# Patient Record
Sex: MALE | Race: WHITE | NOT HISPANIC OR LATINO | Employment: OTHER | URBAN - METROPOLITAN AREA
[De-identification: names, ages, dates, MRNs, and addresses within clinical notes are randomized per-mention and may not be internally consistent; named-entity substitution may affect disease eponyms.]

---

## 2017-09-07 ENCOUNTER — GENERIC CONVERSION - ENCOUNTER (OUTPATIENT)
Dept: OTHER | Facility: OTHER | Age: 72
End: 2017-09-07

## 2018-01-22 VITALS
DIASTOLIC BLOOD PRESSURE: 80 MMHG | SYSTOLIC BLOOD PRESSURE: 118 MMHG | BODY MASS INDEX: 31.19 KG/M2 | WEIGHT: 230.25 LBS | HEIGHT: 72 IN | HEART RATE: 64 BPM

## 2018-01-26 DIAGNOSIS — N40.1 BENIGN PROSTATIC HYPERPLASIA WITH LOWER URINARY TRACT SYMPTOMS, SYMPTOM DETAILS UNSPECIFIED: Primary | ICD-10-CM

## 2018-01-26 RX ORDER — TAMSULOSIN HYDROCHLORIDE 0.4 MG/1
1 CAPSULE ORAL
COMMUNITY
Start: 2014-10-22 | End: 2018-01-26 | Stop reason: SDUPTHER

## 2018-01-29 RX ORDER — TAMSULOSIN HYDROCHLORIDE 0.4 MG/1
0.4 CAPSULE ORAL
Qty: 90 CAPSULE | Refills: 3 | Status: SHIPPED | OUTPATIENT
Start: 2018-01-29 | End: 2019-01-24 | Stop reason: SDUPTHER

## 2018-03-07 DIAGNOSIS — C61 MALIGNANT NEOPLASM OF PROSTATE (HCC): ICD-10-CM

## 2018-03-28 ENCOUNTER — OFFICE VISIT (OUTPATIENT)
Dept: UROLOGY | Facility: AMBULATORY SURGERY CENTER | Age: 73
End: 2018-03-28
Payer: MEDICARE

## 2018-03-28 VITALS
SYSTOLIC BLOOD PRESSURE: 140 MMHG | HEIGHT: 72 IN | DIASTOLIC BLOOD PRESSURE: 80 MMHG | WEIGHT: 235 LBS | HEART RATE: 64 BPM | BODY MASS INDEX: 31.83 KG/M2

## 2018-03-28 DIAGNOSIS — C61 PROSTATE CANCER (HCC): Primary | ICD-10-CM

## 2018-03-28 PROCEDURE — 99214 OFFICE O/P EST MOD 30 MIN: CPT | Performed by: UROLOGY

## 2018-03-28 RX ORDER — CHOLECALCIFEROL (VITAMIN D3) 125 MCG
500 CAPSULE ORAL DAILY
COMMUNITY
End: 2020-06-18 | Stop reason: ALTCHOICE

## 2018-03-28 RX ORDER — MELATONIN
1000 DAILY
COMMUNITY
End: 2020-06-18 | Stop reason: ALTCHOICE

## 2018-03-28 RX ORDER — FINASTERIDE 5 MG/1
1 TABLET, FILM COATED ORAL DAILY
COMMUNITY
Start: 2016-12-09 | End: 2018-11-13 | Stop reason: SDUPTHER

## 2018-03-28 RX ORDER — ATORVASTATIN CALCIUM 20 MG/1
TABLET, FILM COATED ORAL
COMMUNITY
Start: 2010-11-29 | End: 2020-06-03

## 2018-03-28 NOTE — PROGRESS NOTES
3/28/2018    Buford Severs  [birth year]  [id number]        Assessment  [place] 6 prostate cancer, rising PSA, BPH       Discussion  We discussed his most recent PSA of 7 4  When corrected for finasteride this is 14 8  This is his highest PSA level which has increased while on finasteride  He has already had 3 prostate biopsies for his small volume [place] 6 cancer  His last biopsy was in March 2016 was reviewed at [facility]  Prior to repeating a prostate biopsy we discussed performing a multiparametric MRI of the prostate  The patient is amenable with this plan  He will return after the MRI has been completed for additional discussion regarding the possibility of his next biopsy  History of Present Illness  81 y o  male with a history of an elevated PSA as well as a prostate which measures 117 g  He underwent 3 prior prostate biopsies  His last biopsy was in March 2016  He has a history of small volume Sh[place] 6 cancer  In the office today is PSA has risen to 7 4 on finasteride  When corrected for the finasteride this is 14 8  He states that his mild lower urinary tract symptoms have improved on the finasteride  He also reports some regrowth of hair with finasteride  AUA Symptom Score      Review of Systems  Review of Systems   Constitutional: Negative  HENT: Negative  Eyes: Negative  Respiratory: Negative  Cardiovascular: Negative  Gastrointestinal: Negative  Endocrine: Negative  Genitourinary: Negative  Musculoskeletal: Negative  Skin: Negative  Allergic/Immunologic: Negative  Neurological: Negative  Hematological: Negative  Psychiatric/Behavioral: Negative  All other systems reviewed and are negative          Past Medical History  Past Medical History:   Diagnosis Date    Prostate cancer [facility])        Past Social History  Past Surgical History:   Procedure Laterality Date    PROSTATE SURGERY      Biopsy       Past Family History  Family History   Problem Relation Age of Onset    Heart disease Mother     Heart disease Father        Past Social history  Social History     Social History    Marital status: /Civil Union     Spouse name: N/A    Number of children: N/A    Years of education: N/A     Occupational History       still active      Social History Main Topics    Smoking status: Never Smoker    Smokeless tobacco: Never Used    Alcohol use Yes      Comment: social    Drug use: No    Sexual activity: Not on file     Other Topics Concern    Not on file     Social History Narrative    No narrative on file       Current Medications  Current Outpatient Prescriptions   Medication Sig Dispense Refill    aspirin 81 MG tablet Take by mouth      atorvastatin (LIPITOR) 20 mg tablet Take by mouth      cholecalciferol (VITAMIN D3) 1,000 units tablet Take 1,000 Units by mouth daily      cyanocobalamin (VITAMIN B-12) 500 mcg tablet Take 500 mcg by mouth daily      finasteride (PROSCAR) 5 mg tablet Take 1 tablet by mouth daily      tamsulosin (FLOMAX) 0 4 mg Take 1 capsule (0 4 mg total) by mouth daily at bedtime 90 capsule 3     No current facility-administered medications for this visit  Allergies  No Known Allergies    Past Medical History, Social History, Family History, medications and allergies were reviewed  Vitals  Vitals:    03/28/18 0917   BP: 140/80   Pulse: 64   Weight: 107 kg (235 lb)   Height: 6' (1 829 m)       Physical Exam  Physical Exam        Results  No results found for: PSA  No results found for: GLUCOSE, CALCIUM, NA, K, CO2, CL, BUN, CREATININE  No results found for: WBC, HGB, HCT, MCV, PLT      Office Urine Dip  No results found for this or any previous visit (from the past 1 hour(s))  ]      Total visit time was 25 minutes of which over 50% was spent on counseling

## 2018-07-03 ENCOUNTER — TELEPHONE (OUTPATIENT)
Dept: UROLOGY | Facility: AMBULATORY SURGERY CENTER | Age: 73
End: 2018-07-03

## 2018-07-03 DIAGNOSIS — C61 PROSTATE CANCER (HCC): Primary | ICD-10-CM

## 2018-08-15 ENCOUNTER — HOSPITAL ENCOUNTER (OUTPATIENT)
Dept: MRI IMAGING | Facility: HOSPITAL | Age: 73
Discharge: HOME | End: 2018-08-15
Attending: UROLOGY
Payer: MEDICARE

## 2018-08-15 DIAGNOSIS — C61 PROSTATE CANCER (HCC): ICD-10-CM

## 2018-08-15 PROCEDURE — A9585 GADOBUTROL INJECTION: HCPCS | Performed by: INTERNAL MEDICINE

## 2018-08-15 PROCEDURE — 76377 3D RENDER W/INTRP POSTPROCES: CPT

## 2018-08-15 PROCEDURE — 72197 MRI PELVIS W/O & W/DYE: CPT

## 2018-08-15 RX ADMIN — GADOBUTROL 10 ML: 604.72 INJECTION INTRAVENOUS at 09:59

## 2018-08-23 ENCOUNTER — TELEPHONE (OUTPATIENT)
Dept: UROLOGY | Facility: AMBULATORY SURGERY CENTER | Age: 73
End: 2018-08-23

## 2018-08-23 NOTE — TELEPHONE ENCOUNTER
PC from patient's wife stating that patient has been having episodes of dizziness and was wondering if it could be due to the Tamsulosin  I explained that he has been taking the Tamsulosin for a long while and this might not be the cause  I encouraged them to get BP readings  I inquired when he is getting the dizziness and she stated that it is after activity  I advised him to call his PCP immediately and notify him of above  I also scheduled an appointment with Dr Hugo Dow to discuss his MRI and other issues that she wanted discussed

## 2018-08-30 ENCOUNTER — TELEPHONE (OUTPATIENT)
Dept: UROLOGY | Facility: AMBULATORY SURGERY CENTER | Age: 73
End: 2018-08-30

## 2018-08-30 NOTE — TELEPHONE ENCOUNTER
Rush Massey called stating that he cannot make bryanna today @ 9:30t and was wondering if he could have a phone call about the results of his MRI  Called patient back and let him know that Dr Sukhwinder Castanon would call him back   If called before 10:30 [phone], if after 10:30 [phone]

## 2018-08-31 NOTE — TELEPHONE ENCOUNTER
Patient had called again and I explained that Dr Jorge Burns would be getting back with him next  He stated that he would be in his office next Tues pm and then the rest of the week  I will inform Dr Jorge Burns of above

## 2018-11-13 DIAGNOSIS — N40.1 BENIGN PROSTATIC HYPERPLASIA WITH LOWER URINARY TRACT SYMPTOMS, SYMPTOM DETAILS UNSPECIFIED: Primary | ICD-10-CM

## 2018-11-13 RX ORDER — FINASTERIDE 5 MG/1
TABLET, FILM COATED ORAL
Qty: 90 TABLET | Refills: 3 | Status: SHIPPED | OUTPATIENT
Start: 2018-11-13 | End: 2019-10-31 | Stop reason: SDUPTHER

## 2019-01-24 DIAGNOSIS — N40.1 BENIGN PROSTATIC HYPERPLASIA WITH LOWER URINARY TRACT SYMPTOMS, SYMPTOM DETAILS UNSPECIFIED: ICD-10-CM

## 2019-01-29 RX ORDER — TAMSULOSIN HYDROCHLORIDE 0.4 MG/1
CAPSULE ORAL
Qty: 90 CAPSULE | Refills: 3 | Status: SHIPPED | OUTPATIENT
Start: 2019-01-29 | End: 2020-01-23

## 2019-01-31 ENCOUNTER — EVALUATION (OUTPATIENT)
Dept: PHYSICAL THERAPY | Facility: CLINIC | Age: 74
End: 2019-01-31
Payer: MEDICARE

## 2019-01-31 DIAGNOSIS — M62.81 MUSCLE WEAKNESS (GENERALIZED): ICD-10-CM

## 2019-01-31 DIAGNOSIS — R26.81 UNSTEADINESS ON FEET: ICD-10-CM

## 2019-01-31 DIAGNOSIS — G20 PARKINSON'S DISEASE (HCC): Primary | ICD-10-CM

## 2019-01-31 PROCEDURE — 97162 PT EVAL MOD COMPLEX 30 MIN: CPT | Performed by: PHYSICAL THERAPIST

## 2019-01-31 NOTE — LETTER
2019    [facility]   [facility]  [address] [place] [address] [address] [facility]    Patient: Nakita Alfaro   YOB: 1945   Date of Visit: 2019     Encounter Diagnosis     ICD-10-CM    1  Parkinson's disease ([address] ) G20    2  Unsteadiness on feet R26 81    3  Muscle weakness (generalized) M62 81        Dear Dr Diana Hopkins:    Please review the attached Plan of Care from [facility] recent visit  Please verify that you agree therapy should continue by signing the attached document and sending it back to our office  If you have any questions or concerns, please don't hesitate to call  Sincerely,    Efren Hill, PT      Referring Provider:      I certify that I have read the below Plan of Care and certify the need for these services furnished under this plan of treatment while under my care  [facility]  [address] [place] [address] Drive  [address] [place] [address] Avenue: [phone]          PT Evaluation     Today's date: 2019  Patient name: Nakita Alfaro  :   MRN: [id number]  Referring provider: Juan Joseph  Dx:   Encounter Diagnosis     ICD-10-CM    1  Parkinson's disease ([facility] [address] ) G20    2  Unsteadiness on feet R26 81    3  Muscle weakness (generalized) M62 81        Start Time: 1030  Stop Time: 1115  Total time in clinic (min): 45 minutes    Assessment  Assessment details: Nakita Alfaro is a 72 y o  male who presents to the clinic with complaints of decreased balance, strength, and symptoms related to Parkinson's disease  The patient presents with the above listed impairments  He is eager to improve his functional performance and should benefit from skilled physical therapy  Thank you for the referral       Impairments: activity intolerance, impaired balance, impaired physical strength, lacks appropriate home exercise program and poor posture   Understanding of Dx/Px/POC: good   Prognosis: good    Goals  Impairment Goals:  1    Patient will increase hip strength by 1/2 grade in 4 weeks  2  Patient will increase knee strength by 1/2 grade in 4 weeks  3  Patient will increase UE strength by 1/2 grade in 4 weeks    Functional Goals:  1  Patient will be independent with HEP by discharge  2  Patient will increase FOTO to at least a 77 by discharge  3  Patient will decrease mCTSIB by 0 25 points in 6 weeks  4  Patient will tolerate ascending/descending stairs with increased endurance in 6 weeks      Plan  Patient would benefit from: skilled physical therapy  Planned modality interventions: cryotherapy and thermotherapy: hydrocollator packs  Planned therapy interventions: abdominal trunk stabilization, balance, balance/weight bearing training, gait training, graded activity, graded exercise, graded motor, home exercise program, transfer training, therapeutic training, therapeutic exercise, therapeutic activities, stretching, strengthening, patient education, postural training, neuromuscular re-education, muscle pump exercises, motor coordination training, massage, manual therapy, joint mobilization and Bales taping  Frequency: 2x week  Duration in weeks: 6  Treatment plan discussed with: patient        Subjective Evaluation    History of Present Illness  Mechanism of injury: The patient reports that his wife has noticed over a 2 year span decreased balance and gait speed  The patient himself notes a loss of stamina overall    The patient was recently diagnosed with Parkinson's disease and is looking for physical therapy to assist with his strength, balance, and endurance      Pain Location:  No pain reported   Occupation:     Prior Functional Limitations:  AGG:  Ascending/descending stairs, repetitive movements, prolonged walking  Ease:  No easing factors  Patient Goals:  "I want to feel better"    Pain  No pain reported    Patient Goals  Patient goal: "I want to feel better"        Objective     Concurrent Complaints  Negative for night pain, disturbed sleep, bladder dysfunction, bowel dysfunction and saddle (S4) numbness    Active Range of Motion   Left Shoulder   Normal active range of motion    Right Shoulder   Normal active range of motion    Strength/Myotome Testing     Left Shoulder     Planes of Motion   Flexion: 4   Abduction: 4   External rotation at 0°:  4+   Internal rotation at 0°:  4+     Right Shoulder     Planes of Motion   Flexion: 4   Abduction: 4   External rotation at 0°:  4+   Internal rotation at 0°:  4+     Left Elbow   Flexion: 4  Extension: 4    Right Elbow   Flexion: 4  Extension: 4    Left Hip   Planes of Motion   Flexion: 4+  Extension: 4  Abduction: 4    Right Hip   Planes of Motion   Flexion: 4+  Extension: 4  Abduction: 4    Left Knee   Extension: 4+    Right Knee   Extension: 4+    Left Ankle/Foot   Dorsiflexion: 5    Right Ankle/Foot   Dorsiflexion: 5    Functional Assessment        Comments  TUG - 10 seconds  30 sec STS - 10x  mCTSIB - 2 5 composite score      Flowsheet Rows      Most Recent Value   PT/OT G-Codes   Current Score  75   Projected Score  77   FOTO information reviewed  Yes   Assessment Type  Evaluation          Diagnosis:    Precautions:    Manuals        PROM        Mobs                        Exercise Diary        Recumbent Bike        SLR        SL Hip ABD        Clamshells        X-Walks        Bridges                Feet Together        SLS        Tandem Stance                TB Rows/EXT                                                        Modalities             CP PRN

## 2019-01-31 NOTE — PROGRESS NOTES
PT Evaluation     Today's date: 2019  Patient name: Rose Marie Sanchez  :   MRN: [id number]  Referring provider: Laine Rogers  Dx:   Encounter Diagnosis     ICD-10-CM    1  Parkinson's disease ([address] ) G20    2  Unsteadiness on feet R26 81    3  Muscle weakness (generalized) M62 81        Start Time: 1030  Stop Time: 1115  Total time in clinic (min): 45 minutes    Assessment  Assessment details: Rose Marie Sanchez is a 72 y o  male who presents to the clinic with complaints of decreased balance, strength, and symptoms related to Parkinson's disease  The patient presents with the above listed impairments  He is eager to improve his functional performance and should benefit from skilled physical therapy  Thank you for the referral       Impairments: activity intolerance, impaired balance, impaired physical strength, lacks appropriate home exercise program and poor posture   Understanding of Dx/Px/POC: good   Prognosis: good    Goals  Impairment Goals:  1  Patient will increase hip strength by 1/2 grade in 4 weeks  2  Patient will increase knee strength by 1/2 grade in 4 weeks  3  Patient will increase UE strength by 1/2 grade in 4 weeks    Functional Goals:  1  Patient will be independent with HEP by discharge  2  Patient will increase FOTO to at least a 77 by discharge  3  Patient will decrease mCTSIB by 0 25 points in 6 weeks  4    Patient will tolerate ascending/descending stairs with increased endurance in 6 weeks      Plan  Patient would benefit from: skilled physical therapy  Planned modality interventions: cryotherapy and thermotherapy: hydrocollator packs  Planned therapy interventions: abdominal trunk stabilization, balance, balance/weight bearing training, gait training, graded activity, graded exercise, graded motor, home exercise program, transfer training, therapeutic training, therapeutic exercise, therapeutic activities, stretching, strengthening, patient education, postural training, neuromuscular re-education, muscle pump exercises, motor coordination training, massage, manual therapy, joint mobilization and Bales taping  Frequency: 2x week  Duration in weeks: 6  Treatment plan discussed with: patient        Subjective Evaluation    History of Present Illness  Mechanism of injury: The patient reports that his wife has noticed over a 2 year span decreased balance and gait speed  The patient himself notes a loss of stamina overall    The patient was recently diagnosed with Parkinson's disease and is looking for physical therapy to assist with his strength, balance, and endurance      Pain Location:  No pain reported   Occupation:     Prior Functional Limitations:  AGG:  Ascending/descending stairs, repetitive movements, prolonged walking  Ease:  No easing factors  Patient Goals:  "I want to feel better"    Pain  No pain reported    Patient Goals  Patient goal: "I want to feel better"        Objective     Concurrent Complaints  Negative for night pain, disturbed sleep, bladder dysfunction, bowel dysfunction and saddle (S4) numbness    Active Range of Motion   Left Shoulder   Normal active range of motion    Right Shoulder   Normal active range of motion    Strength/Myotome Testing     Left Shoulder     Planes of Motion   Flexion: 4   Abduction: 4   External rotation at 0°: 4+   Internal rotation at 0°: 4+     Right Shoulder     Planes of Motion   Flexion: 4   Abduction: 4   External rotation at 0°: 4+   Internal rotation at 0°: 4+     Left Elbow   Flexion: 4  Extension: 4    Right Elbow   Flexion: 4  Extension: 4    Left Hip   Planes of Motion   Flexion: 4+  Extension: 4  Abduction: 4    Right Hip   Planes of Motion   Flexion: 4+  Extension: 4  Abduction: 4    Left Knee   Extension: 4+    Right Knee   Extension: 4+    Left Ankle/Foot   Dorsiflexion: 5    Right Ankle/Foot   Dorsiflexion: 5    Functional Assessment        Comments  TUG - 10 seconds  30 sec STS - 10x  mCTSIB - 2 5 composite score      Flowsheet Rows      Most Recent Value   PT/OT G-Codes   Current Score  75   Projected Score  77   FOTO information reviewed  Yes   Assessment Type  Evaluation          Diagnosis:    Precautions:    Manuals        PROM        Mobs                        Exercise Diary        Recumbent Bike        SLR        SL Hip ABD        Clamshells        X-Walks        Bridges                Feet Together        SLS        Tandem Stance                TB Rows/EXT                                                        Modalities             CP PRN

## 2019-02-14 ENCOUNTER — OFFICE VISIT (OUTPATIENT)
Dept: PHYSICAL THERAPY | Facility: CLINIC | Age: 74
End: 2019-02-14
Payer: MEDICARE

## 2019-02-14 DIAGNOSIS — M62.81 MUSCLE WEAKNESS (GENERALIZED): ICD-10-CM

## 2019-02-14 DIAGNOSIS — R26.81 UNSTEADINESS ON FEET: ICD-10-CM

## 2019-02-14 DIAGNOSIS — G20 PARKINSON'S DISEASE (HCC): Primary | ICD-10-CM

## 2019-02-14 PROCEDURE — 97112 NEUROMUSCULAR REEDUCATION: CPT | Performed by: PHYSICAL THERAPIST

## 2019-02-14 PROCEDURE — 97110 THERAPEUTIC EXERCISES: CPT | Performed by: PHYSICAL THERAPIST

## 2019-02-14 NOTE — PROGRESS NOTES
Daily Note     Today's date: 2019  Patient name: Deepika Lazo  :   MRN: [id number]  Referring provider: Veronica Bloom  Dx:   Encounter Diagnosis     ICD-10-CM    1  Parkinson's disease ([address] ) G20    2  Unsteadiness on feet R26 81    3  Muscle weakness (generalized) M62 81        Start Time: 1620  Stop Time: 1702  Total time in clinic (min): 42 minutes    Subjective:  "I feel OK  I've been working out in my basement to prepare for this  I've been having some trouble going down my steps at home  I feel unsteady"      Objective: See treatment diary below      Assessment: Tolerated treatment well  Patient would benefit from continued PT  Patient did well with his first visit today  Able to achieve muscular fatigue s/p session  Needed verbal cues and tactile cues for balance  Continue to progress strength and balance as able  Plan: Progress treatment as tolerated          Diagnosis:    Precautions:    Manuals        PROM        Mobs                        Exercise Diary        Recumbent Bike        SLR 2# 2x10 ea       SL Hip ABD 3x10 ea       Clamshells RTB 3x10 ea       X-Walks        Bridges 3x10       Step Downs 6" 2x10 eccentric down        Tap Downs 6" 2x10 ea               Feet Together Foam 15" x 2        SLS        Tandem Stance        Wobble Board 20" x 2 AP               TB Rows/EXT   GTB EXT 2x10                                                     Modalities             CP PRN

## 2019-02-18 ENCOUNTER — OFFICE VISIT (OUTPATIENT)
Dept: PHYSICAL THERAPY | Facility: CLINIC | Age: 74
End: 2019-02-18
Payer: MEDICARE

## 2019-02-18 DIAGNOSIS — G20 PARKINSON'S DISEASE (HCC): Primary | ICD-10-CM

## 2019-02-18 DIAGNOSIS — M62.81 MUSCLE WEAKNESS (GENERALIZED): ICD-10-CM

## 2019-02-18 DIAGNOSIS — R26.81 UNSTEADINESS ON FEET: ICD-10-CM

## 2019-02-18 PROCEDURE — 97110 THERAPEUTIC EXERCISES: CPT | Performed by: PHYSICAL THERAPIST

## 2019-02-18 PROCEDURE — 97112 NEUROMUSCULAR REEDUCATION: CPT | Performed by: PHYSICAL THERAPIST

## 2019-02-19 NOTE — PROGRESS NOTES
Daily Note     Today's date: 2019  Patient name: Polo Gaytan  :   MRN: [id number]  Referring provider: Heather Bhatti  Dx:   Encounter Diagnosis     ICD-10-CM    1  Parkinson's disease ([address] ) G20    2  Unsteadiness on feet R26 81    3  Muscle weakness (generalized) M62 81        Start Time: 1638  Stop Time: 1716  Total time in clinic (min): 38 minutes    Subjective:  "I feel great  I had a lot of energy after leaving last session"      Objective: See treatment diary below      Assessment: Tolerated treatment well  Patient would benefit from continued PT  Patient was able to progress PREs today and achieve muscular fatigue  We will progress the patient's balance as we continue  Progress as able  Plan: Progress treatment as tolerated  Diagnosis:    Precautions:    Manuals       PROM        Mobs                        Exercise Diary        Recumbent Bike        SLR 2# 2x10 ea 2 5# 2x10 ea      SL Hip ABD 3x10 ea 3x10 ea      Clamshells RTB 3x10 ea RTB 3x10 ea      X-Walks        Bridges 3x10 2x10 w/ 10# / 2x10 on OSB      Hamstirng Curls  Seated GTB 2x10 ea / Standing x20 ea 1 hand sup  SideStepping  GTB 10' x 6      Hip EXT  Stndg   2x10 ea                      Step Downs 6" 2x10 eccentric down        Tap Downs 6" 2x10 ea               Feet Together Foam 15" x 2        SLS        Tandem Stance        Wobble Board 20" x 2 AP               TB Rows/EXT   GTB EXT 2x10                                                     Modalities             CP PRN

## 2019-02-20 ENCOUNTER — APPOINTMENT (OUTPATIENT)
Dept: PHYSICAL THERAPY | Facility: CLINIC | Age: 74
End: 2019-02-20
Payer: MEDICARE

## 2019-02-21 ENCOUNTER — OFFICE VISIT (OUTPATIENT)
Dept: PHYSICAL THERAPY | Facility: CLINIC | Age: 74
End: 2019-02-21
Payer: MEDICARE

## 2019-02-21 DIAGNOSIS — G20 PARKINSON'S DISEASE (HCC): Primary | ICD-10-CM

## 2019-02-21 DIAGNOSIS — R26.81 UNSTEADINESS ON FEET: ICD-10-CM

## 2019-02-21 DIAGNOSIS — M62.81 MUSCLE WEAKNESS (GENERALIZED): ICD-10-CM

## 2019-02-21 PROCEDURE — 97112 NEUROMUSCULAR REEDUCATION: CPT | Performed by: PHYSICAL THERAPIST

## 2019-02-21 PROCEDURE — 97110 THERAPEUTIC EXERCISES: CPT | Performed by: PHYSICAL THERAPIST

## 2019-02-21 NOTE — PROGRESS NOTES
Daily Note     Today's date: 2019  Patient name: Deepika Lazo  : [birth year]  MRN: [id number]  Referring provider: Veronica Bloom  Dx:   Encounter Diagnosis     ICD-10-CM    1  Parkinson's disease ([address] ) G20    2  Unsteadiness on feet R26 81    3  Muscle weakness (generalized) M62 81        Start Time: 1618  Stop Time: 1711  Total time in clinic (min): 53 minutes    Subjective:  "I feel like I'm moving better  I definitely think this has been helping"      Objective: See treatment diary below      Assessment: Tolerated treatment well  Patient would benefit from continued PT  Patient challenged today with increased PREs and increased balance exercises  Able to complete all tasks and achieve muscular fatigue  Patient educated on need for continued practice with balance exercises  Patient will progress with further visits  Plan: Progress treatment as tolerated  Diagnosis:    Precautions:    Manuals      PROM        Mobs                        Exercise Diary        Recumbent Bike        SLR 2# 2x10 ea 2 5# 2x10 ea 3# 3x10 ea     SL Hip ABD 3x10 ea 3x10 ea 3# 3x10 ea     Clamshells RTB 3x10 ea RTB 3x10 ea GTB 3x10 ea     X-Walks        Bridges 3x10 2x10 w/ 10# / 2x10 on OSB 2x10 on OSB / 2x10 OSB hams  Hamstirng Curls  Seated GTB 2x10 ea / Standing x20 ea 1 hand sup  GTB 2x10 ea     SideStepping  GTB 10' x 6      Hip EXT  Stndg   2x10 ea      Meddybemps and Arrows   GTB 3x10 ea             Step Downs 6" 2x10 eccentric down        Tap Downs 6" 2x10 ea               Feet Together Foam 15" x 2        SLS        Tandem Stance        Wobble Board 20" x 2 AP       Biodex   LoS - 40%  Maze - 20%                     TB Rows/EXT   GTB EXT 2x10                                                     Modalities             CP PRN

## 2019-02-25 ENCOUNTER — OFFICE VISIT (OUTPATIENT)
Dept: PHYSICAL THERAPY | Facility: CLINIC | Age: 74
End: 2019-02-25
Payer: MEDICARE

## 2019-02-25 DIAGNOSIS — R26.81 UNSTEADINESS ON FEET: ICD-10-CM

## 2019-02-25 DIAGNOSIS — M62.81 MUSCLE WEAKNESS (GENERALIZED): ICD-10-CM

## 2019-02-25 DIAGNOSIS — G20 PARKINSON'S DISEASE (HCC): Primary | ICD-10-CM

## 2019-02-25 PROCEDURE — 97110 THERAPEUTIC EXERCISES: CPT

## 2019-02-25 PROCEDURE — 97112 NEUROMUSCULAR REEDUCATION: CPT

## 2019-02-25 NOTE — PROGRESS NOTES
Daily Note     Today's date: 2019  Patient name: Ladarius Celaya  :   MRN: [id number]  Referring provider: Aranda Socks  Dx:   Encounter Diagnosis     ICD-10-CM    1  Parkinson's disease ([address] ) G20    2  Unsteadiness on feet R26 81    3  Muscle weakness (generalized) M62 81                   Subjective: Patient reports, "I have a problem with balance more than anything else, but I am feeling good"  Objective: See treatment diary below      Assessment: Tolerated treatment well  Patient exhibited good technique with therapeutic exercises and would benefit from continued PT  Cuing needed to maintain proper form with exercises throughout the session  Muscle fatigue noted with resisted exercises  Added balance exercises this visit; tolerated well  Plan: Continue per plan of care  Diagnosis:    Precautions:    Manuals     PROM        Mobs                        Exercise Diary        Recumbent Bike        SLR 2# 2x10 ea 2 5# 2x10 ea 3# 3x10 ea 3# 3x10 ea    SL Hip ABD 3x10 ea 3x10 ea 3# 3x10 ea 3#, 3x10 ea    Clamshells RTB 3x10 ea RTB 3x10 ea GTB 3x10 ea GTB, 3x10     X-Walks        Bridges 3x10 2x10 w/ 10# / 2x10 on OSB 2x10 on OSB / 2x10 OSB hams  2x10 on OSB / 2x10 OSB hams    Hamstirng Curls  Seated GTB 2x10 ea / Standing x20 ea 1 hand sup  GTB 2x10 ea     SideStepping  GTB 10' x 6      Hip EXT  Stndg   2x10 ea      Sesser and Arrows   GTB 3x10 ea GTB 3x10 ea            Step Downs 6" 2x10 eccentric down        Tap Downs 6" 2x10 ea               Feet Together Foam 15" x 2     Foam: 3x 20 sec    SLS     2x 15 sec ea    Tandem Stance    Foam: 2x 20 sec ea    Wobble Board 20" x 2 AP       Biodex   LoS - 40%  Maze - 20% With one UE: LOS:2x (76%,57%)  Maze: 2x (96%, 64%)                    TB Rows/EXT   GTB EXT 2x10                                                     Modalities             CP PRN

## 2019-02-28 ENCOUNTER — OFFICE VISIT (OUTPATIENT)
Dept: PHYSICAL THERAPY | Facility: CLINIC | Age: 74
End: 2019-02-28
Payer: MEDICARE

## 2019-02-28 DIAGNOSIS — R26.81 UNSTEADINESS ON FEET: ICD-10-CM

## 2019-02-28 DIAGNOSIS — M62.81 MUSCLE WEAKNESS (GENERALIZED): ICD-10-CM

## 2019-02-28 DIAGNOSIS — G20 PARKINSON'S DISEASE (HCC): Primary | ICD-10-CM

## 2019-02-28 PROCEDURE — 97110 THERAPEUTIC EXERCISES: CPT

## 2019-02-28 PROCEDURE — 97112 NEUROMUSCULAR REEDUCATION: CPT

## 2019-02-28 NOTE — PROGRESS NOTES
Daily Note     Today's date: 2019  Patient name: Joyceann Blizzard  : [birth year]  MRN: [id number]  Referring provider: José Miguel Umaña  Dx:   Encounter Diagnosis     ICD-10-CM    1  Parkinson's disease ([address] ) G20    2  Unsteadiness on feet R26 81    3  Muscle weakness (generalized) M62 81                   Subjective: Patient reports that he is feeling pretty good today  He reports that he was not sore after his last session  Objective: See treatment diary below      Assessment: Tolerated treatment well  Patient demonstrated fatigue post treatment and would benefit from continued PT  Patient exhibits good tolerance to increased PRE's this visit  Moderate muscle fatigue noted at end of session  He was able to perform biodex tasks without the use of UE; but had difficulty with lateral shifting to the right  Will continue to progress as able  Plan: Continue per plan of care  Diagnosis:    Precautions:    Manuals      PROM        Mobs                        Exercise Diary        Recumbent Bike        SLR  2 5# 2x10 ea 3# 3x10 ea 3# 3x10 ea 4#, 3x10    SL Hip ABD  3x10 ea 3# 3x10 ea 3#, 3x10 ea 4#, 2x10    Clamshells  RTB 3x10 ea GTB 3x10 ea GTB, 3x10  BTB, 3x10    X-Walks        Bridges  2x10 w/ 10# / 2x10 on OSB 2x10 on OSB / 2x10 OSB hams  2x10 on OSB / 2x10 OSB hams 3x10 on OSB  3x10 OSB hams   Hamstirng Curls  Seated GTB 2x10 ea / Standing x20 ea 1 hand sup  GTB 2x10 ea     SideStepping  GTB 10' x 6      Hip EXT  Stndg   2x10 ea      Zephyr and Arrows   GTB 3x10 ea GTB 3x10 ea Blue, 3x10 ea           Step Recio Sachs     6 in, 2x10 ea             Feet Together     Foam: 3x 20 sec  Foam + EC: 3x 30 sec   SLS     2x 15 sec ea 2x 20 sec ea   Tandem Stance    Foam: 2x 20 sec ea Foam: 3x 30 sec    Wobble Board        Biodex   LoS - 40%  Maze - 20% With one UE: LOS:2x (76%,57%)  Maze: 2x (96%, 64%) No UE:  LOS: 13%, 33%  Maze: 78%, 60%                   TB Rows/EXT                                                       Modalities             CP PRN

## 2019-03-04 ENCOUNTER — TRANSCRIBE ORDERS (OUTPATIENT)
Dept: PHYSICAL THERAPY | Facility: CLINIC | Age: 74
End: 2019-03-04

## 2019-03-04 ENCOUNTER — EVALUATION (OUTPATIENT)
Dept: PHYSICAL THERAPY | Facility: CLINIC | Age: 74
End: 2019-03-04
Payer: MEDICARE

## 2019-03-04 DIAGNOSIS — M62.81 MUSCLE WEAKNESS (GENERALIZED): ICD-10-CM

## 2019-03-04 DIAGNOSIS — G20 PARKINSON'S DISEASE (HCC): Primary | ICD-10-CM

## 2019-03-04 DIAGNOSIS — R26.81 UNSTEADINESS ON FEET: ICD-10-CM

## 2019-03-04 PROCEDURE — 97110 THERAPEUTIC EXERCISES: CPT | Performed by: PHYSICAL THERAPIST

## 2019-03-04 PROCEDURE — 97112 NEUROMUSCULAR REEDUCATION: CPT | Performed by: PHYSICAL THERAPIST

## 2019-03-04 NOTE — PROGRESS NOTES
PT Re-Evaluation     Today's date: 3/4/2019  Patient name: Fady Doll  : [id number]  MRN: [id number]  Referring provider: Angeli Shaikh  Dx:   Encounter Diagnosis     ICD-10-CM    1  Parkinson's disease ([address] ) G20    2  Unsteadiness on feet R26 81    3  Muscle weakness (generalized) M62 81        Start Time: 1702  Stop Time: 1740  Total time in clinic (min): 38 minutes    Assessment  Assessment details: Fady Doll is a 72 y o  male who has been consistent with attending and participating in skilled physical therapy sessions  He has seen increases in his strength in both upper extremities and lower extremities  He has seen decreases in his TUG score as well as increases in his 30 sec sit to stand test   The patient has seen slight decreases in his composite score of the mCTSIB, but can continue to improve with his balance to decrease his risk for falls  He will benefit from further skilled physical therapy sessions to further address his functional deficits  Impairments: activity intolerance, impaired balance, impaired physical strength, lacks appropriate home exercise program and poor posture   Understanding of Dx/Px/POC: good   Prognosis: good    Goals  Impairment Goals:  1  Patient will increase hip strength by 1/2 grade in 4 weeks - MET  2  Patient will increase knee strength by 1/2 grade in 4 weeks - MET  3  Patient will increase UE strength by 1/2 grade in 4 weeks - MET    Functional Goals:  1  Patient will be independent with HEP by discharge - PARTIALLY MET   2  Patient will increase FOTO to at least a 77 by discharge - PARTIALLY MET   3  Patient will decrease mCTSIB by 0 25 points in 6 weeks - PARTIALLY MET   4    Patient will tolerate ascending/descending stairs with increased endurance in 6 weeks - MET      Plan  Patient would benefit from: skilled physical therapy  Planned modality interventions: cryotherapy and thermotherapy: hydrocollator packs  Planned therapy interventions: abdominal trunk stabilization, balance, balance/weight bearing training, gait training, graded activity, graded exercise, graded motor, home exercise program, transfer training, therapeutic training, therapeutic exercise, therapeutic activities, stretching, strengthening, patient education, postural training, neuromuscular re-education, muscle pump exercises, motor coordination training, massage, manual therapy, joint mobilization and Bales taping  Frequency: 2x week  Duration in weeks: 3  Treatment plan discussed with: patient        Subjective Evaluation    History of Present Illness  Mechanism of injury: Patient reports he has improved "50-60%" since starting physical therapy  The patient reports improvement with balance and feeling "better" after getting home from work  He states he feels he needs to work on balance such as getting up too quick from a chair        Pain Location:  No pain reported   Occupation:     Prior Functional Limitations:  AGG:  Ascending/descending stairs, repetitive movements, prolonged walking  Ease:  No easing factors  Patient Goals:  "I want to feel better"    Pain  No pain reported    Patient Goals  Patient goal: "I want to feel better"        Objective     Concurrent Complaints  Negative for night pain, disturbed sleep, bladder dysfunction, bowel dysfunction and saddle (S4) numbness    Active Range of Motion   Left Shoulder   Normal active range of motion    Right Shoulder   Normal active range of motion    Strength/Myotome Testing     Left Shoulder     Planes of Motion   Flexion: 4+   Abduction: 4+   External rotation at 0°: 4+   Internal rotation at 0°: 4+     Right Shoulder     Planes of Motion   Flexion: 4+   Abduction: 4+   External rotation at 0°: 4+   Internal rotation at 0°: 4+     Left Elbow   Flexion: 4  Extension: 4    Right Elbow   Flexion: 4  Extension: 4    Left Hip   Planes of Motion   Flexion: 5  Extension: 4+  Abduction: 4+    Right Hip   Planes of Motion   Flexion: 5  Extension: 4+  Abduction: 4+    Left Knee   Extension: 5    Right Knee   Extension: 5    Left Ankle/Foot   Dorsiflexion: 5    Right Ankle/Foot   Dorsiflexion: 5    Functional Assessment        Comments  TUG - 10 seconds, 8 seconds 3/4  30 sec STS - 10x, 12 5x 3/4  mCTSIB - 2 5 composite score, 2 29 3/4       Flowsheet Rows      Most Recent Value   PT/OT G-Codes   Current Score  86   Projected Score  77   Assessment Type  Re-evaluation          Diagnosis:    Precautions:    Manuals 3/4 2/18 2/21 2/25 2/28    PROM        Mobs                        Exercise Diary        Recumbent Bike        SLR  2 5# 2x10 ea 3# 3x10 ea 3# 3x10 ea 4#, 3x10    SL Hip ABD  3x10 ea 3# 3x10 ea 3#, 3x10 ea 4#, 2x10    Clamshells  RTB 3x10 ea GTB 3x10 ea GTB, 3x10  BTB, 3x10    X-Walks        Bridges  2x10 w/ 10# / 2x10 on OSB 2x10 on OSB / 2x10 OSB hams  2x10 on OSB / 2x10 OSB hams 3x10 on OSB  3x10 OSB hams   Hamstirng Curls  Seated GTB 2x10 ea / Standing x20 ea 1 hand sup  GTB 2x10 ea     SideStepping  GTB 10' x 6      Hip EXT  Stndg   2x10 ea      [place] and Arrows   GTB 3x10 ea GTB 3x10 ea Blue, 3x10 ea           Step Recio Sachs     6 in, 2x10 ea             Feet Together     Foam: 3x 20 sec  Foam + EC: 3x 30 sec   SLS     2x 15 sec ea 2x 20 sec ea   Tandem Stance    Foam: 2x 20 sec ea Foam: 3x 30 sec    Wobble Board        Biodex MCTSIB:  compositte 2 29  LoS - 40%  Maze - 20% With one UE: LOS:2x (76%,57%)  Maze: 2x (96%, 64%) No UE:  LOS: 13%, 33%  Maze: 78%, 60%   Review of FOTO / Re-Evaluaiton RT, PT       Review of HEP RT, PT                                                             Modalities             CP PRN

## 2019-03-04 NOTE — LETTER
2019    [facility]   [address] [address]    Patient: Nakita Alfaro   YOB: 1945   Date of Visit: 3/4/2019     Encounter Diagnosis     ICD-10-CM    1  Parkinson's disease ([address] ) G20    2  Unsteadiness on feet R26 81    3  Muscle weakness (generalized) M62 81        Dear Dr Diana Hopkins:    Please review the attached Plan of Care from [facility] [place] [facility] recent visit  Please verify that you agree therapy should continue by signing the attached document and sending it back to our office  If you have any questions or concerns, please don't hesitate to call  Sincerely,    Efren Hill, PT      Referring Provider:      I certify that I have read the below Plan of Care and certify the need for these services furnished under this plan of treatment while under my care  [facility]  [address]  [address] [place] [address] [address]: [phone]          PT Re-Evaluation     Today's date: 3/4/2019  Patient name: Nakita Alfaro  :   MRN: [id number]  Referring provider: Juan Joseph  Dx:   Encounter Diagnosis     ICD-10-CM    1  Parkinson's disease ([address] ) G20    2  Unsteadiness on feet R26 81    3  Muscle weakness (generalized) M62 81        Start Time: 1702  Stop Time: 1740  Total time in clinic (min): 38 minutes    Assessment  Assessment details: Nakita Alfaro is a 72 y o  male who has been consistent with attending and participating in skilled physical therapy sessions  He has seen increases in his strength in both upper extremities and lower extremities  He has seen decreases in his TUG score as well as increases in his 30 sec sit to stand test   The patient has seen slight decreases in his composite score of the mCTSIB, but can continue to improve with his balance to decrease his risk for falls  He will benefit from further skilled physical therapy sessions to further address his functional deficits      Impairments: activity intolerance, impaired balance, impaired physical strength, lacks appropriate home exercise program and poor posture   Understanding of Dx/Px/POC: good   Prognosis: good    Goals  Impairment Goals:  1  Patient will increase hip strength by 1/2 grade in 4 weeks - MET  2  Patient will increase knee strength by 1/2 grade in 4 weeks - MET  3  Patient will increase UE strength by 1/2 grade in 4 weeks - MET    Functional Goals:  1  Patient will be independent with HEP by discharge - PARTIALLY MET   2  Patient will increase FOTO to at least a 77 by discharge - PARTIALLY MET   3  Patient will decrease mCTSIB by 0 25 points in 6 weeks - PARTIALLY MET   4  Patient will tolerate ascending/descending stairs with increased endurance in 6 weeks - MET      Plan  Patient would benefit from: skilled physical therapy  Planned modality interventions: cryotherapy and thermotherapy: hydrocollator packs  Planned therapy interventions: abdominal trunk stabilization, balance, balance/weight bearing training, gait training, graded activity, graded exercise, graded motor, home exercise program, transfer training, therapeutic training, therapeutic exercise, therapeutic activities, stretching, strengthening, patient education, postural training, neuromuscular re-education, muscle pump exercises, motor coordination training, massage, manual therapy, joint mobilization and Bales taping  Frequency: 2x week  Duration in weeks: 3  Treatment plan discussed with: patient        Subjective Evaluation    History of Present Illness  Mechanism of injury: Patient reports he has improved "50-60%" since starting physical therapy  The patient reports improvement with balance and feeling "better" after getting home from work  He states he feels he needs to work on balance such as getting up too quick from a chair        Pain Location:  No pain reported   Occupation:     Prior Functional Limitations:  AGG:  Ascending/descending stairs, repetitive movements, prolonged walking  Ease:  No easing factors  Patient Goals:  "I want to feel better"    Pain  No pain reported    Patient Goals  Patient goal: "I want to feel better"        Objective     Concurrent Complaints  Negative for night pain, disturbed sleep, bladder dysfunction, bowel dysfunction and saddle (S4) numbness    Active Range of Motion   Left Shoulder   Normal active range of motion    Right Shoulder   Normal active range of motion    Strength/Myotome Testing     Left Shoulder     Planes of Motion   Flexion: 4+   Abduction: 4+   External rotation at 0°:  4+   Internal rotation at 0°:  4+     Right Shoulder     Planes of Motion   Flexion: 4+   Abduction: 4+   External rotation at 0°:  4+   Internal rotation at 0°:  4+     Left Elbow   Flexion: 4  Extension: 4    Right Elbow   Flexion: 4  Extension: 4    Left Hip   Planes of Motion   Flexion: 5  Extension: 4+  Abduction: 4+    Right Hip   Planes of Motion   Flexion: 5  Extension: 4+  Abduction: 4+    Left Knee   Extension: 5    Right Knee   Extension: 5    Left Ankle/Foot   Dorsiflexion: 5    Right Ankle/Foot   Dorsiflexion: 5    Functional Assessment        Comments  TUG - 10 seconds, 8 seconds 3/4  30 sec STS - 10x, 12 5x 3/4  mCTSIB - 2 5 composite score, 2 29 3/4       Flowsheet Rows      Most Recent Value   PT/OT G-Codes   Current Score  86   Projected Score  77   Assessment Type  Re-evaluation          Diagnosis:    Precautions:    Manuals 3/4 2/18 2/21 2/25 2/28    PROM        Mobs                        Exercise Diary        Recumbent Bike        SLR  2 5# 2x10 ea 3# 3x10 ea 3# 3x10 ea 4#, 3x10    SL Hip ABD  3x10 ea 3# 3x10 ea 3#, 3x10 ea 4#, 2x10    Clamshells  RTB 3x10 ea GTB 3x10 ea GTB, 3x10  BTB, 3x10    X-Walks        Bridges  2x10 w/ 10# / 2x10 on OSB 2x10 on OSB / 2x10 OSB hams  2x10 on OSB / 2x10 OSB hams 3x10 on OSB  3x10 OSB hams   Hamstirng Curls  Seated GTB 2x10 ea / Standing x20 ea 1 hand sup   GTB 2x10 ea SideStepping  GTB 10' x 6      Hip EXT  Stndg   2x10 ea      [place] and Arrows   GTB 3x10 ea GTB 3x10 ea Blue, 3x10 ea           Step Recio Sachs     6 in, 2x10 ea             Feet Together     Foam: 3x 20 sec  Foam + EC: 3x 30 sec   SLS     2x 15 sec ea 2x 20 sec ea   Tandem Stance    Foam: 2x 20 sec ea Foam: 3x 30 sec    Wobble Board        Biodex MCTSIB:  compositte 2 29  LoS - 40%  Maze - 20% With one UE: LOS:2x (76%,57%)  Maze: 2x (96%, 64%) No UE:  LOS: 13%, 33%  Maze: 78%, 60%   Review of FOTO / Re-Evaluaiton RT, PT       Review of HEP RT, PT                                                             Modalities             CP PRN

## 2019-03-07 ENCOUNTER — OFFICE VISIT (OUTPATIENT)
Dept: PHYSICAL THERAPY | Facility: CLINIC | Age: 74
End: 2019-03-07
Payer: MEDICARE

## 2019-03-07 DIAGNOSIS — G20 PARKINSON'S DISEASE (HCC): Primary | ICD-10-CM

## 2019-03-07 DIAGNOSIS — R26.81 UNSTEADINESS ON FEET: ICD-10-CM

## 2019-03-07 DIAGNOSIS — M62.81 MUSCLE WEAKNESS (GENERALIZED): ICD-10-CM

## 2019-03-07 PROCEDURE — 97110 THERAPEUTIC EXERCISES: CPT | Performed by: PHYSICAL THERAPIST

## 2019-03-07 PROCEDURE — 97112 NEUROMUSCULAR REEDUCATION: CPT | Performed by: PHYSICAL THERAPIST

## 2019-03-07 NOTE — PROGRESS NOTES
Daily Note     Today's date: 3/7/2019  Patient name: Marvin Hassan  :   MRN: [id number]  Referring provider: Mariam Schneider  Dx:   Encounter Diagnosis     ICD-10-CM    1  Parkinson's disease ([address] ) G20    2  Unsteadiness on feet R26 81    3  Muscle weakness (generalized) M62 81        Start Time: 1628  Stop Time: 1711  Total time in clinic (min): 43 minutes    Subjective:  "I feel good  I think I'm doing great"      Objective: See treatment diary below      Assessment: Tolerated treatment well  Patient would benefit from continued PT  Patient was able to tolerate increased PREs today  DGI tested and patient with perfect score  No loss of balance noted during turns or stops  Patient is progressing well towards goals  Plan: Progress treatment as tolerated  Diagnosis:    Precautions:    Manuals 3/4 3/7 2/21 2/25 2/28    PROM        Mobs                        Exercise Diary        Recumbent Bike  6' with resistance      SLR  4# 3x10 3# 3x10 ea 3# 3x10 ea 4#, 3x10    SL Hip ABD   3# 3x10 ea 3#, 3x10 ea 4#, 2x10    Clamshells  Semi-Reclined Black 3x10 GTB 3x10 ea GTB, 3x10  BTB, 3x10    X-Walks        Bridges   2x10 on OSB / 2x10 OSB hams   2x10 on OSB / 2x10 OSB hams 3x10 on OSB  3x10 OSB hams   Hamstirng Curls   GTB 2x10 ea     SideStepping        Hip EXT        [place] and Arrows   GTB 3x10 ea GTB 3x10 ea Blue, 3x10 ea   Leg Press  140# 3x10 & 2x10      Step Downs        Tap Downs     6 in, 2x10 ea             Feet Together     Foam: 3x 20 sec  Foam + EC: 3x 30 sec   SLS     2x 15 sec ea 2x 20 sec ea   Tandem Stance  DGI Testing   Foam: 2x 20 sec ea Foam: 3x 30 sec    Wobble Board        Biodex MCTSIB:  compositte 2 29 LoS, Maze, and Catch Game LoS - 40%  Maze - 20% With one UE: LOS:2x (76%,57%)  Maze: 2x (96%, 64%) No UE:  LOS: 13%, 33%  Maze: 78%, 60%   Review of FOTO / Re-Evaluaiton RT, PT       Review of HEP RT, PT                                                          Modalities             CP PRN

## 2019-03-14 ENCOUNTER — OFFICE VISIT (OUTPATIENT)
Dept: PHYSICAL THERAPY | Facility: CLINIC | Age: 74
End: 2019-03-14
Payer: MEDICARE

## 2019-03-14 DIAGNOSIS — G20 PARKINSON'S DISEASE (HCC): Primary | ICD-10-CM

## 2019-03-14 DIAGNOSIS — M62.81 MUSCLE WEAKNESS (GENERALIZED): ICD-10-CM

## 2019-03-14 DIAGNOSIS — R26.81 UNSTEADINESS ON FEET: ICD-10-CM

## 2019-03-14 PROCEDURE — 97112 NEUROMUSCULAR REEDUCATION: CPT

## 2019-03-14 PROCEDURE — 97110 THERAPEUTIC EXERCISES: CPT

## 2019-03-14 NOTE — PROGRESS NOTES
Daily Note     Today's date: 3/14/2019  Patient name: Lisset Mcdonald  :   MRN: [id number]  Referring provider: Luma French  Dx:   Encounter Diagnosis     ICD-10-CM    1  Parkinson's disease ([address] ) G20    2  Unsteadiness on feet R26 81    3  Muscle weakness (generalized) M62 81                   Subjective: Patient reports that he is feeling okay today and was feeling good after his last session  Objective: See treatment diary below      Assessment: Tolerated treatment well  Patient would benefit from continued PT  Added functional tasks this visit, which patient was challenged with  Muscular fatigue with resisted exercises, mainly leg press and box lifts  Patient is progressing towards goals  Plan: Continue per plan of care       Diagnosis:    Precautions:    Manuals 3/4 3/7 3/14  2/28    PROM        Mobs                        Exercise Diary        Recumbent Bike  6' with resistance 6 ' with resistance      SLR  4# 3x10 4# 2x20 ea  4#, 3x10    SL Hip ABD     4#, 2x10    Clamshells  Semi-Reclined Black 3x10 Semi-Reclined Black 3x10  BTB, 3x10    X-Walks        Bridges     3x10 on OSB  3x10 OSB hams   Mini squats with dumbbells    2, 15#, 15x      Box lifts    From crate to plinth: 20#, 10x      Hip EXT        [place] and Arrows     Blue, 3x10 ea   Leg Press  140# 3x10 & 2x10 140#, 5x10      Step Downs onto Airex    8 in, 20x      Tap Downs   6 in + Airex, 20x ea  6 in, 2x10 ea             Feet Together      Foam + EC: 3x 30 sec   SLS     2x 20 sec ea   Tandem Stance  DGI Testing    Foam: 3x 30 sec    Wobble Board        Biodex MCTSIB:  compositte 2 29 LoS, Maze, and Catch Game LoS, Maze, and [facility] [employer]  No UE:  LOS: 13%, 33%  Maze: 78%, 60%   Review of FOTO / Re-Evaluaiton RT, PT       Review of HEP RT, PT                                                          Modalities             CP PRN

## 2019-03-18 ENCOUNTER — OFFICE VISIT (OUTPATIENT)
Dept: PHYSICAL THERAPY | Facility: CLINIC | Age: 74
End: 2019-03-18
Payer: MEDICARE

## 2019-03-18 DIAGNOSIS — R26.81 UNSTEADINESS ON FEET: Primary | ICD-10-CM

## 2019-03-18 DIAGNOSIS — G20 PARKINSON'S DISEASE (HCC): ICD-10-CM

## 2019-03-18 DIAGNOSIS — M62.81 MUSCLE WEAKNESS (GENERALIZED): ICD-10-CM

## 2019-03-18 PROCEDURE — 97110 THERAPEUTIC EXERCISES: CPT

## 2019-03-18 PROCEDURE — 97530 THERAPEUTIC ACTIVITIES: CPT

## 2019-03-18 PROCEDURE — 97112 NEUROMUSCULAR REEDUCATION: CPT

## 2019-03-18 NOTE — PROGRESS NOTES
Daily Note     Today's date: 3/18/2019  Patient name: Nawaf Simpson  :   MRN: [id number]  Referring provider: Pauline Reece  Dx:   Encounter Diagnosis     ICD-10-CM    1  Unsteadiness on feet R26 81    2  Muscle weakness (generalized) M62 81    3  Parkinson's disease ([facility] [address] ) G20               4:40- 5:25 1:1    Subjective: Pt states he is feeling really good today  Pt states he thought he would be tired today but surprisingly he is not  Objective: See treatment diary below      Assessment:  PT progressed through exercises well with no increase in pain  Pt states moderate fatigue  Pt demonstrates improved endurance as he required less rest breaks  Pt was able to tolerate increased difficulty with balance exercises  Pt would continue to benefit from PT  Plan: Continue per plan of care       Diagnosis:    Precautions:    Manuals 3/4 3/7 3/14 3/18 2/28    PROM        Mobs                        Exercise Diary        Recumbent Bike  6' with resistance 6 ' with resistance  8 mins w/resistance    SLR  4# 3x10 4# 2x20 ea 4# 2 x20 ea 4#, 3x10    SL Hip ABD     4#, 2x10    Clamshells  Semi-Reclined Black 3x10 Semi-Reclined Black 3x10 Semi-Reclined Black 3x10 BTB, 3x10    X-Walks        Bridges     3x10 on OSB  3x10 OSB hams   Mini squats with dumbbells    2, 15#, 15x  At high low table STS 15#  x10     Box lifts    From crate to plinth: 20#, 10x  From crate to plinth: 20#, 10x ea    Hip EXT        [place] and Arrows     Blue, 3x10 ea   Leg Press  140# 3x10 & 2x10 140#, 5x10  140# 3 x10     Step Downs onto Airex    8 in, 20x  8in 20x     Tap Downs   6 in + Airex, 20x ea 6 in + Airex, 20x ea 6 in, 2x10 ea             Feet Together      Foam + EC: 3x 30 sec   SLS     2x 20 sec ea   Tandem Stance  DGI Testing    Foam: 3x 30 sec    Wobble Board        Biodex MCTSIB:  compositte 2 29 LoS, Maze, and Catch Game LOS lv 12 x3, Maze x2 , and Catch Game LOS, Maze, catch game  No UE:  LOS: 13%, 33%  Maze: 78%, 60% Review of FOTO / Re-Evaluaiton RT, PT       Review of HEP RT, PT                                                          Modalities             CP PRN

## 2019-03-20 ENCOUNTER — OFFICE VISIT (OUTPATIENT)
Dept: PHYSICAL THERAPY | Facility: CLINIC | Age: 74
End: 2019-03-20
Payer: MEDICARE

## 2019-03-20 DIAGNOSIS — M62.81 MUSCLE WEAKNESS (GENERALIZED): ICD-10-CM

## 2019-03-20 DIAGNOSIS — G20 PARKINSON'S DISEASE (HCC): ICD-10-CM

## 2019-03-20 DIAGNOSIS — R26.81 UNSTEADINESS ON FEET: Primary | ICD-10-CM

## 2019-03-20 PROCEDURE — 97112 NEUROMUSCULAR REEDUCATION: CPT

## 2019-03-20 PROCEDURE — 97530 THERAPEUTIC ACTIVITIES: CPT

## 2019-03-20 PROCEDURE — 97110 THERAPEUTIC EXERCISES: CPT

## 2019-03-20 NOTE — PROGRESS NOTES
Daily Note     Today's date: 3/20/2019  Patient name: Ashley Christy  :   MRN: [id number]  Referring provider: Maria E Toro  Dx:   Encounter Diagnosis     ICD-10-CM    1  Unsteadiness on feet R26 81    2  Muscle weakness (generalized) M62 81    3  Parkinson's disease ([facility] [address] ) G20               4:40- 5:25 1:1 PTA     Subjective: Pt states he is feeling good today  Pt states he is a little tired today but he was busy at work and that can take it out of him sometimes  Objective: See treatment diary below      Assessment:  Pt progressed through exercises well with no increase in pain and moderate fatigue in bilateral LE  Pt demonstrate improved  balance seen with Biodex  Pt required verbal cuing with table exercises to engage core properly  Pt would continue to benefit from PT  Plan: Continue per plan of care       Diagnosis:    Precautions:    Manuals 3/4 3/7 3/14 3/18 3/20   PROM        Mobs                        Exercise Diary        Recumbent Bike  6' with resistance 6 ' with resistance  8 mins w/resistance 9 mins lvl 3    SLR  4# 3x10 4# 2x20 ea 4# 2 x20 ea 4# 2 x20 ea   SL Hip ABD     2 x10    Clamshells  Semi-Reclined Black 3x10 Semi-Reclined Black 3x10 Semi-Reclined Black 3x10 S/l black 3 x10    X-Walks        Bridges     PB bridges 2 x10    Mini squats with dumbbells    2, 15#, 15x  At high low table STS 15#  x10  [place]air STS 15# x 15    Box lifts    From crate to plinth: 20#, 10x  From crate to plinth: 20#, 10x ea From crate to plinth: 20#, 10x ea   Hip EXT        [place] and Arrows        Leg Press  140# 3x10 & 2x10 140#, 5x10  140# 3 x10  140#  4 x10    Step Downs onto Airex    8 in, 20x  8in 20x  nv   Tap Downs   6 in + Airex, 20x ea 6 in + Airex, 20x ea nv           Feet Together         SLS        Tandem Stance  DGI Testing       Wobble Board        Biodex MCTSIB:  compositte 2 29 LoS, Maze, and Catch Game LOS lv 12 x3, Maze x2 , and Catch Game LOS, Maze, catch game  LOS x 3 , Maze x 3 lvl 2  , RCT 2 x 1 min   Review of FOTO / Re-Evaluaiton RT, PT       Review of HEP RT, PT                                                          Modalities             CP PRN

## 2019-04-01 ENCOUNTER — APPOINTMENT (OUTPATIENT)
Dept: PHYSICAL THERAPY | Facility: CLINIC | Age: 74
End: 2019-04-01
Payer: MEDICARE

## 2019-04-08 ENCOUNTER — APPOINTMENT (OUTPATIENT)
Dept: PHYSICAL THERAPY | Facility: CLINIC | Age: 74
End: 2019-04-08
Payer: MEDICARE

## 2019-04-10 ENCOUNTER — TRANSCRIBE ORDERS (OUTPATIENT)
Dept: PHYSICAL THERAPY | Facility: CLINIC | Age: 74
End: 2019-04-10

## 2019-04-10 ENCOUNTER — EVALUATION (OUTPATIENT)
Dept: PHYSICAL THERAPY | Facility: CLINIC | Age: 74
End: 2019-04-10
Payer: MEDICARE

## 2019-04-10 DIAGNOSIS — R26.81 UNSTEADINESS ON FEET: Primary | ICD-10-CM

## 2019-04-10 DIAGNOSIS — G20 PARKINSON'S DISEASE (HCC): ICD-10-CM

## 2019-04-10 DIAGNOSIS — M62.81 MUSCLE WEAKNESS (GENERALIZED): ICD-10-CM

## 2019-04-10 PROCEDURE — 97112 NEUROMUSCULAR REEDUCATION: CPT | Performed by: PHYSICAL THERAPIST

## 2019-04-10 PROCEDURE — 97110 THERAPEUTIC EXERCISES: CPT | Performed by: PHYSICAL THERAPIST

## 2019-04-15 ENCOUNTER — OFFICE VISIT (OUTPATIENT)
Dept: PHYSICAL THERAPY | Facility: CLINIC | Age: 74
End: 2019-04-15
Payer: MEDICARE

## 2019-04-15 DIAGNOSIS — R26.81 UNSTEADINESS ON FEET: Primary | ICD-10-CM

## 2019-04-15 DIAGNOSIS — M62.81 MUSCLE WEAKNESS (GENERALIZED): ICD-10-CM

## 2019-04-15 DIAGNOSIS — G20 PARKINSON'S DISEASE (HCC): ICD-10-CM

## 2019-04-15 PROCEDURE — 97110 THERAPEUTIC EXERCISES: CPT

## 2019-04-15 PROCEDURE — 97112 NEUROMUSCULAR REEDUCATION: CPT

## 2019-04-18 ENCOUNTER — OFFICE VISIT (OUTPATIENT)
Dept: PHYSICAL THERAPY | Facility: CLINIC | Age: 74
End: 2019-04-18
Payer: MEDICARE

## 2019-04-18 DIAGNOSIS — G20 PARKINSON'S DISEASE (HCC): ICD-10-CM

## 2019-04-18 DIAGNOSIS — M62.81 MUSCLE WEAKNESS (GENERALIZED): ICD-10-CM

## 2019-04-18 DIAGNOSIS — R26.81 UNSTEADINESS ON FEET: Primary | ICD-10-CM

## 2019-04-18 PROCEDURE — 97112 NEUROMUSCULAR REEDUCATION: CPT

## 2019-04-18 PROCEDURE — 97110 THERAPEUTIC EXERCISES: CPT

## 2019-04-22 ENCOUNTER — APPOINTMENT (OUTPATIENT)
Dept: PHYSICAL THERAPY | Facility: CLINIC | Age: 74
End: 2019-04-22
Payer: MEDICARE

## 2019-04-25 ENCOUNTER — APPOINTMENT (OUTPATIENT)
Dept: PHYSICAL THERAPY | Facility: CLINIC | Age: 74
End: 2019-04-25
Payer: MEDICARE

## 2019-05-10 ENCOUNTER — TELEPHONE (OUTPATIENT)
Dept: NEUROLOGY | Facility: CLINIC | Age: 74
End: 2019-05-10

## 2019-05-24 ENCOUNTER — OFFICE VISIT (OUTPATIENT)
Dept: NEUROLOGY | Facility: CLINIC | Age: 74
End: 2019-05-24
Payer: MEDICARE

## 2019-05-24 VITALS
DIASTOLIC BLOOD PRESSURE: 52 MMHG | HEART RATE: 73 BPM | WEIGHT: 225 LBS | SYSTOLIC BLOOD PRESSURE: 84 MMHG | BODY MASS INDEX: 30.52 KG/M2

## 2019-05-24 DIAGNOSIS — I95.1 ORTHOSTATIC HYPOTENSION: ICD-10-CM

## 2019-05-24 DIAGNOSIS — G47.52 REM BEHAVIORAL DISORDER: ICD-10-CM

## 2019-05-24 DIAGNOSIS — G20 PARKINSON'S DISEASE (HCC): Primary | ICD-10-CM

## 2019-05-24 PROBLEM — G20.A1 PARKINSON'S DISEASE: Status: ACTIVE | Noted: 2019-05-24

## 2019-05-24 PROCEDURE — 99204 OFFICE O/P NEW MOD 45 MIN: CPT | Performed by: PSYCHIATRY & NEUROLOGY

## 2019-05-24 RX ORDER — SERTRALINE HYDROCHLORIDE 25 MG/1
25 TABLET, FILM COATED ORAL DAILY
Refills: 5 | COMMUNITY
Start: 2019-05-04 | End: 2020-06-18 | Stop reason: ALTCHOICE

## 2019-05-24 RX ORDER — ALPRAZOLAM 0.25 MG/1
TABLET ORAL
Refills: 0 | COMMUNITY
Start: 2019-04-04 | End: 2020-06-18 | Stop reason: ALTCHOICE

## 2019-06-20 ENCOUNTER — TELEPHONE (OUTPATIENT)
Dept: NEUROLOGY | Facility: CLINIC | Age: 74
End: 2019-06-20

## 2019-08-27 ENCOUNTER — OFFICE VISIT (OUTPATIENT)
Dept: NEUROLOGY | Facility: CLINIC | Age: 74
End: 2019-08-27
Payer: MEDICARE

## 2019-08-27 VITALS
WEIGHT: 225 LBS | HEART RATE: 65 BPM | SYSTOLIC BLOOD PRESSURE: 118 MMHG | DIASTOLIC BLOOD PRESSURE: 78 MMHG | BODY MASS INDEX: 30.48 KG/M2 | HEIGHT: 72 IN

## 2019-08-27 DIAGNOSIS — G47.52 REM BEHAVIORAL DISORDER: ICD-10-CM

## 2019-08-27 DIAGNOSIS — G20 PARKINSON'S DISEASE (HCC): Primary | ICD-10-CM

## 2019-08-27 DIAGNOSIS — I95.1 ORTHOSTATIC HYPOTENSION: ICD-10-CM

## 2019-08-27 PROCEDURE — 99214 OFFICE O/P EST MOD 30 MIN: CPT | Performed by: PSYCHIATRY & NEUROLOGY

## 2019-08-27 RX ORDER — MIDODRINE HYDROCHLORIDE 5 MG/1
5 TABLET ORAL 2 TIMES DAILY PRN
Qty: 60 TABLET | Refills: 3 | Status: SHIPPED | OUTPATIENT
Start: 2019-08-27 | End: 2020-06-18 | Stop reason: ALTCHOICE

## 2019-08-27 NOTE — ASSESSMENT & PLAN NOTE
Continues to be an issue  No better off of sinemet  He has tried increasing his fluids and salt  Will start midodrine 5mg prior to PT or exercise and see how he does

## 2019-08-27 NOTE — PROGRESS NOTES
Assessment/Plan:    Orthostatic hypotension  Continues to be an issue  No better off of sinemet  He has tried increasing his fluids and salt  Will start midodrine 5mg prior to PT or exercise and see how he does  REM behavioral disorder  Musa Moore out of bed once  Wife is very disturbed by his movements  He was open to a trial of melatonin  Parkinson's disease ([address] )  Overall very mild left sided symptoms  No significant progression in his exam despite being off of sinemet now  Will target his OH symptoms with the hopes of getting him more active and getting back to PT  Will continue to monitor his parkinsonism clinically over time  Diagnoses and all orders for this visit:    Parkinson's disease [facility])  -     Ambulatory referral to Physical Therapy; Future  -     midodrine (PROAMATINE) 5 mg tablet; Take 1 tablet (5 mg total) by mouth 2 (two) times a day as needed (prior to PT, exercise) 1 hour prior to PT    Orthostatic hypotension    REM behavioral disorder        Subjective:     Patient ID: Nancyann Severance is a 69 y o  male  I had the pleasure of seeing your patient, Nancyann Severance in the Movement Disorders Clinic at the [facility]  Bryce Phillips is a 69year-old right-handed practicing  who presents for evaluation of parkinsonism, symptom onset in 2016 with slowness of gait followed by left hand tremor, course complicated by orthostatic hypotension  When I 1st met the patient he had been started on Sinemet, which was exacerbating his orthostatic intolerance  His parkinsonism was not particularly bothersome to a planned on discontinuing his Sinemet to focus on his orthostatic symptoms  His orthostatic hypotension was keeping him from participating in physical therapy  Prior medication trials:  Sinemet: worsening OH    Interval history:  A little more tremor, slowness and balance troubles but nothing significant  He feels like things are the same       OH symptoms are not improved  Increased fluids didn't help much  Symptoms still worse in the morning  Increased salt also didn't help  Frank Ly out of bed once  Acts out his dreams most nights  No significant injuries  His wife is kept up by the movements  The following portions of the patient's history were reviewed and updated as appropriate: allergies, current medications, past family history, past medical history, past social history, past surgical history and problem list       Objective:  /78 (BP Location: Right arm, Patient Position: Standing, Cuff Size: Standard)   Pulse 65   Ht 6' (1 829 m)   Wt 102 kg (225 lb)   BMI 30 52 kg/m²     Physical Exam    Neurological Exam     UPDRS motor:                              Time since last dose:       Speech  0     Facial Expression  0     Rigidity - Neck  0     Rigidity - Upper Extremity (Right)  t     Rigidity - Upper Extremity (Left)   1     Rigidity - Lower Extremity (Right)  1     Rigidity - Lower Extremity (Left)   1     Finger Taps (Right)   1     Finger Taps (Left)   2     Hand Movement (Right)  1     Hand Movement (Left)   1 L>R    Pronation/Supination (Right)  0     Pronation/Supination (Left)   1     Toe Tapping (Right) 0     Toe Tapping (Left) 1     Leg Agility (Right)  0     Leg Agility (Left)   0     Arising from Chair   1     Gait   0 Just light arm swing reduction     Freezing of Gait 0     Postural Stability        Posture 0     Global spontaneity of movement 1     Postural Tremor (Right) 1     Postural Tremor (Left) 1     Kinetic Tremor (Right)  1     Kinetic Tremor (Left)  1     Rest tremor amplitude RUE 0     Rest tremor amplitude LUE 1     Rest tremor amplitude RLE 0     Reset tremor amplitude LLE 0     Lip/Jaw Tremor  0     Consistency of tremor 1 Just with detraction     Motor Exam Total:              Review of Systems   Constitutional: Negative  Negative for appetite change and fever  HENT: Negative    Negative for hearing loss, tinnitus, trouble swallowing and voice change  Eyes: Negative  Negative for photophobia and pain  Respiratory: Negative  Negative for shortness of breath  Cardiovascular: Negative  Negative for palpitations  Gastrointestinal: Negative  Negative for nausea and vomiting  Endocrine: Negative  Negative for cold intolerance and heat intolerance  Genitourinary: Negative  Negative for dysuria, frequency and urgency  Musculoskeletal: Positive for gait problem (slowing with the walking)  Negative for myalgias and neck pain  Skin: Negative  Negative for rash  Neurological: Positive for dizziness and light-headedness  Negative for tremors, seizures, syncope, facial asymmetry, speech difficulty, weakness, numbness and headaches  Hematological: Negative  Does not bruise/bleed easily  Psychiatric/Behavioral: Positive for sleep disturbance (vivid dreams and falling out of bed)  Negative for confusion and hallucinations  The above ROS was reviewed and updated       Eve Jalloh MD  Medical Director   [facility]  Movement and Memory Specialist       Current Outpatient Medications on File Prior to Visit   Medication Sig Dispense Refill    ALPRAZolam (XANAX) 0 25 mg tablet TAKE 1 TABLET BY MOUTH TWO TIMES A DAY AS NEEDED  0    aspirin 81 MG tablet Take by mouth      atorvastatin (LIPITOR) 20 mg tablet Take by mouth      cholecalciferol (VITAMIN D3) 1,000 units tablet Take 1,000 Units by mouth daily      cyanocobalamin (VITAMIN B-12) 500 mcg tablet Take 500 mcg by mouth daily      finasteride (PROSCAR) 5 mg tablet TAKE ONE TABLET BY MOUTH EVERY DAY AS DIRECTED 90 tablet 3    sertraline (ZOLOFT) 25 mg tablet Take 25 mg by mouth daily  5    tamsulosin (FLOMAX) 0 4 mg TAKE ONE CAPSULE BY MOUTH AT BEDTIME 90 capsule 3    [DISCONTINUED] carbidopa-levodopa (SINEMET)  mg per tablet Take 1 tablet by mouth 3 (three) times a day before meals  2     No current facility-administered medications on file prior to visit

## 2019-08-27 NOTE — ASSESSMENT & PLAN NOTE
Overall very mild left sided symptoms  No significant progression in his exam despite being off of sinemet now  Will target his OH symptoms with the hopes of getting him more active and getting back to PT  Will continue to monitor his parkinsonism clinically over time

## 2019-08-27 NOTE — ASSESSMENT & PLAN NOTE
Joel Loge out of bed once  Wife is very disturbed by his movements  He was open to a trial of melatonin

## 2019-08-27 NOTE — PATIENT INSTRUCTIONS
Try taking the midodrine 1 hour prior to PT to see if this helps with your low blood pressure  Try melatonin 5mg   You may need up to 10mg or 15mg

## 2019-10-31 DIAGNOSIS — N40.1 BENIGN PROSTATIC HYPERPLASIA WITH LOWER URINARY TRACT SYMPTOMS, SYMPTOM DETAILS UNSPECIFIED: ICD-10-CM

## 2019-10-31 RX ORDER — FINASTERIDE 5 MG/1
TABLET, FILM COATED ORAL
Qty: 90 TABLET | Refills: 3 | Status: SHIPPED | OUTPATIENT
Start: 2019-10-31 | End: 2020-09-03

## 2019-12-06 ENCOUNTER — TELEPHONE (OUTPATIENT)
Dept: NEUROLOGY | Facility: CLINIC | Age: 74
End: 2019-12-06

## 2019-12-10 ENCOUNTER — OFFICE VISIT (OUTPATIENT)
Dept: NEUROLOGY | Facility: CLINIC | Age: 74
End: 2019-12-10
Payer: MEDICARE

## 2019-12-10 VITALS
BODY MASS INDEX: 27.31 KG/M2 | HEART RATE: 57 BPM | HEIGHT: 78 IN | DIASTOLIC BLOOD PRESSURE: 79 MMHG | WEIGHT: 236 LBS | SYSTOLIC BLOOD PRESSURE: 156 MMHG

## 2019-12-10 DIAGNOSIS — I95.1 ORTHOSTATIC HYPOTENSION: ICD-10-CM

## 2019-12-10 DIAGNOSIS — G20 PARKINSON'S DISEASE (HCC): Primary | ICD-10-CM

## 2019-12-10 PROCEDURE — 99214 OFFICE O/P EST MOD 30 MIN: CPT | Performed by: PSYCHIATRY & NEUROLOGY

## 2019-12-10 NOTE — ASSESSMENT & PLAN NOTE
44-year-old man presents in follow-up for Parkinson's disease  Overall his symptoms appear to be progressing very slowly  He is doing very well in continuing to work as a  despite being on no medications  He is struggling with some depression and anxiety both like to hold off on restarting his Zoloft until his PCP appointment  At this point I do not think the patient needs to be restarted on Sinemet  Delphine Kunz is to continue optimizing his orthostatic hypotension with the plan of restarting Sinemet at some point in the future

## 2019-12-10 NOTE — PROGRESS NOTES
Patient ID: Elva Mckeon is a 69 y o  male  Assessment/Plan:    Orthostatic hypotension  Improved with increased fluid and salt intake  Now rarely symptomatic  He feels ready to get back to physical therapy  He has the midodrine which she can take if he becomes symptomatic  Best taken prior to initiating his physical therapy  Urinary frequency and urgency having a major impact on the patient's quality of life and comfort increasing his fluid intake  We will follow up with his urologist to discuss  Parkinson's disease [facility])  44-year-old man presents in follow-up for Parkinson's disease  Overall his symptoms appear to be progressing very slowly  He is doing very well in continuing to work as a  despite being on no medications  He is struggling with some depression and anxiety both like to hold off on restarting his Zoloft until his PCP appointment  At this point I do not think the patient needs to be restarted on Sinemet  Jennifer Perfect is to continue optimizing his orthostatic hypotension with the plan of restarting Sinemet at some point in the future  Diagnoses and all orders for this visit:    Parkinson's disease [facility])  -     Ambulatory referral to Physical Therapy; Future    Orthostatic hypotension           Subjective:    HPI    I had the pleasure of seeing your patient, Elva Mckeon in the Movement Disorders Clinic at the [facility]  Henny Read is a 69year-old right-handed practicing  who presents for evaluation of parkinsonism, symptom onset in 2016 with slowness of gait followed by left hand tremor, course complicated by orthostatic hypotension  When I 1st met the patient he had been started on Sinemet, which was exacerbating his orthostatic intolerance  His parkinsonism was not particularly bothersome to a planned on discontinuing his Sinemet to focus on his orthostatic symptoms    His orthostatic hypotension was keeping him from participating in physical therapy      Prior medication trials:  Sinemet: worsening OH    Interval history:  Didn't do PT  Stopped his Zoloft but mood got worse  On 25mg daily  Hasnt tried the midodrine yet  Two symptomatic OH bouts  Didn't drink enough water those days  Urine issues are still a significant issue  Anxiety and depression remain an issue  A little slower  A little unsteady with a turn  A little more stooped posture  Some mild swallowing troubles which is new  Pills feel like they get caught  No coughing or choking  A little more tremor       The following portions of the patient's history were reviewed and updated as appropriate: allergies, current medications, past family history, past medical history, past social history, past surgical history and problem list          Objective:  Blood pressure 156/79, pulse 57, height 6' 6" (1 981 m), weight 107 kg (236 lb)      Physical Exam    Neurological Exam    UPDRS motor:                              Time since last dose:       Speech  1     Facial Expression  1     Rigidity - Neck  0     Rigidity - Upper Extremity (Right)  2     Rigidity - Upper Extremity (Left)   2     Rigidity - Lower Extremity (Right)  p     Rigidity - Lower Extremity (Left)   p     Finger Taps (Right)   1     Finger Taps (Left)   1     Hand Movement (Right)  0     Hand Movement (Left)   1     Pronation/Supination (Right)  0     Pronation/Supination (Left)   1     Toe Tapping (Right) 0     Toe Tapping (Left) 1     Leg Agility (Right)  1     Leg Agility (Left)   1     Arising from Chair   1     Gait   1     Freezing of Gait 0     Postural Stability        Posture 1     Global spontaneity of movement 0     Postural Tremor (Right) 1     Postural Tremor (Left) 1     Kinetic Tremor (Right)  1     Kinetic Tremor (Left)  1     Rest tremor amplitude RUE 0     Rest tremor amplitude LUE 0     Rest tremor amplitude RLE 0     Reset tremor amplitude LLE 0     Lip/Jaw Tremor  0 Consistency of tremor 0     Motor Exam Total:          Less arm swing on the left  ROS:    Review of Systems   Constitutional: Positive for activity change (slower)  Negative for appetite change and fever  HENT: Negative  Negative for hearing loss (worst), tinnitus, trouble swallowing and voice change  Eyes: Positive for visual disturbance (floaters,blurred)  Negative for photophobia and pain  Respiratory: Negative  Negative for shortness of breath  Cardiovascular: Negative  Negative for palpitations  Gastrointestinal: Negative  Negative for nausea and vomiting  Endocrine: Negative  Negative for cold intolerance and heat intolerance  Genitourinary: Positive for frequency  Negative for dysuria and urgency  Musculoskeletal: Negative  Negative for myalgias and neck pain  Skin: Negative  Negative for rash  Neurological: Positive for tremors (hands left hand 10,right about a 4)  Negative for dizziness, seizures, syncope, facial asymmetry, speech difficulty, weakness, light-headedness, numbness and headaches  Hematological: Negative  Does not bruise/bleed easily  Psychiatric/Behavioral: Negative  Negative for confusion (not thinking clearly), hallucinations and sleep disturbance  The above ROS was reviewed and updated       Steffi Pemberton MD  Medical Director   [facility]  Movement and Memory Specialist

## 2019-12-10 NOTE — ASSESSMENT & PLAN NOTE
Improved with increased fluid and salt intake  Now rarely symptomatic  He feels ready to get back to physical therapy  He has the midodrine which she can take if he becomes symptomatic  Best taken prior to initiating his physical therapy  Urinary frequency and urgency having a major impact on the patient's quality of life and comfort increasing his fluid intake  We will follow up with his urologist to discuss

## 2020-01-08 DIAGNOSIS — N40.1 BENIGN PROSTATIC HYPERPLASIA WITH LOWER URINARY TRACT SYMPTOMS, SYMPTOM DETAILS UNSPECIFIED: ICD-10-CM

## 2020-01-21 NOTE — TELEPHONE ENCOUNTER
Patient left a message on the Medication Refill voice mail line requesting a new prescription for Tamsulosin 0 4mg, 90 day supply to [facility] in [place]

## 2020-01-22 LAB — HBA1C MFR BLD HPLC: 5.7 %

## 2020-01-22 NOTE — TELEPHONE ENCOUNTER
The patient was last seen on 3/28/18 by Dr Barbara Jordan in the [place] location  He is overdue for his next office visit  This message will be forwarded to Call Center so they can schedule an appointment

## 2020-01-23 RX ORDER — TAMSULOSIN HYDROCHLORIDE 0.4 MG/1
CAPSULE ORAL
Qty: 90 CAPSULE | Refills: 0 | Status: SHIPPED | OUTPATIENT
Start: 2020-01-23 | End: 2020-04-10 | Stop reason: SDUPTHER

## 2020-01-23 NOTE — TELEPHONE ENCOUNTER
Patient called regarding refill  Appointment now scheduled for 2/12 in [facility] office  He did report having only 2 pills left    Thank you

## 2020-01-23 NOTE — TELEPHONE ENCOUNTER
The patient has an upcoming office visit scheduled for 2/12/20 with Connie Munoz PA-C in the [facility] location but will run out of medication until then   Request for same, 90 day supply with NO refills was queued and forwarded to the Advanced Practitioner covering the [place] location for approval

## 2020-02-07 NOTE — PROGRESS NOTES
Assessment and plan:       1  Elevated PSA status post negative biopsies  - Patient has previously undergone 3 ultrasound-guided biopsies     - Multiparametric prostate MRI performed approximately 18 months ago was positive for a PI RADs Category 3 lesion  At that time discussion was had to continue observation   - PSA remains elevated at 14 2 when corrected for finasteride use  - I have discussed this case with Dr Tamera Ortiz who recommends repeating the multiparametric prostate MRI in the near future  - He will return approximately 1 week after this test has been completed to discuss results  Gay Voss PA-C      Chief Complaint     Chief Complaint   Patient presents with    Prostate Cancer         History of Present Illness     Benjamín Cline is a 69 y o  male patient known to Dr Tamera Ortiz for history of elevated PSA and an enlarged prostate measuring 117 g  Patient has undergone 3 prostate biopsies  Most recent biopsy was March of 2016  This indicated small volume [place] 6 prostate cancer  After this diagnosis the patient was found to have a PSA of 14 8 when corrected for finasteride use  At that time he was recommended to undergo a multiparametric prostate MRI which was performed and was classified as PI RADS 3  He is on both tamsulosin and finasteride for his lower urinary tract symptoms  PSA drawn 01/21/2020 remains elevated when corrected for finasteride use at 14 2  Laboratory     No results found for: CREATININE    No results found for: PSA    No results found for this or any previous visit (from the past 1 hour(s))  Review of Systems     Review of Systems   Constitutional: Negative for chills and fever  HENT: Negative  Eyes: Negative  Respiratory: Negative for shortness of breath  Cardiovascular: Negative for chest pain  Gastrointestinal: Negative for constipation, diarrhea, nausea and vomiting     Genitourinary: Negative for difficulty urinating, dysuria, enuresis, flank pain, frequency, hematuria and urgency  Musculoskeletal: Negative  Neurological:        Recently diagnosed with Parkinson's       Urinary Incontinence Screening      Most Recent Value   Urinary Incontinence   Urinary Incontinence? Yes [1 brief a day ]   Incomplete emptying? No   Urinary frequency? Yes   Urinary urgency? Yes   Urinary hesitancy? Yes   Dysuria (painful difficult urination)? No   Nocturia (waking up to use the bathroom)? Yes [2-3x]   Straining (having to push to go)? No   Weak stream?  Yes   Intermittent stream?  Yes   Post void dribbling? Yes                  Allergies     No Known Allergies    Physical Exam     Physical Exam   Constitutional: He is oriented to person, place, and time  He appears well-developed and well-nourished  No distress  HENT:   Head: Normocephalic and atraumatic  Right Ear: External ear normal    Left Ear: External ear normal    Nose: Nose normal    Eyes: Right eye exhibits no discharge  Left eye exhibits no discharge  No scleral icterus  Cardiovascular: Normal rate  Pulmonary/Chest: Effort normal    Musculoskeletal:   Ambulates independent   Neurological: He is alert and oriented to person, place, and time  Skin: Skin is warm and dry  He is not diaphoretic  Psychiatric: He has a normal mood and affect  His behavior is normal  Judgment and thought content normal    Nursing note and vitals reviewed          Vital Signs     Vitals:    02/12/20 1113   BP: 128/70   Pulse: 62   Weight: 104 kg (230 lb)   Height: 6' (1 829 m)         Current Medications       Current Outpatient Medications:     aspirin 81 MG tablet, Take by mouth, Disp: , Rfl:     atorvastatin (LIPITOR) 20 mg tablet, Take by mouth, Disp: , Rfl:     cholecalciferol (VITAMIN D3) 1,000 units tablet, Take 1,000 Units by mouth daily, Disp: , Rfl:     finasteride (PROSCAR) 5 mg tablet, TAKE ONE TABLET BY MOUTH EVERY DAY AS DIRECTED, Disp: 90 tablet, Rfl: 3    tamsulosin (FLOMAX) 0 4 mg, TAKE ONE CAPSULE BY MOUTH AT BEDTIME, Disp: 90 capsule, Rfl: 0    ALPRAZolam (XANAX) 0 25 mg tablet, daily at bedtime as needed , Disp: , Rfl: 0    cyanocobalamin (VITAMIN B-12) 500 mcg tablet, Take 500 mcg by mouth daily, Disp: , Rfl:     midodrine (PROAMATINE) 5 mg tablet, Take 1 tablet (5 mg total) by mouth 2 (two) times a day as needed (prior to PT, exercise) 1 hour prior to PT (Patient not taking: Reported on 2/12/2020), Disp: 60 tablet, Rfl: 3    sertraline (ZOLOFT) 25 mg tablet, Take 25 mg by mouth daily, Disp: , Rfl: 5      Active Problems     Patient Active Problem List   Diagnosis    REM behavioral disorder    [facility] disease ([facility] [address] )    Prostate cancer ([facility] [address] )    Orthostatic hypotension         Past Medical History     Past Medical History:   Diagnosis Date    Prostate cancer [facility])          Surgical History     Past Surgical History:   Procedure Laterality Date    PROSTATE SURGERY      Biopsy         Family History     Family History   Problem Relation Age of Onset    Heart disease Mother     Heart disease Father          Social History     Social History       Radiology

## 2020-02-12 ENCOUNTER — TELEPHONE (OUTPATIENT)
Dept: UROLOGY | Facility: CLINIC | Age: 75
End: 2020-02-12

## 2020-02-12 ENCOUNTER — OFFICE VISIT (OUTPATIENT)
Dept: UROLOGY | Facility: CLINIC | Age: 75
End: 2020-02-12
Payer: MEDICARE

## 2020-02-12 VITALS
HEIGHT: 72 IN | BODY MASS INDEX: 31.15 KG/M2 | DIASTOLIC BLOOD PRESSURE: 70 MMHG | HEART RATE: 62 BPM | WEIGHT: 230 LBS | SYSTOLIC BLOOD PRESSURE: 128 MMHG

## 2020-02-12 DIAGNOSIS — C61 PROSTATE CANCER (HCC): Primary | ICD-10-CM

## 2020-02-12 DIAGNOSIS — R97.20 ELEVATED PSA, BETWEEN 10 AND LESS THAN 20 NG/ML: ICD-10-CM

## 2020-02-12 DIAGNOSIS — N40.1 BENIGN PROSTATIC HYPERPLASIA WITH LOWER URINARY TRACT SYMPTOMS, SYMPTOM DETAILS UNSPECIFIED: ICD-10-CM

## 2020-02-12 PROCEDURE — 99213 OFFICE O/P EST LOW 20 MIN: CPT | Performed by: PHYSICIAN ASSISTANT

## 2020-02-12 NOTE — TELEPHONE ENCOUNTER
Per Luann Caballero, Return for 1 week after MRI for discussion of results  Pt will call central scheduling to schedule after he looks at his calendar  He will c/b to schedule fu

## 2020-03-19 ENCOUNTER — TELEPHONE (OUTPATIENT)
Dept: UROLOGY | Facility: CLINIC | Age: 75
End: 2020-03-19

## 2020-03-19 NOTE — TELEPHONE ENCOUNTER
Please postponed patient is scheduled appointment for 03/25/2020 for an additional 6-8 weeks  Patient will need MRI completed prior to this visit

## 2020-03-19 NOTE — TELEPHONE ENCOUNTER
I spoke to patient's wife and she will let patient know apt is canceled  She stated they will r/s mri once things settle down with COVID -19

## 2020-04-10 DIAGNOSIS — N40.1 BENIGN PROSTATIC HYPERPLASIA WITH LOWER URINARY TRACT SYMPTOMS, SYMPTOM DETAILS UNSPECIFIED: ICD-10-CM

## 2020-04-10 RX ORDER — TAMSULOSIN HYDROCHLORIDE 0.4 MG/1
0.4 CAPSULE ORAL
Qty: 90 CAPSULE | Refills: 3 | Status: SHIPPED | OUTPATIENT
Start: 2020-04-10 | End: 2020-04-17 | Stop reason: SDUPTHER

## 2020-04-13 ENCOUNTER — TELEPHONE (OUTPATIENT)
Dept: NEUROLOGY | Facility: CLINIC | Age: 75
End: 2020-04-13

## 2020-04-16 ENCOUNTER — TELEMEDICINE (OUTPATIENT)
Dept: NEUROLOGY | Facility: CLINIC | Age: 75
End: 2020-04-16
Payer: MEDICARE

## 2020-04-16 DIAGNOSIS — G20 PARKINSON'S DISEASE (HCC): Primary | ICD-10-CM

## 2020-04-16 DIAGNOSIS — I95.1 ORTHOSTATIC HYPOTENSION: ICD-10-CM

## 2020-04-16 PROCEDURE — 99442 PR PHYS/QHP TELEPHONE EVALUATION 11-20 MIN: CPT | Performed by: PSYCHIATRY & NEUROLOGY

## 2020-04-17 DIAGNOSIS — N40.1 BENIGN PROSTATIC HYPERPLASIA WITH LOWER URINARY TRACT SYMPTOMS, SYMPTOM DETAILS UNSPECIFIED: ICD-10-CM

## 2020-04-17 RX ORDER — TAMSULOSIN HYDROCHLORIDE 0.4 MG/1
CAPSULE ORAL
Qty: 90 CAPSULE | Refills: 0 | Status: SHIPPED | OUTPATIENT
Start: 2020-04-17 | End: 2021-03-04 | Stop reason: SDUPTHER

## 2020-06-02 DIAGNOSIS — E78.2 MIXED HYPERLIPIDEMIA: Primary | ICD-10-CM

## 2020-06-03 RX ORDER — ATORVASTATIN CALCIUM 20 MG/1
TABLET, FILM COATED ORAL
Qty: 90 TABLET | Refills: 1 | Status: SHIPPED | OUTPATIENT
Start: 2020-06-03 | End: 2020-11-29

## 2020-06-18 ENCOUNTER — OFFICE VISIT (OUTPATIENT)
Dept: FAMILY MEDICINE CLINIC | Facility: CLINIC | Age: 75
End: 2020-06-18
Payer: MEDICARE

## 2020-06-18 VITALS
BODY MASS INDEX: 31.15 KG/M2 | HEIGHT: 72 IN | RESPIRATION RATE: 16 BRPM | SYSTOLIC BLOOD PRESSURE: 118 MMHG | DIASTOLIC BLOOD PRESSURE: 72 MMHG | TEMPERATURE: 98.7 F | HEART RATE: 58 BPM | OXYGEN SATURATION: 98 % | WEIGHT: 230 LBS

## 2020-06-18 DIAGNOSIS — G20 PARKINSON'S DISEASE (HCC): ICD-10-CM

## 2020-06-18 DIAGNOSIS — Z20.822 EXPOSURE TO COVID-19 VIRUS: Primary | ICD-10-CM

## 2020-06-18 DIAGNOSIS — R73.03 PREDIABETES: ICD-10-CM

## 2020-06-18 DIAGNOSIS — I95.1 ORTHOSTATIC HYPOTENSION: ICD-10-CM

## 2020-06-18 DIAGNOSIS — M54.30 ACUTE SCIATICA: ICD-10-CM

## 2020-06-18 PROCEDURE — 1160F RVW MEDS BY RX/DR IN RCRD: CPT | Performed by: FAMILY MEDICINE

## 2020-06-18 PROCEDURE — 4040F PNEUMOC VAC/ADMIN/RCVD: CPT | Performed by: FAMILY MEDICINE

## 2020-06-18 PROCEDURE — 3008F BODY MASS INDEX DOCD: CPT | Performed by: FAMILY MEDICINE

## 2020-06-18 PROCEDURE — 99213 OFFICE O/P EST LOW 20 MIN: CPT | Performed by: FAMILY MEDICINE

## 2020-06-18 PROCEDURE — 1036F TOBACCO NON-USER: CPT | Performed by: FAMILY MEDICINE

## 2020-06-18 RX ORDER — PREDNISONE 10 MG/1
TABLET ORAL
Qty: 18 TABLET | Refills: 0 | Status: SHIPPED | OUTPATIENT
Start: 2020-06-18 | End: 2020-07-21 | Stop reason: ALTCHOICE

## 2020-06-18 RX ORDER — TIZANIDINE 2 MG/1
2 TABLET ORAL
Qty: 14 TABLET | Refills: 0 | Status: SHIPPED | OUTPATIENT
Start: 2020-06-18 | End: 2020-07-21

## 2020-06-19 ENCOUNTER — APPOINTMENT (OUTPATIENT)
Dept: LAB | Facility: CLINIC | Age: 75
End: 2020-06-19
Payer: MEDICARE

## 2020-06-19 DIAGNOSIS — R73.03 PREDIABETES: ICD-10-CM

## 2020-06-19 DIAGNOSIS — Z20.822 EXPOSURE TO COVID-19 VIRUS: ICD-10-CM

## 2020-06-19 LAB
EST. AVERAGE GLUCOSE BLD GHB EST-MCNC: 117 MG/DL
HBA1C MFR BLD: 5.7 %

## 2020-06-19 PROCEDURE — 83036 HEMOGLOBIN GLYCOSYLATED A1C: CPT

## 2020-06-19 PROCEDURE — 36415 COLL VENOUS BLD VENIPUNCTURE: CPT

## 2020-06-19 PROCEDURE — 86769 SARS-COV-2 COVID-19 ANTIBODY: CPT

## 2020-06-20 LAB — SARS-COV-2 IGG SERPL QL IA: NEGATIVE

## 2020-06-25 ENCOUNTER — TELEPHONE (OUTPATIENT)
Dept: FAMILY MEDICINE CLINIC | Facility: CLINIC | Age: 75
End: 2020-06-25

## 2020-07-02 ENCOUNTER — TELEPHONE (OUTPATIENT)
Dept: UROLOGY | Facility: CLINIC | Age: 75
End: 2020-07-02

## 2020-07-02 NOTE — TELEPHONE ENCOUNTER
Pt is on the recall list  I spoke to pt about scheduling his prostate mri and fu apt  He stated he hurt his back recently and he is going through treatment for that  He wants to hold off on scheduling until his back is better

## 2020-07-21 ENCOUNTER — OFFICE VISIT (OUTPATIENT)
Dept: FAMILY MEDICINE CLINIC | Facility: CLINIC | Age: 75
End: 2020-07-21
Payer: MEDICARE

## 2020-07-21 VITALS
RESPIRATION RATE: 16 BRPM | HEART RATE: 60 BPM | WEIGHT: 225 LBS | SYSTOLIC BLOOD PRESSURE: 120 MMHG | OXYGEN SATURATION: 99 % | TEMPERATURE: 98.1 F | BODY MASS INDEX: 30.48 KG/M2 | HEIGHT: 72 IN | DIASTOLIC BLOOD PRESSURE: 78 MMHG

## 2020-07-21 DIAGNOSIS — G20 PARKINSON'S DISEASE (HCC): ICD-10-CM

## 2020-07-21 DIAGNOSIS — Z74.09 IMPAIRED FUNCTIONAL MOBILITY, BALANCE, AND ENDURANCE: ICD-10-CM

## 2020-07-21 DIAGNOSIS — M54.30 ACUTE SCIATICA: Primary | ICD-10-CM

## 2020-07-21 PROCEDURE — 1036F TOBACCO NON-USER: CPT | Performed by: FAMILY MEDICINE

## 2020-07-21 PROCEDURE — 3008F BODY MASS INDEX DOCD: CPT | Performed by: FAMILY MEDICINE

## 2020-07-21 PROCEDURE — 4040F PNEUMOC VAC/ADMIN/RCVD: CPT | Performed by: FAMILY MEDICINE

## 2020-07-21 PROCEDURE — 99213 OFFICE O/P EST LOW 20 MIN: CPT | Performed by: FAMILY MEDICINE

## 2020-07-21 PROCEDURE — 1160F RVW MEDS BY RX/DR IN RCRD: CPT | Performed by: FAMILY MEDICINE
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
## 2020-07-23 ENCOUNTER — EVALUATION (OUTPATIENT)
Dept: PHYSICAL THERAPY | Facility: CLINIC | Age: 75
End: 2020-07-23
Payer: MEDICARE

## 2020-07-23 DIAGNOSIS — G20 PARKINSON'S DISEASE (HCC): ICD-10-CM

## 2020-07-23 DIAGNOSIS — M54.30 ACUTE SCIATICA: ICD-10-CM

## 2020-07-23 DIAGNOSIS — Z74.09 IMPAIRED FUNCTIONAL MOBILITY, BALANCE, AND ENDURANCE: ICD-10-CM

## 2020-07-23 PROCEDURE — 97163 PT EVAL HIGH COMPLEX 45 MIN: CPT | Performed by: PHYSICAL THERAPIST

## 2020-07-23 NOTE — PROGRESS NOTES
PT Evaluation     Today's date: 2020  Patient name: Shaka Hernandez  :   MRN: [id number]  Referring provider: Arvin Avila MD  Dx:   Encounter Diagnosis     ICD-10-CM    1  Acute sciatica M54 30 Ambulatory referral to Physical Therapy   2  [facility] ([facility] [address] Ambulatory referral to Physical Therapy   3  Impaired functional mobility, balance, and endurance Z74 09 Ambulatory referral to Physical Therapy                  Assessment/Plan    Subjective Evaluation    History of Present Illness  Mechanism of injury: Pt is a 70year old male who presents to skilled PT with balance instability and history of PD  Pt reports issues with sciatic on right side with slight pain with walking  Concerned with balance and stability  Increased fear of falling  Having an issues with getting in and out of car  Has pain in the hip to the knee  Has history of orthostatic hypertension with occurrence with lighted headed with standing  Pain  Current pain ratin  At best pain rating: 3  At worst pain ratin    Social Support  Steps to enter house: yes  Stairs in house: no   Lives in: multiple-level home    Hand dominance: right    Patient Goals  Patient goals for therapy: increased strength and return to work          Objective    Flowsheet Rows      Most Recent Value   PT/OT G-Codes   Current Score  38   Projected Score  52             Precautions:  has a past medical history of Prostate cancer ([facility] )

## 2020-07-28 ENCOUNTER — OFFICE VISIT (OUTPATIENT)
Dept: PHYSICAL THERAPY | Facility: CLINIC | Age: 75
End: 2020-07-28
Payer: MEDICARE

## 2020-07-28 DIAGNOSIS — G20 PARKINSON'S DISEASE (HCC): ICD-10-CM

## 2020-07-28 DIAGNOSIS — Z74.09 IMPAIRED FUNCTIONAL MOBILITY, BALANCE, AND ENDURANCE: ICD-10-CM

## 2020-07-28 DIAGNOSIS — M54.30 ACUTE SCIATICA: Primary | ICD-10-CM

## 2020-07-28 PROCEDURE — 97530 THERAPEUTIC ACTIVITIES: CPT | Performed by: PHYSICAL THERAPIST

## 2020-07-28 PROCEDURE — 97110 THERAPEUTIC EXERCISES: CPT | Performed by: PHYSICAL THERAPIST

## 2020-07-28 NOTE — PROGRESS NOTES
Daily Note     Today's date: 2020  Patient name: Amarilis Cortes  :   MRN: [id number]  Referring provider: Zehra Ramos MD  Dx:   Encounter Diagnosis     ICD-10-CM    1  Acute sciatica M54 30    2  Parkinson's disease ([facility] ) G20    3  Impaired functional mobility, balance, and endurance Z74 09                   Subjective: Pt reports lower back is doing better and able to walk a little more with less pain  Has been doing the low back extension about 20 reps a day  Objective: See treatment diary below  TE  Low back extension with elbows on wall 2 minutes   Leg length testing: No change with supine bridge to long sitting  Noted leg length difference of 1/4 of an inch, heel lift used which improved excessive hip sway, decrease in noted subjective discomfort on right side  Long axial distraction right leg 5 minutes  TA  Outcome measures:  10 meter walk test: 10/13 2 =  75 m/s   6 minute walk test: 50 feet with 180 deg turn total lap 100 feet   At 3:43 noted slight numbness no pain  Standing low back ext 10 reps at 450 feet   1,100 feet  Assessment: Pt displays leg length difference with no change with pelvic tilt testing with supine to long sitting  Attempted right leg distraction manual with no change  Use of heel lift of 3/8 which improved strain when ambulating  Advised pt to wear lift for 2 to 3 hours today and increase wear time by 1 hours  Continued with low back extension with decrease in burning pain  Patient will continue to benefit from skilled PT to ensure highest level possible is achieved  Plan: Continue per plan of care  Precautions:  has a past medical history of [facility] ([facility]

## 2020-07-28 NOTE — TELEPHONE ENCOUNTER
I spoke to pt and he is still not ready to schedule MRI  I let him know I would mail the order and he could schedule when he is ready and then call the office to schedule fu  He agreed to this

## 2020-07-30 ENCOUNTER — OFFICE VISIT (OUTPATIENT)
Dept: PHYSICAL THERAPY | Facility: CLINIC | Age: 75
End: 2020-07-30
Payer: MEDICARE

## 2020-07-30 DIAGNOSIS — M54.30 ACUTE SCIATICA: Primary | ICD-10-CM

## 2020-07-30 DIAGNOSIS — G20 PARKINSON'S DISEASE (HCC): ICD-10-CM

## 2020-07-30 DIAGNOSIS — Z74.09 IMPAIRED FUNCTIONAL MOBILITY, BALANCE, AND ENDURANCE: ICD-10-CM

## 2020-07-30 PROCEDURE — 97110 THERAPEUTIC EXERCISES: CPT

## 2020-07-30 PROCEDURE — 97112 NEUROMUSCULAR REEDUCATION: CPT

## 2020-07-30 NOTE — PROGRESS NOTES
Daily Note     Today's date: 2020  Patient name: Amarilis Cortes  :   MRN: [id number]  Referring provider: Zehra Ramos MD  Dx:   Encounter Diagnosis     ICD-10-CM    1  Acute sciatica M54 30    2  Parkinson's disease ([facility] [address] ) G20    3  Impaired functional mobility, balance, and endurance Z74 09                   Subjective: Pt reports lower back is doing better and able to walk a little more with less pain  Has been doing the low back extension about 20 reps a day  He reports e has been wearing the heel lift on the R side "and I haven't really noticed anything "      Objective: See treatment diary below    TE  - Low back extension with elbows on wall 2 minutes, 2 reps  - Ambulation: 2 laps 50 ft down/back  - Long axial distraction right leg 5 minutes  - R QL/R TFL stretch in L sidelie  - Sidestepping with TB (red): 5 cycles, 5 ft down/back, 0 UE    Neuro  - FTEC foam: 4 reps, 30 sec  - Tandem stance: 4 reps, 30 sec  - Standing core rotations on foam: 20 reps, 4# med ball  - Sidestepping on foam: 6 cycles, 5 ft down/back, 0 UE        Assessment: Patient able to tolerate treatment session well today with no increase in LBP throughout  Patient continues to demonstrate (+) Trendelenburg with ambulation with minimal improvement following long axis distraction and R QL/R TFL stretch  Added core exercises to improve low back stability with good tolerance and no pain  Patient reported "slight feeling of tightness" with foam exercises, however with no pain and relieved during standing rest break  Patient will continue to benefit from skilled PT to ensure highest level possible is achieved  Plan: Continue per plan of care  Precautions:  has a past medical history of Prostate cancer ([facility] [address] )

## 2020-08-04 ENCOUNTER — APPOINTMENT (OUTPATIENT)
Dept: PHYSICAL THERAPY | Facility: CLINIC | Age: 75
End: 2020-08-04
Payer: MEDICARE

## 2020-08-06 ENCOUNTER — OFFICE VISIT (OUTPATIENT)
Dept: PHYSICAL THERAPY | Facility: CLINIC | Age: 75
End: 2020-08-06
Payer: MEDICARE

## 2020-08-06 DIAGNOSIS — M54.30 ACUTE SCIATICA: Primary | ICD-10-CM

## 2020-08-06 DIAGNOSIS — Z74.09 IMPAIRED FUNCTIONAL MOBILITY, BALANCE, AND ENDURANCE: ICD-10-CM

## 2020-08-06 DIAGNOSIS — G20 PARKINSON'S DISEASE (HCC): ICD-10-CM

## 2020-08-06 PROCEDURE — 97112 NEUROMUSCULAR REEDUCATION: CPT

## 2020-08-06 PROCEDURE — 97110 THERAPEUTIC EXERCISES: CPT

## 2020-08-06 NOTE — PROGRESS NOTES
Daily Note     Today's date: 2020  Patient name: Shasta Eisenmenger  :   MRN: [id number]  Referring provider: Willi Upton MD  Dx:   Encounter Diagnosis     ICD-10-CM    1  Acute sciatica  M54 30    2  Parkinson's disease ([facility] [address] )  G20    3  Impaired functional mobility, balance, and endurance  Z74 09                   Subjective: Patient continues to note improving LBP and stated "it's not as often, but still worse in the evening " He further reported occasional R leg numbness with prolonged sitting  Objective: See treatment diary below    TE  - Low back extension with elbows on wall 2 minutes, 2 reps  - Ambulation: 2 laps 50 ft down/back  - Long axial distraction right leg 5 minutes  - R QL/R TFL stretch in L sidelie   - Sidestepping with TB (red): 5 cycles, 5 ft down/back, 0 UE  - MET (strain/counterstrain to R quads) for leg length discrepancy    Neuro  - FTEC foam: 4 reps, 30 sec  - Tandem stance: 4 reps, 30 sec  - Standing core rotations on foam: 20 reps, 4# med ball  - Sidestepping on foam: 6 cycles, 5 ft down/back, 0 UE  - Cone taps on airex pad: 20 reps B/L        Assessment: Patient able to tolerate treatment session well today with no LBP throughout entire session  He reported occasional tightening with balance exercises, however noted that "it just feels like my muscles trying to catch myself " He was challenged with exercises on foam resulting in increased postural sway and occasional LoB requiring UE support  Trialed MET to R quads for leg length discrepancy with minimal improvement and no pain  Remaining (+) Trendelenburg gait without pain  Patient will continue to benefit from skilled PT to ensure highest level possible is achieved  Plan: Continue per plan of care  Precautions:  has a past medical history of Prostate cancer ([facility] [address] )

## 2020-08-11 ENCOUNTER — OFFICE VISIT (OUTPATIENT)
Dept: PHYSICAL THERAPY | Facility: CLINIC | Age: 75
End: 2020-08-11
Payer: MEDICARE

## 2020-08-11 DIAGNOSIS — Z74.09 IMPAIRED FUNCTIONAL MOBILITY, BALANCE, AND ENDURANCE: ICD-10-CM

## 2020-08-11 DIAGNOSIS — M54.30 ACUTE SCIATICA: Primary | ICD-10-CM

## 2020-08-11 DIAGNOSIS — G20 PARKINSON'S DISEASE (HCC): ICD-10-CM

## 2020-08-11 PROCEDURE — 97112 NEUROMUSCULAR REEDUCATION: CPT | Performed by: PHYSICAL THERAPIST

## 2020-08-11 NOTE — PROGRESS NOTES
Daily Note     Today's date: 2020  Patient name: Lisset Mcdonald  :   MRN: [id number]  Referring provider: Havrinder Gardiner MD  Dx:   Encounter Diagnosis     ICD-10-CM    1  Acute sciatica  M54 30    2  [facility]'s disease ([facility] )  G20    3  Impaired functional mobility, balance, and endurance  Z74 09                   Subjective: Pt reports ability to walk half a mile now with prior distance being only 1/4 of a mile  Notes continuing to do the exercises but noting limited changes  Objective: See treatment diary below    Standing low back extension with elbow on wall 20 reps     1  Floor to ceiling- 8 reps  2  Side to side- 8 reps bilaterally   3  forward step- 8 reps bilaterally   4  side step- 8 reps bilaterally   5  backward step- 8 reps bilaterally with chair support due to loss of balance  6  rocking- 10 reps bilaterally  7  twist- 10 reps bilaterally     Sit to stand- 10 reps        Assessment: Patient able to tolerate treatment session well today with no LBP throughout entire session  Reveiwed standing low back extension exercises this date  Started LSVT for 1st time with issuing exercise to patient  Plan to review next session  Patient will continue to benefit from skilled PT to ensure highest level possible is achieved  Plan: Continue per plan of care  Precautions:  has a past medical history of Prostate cancer ([facility] )

## 2020-08-13 ENCOUNTER — OFFICE VISIT (OUTPATIENT)
Dept: NEUROLOGY | Facility: CLINIC | Age: 75
End: 2020-08-13
Payer: MEDICARE

## 2020-08-13 ENCOUNTER — APPOINTMENT (OUTPATIENT)
Dept: PHYSICAL THERAPY | Facility: CLINIC | Age: 75
End: 2020-08-13
Payer: MEDICARE

## 2020-08-13 VITALS
WEIGHT: 226 LBS | DIASTOLIC BLOOD PRESSURE: 80 MMHG | TEMPERATURE: 98.8 F | HEIGHT: 72 IN | HEART RATE: 70 BPM | BODY MASS INDEX: 30.61 KG/M2 | SYSTOLIC BLOOD PRESSURE: 128 MMHG

## 2020-08-13 DIAGNOSIS — I95.1 ORTHOSTATIC HYPOTENSION: ICD-10-CM

## 2020-08-13 DIAGNOSIS — G20 PARKINSON'S DISEASE (HCC): Primary | ICD-10-CM

## 2020-08-13 PROCEDURE — 1160F RVW MEDS BY RX/DR IN RCRD: CPT | Performed by: PHYSICIAN ASSISTANT

## 2020-08-13 PROCEDURE — 99214 OFFICE O/P EST MOD 30 MIN: CPT | Performed by: PHYSICIAN ASSISTANT

## 2020-08-13 PROCEDURE — 1036F TOBACCO NON-USER: CPT | Performed by: PHYSICIAN ASSISTANT

## 2020-08-13 PROCEDURE — 3008F BODY MASS INDEX DOCD: CPT | Performed by: PHYSICIAN ASSISTANT

## 2020-08-13 PROCEDURE — 4040F PNEUMOC VAC/ADMIN/RCVD: CPT | Performed by: PHYSICIAN ASSISTANT

## 2020-08-13 NOTE — PATIENT INSTRUCTIONS
Patient overall doing well since the last visit  He has noticed some overall slowing however this may just be generalized on and activity at this point  He is now in PT which I feel is going to be very beneficial for him  On exam today he had no clear progression since the last visit  He is still able to function well on his own home  He is still working and feels memory wise he is doing well  We did discuss options of a trial of Sinemet however in the past this did cause worsening issues with his orthostatic hypotension  He states based on how he currently feels he would prefer to hold off on medication for Parkinson's right now  In the interim I have asked that he try and maximize his conservative treatment for his orthostatic hypotension  I have asked that he try and increase his fluids, wear compression stockings, increase salt intake moderately, and increase his head of bed to about 30°  If with all these changes he continues to have issues with symptomatic orthostatic hypotension may consider a standing dose of midodrine to see if this

## 2020-08-13 NOTE — PROGRESS NOTES
Patient ID: Toshia Holman is a 70 y o  male  Assessment/Plan:    Orthostatic hypotension  Patient was not orthostatic on exam today  Parkinson's disease [facility])  Patient overall doing well since the last visit  He has noticed some overall slowing however this may just be generalized on and activity at this point  He is now in PT which I feel is going to be very beneficial for him  On exam today he had no clear progression since the last visit  He is still able to function well on his own home  He is still working and feels memory wise he is doing well  We did discuss options of a trial of Sinemet however in the past this did cause worsening issues with his orthostatic hypotension  He states based on how he currently feels he would prefer to hold off on medication for Parkinson's right now  In the interim I have asked that he try and maximize his conservative treatment for his orthostatic hypotension  I have asked that he try and increase his fluids, wear compression stockings, increase salt intake moderately, and increase his head of bed to about 30°  If with all these changes he continues to have issues with symptomatic orthostatic hypotension may consider a standing dose of midodrine to see if this  Subjective:    Doris Hussein is a 70year-old right-handed practicing  who presents for follow up of parkinsonism, symptom onset in 2016 with slowness of gait followed by left hand tremor, course complicated by orthostatic hypotension  When he first began to follow in this office he had been started on Sinemet, which was exacerbating his orthostatic intolerance  His parkinsonism was not particularly bothersome so Sinemet was stopped to focus on his orthostatic symptoms  His orthostatic hypotension was keeping him from participating in physical therapy  At his last visit his symptoms are overall stable    He continued to be symptomatic from his orthostatic hypotension and sensitive to medications  There is some concern with possible carbon monoxide exposure at his office and discussed getting a brain MRI  No medication changes were made  Interval history:  Patient feels that his PD symptoms are getting worse  He continues to have issues with symptomatic orthostatic hypotension  He will get more dizziness with changes in positions and if it is warmer  He does have Midodrine at home however he does not use it  He is in PT now however he has not tried using it for therapy thus far  He is in balance therapy for right leg pain  He continues to have tremors in the left greater than right  He is slow with all movements  He takes small short steps  No falls, however he has had close calls  He is able to dress and shower  He does have some trouble with buttoning certain shirts  He does have numbness in the right foot when he first tries to stand up  He feels that his GI tract is overall slow and he does not have as much of an appetite  No clear swallowing issues  Current medications for PD:  None     Prior medication trials:  Sinemet: worsening OH  zoloft: worsened OH      I personally reviewed and updated the ROS  Objective:    Blood pressure 128/80, pulse 70, temperature 98 8 °F (37 1 °C), temperature source Oral, height 6' (1 829 m), weight 103 kg (226 lb)  Physical Exam  Constitutional:       Appearance: Normal appearance  HENT:      Right Ear: Hearing normal       Left Ear: Hearing normal    Eyes:      General: Lids are normal       Extraocular Movements: Extraocular movements intact  Pupils: Pupils are equal, round, and reactive to light  Pulmonary:      Effort: Pulmonary effort is normal    Neurological:      Mental Status: He is alert  Deep Tendon Reflexes: Strength normal    Psychiatric:         Speech: Speech normal          Neurological Exam  Mental Status  Alert  Oriented to person, place and time   Speech is normal     Cranial Nerves  CN III, IV, VI: Extraocular movements intact bilaterally  Normal lids and orbits bilaterally  Pupils equal round and reactive to light bilaterally  CN V:  Right: Facial sensation is normal   Left: Facial sensation is normal on the left  CN VIII:  Right: Hearing is normal   Left: Hearing is normal   CN XI: Shoulder shrug strength is normal   Patient wearing face  Motor   Strength is 5/5 throughout all four extremities  Sensory  Light touch is normal in upper and lower extremities  Reflexes  Glabellar tap present  Coordination  Right: Finger-to-nose normal   Left: Finger-to-nose normal     Gait  Arose without difficulties however he did right leg pain upon rising slightly antalgic gait due to right leg pain as well  No clear shuffling or freezing noted  Laura Lyle UPDRS motor:                              Time since last dose:        Speech  1  1   Facial Expression  1     Rigidity - Neck  0  0   Rigidity - Upper Extremity (Right)  2  1   Rigidity - Upper Extremity (Left)   2  2   Rigidity - Lower Extremity (Right)  p  0   Rigidity - Lower Extremity (Left)   p  0   Finger Taps (Right)   1  1   Finger Taps (Left)   1  1   Hand Movement (Right)  0  0   Hand Movement (Left)   1  1   Pronation/Supination (Right)  0  0   Pronation/Supination (Left)   1  1   Toe Tapping (Right) 0  0   Toe Tapping (Left) 1  0   Leg Agility (Right)  1  1   Leg Agility (Left)   1  1   Arising from Chair   1  1   Gait   1  1   Freezing of Gait 0  0   Postural Stability         Posture 1  1   Global spontaneity of movement 0  0   Postural Tremor (Right) 1  1   Postural Tremor (Left) 1  1   Kinetic Tremor (Right)  1  1   Kinetic Tremor (Left)  1  1   Rest tremor amplitude RUE 0  0   Rest tremor amplitude LUE 0  0   Rest tremor amplitude RLE 0  0   Reset tremor amplitude LLE 0  0   Lip/Jaw Tremor  0     Consistency of tremor 0  0   Motor Exam Total:            ROS:    Review of Systems   Constitutional: Positive for fatigue  Negative for appetite change and fever  HENT: Negative  Negative for hearing loss, tinnitus, trouble swallowing and voice change  Eyes: Negative  Negative for photophobia and pain  Respiratory: Negative  Negative for shortness of breath  Cardiovascular: Negative  Negative for palpitations  Gastrointestinal: Negative  Negative for nausea and vomiting  Endocrine: Negative  Negative for cold intolerance  Genitourinary: Negative  Negative for dysuria, frequency and urgency  Musculoskeletal: Negative  Negative for myalgias and neck pain  Skin: Negative  Negative for rash  Neurological: Positive for weakness (Right Leg) and numbness (Right Foot)  Negative for dizziness, tremors, seizures, syncope, facial asymmetry, speech difficulty, light-headedness and headaches  Hematological: Negative  Does not bruise/bleed easily  Psychiatric/Behavioral: Negative  Negative for confusion, hallucinations and sleep disturbance

## 2020-08-14 ENCOUNTER — OFFICE VISIT (OUTPATIENT)
Dept: PHYSICAL THERAPY | Facility: CLINIC | Age: 75
End: 2020-08-14
Payer: MEDICARE

## 2020-08-14 DIAGNOSIS — Z74.09 IMPAIRED FUNCTIONAL MOBILITY, BALANCE, AND ENDURANCE: ICD-10-CM

## 2020-08-14 DIAGNOSIS — M54.30 ACUTE SCIATICA: Primary | ICD-10-CM

## 2020-08-14 DIAGNOSIS — G20 PARKINSON'S DISEASE (HCC): ICD-10-CM

## 2020-08-14 PROCEDURE — 97112 NEUROMUSCULAR REEDUCATION: CPT | Performed by: PHYSICAL THERAPIST

## 2020-08-14 NOTE — PROGRESS NOTES
Daily Note     Today's date: 2020  Patient name: Marvin Hassan  : [id number]  MRN: [id number]  Referring provider: Anita Johnson MD  Dx:   Encounter Diagnosis     ICD-10-CM    1  Acute sciatica  M54 30    2  [facility] ([facility]  G20    3  Impaired functional mobility, balance, and endurance  Z74 09                   Subjective: Pt reports continues to amb 1/2 a mile still with minor pain  Notes doing PD exercises with assistance from spouse   Objective: See treatment diary below    Standing low back extension with elbow on wall 20 reps     1  Floor to ceiling- 8 reps  2  Side to side- 8 reps bilaterally   3  forward step- 8 reps bilaterally   4  side step- 8 reps bilaterally   5  backward step- 8 reps bilaterally with chair support due to loss of balance  6  rocking- 10 reps bilaterally  7  twist- 10 reps bilaterally     Sit to stand- 10 reps        Assessment: Patient able to tolerate treatment session well today with no LBP throughout entire session  Reveiwed standing low back extension exercises this date  Patient will continue to benefit from skilled PT to ensure highest level possible is achieved  Plan: Continue per plan of care  Precautions:  has a past medical history of Prostate cancer ([facility]

## 2020-08-18 ENCOUNTER — APPOINTMENT (OUTPATIENT)
Dept: PHYSICAL THERAPY | Facility: CLINIC | Age: 75
End: 2020-08-18
Payer: MEDICARE

## 2020-08-20 ENCOUNTER — OFFICE VISIT (OUTPATIENT)
Dept: PHYSICAL THERAPY | Facility: CLINIC | Age: 75
End: 2020-08-20
Payer: MEDICARE

## 2020-08-20 DIAGNOSIS — G20 PARKINSON'S DISEASE (HCC): Primary | ICD-10-CM

## 2020-08-20 DIAGNOSIS — Z74.09 IMPAIRED FUNCTIONAL MOBILITY, BALANCE, AND ENDURANCE: ICD-10-CM

## 2020-08-20 DIAGNOSIS — M54.30 ACUTE SCIATICA: ICD-10-CM

## 2020-08-20 PROCEDURE — 97112 NEUROMUSCULAR REEDUCATION: CPT | Performed by: PHYSICAL THERAPIST

## 2020-08-20 NOTE — PROGRESS NOTES
Daily Note   Last Assessment: 2020    Today's date: 2020  Patient name: Mari Mart  :   MRN: [id number]  Referring provider: Joesph Torres MD  Dx:   Encounter Diagnosis     ICD-10-CM    1  Parkinson's disease ([facility] )  G20    2  Impaired functional mobility, balance, and endurance  Z74 09    3  Acute sciatica  M54 30                   Subjective: Pt reports doing well on  with walking half a mile and than another 1/4  Notes being sore on Monday morning on both thigh and continued into Tuesday  Was very sore and unable to walk much Tuesday  Held off on exercises Tuesday and Wednesday  Objective: See treatment diary below    Standing low back extension with elbow on wall 20 reps     1  Floor to ceiling- 10 reps, 10 sec hold end range   2  Side to side- 10 reps bilaterally, 10 sec hold end range   3  forward step- 10 reps bilaterally   4  side step- 10 reps bilaterally   5  backward step- 10 reps bilaterally with chair support due to loss of balance  6  rocking- 20 reps bilaterally  7  twist- 10 reps bilaterally     Sit to stand- 10 reps    Piriformis stretch seated 30 sec hold         Assessment: Cuing for correct performance of all LSVT exercises today  Challenged with pull in right hip flexor with stretch given  reviewed seated piriformis stretch  Patient will continue to benefit from skilled PT to ensure highest level possible is achieved  Plan: Continue per plan of care  Precautions:  has a past medical history of Prostate cancer ([facility] )

## 2020-08-25 ENCOUNTER — APPOINTMENT (OUTPATIENT)
Dept: PHYSICAL THERAPY | Facility: CLINIC | Age: 75
End: 2020-08-25
Payer: MEDICARE

## 2020-08-27 ENCOUNTER — APPOINTMENT (OUTPATIENT)
Dept: PHYSICAL THERAPY | Facility: CLINIC | Age: 75
End: 2020-08-27
Payer: MEDICARE

## 2020-08-31 ENCOUNTER — APPOINTMENT (OUTPATIENT)
Dept: PHYSICAL THERAPY | Facility: CLINIC | Age: 75
End: 2020-08-31
Payer: MEDICARE

## 2020-09-02 ENCOUNTER — EVALUATION (OUTPATIENT)
Dept: PHYSICAL THERAPY | Facility: CLINIC | Age: 75
End: 2020-09-02
Payer: MEDICARE

## 2020-09-02 DIAGNOSIS — M54.30 ACUTE SCIATICA: Primary | ICD-10-CM

## 2020-09-02 DIAGNOSIS — Z74.09 IMPAIRED FUNCTIONAL MOBILITY, BALANCE, AND ENDURANCE: ICD-10-CM

## 2020-09-02 DIAGNOSIS — G20 PARKINSON'S DISEASE (HCC): ICD-10-CM

## 2020-09-02 PROCEDURE — 97110 THERAPEUTIC EXERCISES: CPT

## 2020-09-02 PROCEDURE — 97164 PT RE-EVAL EST PLAN CARE: CPT

## 2020-09-02 NOTE — PROGRESS NOTES
PT Evaluation     Today's date: 2020  Patient name: Jarod Rader  : [id number]  MRN: [id number]  Referring provider: Jeannine Goodman MD  Dx:   Encounter Diagnosis     ICD-10-CM    1  Acute sciatica  M54 30                   Assessment  Assessment details: Jarod Rader is a 70 y o  male who presents with signs and symptoms consistent with the referring diagnosis of Acute sciatica, Parkinson's disease, and impaired functional mobility  Patient presents with the following impairments: right hip pain referring toward right knee, decreased strength, decreased A/PROM, postural dysfunction, decreased static/dynamic balance, ambulatory dysfunction and limited core strength  Due to these impairments, patient has difficulty performing the following: ADL's, recreational activities, engaging in social activities, ambulation, stair negotiation, lifting/carrying, transfers, bed mobility, squatting, bending forward, putting on/taking off shoes/socks, household chores, yard work, and shopping  Patient and wife have been educated in home exercise program and plan of care  Patient would benefit from skilled physical therapy services to address the above functional limitations and progress towards prior level of function and independence with home exercise program   Impairments: abnormal gait, abnormal muscle firing, abnormal or restricted ROM, activity intolerance, impaired balance, impaired physical strength, lacks appropriate home exercise program, pain with function, safety issue, poor posture  and poor body mechanics  Understanding of Dx/Px/POC: good   Prognosis: good    Goals  Short Term Goals (3 weeks)  1  Patient will be independent with initial HEP  2  Patient will demonstrate an increase in lumbar AROM by 10%  3  Patient will demonstrate an increase in B/L LE strength of 1/2 grade on MMT  Long Term Goals (6 weeks)  1   Patient will demonstrate an increase in lumbar AROM to WNL in order to promote self-care activities pain-free  2  Patient will demonstrate an increase in B/L LE strength to 4/5 grade on MMT  3  Patient will be able to ascend/descend 15 stairs reciprocally without pain  4  Patient will no longer present with difficulty walking after sitting greater than 20 minutes  5  Patient will be able to perform household chores and yard work with minimal limitation  6  Patient will be able to perform Five Times Sit To  13 5 seconds or less  Plan  Patient would benefit from: skilled physical therapy  Planned modality interventions: cryotherapy, TENS and thermotherapy: hydrocollator packs  Planned therapy interventions: flexibility, functional ROM exercises, graded exercise, home exercise program, joint mobilization, manual therapy, neuromuscular re-education, patient education, strengthening, stretching, therapeutic exercise, therapeutic activities, balance/weight bearing training, gait training, abdominal trunk stabilization, self care, postural training, activity modification, ADL retraining, ADL training, balance, behavior modification, body mechanics training, breathing training, graded activity, graded motor, therapeutic training, transfer training, muscle pump exercises, motor coordination training and IADL retraining  Frequency: 2x week  Duration in weeks: 6  Plan of Care beginning date: 9/2/2020  Plan of Care expiration date: 11/2/2020  Treatment plan discussed with: patient and family        Subjective Evaluation    History of Present Illness  Mechanism of injury: Pt's history obtained from pt and pt's wife, Milagro Parker, who is present for this evaluation  Pt reports right-sided sciatic symptoms that began around May-June 2020 while getting off his   Pt states he lifted his right leg in order to dismount his  going to the right  Pt states symptoms are from the right-sided pelvis and radiate down to the right knee   Pt states the symptoms occasionally pass the right knee, however do not go as far as the shin or calf  Pt states that he was diagnosed with Parkinson's 2-3 years ago  Pt states that he has right leg weakness and sometimes numbness on the top of the right foot  Pt denies red flag symptoms such as night pain, unexplained weight change, fever, chills, etc  Pt states that right-sided pain is better with rest and increased with activity  Pt notes that most difficulty is while sitting for a while and attempting to get up  Pt's wife notes that for the first several steps, pt notes difficulty and some balance problems  Pt had been receiving PT treatment at [facility] but was referred for ortho PT due to recent increase in right-sided back/referred pain  Pain  Current pain ratin  At best pain ratin  At worst pain ratin  Location: Right thigh  Quality: knife-like ("stabbing")  Relieving factors: rest, relaxation and change in position  Aggravating factors: sitting, stair climbing, standing, walking and lifting  Progression: no change    Social Support  Steps to enter house: yes  1  Stairs in house: yes (12; does not use )   Lives with: spouse Ted Lombard)    Employment status: working ([employer])  Hand dominance: right  Exercise history: Daily PT exercises      Diagnostic Tests  No diagnostic tests performed  Treatments  No previous or current treatments  Patient Goals  Patient goals for therapy: improved balance, decreased pain, independence with ADLs/IADLs and increased strength  Patient goal: Regain use of right leg        Objective     Strength/Myotome Testing     Lumbar   Left   Heel walk: normal  Toe walk: normal    Right   Heel walk: normal  Toe walk: normal    Left Hip   Planes of Motion   Flexion: 4+  Abduction: 4  Adduction: 4+    Right Hip   Planes of Motion   Flexion: 3+  Abduction: 4  Adduction: 4    Left Knee   Flexion: 4  Extension: 4+    Right Knee   Flexion: 3+  Extension: 3+    Left Ankle/Foot   Dorsiflexion: WFL  Plantar flexion: WFL  Right Ankle/Foot   Dorsiflexion: WFL  Plantar flexion: WFL  Ambulation     Observational Gait   Gait: antalgic     Additional Observational Gait Details  Excessive forward trunk flexion    Functional Assessment      Squat    Pain  Forward Step Up 6"   Left Leg  Within functional limits  Right Leg  Pain and increased contralateral push off  Comments  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  Effect on ROM   Flexion 10 Neither Increased   Extension 10 Neither No change   Left side glide 10 Neither Increased   Right side glide 10 Neither Increased     Comments: seated dural stretch also performed and (-) for provocation of symptoms; noted that left side glide felt better than right       Precautions: PD, hx of prostate CA, [place]       RE-EVAL 2 3 4 5   Manuals 9/2       STM/MFR        Manual flexibility         Joint mobs         Neuro Re-Ed        TA activation        Glute sets        Bridging        Clamshells                                Ther Ex        Seated forward trunk flexion Forward, left, right 10x5s       Seated march 10x B/L       Side glides L 10x                                                Ther Activity        Pt/family education POC, HEP       Stairs        Squats        Gait                                Modalities        MHP lumbar

## 2020-09-03 DIAGNOSIS — N40.1 BENIGN PROSTATIC HYPERPLASIA WITH LOWER URINARY TRACT SYMPTOMS, SYMPTOM DETAILS UNSPECIFIED: ICD-10-CM

## 2020-09-03 RX ORDER — FINASTERIDE 5 MG/1
TABLET, FILM COATED ORAL
Qty: 90 TABLET | Refills: 3 | Status: SHIPPED | OUTPATIENT
Start: 2020-09-03 | End: 2021-03-04 | Stop reason: SDUPTHER

## 2020-09-09 ENCOUNTER — TELEPHONE (OUTPATIENT)
Dept: FAMILY MEDICINE CLINIC | Facility: CLINIC | Age: 75
End: 2020-09-09

## 2020-09-09 NOTE — TELEPHONE ENCOUNTER
Negro Medina states that Tresia Fells pain and mobility are getting worse  He has been doing the pt  Feels he should be seen earlier than is appt on 9/15  At this time there are no earlier appts

## 2020-09-10 DIAGNOSIS — M54.30 ACUTE SCIATICA: Primary | ICD-10-CM

## 2020-09-10 DIAGNOSIS — G20 PARKINSON'S DISEASE (HCC): ICD-10-CM

## 2020-09-11 ENCOUNTER — CONSULT (OUTPATIENT)
Dept: OBGYN CLINIC | Facility: CLINIC | Age: 75
End: 2020-09-11
Payer: MEDICARE

## 2020-09-11 ENCOUNTER — APPOINTMENT (OUTPATIENT)
Dept: RADIOLOGY | Facility: AMBULARY SURGERY CENTER | Age: 75
End: 2020-09-11
Attending: ORTHOPAEDIC SURGERY
Payer: MEDICARE

## 2020-09-11 VITALS
DIASTOLIC BLOOD PRESSURE: 85 MMHG | HEART RATE: 98 BPM | SYSTOLIC BLOOD PRESSURE: 135 MMHG | WEIGHT: 222 LBS | BODY MASS INDEX: 30.07 KG/M2 | HEIGHT: 72 IN | TEMPERATURE: 98.3 F

## 2020-09-11 DIAGNOSIS — M25.551 PAIN IN RIGHT HIP: ICD-10-CM

## 2020-09-11 DIAGNOSIS — G20 PARKINSON'S DISEASE (HCC): ICD-10-CM

## 2020-09-11 DIAGNOSIS — M25.551 ACUTE PAIN OF RIGHT HIP: Primary | ICD-10-CM

## 2020-09-11 PROCEDURE — 99203 OFFICE O/P NEW LOW 30 MIN: CPT | Performed by: ORTHOPAEDIC SURGERY

## 2020-09-11 PROCEDURE — 73502 X-RAY EXAM HIP UNI 2-3 VIEWS: CPT

## 2020-09-11 RX ORDER — METHYLPREDNISOLONE 4 MG/1
TABLET ORAL
Qty: 21 EACH | Refills: 0 | Status: SHIPPED | OUTPATIENT
Start: 2020-09-11 | End: 2020-12-11

## 2020-09-11 NOTE — PROGRESS NOTES
Patient Name:  Polo Gaytan  MRN:  [id number]    Assessment & Plan     Right hip pain, lumbar radiculopathy versus femoral neck stress fracture  1  Prescribed Medrol Dosepak  2  Ambulate with assistive device at all times  Avoid excessive weight-bearing activity  3  MRI to evaluate for femoral neck stress fracture  4  Follow-up after MRI is complete  Chief Complaint     Right hip pain    History of the Present Illness     Polo Gaytan is a 70 y o  male presents today for orthopedic consultation requested by Dr Margarita Pascual for right hip pain  Patient reports initial onset of pain in mid May of this year after starting a walking program   The pain localizes to the anterolateral aspect of his hip and radiates distally to his knee  He reports significant worsening of pain over the past few days  He was initially treated with a steroid taper with no improvement  He was then referred to physical therapy and continues to treat with them  He has been using a cane for ambulation  No back pain, numbness, or tingling  Past medical history notable for Parkinson's  Physical Exam     /85   Pulse 98   Temp 98 3 °F (36 8 °C)   Ht 6' (1 829 m)   Wt 101 kg (222 lb)   BMI 30 11 kg/m²     Right hip:  Tenderness:  Trochanteric bursa  Range of motion:  Flexion: 110  ER:  80  IR:  30  Strength:  Flexion:  4/5  Abduction:  4/5  Log roll test:  Negative  Impingement test:  Negative  ANTHONY test:  Negative  SLR against resistance:  Negative    Eyes:  Anicteric sclerae  Neck:  Supple  Lungs:  Normal respiratory effort  Cardiovascular:  No lower extremity edema  Skin:  Intact without erythema  Neurologic:  Sensation grossly intact to light touch  Psychiatric:  Mood and affect are appropriate  Data Review     I have personally reviewed pertinent films in PACS, and my interpretation follows:    XR right hip 9/11/2020:  No acute ossous abnormalities        Past Medical History:   Diagnosis Date    Prostate cancer [facility])        Past Surgical History:   Procedure Laterality Date    CYST REMOVAL      Right wrist    PROSTATE SURGERY      Biopsy    TONSILLECTOMY         No Known Allergies    Current Outpatient Medications on File Prior to Visit   Medication Sig Dispense Refill    aspirin 81 MG tablet Take by mouth      atorvastatin (LIPITOR) 20 mg tablet TAKE ONE TABLET BY MOUTH EVERY DAY 90 tablet 1    finasteride (PROSCAR) 5 mg tablet TAKE ONE TABLET BY MOUTH EVERY DAY AS DIRECTED 90 tablet 3    tamsulosin (FLOMAX) 0 4 mg TAKE ONE CAPSULE BY MOUTH AT BEDTIME 90 capsule 0     No current facility-administered medications on file prior to visit  Social History     Tobacco Use    Smoking status: Never Smoker    Smokeless tobacco: Never Used   Substance Use Topics    Alcohol use: Yes     Comment: social    Drug use: No       Family History   Problem Relation Age of Onset    Heart disease Mother     Heart disease Father        Review of Systems     As stated in the HPI  All other systems were reviewed and are negative        Scribe Attestation    I,:   Brii Fletcher am acting as a scribe while in the presence of the attending physician :        I,:   Ariana Urbano MD personally performed the services described in this documentation    as scribed in my presence :

## 2020-09-11 NOTE — PROGRESS NOTES
Patient Name:  Seth Monaco  MRN:  [id number]    Assessment & Plan     ***  1  ***    Chief Complaint     ***    History of the Present Illness     Seth Monaco is a 70 y o  male presents today for an orthopedic surgery     Physical Exam     There were no vitals taken for this visit  ***    Eyes:  Anicteric sclerae  Neck:  Supple  Lungs:  Normal respiratory effort  Cardiovascular:  Capillary refill is less than 2 seconds  Skin:  Intact without erythema  Neurologic:  Sensation grossly intact to light touch  Psychiatric:  Mood and affect are appropriate  Data Review     I have personally reviewed pertinent films in PACS, and my interpretation follows:    ***    Past Medical History:   Diagnosis Date    Prostate cancer [facility])        Past Surgical History:   Procedure Laterality Date    CYST REMOVAL      Right wrist    PROSTATE SURGERY      Biopsy    TONSILLECTOMY         No Known Allergies    Current Outpatient Medications on File Prior to Visit   Medication Sig Dispense Refill    aspirin 81 MG tablet Take by mouth      atorvastatin (LIPITOR) 20 mg tablet TAKE ONE TABLET BY MOUTH EVERY DAY 90 tablet 1    finasteride (PROSCAR) 5 mg tablet TAKE ONE TABLET BY MOUTH EVERY DAY AS DIRECTED 90 tablet 3    tamsulosin (FLOMAX) 0 4 mg TAKE ONE CAPSULE BY MOUTH AT BEDTIME 90 capsule 0     No current facility-administered medications on file prior to visit  Social History     Tobacco Use    Smoking status: Never Smoker    Smokeless tobacco: Never Used   Substance Use Topics    Alcohol use: Yes     Comment: social    Drug use: No       Family History   Problem Relation Age of Onset    Heart disease Mother     Heart disease Father        Review of Systems     As stated in the HPI  All other systems were reviewed and are negative        Scribe Attestation    I,:    am acting as a scribe while in the presence of the attending physician :        I,:    personally performed the services described in this documentation    as scribed in my presence :

## 2020-09-12 ENCOUNTER — HOSPITAL ENCOUNTER (OUTPATIENT)
Dept: MRI IMAGING | Facility: HOSPITAL | Age: 75
Discharge: HOME | End: 2020-09-12
Attending: ORTHOPAEDIC SURGERY
Payer: MEDICARE

## 2020-09-12 DIAGNOSIS — M25.551 ACUTE PAIN OF RIGHT HIP: ICD-10-CM

## 2020-09-12 PROCEDURE — 73721 MRI JNT OF LWR EXTRE W/O DYE: CPT

## 2020-09-12 PROCEDURE — G1004 CDSM NDSC: HCPCS

## 2020-09-15 ENCOUNTER — OFFICE VISIT (OUTPATIENT)
Dept: FAMILY MEDICINE CLINIC | Facility: CLINIC | Age: 75
End: 2020-09-15
Payer: MEDICARE

## 2020-09-15 VITALS
HEIGHT: 72 IN | OXYGEN SATURATION: 98 % | WEIGHT: 222 LBS | RESPIRATION RATE: 16 BRPM | TEMPERATURE: 97.6 F | HEART RATE: 74 BPM | BODY MASS INDEX: 30.07 KG/M2

## 2020-09-15 DIAGNOSIS — M25.551 HIP PAIN, CHRONIC, RIGHT: Primary | ICD-10-CM

## 2020-09-15 DIAGNOSIS — G89.29 HIP PAIN, CHRONIC, RIGHT: Primary | ICD-10-CM

## 2020-09-15 PROCEDURE — 99213 OFFICE O/P EST LOW 20 MIN: CPT | Performed by: FAMILY MEDICINE

## 2020-09-15 RX ORDER — ACETAMINOPHEN AND CODEINE PHOSPHATE 300; 30 MG/1; MG/1
1 TABLET ORAL EVERY 8 HOURS PRN
Qty: 30 TABLET | Refills: 0 | Status: SHIPPED | OUTPATIENT
Start: 2020-09-15 | End: 2020-09-21

## 2020-09-15 NOTE — PROGRESS NOTES
Assessment/Plan:    1  Hip pain, chronic, right  -     acetaminophen-codeine (TYLENOL #3) 300-30 mg per tablet; Take 1 tablet by mouth every 8 (eight) hours as needed for moderate pain         I DID NOT DISCUSS THE RESULTS WITH THE PT AT THIS TIME  As he will be seeing lesli in 3 days and I didn't want togive him inaccurate information    I did allude to that fact he might have tendon issues and an c/s injection or something more (surgery) might be on the table though      Subjective:      Patient ID: Fady Doll is a 70 y o  male  Back Pain   Associated symptoms include weakness  Pertinent negatives include no abdominal pain, chest pain, fever or headaches  parkinsons: following with [facility] now  HLD: on atorva  mood issues better on zoloft 25mg xanax prn  orthostatic BP will try midodrine 5mg  BPH with prostate CA follows with tamarkin on finasteride 5mg and flomax    Right hip pain   Saw lesli        MRI with   Severe tendinosis with near complete tearing of the right gluteus minimus insertion    Mild tendinosis right gluteus medius insertion      Full-thickness tear right anterior superior labrum      Mild tendinosis and partial-thickness tearing left gluteus minimus insertion      Mild tendinosis right hamstrings origin      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       Very unsteady with almost fall here due to his ortho static issues from parkinsons    Pain is very intense now since doing PT   The sitting (air bike riding  maneuver)   Using walker and finds that he is getting more sx using it   One fall  On 9/3 out of bed landed on his face   No HA or vision issues since then   Medrol not helping       The following portions of the patient's history were reviewed and updated as appropriate: allergies, current medications, past family history, past medical history, past social history, past surgical history and problem list     Review of Systems   Constitutional: Negative for activity change, appetite change and fever  HENT: Negative for congestion, nosebleeds and trouble swallowing  Eyes: Negative for itching  Respiratory: Negative for cough and chest tightness  Cardiovascular: Negative for chest pain and palpitations  Gastrointestinal: Negative for abdominal pain, constipation, diarrhea and nausea  Endocrine: Negative for cold intolerance  Genitourinary: Negative for frequency  Musculoskeletal: Positive for arthralgias, back pain and gait problem (shuffling)  Negative for joint swelling  Skin: Negative for rash  Allergic/Immunologic: Negative for immunocompromised state  Neurological: Positive for dizziness and weakness  Negative for tremors, seizures, syncope and headaches  Psychiatric/Behavioral: Negative for hallucinations and suicidal ideas  Objective:      Pulse 74   Temp 97 6 °F (36 4 °C) (Tympanic)   Resp 16   Ht 6' (1 829 m)   Wt 101 kg (222 lb)   SpO2 98%   BMI 30 11 kg/m²     No visits with results within 2 Week(s) from this visit  Latest known visit with results is:   Appointment on 06/19/2020   Component Date Value    Hemoglobin A1C 06/19/2020 5 7*    EAG 06/19/2020 117     SARS-CoV-2 Ab, IgG 06/19/2020 Negative           Physical Exam  Vitals signs and nursing note reviewed  Constitutional:       General: He is not in acute distress  Appearance: He is well-developed  HENT:      Head: Normocephalic and atraumatic  Neck:      Musculoskeletal: Normal range of motion and neck supple  Cardiovascular:      Rate and Rhythm: Normal rate and regular rhythm  Heart sounds: Normal heart sounds  No murmur  Pulmonary:      Effort: Pulmonary effort is normal       Breath sounds: Normal breath sounds  No wheezing or rales  Abdominal:      General: Bowel sounds are normal  There is no distension  Palpations: Abdomen is soft  Tenderness: There is no abdominal tenderness  There is no guarding or rebound  Musculoskeletal: Normal range of motion  General: No tenderness  Comments: Decreased rom right hip  And sitting slr +  Shuffling gait   Lymphadenopathy:      Cervical: No cervical adenopathy  Skin:     General: Skin is warm and dry  Capillary Refill: Capillary refill takes less than 2 seconds  Findings: No rash  Neurological:      Mental Status: He is alert and oriented to person, place, and time  Cranial Nerves: No cranial nerve deficit  Sensory: No sensory deficit  Motor: No abnormal muscle tone  Psychiatric:         Behavior: Behavior normal          Thought Content:  Thought content normal          Judgment: Judgment normal              MD Dewey Delgadillo

## 2020-09-16 ENCOUNTER — APPOINTMENT (OUTPATIENT)
Dept: PHYSICAL THERAPY | Facility: CLINIC | Age: 75
End: 2020-09-16
Payer: MEDICARE

## 2020-09-17 ENCOUNTER — TELEPHONE (OUTPATIENT)
Dept: OBGYN CLINIC | Facility: HOSPITAL | Age: 75
End: 2020-09-17

## 2020-09-17 DIAGNOSIS — M25.551 ACUTE PAIN OF RIGHT HIP: Primary | ICD-10-CM

## 2020-09-17 RX ORDER — PANTOPRAZOLE SODIUM 20 MG/1
20 TABLET, DELAYED RELEASE ORAL 2 TIMES DAILY
Qty: 14 TABLET | Refills: 0 | Status: SHIPPED | OUTPATIENT
Start: 2020-09-17 | End: 2021-04-08 | Stop reason: ALTCHOICE

## 2020-09-17 RX ORDER — NAPROXEN 500 MG/1
500 TABLET ORAL 2 TIMES DAILY WITH MEALS
Qty: 14 TABLET | Refills: 0 | Status: SHIPPED | OUTPATIENT
Start: 2020-09-17 | End: 2021-04-08 | Stop reason: ALTCHOICE

## 2020-09-17 NOTE — TELEPHONE ENCOUNTER
Prescription for naproxen will be sent in,  Did not see any clear evidence in his chart of why he would be unable to take NSAIDs however if he has any history of kidney disease, coronary artery disease or heart disease heart attack, or active GI ulcers do not recommend he take this prescription  If he is otherwise able to take NSAIDs please let them know that a prescription for  Protonix was prescribed and he should take this along with the naproxen if he is allowed to take the naproxen  Recommend protected weight-bearing at this time  He will There is no evidence of stress fracture in the bone   So although he does not have to be nonweightbearing he may benefit from pain standpoint from standing off of the leg until his appointment tomorrow  He does have other findings that will need to be reviewed as appointment tomorrow

## 2020-09-17 NOTE — TELEPHONE ENCOUNTER
Pt  Was made aware and verbalized understanding  Pt  Will start medication and will be at his appt  Tomorrow

## 2020-09-17 NOTE — TELEPHONE ENCOUNTER
Patient's wife is calling to report that the patient is experiencing a tremendous increase in pain over the past several days  Taiwo's PCP ordered tylenol with codeine, which does not even begin to relieve the pain, but did make him tired  Dr Chad Vee offered something stronger (percocet or vicoden) but they are hesitant to use them due to his Parkinsons  Sugey Boggs states that the patient is barely able to walk a few steps to even get to his walker  Patient is scheduled for a follow up in the office tomorrow morning  Please advise

## 2020-09-18 ENCOUNTER — APPOINTMENT (OUTPATIENT)
Dept: PHYSICAL THERAPY | Facility: CLINIC | Age: 75
End: 2020-09-18
Payer: MEDICARE

## 2020-09-18 ENCOUNTER — OFFICE VISIT (OUTPATIENT)
Dept: OBGYN CLINIC | Facility: CLINIC | Age: 75
End: 2020-09-18
Payer: MEDICARE

## 2020-09-18 VITALS
SYSTOLIC BLOOD PRESSURE: 144 MMHG | HEIGHT: 72 IN | HEART RATE: 71 BPM | DIASTOLIC BLOOD PRESSURE: 81 MMHG | WEIGHT: 222 LBS | BODY MASS INDEX: 30.07 KG/M2

## 2020-09-18 DIAGNOSIS — M76.891 HIP ABDUCTOR TENDONITIS, RIGHT: Primary | ICD-10-CM

## 2020-09-18 PROCEDURE — 99213 OFFICE O/P EST LOW 20 MIN: CPT | Performed by: ORTHOPAEDIC SURGERY

## 2020-09-18 NOTE — PROGRESS NOTES
Patient Name:  Marilyn Trammell  MRN:  [id number]    Assessment & Plan     Right hip partial thickness tear of hip abductor    1  Avoid prolonged ambulating and high impact activities  Avoid painful maneuvers  Ambulate with assistive device as needed  2  Continue use of Naproxen prn for pain relief  Trail of medication for at least 1 month  May consider different medication if symptoms persist     3  Consider PT if symptoms persist  4  Follow up in 3 months for re evaluation of symptoms  Sooner if needed  Consider repeat MRI in 3 months to evaluate     History of the Present Illness     69 y/o male who presents today for a follow-up visit for his right hip as well as to discuss MRI results  Patient notes that his symptoms has improved over the past 2 days  He continues to ambulate with a rolling walker in case of imbalance  He has transition into a home exercise program from formal PT  He also has been taking naproxen p r n  for pain relief as prescribed by PCP  He reports that when he has the pain it continues to be about the anterior lateral aspect of the hip and radiates distally to the knee  General ROS:  Negative for fever or chills  Neurological ROS:  Negative for numbness or tingling  Physical Exam     /81   Pulse 71   Ht 6' (1 829 m)   Wt 101 kg (222 lb)   BMI 30 11 kg/m²       Data Review     I have personally reviewed pertinent films in PACS, and my interpretation follows  MRI right hip 9/12/20: No femoral neck stress fracture  Severe tendinosis with partial tearing of the gluteus medius and minimus tendons  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  Proximal hamstring tendinosis  Counseling     The patient was counseled regarding diagnostic results, impressions, patient/family education, instructions for management, risks and benefits of treatment options, and prognosis    The total time of the encounter was 15 minutes, and more than 50% of that time was spent in counseling and coordination of care        Scribe Attestation    I,:   Molly Avery am acting as a scribe while in the presence of the attending physician :        I,:   Allison Garcia MD personally performed the services described in this documentation    as scribed in my presence :

## 2020-09-21 DIAGNOSIS — M25.551 HIP PAIN, CHRONIC, RIGHT: ICD-10-CM

## 2020-09-21 DIAGNOSIS — G89.29 HIP PAIN, CHRONIC, RIGHT: ICD-10-CM

## 2020-09-21 RX ORDER — ACETAMINOPHEN AND CODEINE PHOSPHATE 300; 30 MG/1; MG/1
TABLET ORAL
Qty: 30 TABLET | Refills: 0 | Status: SHIPPED | OUTPATIENT
Start: 2020-09-21 | End: 2021-04-08 | Stop reason: ALTCHOICE

## 2020-09-29 ENCOUNTER — IMMUNIZATIONS (OUTPATIENT)
Dept: FAMILY MEDICINE CLINIC | Facility: CLINIC | Age: 75
End: 2020-09-29
Payer: MEDICARE

## 2020-09-29 DIAGNOSIS — Z23 ENCOUNTER FOR IMMUNIZATION: ICD-10-CM

## 2020-09-29 PROCEDURE — G0008 ADMIN INFLUENZA VIRUS VAC: HCPCS

## 2020-09-29 PROCEDURE — 90662 IIV NO PRSV INCREASED AG IM: CPT

## 2020-10-07 ENCOUNTER — TELEMEDICINE (OUTPATIENT)
Dept: FAMILY MEDICINE CLINIC | Facility: CLINIC | Age: 75
End: 2020-10-07
Payer: MEDICARE

## 2020-10-07 DIAGNOSIS — G89.29 HIP PAIN, CHRONIC, RIGHT: ICD-10-CM

## 2020-10-07 DIAGNOSIS — M25.551 HIP PAIN, CHRONIC, RIGHT: ICD-10-CM

## 2020-10-07 DIAGNOSIS — F51.04 PSYCHOPHYSIOLOGICAL INSOMNIA: Primary | ICD-10-CM

## 2020-10-07 DIAGNOSIS — F41.9 ANXIETY: ICD-10-CM

## 2020-10-07 PROCEDURE — 99213 OFFICE O/P EST LOW 20 MIN: CPT | Performed by: FAMILY MEDICINE

## 2020-10-07 RX ORDER — TRAZODONE HYDROCHLORIDE 50 MG/1
50 TABLET ORAL
Qty: 30 TABLET | Refills: 0 | Status: SHIPPED | OUTPATIENT
Start: 2020-10-07 | End: 2020-11-25 | Stop reason: SDUPTHER

## 2020-10-26 ENCOUNTER — TELEPHONE (OUTPATIENT)
Dept: OBGYN CLINIC | Facility: HOSPITAL | Age: 75
End: 2020-10-26

## 2020-10-26 DIAGNOSIS — M25.551 HIP PAIN, CHRONIC, RIGHT: Primary | ICD-10-CM

## 2020-10-26 DIAGNOSIS — G89.29 HIP PAIN, CHRONIC, RIGHT: Primary | ICD-10-CM

## 2020-11-25 DIAGNOSIS — F51.04 PSYCHOPHYSIOLOGICAL INSOMNIA: ICD-10-CM

## 2020-11-25 RX ORDER — TRAZODONE HYDROCHLORIDE 50 MG/1
50 TABLET ORAL
Qty: 30 TABLET | Refills: 0 | Status: SHIPPED | OUTPATIENT
Start: 2020-11-25 | End: 2021-01-25

## 2020-11-28 ENCOUNTER — HOSPITAL ENCOUNTER (OUTPATIENT)
Dept: MRI IMAGING | Facility: HOSPITAL | Age: 75
Discharge: HOME | End: 2020-11-28
Payer: MEDICARE

## 2020-11-28 DIAGNOSIS — M25.551 HIP PAIN, CHRONIC, RIGHT: ICD-10-CM

## 2020-11-28 DIAGNOSIS — G89.29 HIP PAIN, CHRONIC, RIGHT: ICD-10-CM

## 2020-11-28 PROCEDURE — G1004 CDSM NDSC: HCPCS

## 2020-11-28 PROCEDURE — 73721 MRI JNT OF LWR EXTRE W/O DYE: CPT

## 2020-11-29 DIAGNOSIS — E78.2 MIXED HYPERLIPIDEMIA: ICD-10-CM

## 2020-11-29 RX ORDER — ATORVASTATIN CALCIUM 20 MG/1
TABLET, FILM COATED ORAL
Qty: 90 TABLET | Refills: 1 | Status: SHIPPED | OUTPATIENT
Start: 2020-11-29 | End: 2021-05-31

## 2020-12-11 ENCOUNTER — OFFICE VISIT (OUTPATIENT)
Dept: OBGYN CLINIC | Facility: CLINIC | Age: 75
End: 2020-12-11
Payer: MEDICARE

## 2020-12-11 VITALS
SYSTOLIC BLOOD PRESSURE: 150 MMHG | BODY MASS INDEX: 29.8 KG/M2 | HEIGHT: 72 IN | HEART RATE: 59 BPM | DIASTOLIC BLOOD PRESSURE: 90 MMHG | WEIGHT: 220 LBS

## 2020-12-11 DIAGNOSIS — M76.891 HIP ABDUCTOR TENDONITIS, RIGHT: Primary | ICD-10-CM

## 2020-12-11 PROCEDURE — 99213 OFFICE O/P EST LOW 20 MIN: CPT | Performed by: ORTHOPAEDIC SURGERY

## 2021-01-06 ENCOUNTER — TELEPHONE (OUTPATIENT)
Dept: NEUROLOGY | Facility: CLINIC | Age: 76
End: 2021-01-06

## 2021-01-06 NOTE — TELEPHONE ENCOUNTER
LMOM for wife to please call back to office so we can schedule a f/u for Kathia Perdomo with Dr Juan Daniel Buenrostro before he leaves the practice

## 2021-01-06 NOTE — TELEPHONE ENCOUNTER
Patient returned call to schedule f/u gave him a few days on this month stated not avail schedule 02/11/21 11:00am

## 2021-01-25 DIAGNOSIS — F51.04 PSYCHOPHYSIOLOGICAL INSOMNIA: ICD-10-CM

## 2021-01-25 RX ORDER — TRAZODONE HYDROCHLORIDE 50 MG/1
TABLET ORAL
Qty: 90 TABLET | Refills: 1 | Status: SHIPPED | OUTPATIENT
Start: 2021-01-25 | End: 2021-04-09

## 2021-02-11 ENCOUNTER — OFFICE VISIT (OUTPATIENT)
Dept: NEUROLOGY | Facility: CLINIC | Age: 76
End: 2021-02-11
Payer: MEDICARE

## 2021-02-11 VITALS
SYSTOLIC BLOOD PRESSURE: 98 MMHG | DIASTOLIC BLOOD PRESSURE: 60 MMHG | WEIGHT: 221.1 LBS | HEIGHT: 72 IN | BODY MASS INDEX: 29.95 KG/M2 | HEART RATE: 66 BPM

## 2021-02-11 DIAGNOSIS — G20 PARKINSON'S DISEASE (HCC): Primary | ICD-10-CM

## 2021-02-11 DIAGNOSIS — I95.1 ORTHOSTATIC HYPOTENSION: ICD-10-CM

## 2021-02-11 PROCEDURE — 99214 OFFICE O/P EST MOD 30 MIN: CPT | Performed by: PSYCHIATRY & NEUROLOGY

## 2021-02-11 NOTE — ASSESSMENT & PLAN NOTE
82-year-old man presents in follow-up for parkinsonism  The patient's motor symptoms continue to progress gradually  The patient has been following in our clinic from most 2 years now and his orthostatic hypotension has remained a significant issue  We again discussed the importance of generating and sticking to a management plan for his orthostatic hypotension so that he will be able to tolerate dopaminergic treatment for his parkinsonism      Will discuss this with his PCP and urologist      Strongly recommended getting back into PT

## 2021-02-11 NOTE — PATIENT INSTRUCTIONS
Parkinson's Disease Resources     Helpful web sites   -  www PayPay  org   -  www parkinson  org (the [employer])   -  www [employer] ([address] for Live Mobile) - Order the "Every Victory Counts" osman under the "resources" tab  Or visit [facility] org/EV or call [phone]   [facility]  org/resources/parkinsons-exercise-essentials   - Contact the [employer] for an "Aware in care kit" @ 9148.163.6342 (this is a kit to take with you if you ever have to go to the hospital)   - www pmdalliance  org  -  parkinsons  jolie edu/exercise_videos  html  - [address] [facility] Exercise helpline: (757) [phone]  - CardSpring    - Check out "[employer]" for help paying for your medications: www tafcares  org

## 2021-02-11 NOTE — PROGRESS NOTES
Assessment/Plan:    Parkinson's disease [facility])  28-year-old man presents in follow-up for parkinsonism  The patient's motor symptoms continue to progress gradually  The patient has been following in our clinic from most 2 years now and his orthostatic hypotension has remained a significant issue  We again discussed the importance of generating and sticking to a management plan for his orthostatic hypotension so that he will be able to tolerate dopaminergic treatment for his parkinsonism  Will discuss this with his PCP and urologist      Strongly recommended getting back into PT    Orthostatic hypotension  systolic BP dropped from 182 to 98 today when transitioning from sitting to standing  Diagnoses and all orders for this visit:    Parkinson's disease [facility])  -     Ambulatory referral to Physical Therapy; Future    Orthostatic hypotension        Subjective:     Patient ID: William Schmidt 70 y o  male    I had the pleasure of seeing your patient, Gabriel Gonzalez in the [facility] at the [address] [place] [address]        Kallie Moralez is a 70year-old right-handed practicing  who presents for evaluation of parkinsonism, symptom onset in 2016 with slowness of gait followed by left hand tremor, course complicated by orthostatic hypotension   When I 1st met the patient he had been started on Sinemet, which was exacerbating his orthostatic intolerance   His parkinsonism was not particularly bothersome to a planned on discontinuing his Sinemet to focus on his orthostatic symptoms        Prior medication trials:  Sinemet: worsening OH  zoloft: worsened OH       Interval history:    When the patient was last here no changes were made  Everything is slower   A couple of close calls but no falls  Feet get stuck sometimes  "feels like my shoes are tied to the ground"  Getting dressed takes longer  Will spill sometimes from the tremor  Orthostatic is symptomatic   Reduces fluids at times because of his urological issues  Never tried the midodrine  Reports that his PCP recommended against it  Not getting much exercises     The following portions of the patient's history were reviewed and updated as appropriate: allergies, current medications, past family history, past medical history, past social history, past surgical history and problem list     Objective:  BP 98/60 (BP Location: Left arm, Patient Position: Standing, Cuff Size: Standard)   Pulse 66   Ht 6' (1 829 m)   Wt 100 kg (221 lb 1 6 oz)   BMI 29 99 kg/m²    149 --> 98    Physical Exam    Neurological Exam    UPDRS motor:                              Time since last dose:       Speech  1     Facial Expression  1     Rigidity - Neck  0     Rigidity - Upper Extremity (Right)  1     Rigidity - Upper Extremity (Left)   2     Rigidity - Lower Extremity (Right)  0     Rigidity - Lower Extremity (Left)   0     Finger Taps (Right)   1     Finger Taps (Left)   2     Hand Movement (Right)  1     Hand Movement (Left)   2     Pronation/Supination (Right)  1     Pronation/Supination (Left)   1     Toe Tapping (Right) 1     Toe Tapping (Left) 1     Leg Agility (Right)  0     Leg Agility (Left)   0     Arising from Chair   1     Gait   2     Freezing of Gait 0     Postural Stability        Posture 1     Global spontaneity of movement 0     Postural Tremor (Right) 1     Postural Tremor (Left) 1     Kinetic Tremor (Right)  0     Kinetic Tremor (Left)  0     Rest tremor amplitude RUE 0     Rest tremor amplitude LUE 0     Rest tremor amplitude RLE 0     Reset tremor amplitude LLE 0     Lip/Jaw Tremor  0     Consistency of tremor 0     Motor Exam Total:          Dysmetria: none on FNF  Dyskinesia: none  Dystonia: none   Myoclonus: none     Stance: narrow-based and stable     trendelenburg gait (right hip)       Review of Systems   Constitutional: Negative  Negative for appetite change and fever  HENT: Negative    Negative for hearing loss, tinnitus, trouble swallowing and voice change  Eyes: Negative  Negative for photophobia and pain  Respiratory: Negative  Negative for shortness of breath  Cardiovascular: Negative  Negative for palpitations  Gastrointestinal: Negative  Negative for nausea and vomiting  Endocrine: Negative  Negative for cold intolerance  Genitourinary: Negative  Negative for dysuria, frequency and urgency  Musculoskeletal: Negative  Negative for myalgias and neck pain  Skin: Negative  Negative for rash  Neurological: Positive for dizziness and light-headedness  Negative for tremors, seizures, syncope, facial asymmetry, speech difficulty, weakness, numbness and headaches  Hematological: Negative  Does not bruise/bleed easily  Psychiatric/Behavioral: Negative  Negative for confusion, hallucinations and sleep disturbance  All other systems reviewed and are negative  The above ROS was reviewed and updated       Current Outpatient Medications on File Prior to Visit   Medication Sig Dispense Refill    aspirin 81 MG tablet Take by mouth      atorvastatin (LIPITOR) 20 mg tablet TAKE ONE TABLET BY MOUTH EVERY DAY 90 tablet 1    finasteride (PROSCAR) 5 mg tablet TAKE ONE TABLET BY MOUTH EVERY DAY AS DIRECTED 90 tablet 3    pantoprazole (PROTONIX) 20 mg tablet Take 1 tablet (20 mg total) by mouth 2 (two) times a day for 7 days With naproxen 14 tablet 0    tamsulosin (FLOMAX) 0 4 mg TAKE ONE CAPSULE BY MOUTH AT BEDTIME 90 capsule 0    traZODone (DESYREL) 50 mg tablet TAKE ONE TABLET BY MOUTH AT BEDTIME (Patient taking differently: 1/2 tab) 90 tablet 1    acetaminophen-codeine (TYLENOL #3) 300-30 mg per tablet TAKE ONE TABLET BY MOUTH EVERY 8 HOURS AS NEEDED FOR PAIN (Patient not taking: Reported on 2/11/2021) 30 tablet 0    naproxen (EC NAPROSYN) 500 MG EC tablet Take 1 tablet (500 mg total) by mouth 2 (two) times a day with meals for 7 days (Patient not taking: Reported on 2/11/2021) 14 tablet 0 No current facility-administered medications on file prior to visit          Jd Heath MD  Medical Director   [facility] [address]  [facility]

## 2021-02-13 DIAGNOSIS — Z23 ENCOUNTER FOR IMMUNIZATION: ICD-10-CM

## 2021-02-17 ENCOUNTER — IMMUNIZATIONS (OUTPATIENT)
Dept: FAMILY MEDICINE CLINIC | Facility: HOSPITAL | Age: 76
End: 2021-02-17

## 2021-02-17 DIAGNOSIS — Z23 ENCOUNTER FOR IMMUNIZATION: Primary | ICD-10-CM

## 2021-02-17 PROCEDURE — 0001A SARS-COV-2 / COVID-19 MRNA VACCINE (PFIZER-BIONTECH) 30 MCG: CPT

## 2021-02-17 PROCEDURE — 91300 SARS-COV-2 / COVID-19 MRNA VACCINE (PFIZER-BIONTECH) 30 MCG: CPT

## 2021-03-02 DIAGNOSIS — N40.1 BENIGN PROSTATIC HYPERPLASIA WITH LOWER URINARY TRACT SYMPTOMS, SYMPTOM DETAILS UNSPECIFIED: ICD-10-CM

## 2021-03-02 RX ORDER — TAMSULOSIN HYDROCHLORIDE 0.4 MG/1
CAPSULE ORAL
Qty: 90 CAPSULE | Refills: 3 | OUTPATIENT
Start: 2021-03-02

## 2021-03-04 DIAGNOSIS — N40.1 BENIGN PROSTATIC HYPERPLASIA WITH LOWER URINARY TRACT SYMPTOMS, SYMPTOM DETAILS UNSPECIFIED: ICD-10-CM

## 2021-03-04 RX ORDER — TAMSULOSIN HYDROCHLORIDE 0.4 MG/1
0.4 CAPSULE ORAL
Qty: 90 CAPSULE | Refills: 0 | Status: SHIPPED | OUTPATIENT
Start: 2021-03-04 | End: 2021-06-01

## 2021-03-04 RX ORDER — FINASTERIDE 5 MG/1
5 TABLET, FILM COATED ORAL DAILY
Qty: 90 TABLET | Refills: 0 | Status: SHIPPED | OUTPATIENT
Start: 2021-03-04 | End: 2021-11-26

## 2021-03-04 NOTE — TELEPHONE ENCOUNTER
Patient of Dr Tricia Marrero seen in [facility]    Patient called requesting refills to be sent to [facility] at [place]     Patient has appointment with Hugo Bolaños at [place] in May    tamsulosin (FLOMAX) 0 4 mg  finasteride (PROSCAR) 5 mg tablet

## 2021-03-04 NOTE — TELEPHONE ENCOUNTER
The patient has an upcoming office visit scheduled for 5/4/21 with Georgia Davidson PA-C in the [facility] location but will run out of medication until then    Requests for both medications were queued and forwarded to the Advanced Practitioner covering the [facility] location for approval

## 2021-03-09 ENCOUNTER — IMMUNIZATIONS (OUTPATIENT)
Dept: FAMILY MEDICINE CLINIC | Facility: HOSPITAL | Age: 76
End: 2021-03-09

## 2021-03-09 DIAGNOSIS — Z23 ENCOUNTER FOR IMMUNIZATION: Primary | ICD-10-CM

## 2021-03-09 PROCEDURE — 0002A SARS-COV-2 / COVID-19 MRNA VACCINE (PFIZER-BIONTECH) 30 MCG: CPT | Performed by: INTERNAL MEDICINE

## 2021-03-09 PROCEDURE — 91300 SARS-COV-2 / COVID-19 MRNA VACCINE (PFIZER-BIONTECH) 30 MCG: CPT | Performed by: INTERNAL MEDICINE

## 2021-04-09 ENCOUNTER — OFFICE VISIT (OUTPATIENT)
Dept: FAMILY MEDICINE CLINIC | Facility: CLINIC | Age: 76
End: 2021-04-09
Payer: MEDICARE

## 2021-04-09 VITALS
HEIGHT: 72 IN | RESPIRATION RATE: 16 BRPM | HEART RATE: 82 BPM | DIASTOLIC BLOOD PRESSURE: 84 MMHG | SYSTOLIC BLOOD PRESSURE: 140 MMHG | OXYGEN SATURATION: 98 % | BODY MASS INDEX: 29.93 KG/M2 | WEIGHT: 221 LBS

## 2021-04-09 DIAGNOSIS — F51.04 PSYCHOPHYSIOLOGICAL INSOMNIA: ICD-10-CM

## 2021-04-09 DIAGNOSIS — G20 PARKINSON'S DISEASE (HCC): ICD-10-CM

## 2021-04-09 DIAGNOSIS — I95.1 ORTHOSTATIC HYPOTENSION: ICD-10-CM

## 2021-04-09 DIAGNOSIS — Z00.00 WELL ADULT EXAM: ICD-10-CM

## 2021-04-09 DIAGNOSIS — E78.2 MIXED HYPERLIPIDEMIA: ICD-10-CM

## 2021-04-09 DIAGNOSIS — L30.9 ACUTE DERMATITIS: ICD-10-CM

## 2021-04-09 DIAGNOSIS — Z12.11 SCREENING FOR MALIGNANT NEOPLASM OF COLON: Primary | ICD-10-CM

## 2021-04-09 DIAGNOSIS — C61 PROSTATE CANCER (HCC): ICD-10-CM

## 2021-04-09 PROCEDURE — G0439 PPPS, SUBSEQ VISIT: HCPCS | Performed by: FAMILY MEDICINE

## 2021-04-09 PROCEDURE — 99214 OFFICE O/P EST MOD 30 MIN: CPT | Performed by: FAMILY MEDICINE

## 2021-04-09 PROCEDURE — 1123F ACP DISCUSS/DSCN MKR DOCD: CPT | Performed by: FAMILY MEDICINE

## 2021-04-09 RX ORDER — TRIAMCINOLONE ACETONIDE 1 MG/G
CREAM TOPICAL 2 TIMES DAILY
Qty: 45 G | Refills: 0 | Status: SHIPPED | OUTPATIENT
Start: 2021-04-09

## 2021-04-09 RX ORDER — SUVOREXANT 10 MG/1
1 TABLET, FILM COATED ORAL
Qty: 30 TABLET | Refills: 1 | Status: SHIPPED | OUTPATIENT
Start: 2021-04-09 | End: 2021-05-14

## 2021-04-09 NOTE — ASSESSMENT & PLAN NOTE
Has happened for years since starting flomax   But was never this bad   We will stopthe trazadone (and not use doxapin)   consdier removing flomax also     He is more concerned about thie sleep   We can try belsomra

## 2021-04-09 NOTE — PROGRESS NOTES
Assessment and Plan:     Problem List Items Addressed This Visit     None      Visit Diagnoses     Well adult exam    -  Primary    BMI 29 0-29 9,adult            BMI Counseling: Body mass index is 29 97 kg/m²  The BMI is above normal  Nutrition recommendations include decreasing portion sizes, encouraging healthy choices of fruits and vegetables and limiting drinks that contain sugar  Exercise recommendations include moderate physical activity 150 minutes/week  No pharmacotherapy was ordered  Falls Plan of Care: balance, strength, and gait training instructions were provided  Medications that increase falls were reviewed  Preventive health issues were discussed with patient, and age appropriate screening tests were ordered as noted in patient's After Visit Summary  Personalized health advice and appropriate referrals for health education or preventive services given if needed, as noted in patient's After Visit Summary       History of Present Illness:     Patient presents for Medicare Annual Wellness visit    Patient Care Team:  Prabhu Hawk MD as PCP - General  MD Анна Rosenbaum MD     Problem List:     Patient Active Problem List   Diagnosis    REM behavioral disorder    Parkinson's disease ([facility] )    Prostate cancer ([facility] )    Orthostatic hypotension    Enlarged prostate without lower urinary tract symptoms (luts)    Anxiety    Psychophysiological insomnia    Hip pain, chronic, right      Past Medical and Surgical History:     Past Medical History:   Diagnosis Date    Prostate cancer [facility])      Past Surgical History:   Procedure Laterality Date    CYST REMOVAL      Right wrist    PROSTATE SURGERY      Biopsy    TONSILLECTOMY        Family History:     Family History   Problem Relation Age of Onset    Heart disease Mother     Heart disease Father       Social History:     E-Cigarette/Vaping    E-Cigarette Use Never User      E-Cigarette/Vaping Substances    Nicotine No  THC No     CBD No     Flavoring No     Other No     Unknown No      Social History     Socioeconomic History    Marital status: /Civil Union     Spouse name: None    Number of children: 3    Years of education: None    Highest education level: None   Occupational History    Occupation:    still active    Occupation: retired    Social Needs    Financial resource strain: None    Food insecurity     Worry: None     Inability: None    Transportation needs     Medical: None     Non-medical: None   Tobacco Use    Smoking status: Never Smoker    Smokeless tobacco: Never Used   Substance and Sexual Activity    Alcohol use: Yes     Comment: social    Drug use: No    Sexual activity: Not Currently   Lifestyle    Physical activity     Days per week: None     Minutes per session: None    Stress: None   Relationships    Social connections     Talks on phone: None     Gets together: None     Attends Yazidi service: None     Active member of club or organization: None     Attends meetings of clubs or organizations: None     Relationship status: None    Intimate partner violence     Fear of current or ex partner: None     Emotionally abused: None     Physically abused: None     Forced sexual activity: None   Other Topics Concern    None   Social History Narrative    Most recent tobacco use screenin-    Do you currently or have you served in the [employer]: Yes    If Yes, What branch of service: [place] [address]    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, into active duty, as a member of the [employer] or as a Reservist: Yes    Advance directive: Yes    Alcohol intake: Moderate    Caffeine intake:  Moderate    Illicit drugs: none    Occupation: retired    Exercise level: Occasional    Single or multi-level home/work: single level home    Live alone or with others: with others    Chewing tobacco: none    Marital status:     Number of children: 3    Diet: Regular    Sexual orientation: Heterosexual    Smoke alarm in home: Yes    General stress level: Medium    Sunscreen used routinely: No    Education: Post Graduate    Guns present in home: No    Presence of domestic violence: No      Medications and Allergies:     Current Outpatient Medications   Medication Sig Dispense Refill    aspirin 81 MG tablet Take by mouth      atorvastatin (LIPITOR) 20 mg tablet TAKE ONE TABLET BY MOUTH EVERY DAY 90 tablet 1    finasteride (PROSCAR) 5 mg tablet Take 1 tablet (5 mg total) by mouth daily 90 tablet 0    tamsulosin (FLOMAX) 0 4 mg Take 1 capsule (0 4 mg total) by mouth daily at bedtime 90 capsule 0    traZODone (DESYREL) 50 mg tablet TAKE ONE TABLET BY MOUTH AT BEDTIME 90 tablet 1     No current facility-administered medications for this visit  No Known Allergies   Immunizations:     Immunization History   Administered Date(s) Administered    INFLUENZA 01/10/2013, 01/13/2015, 02/01/2018, 11/12/2018    Influenza, high dose seasonal 0 7 mL 09/29/2020    Pneumococcal Conjugate 13-Valent 12/19/2019    Pneumococcal Polysaccharide PPV23 03/13/2018    SARS-CoV-2 / COVID-19 mRNA IM (Pfizer-BioNTech) 02/17/2021, 03/09/2021      Health Maintenance:         Topic Date Due    Hepatitis C Screening  Never done    Colorectal Cancer Screening  Never done     There are no preventive care reminders to display for this patient  Medicare Health Risk Assessment:     /84 (BP Location: Left arm, Patient Position: Sitting, Cuff Size: Standard)   Pulse 82   Resp 16   Ht 6' (1 829 m)   Wt 100 kg (221 lb)   SpO2 98%   BMI 29 97 kg/m²      Isabella Mills is here for his Subsequent Wellness visit  Health Risk Assessment:   Patient rates overall health as fair  Patient feels that their physical health rating is slightly worse  Patient is very satisfied with their life  Eyesight was rated as slightly worse  Hearing was rated as slightly worse   Patient feels that their emotional and mental health rating is same  Patients states they are never, rarely angry  Patient states they are sometimes unusually tired/fatigued  Pain experienced in the last 7 days has been none  Patient states that he has experienced no weight loss or gain in last 6 months  Depression Screening:   PHQ-2 Score: 0      Fall Risk Screening: In the past year, patient has experienced: no history of falling in past year      Home Safety:  Patient has trouble with stairs inside or outside of their home  Patient has working smoke alarms and has working carbon monoxide detector  Home safety hazards include: none  Nutrition:   Current diet is Regular  Medications:   Patient is currently taking over-the-counter supplements  OTC medications include: see medication list  Patient is able to manage medications  Activities of Daily Living (ADLs)/Instrumental Activities of Daily Living (IADLs):   Walk and transfer into and out of bed and chair?: Yes  Dress and groom yourself?: Yes    Bathe or shower yourself?: Yes    Feed yourself? Yes  Do your laundry/housekeeping?: No  Manage your money, pay your bills and track your expenses?: No  Make your own meals?: No    Do your own shopping?: No    Previous Hospitalizations:   Any hospitalizations or ED visits within the last 12 months?: No      Advance Care Planning:   Living will: Yes    Durable POA for healthcare:  Yes    Advanced directive: Yes      Cognitive Screening:   Provider or family/friend/caregiver concerned regarding cognition?: No    PREVENTIVE SCREENINGS      Cardiovascular Screening:    General: Screening Not Indicated and History Lipid Disorder    Due for: Lipid Panel      Diabetes Screening:     General: Screening Current    Due for: Blood Glucose      Colorectal Cancer Screening:       Due for: Cologuard      Prostate Cancer Screening:    General: History Prostate Cancer      Osteoporosis Screening:    General: Screening Not Indicated      Abdominal Aortic Aneurysm (AAA) Screening:    Risk factors include: age between 73-67 yo        General: Screening Not Indicated      Lung Cancer Screening:     General: Screening Not Indicated      Hepatitis C Screening:    General: Screening Not Indicated    Screening, Brief Intervention, and Referral to Treatment (SBIRT)    Screening  Typical number of drinks in a day: 1  Typical number of drinks in a week: 1  Interpretation: Low risk drinking behavior      Single Item Drug Screening:  How often have you used an illegal drug (including marijuana) or a prescription medication for non-medical reasons in the past year? never    Single Item Drug Screen Score: 0  Interpretation: Negative screen for possible drug use disorder      Teri Garza MD

## 2021-04-09 NOTE — ASSESSMENT & PLAN NOTE
Given the orthostatic we manuel hold off on emds that might be assiocatied with it   That includes trazaone   We can try 5mg of melatonin or if that is nto working then we can do belsomra   Issues falling alseep and staying alseep

## 2021-04-09 NOTE — PATIENT INSTRUCTIONS

## 2021-04-20 ENCOUNTER — TELEPHONE (OUTPATIENT)
Dept: NEUROLOGY | Facility: CLINIC | Age: 76
End: 2021-04-20

## 2021-04-20 NOTE — TELEPHONE ENCOUNTER
Received voicemail from wife Eric Martini requesting a call back     Aminata Johnson, she states the patient's symptoms have increased / worsened since last follow up with Dr Nevaeh Cheung  States this (PD) is relatively new to them and his symptoms have progressed since starting to see our office  Reports the patient prefers for wife to call and explain issues as he "hides" his issues and "puts his best face forward"  States he has intermittent brain fog, limited walking ability (instability with gait, bilateral arm rigidity, difficulty lifting right leg and rocks back and forth when he loses his balance)  Also that he is easily fatigued to the point where he cannot walk down their driveway that is 1/3 of a mile without "collapsing on the couch" when he gets in  States he can barely get from room to room due to fatigue and his BP (orthostatic hypotension)  His fatigue is so debilitating that he will sleep throughout the day and then have insomnia  5-7 days a week he has difficulty sleeping  States they followed up with Dr Jamal Marquez and is taking trazodone 50 mg nightly with no relief  (not taking Belsomra (declined PA))  States they attempting to get in touch with Dr Nissa Calvillo for an appt (sleep medicine) but have not had success with this  Asking for a medication to assist with symptoms as they are no longer tolerable, and reports hi quality of life is rather poor  Also with worse depression and anxiety , states he believes he cannot breathe, but the wife sees no evidence of him gasping / SOB  Asking if you think he would benefit from a med for this too? States she is looking for something more comprehensive for his care in regards to med  States he tried carbidopa-levodopa in the past but was not okay with side effects  Though, now they are interested in restarting  Best call back [phone], okay with detailed message

## 2021-04-20 NOTE — TELEPHONE ENCOUNTER
Called and discussed with the patient  He is agreeable to trying conservative methods to attempt to bring up his pressure  Also agreeable to a sooner appointment with Dr Dagmar Sevilla       Taiwo: Please assist with finding a sooner appointment with Dr Dagmar Sevilla

## 2021-04-20 NOTE — TELEPHONE ENCOUNTER
He is a bit of a complicated cause given his significant issues with orthostatic hypotension in the past   I would not feel comfortable starting any medication for PD over the phone given all of these medications can cause his BP issues to be worse  Can we see if perhaps this patient could be added to Dr Patel's scheduled when she is opening up the flex days? If not he should be placed on the cancellation list and in the meantime he should try and make sure he is doing all conservative measures for his BP including     Increasing fluids  Wearing compression stockings   Elevating HOB at night   He should also check his pressure at home  If he remains low then may consider a medication such as Midodrine or Florinef to help increase his pressure

## 2021-04-28 NOTE — TELEPHONE ENCOUNTER
Pt's wife [employer] called re: sooner appt  She is upset that nobody called her for sooner appt  Not getting good response to care for her   "There is a billboard how great is Kezia Lynch, but it is so hard to get an appointment to see a doctor"  Requesting to speak w/ Jhonny or other supervisors  Also, states that pt is now agreeable to visiting nurse  Requesting referral        Pt has upcoming appt in July  I was able to find sooner appt  OVL scheduled tmrw at 2:45- Karla w/ Shar Elias, do you prefer in person visit or can it be virtual video? Pt preferred virtual but if you prefer in person, pt is fine w/ that  They just want to get a good evaluation  Call back# [phone]  [email or web address]

## 2021-04-28 NOTE — TELEPHONE ENCOUNTER
Called [phone] and left a detailed  message on pt's wife Rhetta Brittle answering machine of the below and for a call back

## 2021-04-28 NOTE — TELEPHONE ENCOUNTER
Would be better to be seen in the office however if they prefer it can be done over the video  Thanks!

## 2021-04-28 NOTE — TELEPHONE ENCOUNTER
Pt's wife Claudia Alcala called and advised of the below  She verbalized understanding  States that pt preferred virtual video  Appt updated  [email or web address]

## 2021-04-29 ENCOUNTER — TELEMEDICINE (OUTPATIENT)
Dept: NEUROLOGY | Facility: CLINIC | Age: 76
End: 2021-04-29
Payer: MEDICARE

## 2021-04-29 DIAGNOSIS — I95.1 ORTHOSTATIC HYPOTENSION: ICD-10-CM

## 2021-04-29 DIAGNOSIS — G20 PARKINSON'S DISEASE (HCC): Primary | ICD-10-CM

## 2021-04-29 PROCEDURE — 99214 OFFICE O/P EST MOD 30 MIN: CPT | Performed by: PHYSICIAN ASSISTANT

## 2021-04-29 NOTE — ASSESSMENT & PLAN NOTE
Patient with some overall progression since his last visit  He feels he is now slower, his legs and arms feel stiffer, and it is taking him longer to do his daily functions  In the past he unfortunately had worsening orthostatic hypotension on low dose of Sinemet  He does continue to have episodes of dizziness especially with position changes  Unfortunately I was unable to get orthostatics on him however at the last visit he did have a significant drop in his systolic pressure  In the past he was prescribed a low dose of midodrine however he never started taking this medication  At this time I have asked that he and his wife try and monitor his blood pressure at home so that we can see where runs on a daily basis  He will check his pressure 3 times a day and 1 of those times he will also do orthostatic pressures  He will keep a record of these numbers over a week time and he will let us know what these are  If he is indeed on the lower end of pressures on a regular basis will have him start a standing dose of midodrine  If we can get his blood pressure better controlled then I would feel more comfortable with restarting a trial of Sinemet  He is also going to follow with his urologist next week and will discuss potentially stopping the Flomax  Patient was also encouraged to wear compression stockings daily, increase fluid intake and sleep with head of bed elevated  In the meantime he was encouraged to remain as active as he can  Unfortunately the dizziness as well as generalized fatigue are limiting this at this time  Perhaps if we can get his pressure better controlled and get him started on some dopamine replacement he will feel better overall

## 2021-04-29 NOTE — PROGRESS NOTES
Virtual Regular Visit      Assessment/Plan:    Problem List Items Addressed This Visit        Cardiovascular and Mediastinum    Orthostatic hypotension       Nervous and Auditory    Parkinson's disease ([facility] [address] ) - Primary     Patient with some overall progression since his last visit  He feels he is now slower, his legs and arms feel stiffer, and it is taking him longer to do his daily functions  In the past he unfortunately had worsening orthostatic hypotension on low dose of Sinemet  He does continue to have episodes of dizziness especially with position changes  Unfortunately I was unable to get orthostatics on him however at the last visit he did have a significant drop in his systolic pressure  In the past he was prescribed a low dose of midodrine however he never started taking this medication  At this time I have asked that he and his wife try and monitor his blood pressure at home so that we can see where runs on a daily basis  He will check his pressure 3 times a day and 1 of those times he will also do orthostatic pressures  He will keep a record of these numbers over a week time and he will let us know what these are  If he is indeed on the lower end of pressures on a regular basis will have him start a standing dose of midodrine  If we can get his blood pressure better controlled then I would feel more comfortable with restarting a trial of Sinemet  He is also going to follow with his urologist next week and will discuss potentially stopping the Flomax  Patient was also encouraged to wear compression stockings daily, increase fluid intake and sleep with head of bed elevated  In the meantime he was encouraged to remain as active as he can  Unfortunately the dizziness as well as generalized fatigue are limiting this at this time  Perhaps if we can get his pressure better controlled and get him started on some dopamine replacement he will feel better overall                      Reason for visit is Parkinson's disease follow up  Chief Complaint   Patient presents with    Virtual Regular Visit        Encounter provider Carlita Hsu PA-C    Provider located at [address] [place] [address] [address]  [facility] [id number] [phone]      Recent Visits  No visits were found meeting these conditions  Showing recent visits within past 7 days and meeting all other requirements     Today's Visits  Date Type Provider Dept   04/29/21 Telemedicine Carlita Hsu PA-C [facility]   Showing today's visits and meeting all other requirements     Future Appointments  No visits were found meeting these conditions  Showing future appointments within next 150 days and meeting all other requirements        The patient was identified by name and date of birth  Jarod Rader was informed that this is a telemedicine visit and that the visit is being conducted through [facility] and patient was informed that this is a secure, HIPAA-compliant platform  He agrees to proceed     My office door was closed  No one else was in the room  He acknowledged consent and understanding of privacy and security of the video platform  The patient has agreed to participate and understands they can discontinue the visit at any time  Patient is aware this is a billable service  Subjective  Jarod Rader is a 70 y o  male practicing  who presents for evaluation of parkinsonism  To review, symptom onset in 2016 with slowness of gait followed by left hand tremor, course complicated by orthostatic hypotension  When first seen by Dr Isaac Sers he had been started on Sinemet, which was exacerbating his orthostatic intolerance  His parkinsonism was not particularly bothersome so the plan was to discontinue his Sinemet to focus on his orthostatic symptoms        At his last visit he continued to have significant orthostatic drops in pressure along with gradual progression of motor symptoms  He was to discuss treatment options for his OH with his PCP  INTERVAL HISTORY:  At the last visit with PCP his trazadone was stopped and they had mentioned stopping Flomax as well  He was started on belsomra for sleep and referred to the sleep center  Obi Singh was not covered by his insurance  He tried to increase the Trazodone which caused side effects in the AM    He is having more issues with balance and fatigue  He will feel that his legs get weak very quickly when trying to walk  He continues to have tremors which have now spread to the right as well  He is not very active given he feels dizzy when trying to do things  He is following with the Urologist next week  He has extreme frequency  He is still trying to work a few hours a day in the AM    He is trying to increase his electrolyte intake  He is able to perform his ADLs on his own however he feels "out of breath" when he is completed  He is overall slower with all activities  No falls, he has had to catch himself a few times  He struggles with turns  Prior medication trials:  Sinemet: worsening OH  zoloft: worsened OH      I personally reviewed and updated the ROS      Past Medical History:   Diagnosis Date    Prostate cancer [facility])        Past Surgical History:   Procedure Laterality Date    CYST REMOVAL      Right wrist    PROSTATE SURGERY      Biopsy    TONSILLECTOMY         Current Outpatient Medications   Medication Sig Dispense Refill    aspirin 81 MG tablet Take by mouth      atorvastatin (LIPITOR) 20 mg tablet TAKE ONE TABLET BY MOUTH EVERY DAY 90 tablet 1    finasteride (PROSCAR) 5 mg tablet Take 1 tablet (5 mg total) by mouth daily 90 tablet 0    tamsulosin (FLOMAX) 0 4 mg Take 1 capsule (0 4 mg total) by mouth daily at bedtime 90 capsule 0    TRAZODONE HCL PO Take by mouth daily at bedtime      triamcinolone (KENALOG) 0 1 % cream Apply topically 2 (two) times a day 45 g 0  Suvorexant (Belsomra) 10 MG TABS Take 1 tablet (10 mg total) by mouth daily at bedtime (Patient not taking: Reported on 4/29/2021) 30 tablet 1     No current facility-administered medications for this visit  No Known Allergies    Review of Systems   Constitutional: Positive for fatigue  Negative for appetite change and fever  HENT: Positive for voice change (voice is softer and at times hoarse)  Negative for hearing loss, tinnitus and trouble swallowing  Eyes: Negative  Negative for photophobia and pain  Respiratory: Negative  Negative for shortness of breath  Cardiovascular: Negative  Negative for palpitations  Gastrointestinal: Negative  Negative for nausea and vomiting  Endocrine: Negative  Negative for cold intolerance  Genitourinary: Negative  Negative for dysuria, frequency and urgency  Musculoskeletal: Positive for gait problem  Negative for myalgias and neck pain  Feet move slowly while walking  Balance issues   Foot locks up at times     Skin: Positive for rash  Allergic/Immunologic: Negative  Neurological: Positive for dizziness (when standing up too quick), tremors (Both hands), weakness (legs get tired after walking a while, more so on left side) and numbness (sometimes bottom of feet tingle)  Negative for seizures, syncope, facial asymmetry, speech difficulty, light-headedness and headaches  Hematological: Negative  Does not bruise/bleed easily  Psychiatric/Behavioral: Positive for sleep disturbance (Trouble going back to sleep after waking up while sleeping )  Negative for confusion and hallucinations  All other systems reviewed and are negative  Video Exam    There were no vitals filed for this visit  Physical Exam  Constitutional:       Appearance: Normal appearance  Pulmonary:      Effort: Pulmonary effort is normal    Neurological:      Mental Status: He is alert        Comments: EOMI    Face symmetric   tongue with slight right-sided deviation   facial sensation intact to light touch     moving all extremities   able to ambulate without any assistance  Stood from  [facility]  Slightly stooped posture with shortened stride  Decreased arm swing bilaterally         UPDRS motor:                              Time since last dose:     4/29/21   Speech  1  1   Facial Expression  1  1   Rigidity - Neck  0     Rigidity - Upper Extremity (Right)  1     Rigidity - Upper Extremity (Left)   2     Rigidity - Lower Extremity (Right)  0     Rigidity - Lower Extremity (Left)   0     Finger Taps (Right)   1  1   Finger Taps (Left)   2  1   Hand Movement (Right)  1  1   Hand Movement (Left)   2  2   Pronation/Supination (Right)  1  1   Pronation/Supination (Left)   1  1   Toe Tapping (Right) 1     Toe Tapping (Left) 1     Leg Agility (Right)  0     Leg Agility (Left)   0     Arising from Chair   1  1   Gait   2  2   Freezing of Gait 0  0   Postural Stability         Posture 1  1   Global spontaneity of movement 0  1   Postural Tremor (Right) 1  1   Postural Tremor (Left) 1  1   Kinetic Tremor (Right)  0  0   Kinetic Tremor (Left)  0  0   Rest tremor amplitude RUE 0  0   Rest tremor amplitude LUE 0  0   Rest tremor amplitude RLE 0  0   Reset tremor amplitude LLE 0  0   Lip/Jaw Tremor  0  0   Consistency of tremor 0  0   Motor Exam Total:              I spent 35 minutes directly with the patient during this visit      VIRTUAL VISIT [facility] acknowledges that he has consented to an online visit or consultation  He understands that the online visit is based solely on information provided by him, and that, in the absence of a face-to-face physical evaluation by the physician, the diagnosis he receives is both limited and provisional in terms of accuracy and completeness  This is not intended to replace a full medical face-to-face evaluation by the physician  Ladarius Celaya understands and accepts these terms

## 2021-04-29 NOTE — PATIENT INSTRUCTIONS
Monitor blood pressure at home  Please take blood pressure 3 times a day for the next week  One of those times please also check orthostatic blood pressures ( have the patient sit, check blood pressure, have patient stand for 2 minutes, check blood pressure)  depending on pressures may consider a trial of standing midodrine  If pressures improve then we can restart a trial of dopamine replacement  In the meantime would also recommend wearing compression stockings daily, increasing fluid intake and sleeping with head of bed elevated

## 2021-05-04 ENCOUNTER — OFFICE VISIT (OUTPATIENT)
Dept: UROLOGY | Facility: CLINIC | Age: 76
End: 2021-05-04
Payer: MEDICARE

## 2021-05-04 VITALS
HEIGHT: 72 IN | DIASTOLIC BLOOD PRESSURE: 82 MMHG | SYSTOLIC BLOOD PRESSURE: 132 MMHG | BODY MASS INDEX: 28.99 KG/M2 | WEIGHT: 214 LBS

## 2021-05-04 DIAGNOSIS — R97.20 ELEVATED PSA: Primary | ICD-10-CM

## 2021-05-04 PROCEDURE — 99213 OFFICE O/P EST LOW 20 MIN: CPT | Performed by: PHYSICIAN ASSISTANT

## 2021-05-04 NOTE — PROGRESS NOTES
UROLOGY PROGRESS NOTE   Patient Identifiers: Shanice Leung (MRN [id number])  Date of Service: 5/4/2021    Subjective:     72-year-old man history of obstructing BPH   And elevated PSA  He has been biopsied in the past which were benign  Prostate is over 100 g  He is on finasteride and tamsulosin  Unfortunately he has been diagnosed with Parkinson's and is becoming symptomatic including postural hypotension  He has not started on levodopa  He is asking to discontinue tamsulosin  He has mainly nocturia multiple times per night  Reason for visit:  BPH follow-up    Objective:     VITALS:    Vitals:    05/04/21 1505   BP: 132/82           LABS:  No results found for: HGB, HCT, WBC, PLT]    No results found for: NA, K, CL, CO2, BUN, CREATININE, CALCIUM, GLUCOSE]        INPATIENT MEDS:    Current Outpatient Medications:     aspirin 81 MG tablet, Take by mouth, Disp: , Rfl:     atorvastatin (LIPITOR) 20 mg tablet, TAKE ONE TABLET BY MOUTH EVERY DAY, Disp: 90 tablet, Rfl: 1    finasteride (PROSCAR) 5 mg tablet, Take 1 tablet (5 mg total) by mouth daily, Disp: 90 tablet, Rfl: 0    tamsulosin (FLOMAX) 0 4 mg, Take 1 capsule (0 4 mg total) by mouth daily at bedtime, Disp: 90 capsule, Rfl: 0    TRAZODONE HCL PO, Take by mouth daily at bedtime, Disp: , Rfl:     triamcinolone (KENALOG) 0 1 % cream, Apply topically 2 (two) times a day, Disp: 45 g, Rfl: 0    Suvorexant (Belsomra) 10 MG TABS, Take 1 tablet (10 mg total) by mouth daily at bedtime (Patient not taking: Reported on 4/29/2021), Disp: 30 tablet, Rfl: 1      Physical Exam:   /82 (BP Location: Left arm, Patient Position: Sitting, Cuff Size: Adult)   Ht 6' (1 829 m)   Wt 97 1 kg (214 lb)   BMI 29 02 kg/m²   GEN: no acute distress    RESP: breathing comfortably with no accessory muscle use    ABD: soft, non-tender, non-distended   INCISION:    EXT: no significant peripheral edema   (Male): Penis circumcised, phallus normal, meatus patent  Testicles descended into scrotum bilaterally without masses nor tenderness  No inguinal hernias bilaterally  NATALI: Prostate is enlarged at 50+ grams  The prostate is not boggy  The prostate is not tender  No nodules noted      RADIOLOGY:    none     Assessment:    1  Prostate hypertrophy   2   History elevated PSA     Plan:   - he will discontinue tamsulosin  - follow-up in 3 months with a PSA prior to visit  - we discussed proceeding with cystoscopy transrectal ultrasound and uroflow  - we also discussed TURP and  Robotic simple prostatectomy

## 2021-05-10 ENCOUNTER — TELEPHONE (OUTPATIENT)
Dept: NEUROLOGY | Facility: CLINIC | Age: 76
End: 2021-05-10

## 2021-05-10 DIAGNOSIS — G20 PARKINSON'S DISEASE (HCC): Primary | ICD-10-CM

## 2021-05-10 RX ORDER — MIDODRINE HYDROCHLORIDE 2.5 MG/1
TABLET ORAL
Qty: 60 TABLET | Refills: 4 | Status: SHIPPED | OUTPATIENT
Start: 2021-05-10 | End: 2022-02-17

## 2021-05-10 NOTE — TELEPHONE ENCOUNTER
Regarding: Prescription Question  ----- Message from Cong Ramos RN sent at 5/10/2021  9:20 AM EDT -----       ----- Message from Melissa Orozco to Antolin Moreno PA-C sent at 2021  9:29 AM -----   Please advise if you received my summary of Mendel Rubin' ( 1945) blood pressure 3 x a day for one week  We were discussing with you the Levadopa prescription for Taiwo (to start it) and whether he needed any additional medications with that  Please advise    Curtis Dwyer

## 2021-05-10 NOTE — TELEPHONE ENCOUNTER
Please explain that Kai Barth does not work on Friday or Monday  She will not be available to respond until tomorrow at the earliest      If she would like to speak to a supervisor please transfer her to my line  Otherwise, will ensure Kai Barth addresses tomorrow

## 2021-05-10 NOTE — TELEPHONE ENCOUNTER
May 10, 2021  Cody Singer, RN    9:19 AM  Note     Pt's wife Eric Martini called and states that she sent Zachariah Perez a [employer] message w/ pt's bp report  See [employer] enc 5/4/21  States that you mentioned starting pt on levodopa  Pt is very anxious to start this med  Eric Martini is expecting a response today  Requesting script be sent to [facility] pharmacy    Eric Martini states that if she does not get a response today, she would like to speak w/ one of our sup                               [phone] ok to leave detailed message

## 2021-05-10 NOTE — TELEPHONE ENCOUNTER
Anthony Mayo discussed his case with me last week  Given his sensitivity changes should be slow  He must also commit to wearing his compression stocking and drinking fluids  We can try having him start midodrine 2 5mg qam  Sinemet 25/100 1/2 tab 3 times daily     If 1/2 tab 3 times daily lead to lightheadedness then he is to do 1/2 tab qam x 1 week then 1 /2 tab bid x 1 week then 1/2 tab 3 x daily     If not tolerating we will increase midodrine to 2 5mg am and 5 hours later    Will send scripts in

## 2021-05-10 NOTE — TELEPHONE ENCOUNTER
Called and advised pt's wife Nancy Crissy of all of the below  She verbalized clear understanding of all instructions  States that pt is wearing his compression stocking, drinking adequate amount of fluids  Pt is drinking electrolyte fluid throughout the day  Pt is also sleeping on wedge which is helpful    States that urologist discontinued flomax for 2-4 weeks to see how pt does off of it  Currently taking finasteride  Also, reporting breathing issue  Pt gets SOB on exertion  This is an ongoing issue but in the last a couple of day, pt has been having issue catching his breath while ambulating short distance  States that the restriction in his lungs makes him anxious  States that PCP is aware of episode of SOB  PCP referred pt to see sleep medicine  Decreased trazodone to 1/2 tab at bedtime  States that dizziness is less but movement problems are more  Advised Nancy Woodward if SOB worsen, should take pt to nearest ER  Nancy Woodward verbalized understanding

## 2021-05-10 NOTE — TELEPHONE ENCOUNTER
I did inform pt's wife that Caty Estrella only works part time, not in the office on Monday and Friday  But she asked if I can send this message to other provider, Dr Alice Church  Will await Dr Alice Church response

## 2021-05-11 NOTE — TELEPHONE ENCOUNTER
Called [phone] LJS left a detailed message on pt's answering machine of the below and  for a call back if any other questions or concerns

## 2021-05-14 DIAGNOSIS — F51.04 PSYCHOPHYSIOLOGICAL INSOMNIA: Primary | ICD-10-CM

## 2021-05-14 RX ORDER — ALPRAZOLAM 0.5 MG/1
0.5 TABLET ORAL
Qty: 30 TABLET | Refills: 0 | Status: SHIPPED | OUTPATIENT
Start: 2021-05-14 | End: 2021-06-25

## 2021-05-28 DIAGNOSIS — N40.1 BENIGN PROSTATIC HYPERPLASIA WITH LOWER URINARY TRACT SYMPTOMS, SYMPTOM DETAILS UNSPECIFIED: ICD-10-CM

## 2021-05-28 DIAGNOSIS — E78.2 MIXED HYPERLIPIDEMIA: ICD-10-CM

## 2021-05-31 RX ORDER — ATORVASTATIN CALCIUM 20 MG/1
TABLET, FILM COATED ORAL
Qty: 90 TABLET | Refills: 1 | Status: SHIPPED | OUTPATIENT
Start: 2021-05-31

## 2021-06-01 RX ORDER — TAMSULOSIN HYDROCHLORIDE 0.4 MG/1
CAPSULE ORAL
Qty: 90 CAPSULE | Refills: 0 | Status: SHIPPED | OUTPATIENT
Start: 2021-06-01 | End: 2022-04-08

## 2021-07-27 ENCOUNTER — APPOINTMENT (OUTPATIENT)
Dept: LAB | Facility: CLINIC | Age: 76
End: 2021-07-27
Payer: MEDICARE

## 2021-07-27 DIAGNOSIS — R97.20 ELEVATED PSA: ICD-10-CM

## 2021-07-27 LAB — PSA SERPL-MCNC: 7.6 NG/ML (ref 0–4)

## 2021-07-27 PROCEDURE — 84153 ASSAY OF PSA TOTAL: CPT

## 2021-08-04 ENCOUNTER — OFFICE VISIT (OUTPATIENT)
Dept: UROLOGY | Facility: CLINIC | Age: 76
End: 2021-08-04
Payer: MEDICARE

## 2021-08-04 VITALS
WEIGHT: 219 LBS | HEIGHT: 72 IN | BODY MASS INDEX: 29.66 KG/M2 | DIASTOLIC BLOOD PRESSURE: 70 MMHG | HEART RATE: 62 BPM | SYSTOLIC BLOOD PRESSURE: 140 MMHG

## 2021-08-04 DIAGNOSIS — R35.0 BENIGN PROSTATIC HYPERPLASIA WITH URINARY FREQUENCY: Primary | ICD-10-CM

## 2021-08-04 DIAGNOSIS — N40.1 BENIGN PROSTATIC HYPERPLASIA WITH URINARY FREQUENCY: Primary | ICD-10-CM

## 2021-08-04 PROCEDURE — 99213 OFFICE O/P EST LOW 20 MIN: CPT | Performed by: PHYSICIAN ASSISTANT

## 2021-08-04 NOTE — PROGRESS NOTES
UROLOGY PROGRESS NOTE   Patient Identifiers: Juno Gilbert (MRN [id number])  Date of Service: 8/4/2021    Subjective:     73-year-old man history of obstructing BPH  He has a history of elevated PSA which is now 7 1  He had a prostate biopsy x2 by Dr Sukhwinder Castanon approximately 10 or 11 years ago PSA was 8 0 at that time  He is on finasteride only due to postural hypotension side effects from tamsulosin and newly diagnosed Parkinson's disease  His voiding pattern has been mostly stable        Reason for visit:  BPH follow-up     Objective:     VITALS:    Vitals:    08/04/21 1138   BP: 140/70   Pulse: 62           LABS:  No results found for: HGB, HCT, WBC, PLT]    No results found for: NA, K, CL, CO2, BUN, CREATININE, CALCIUM, GLUCOSE]        INPATIENT MEDS:    Current Outpatient Medications:     ALPRAZolam (XANAX) 0 5 mg tablet, TAKE 1 TABLET BY MOUTH DAILY AT BEDTIME AS NEEDED FOR ANXIETY, Disp: 30 tablet, Rfl: 2    aspirin 81 MG tablet, Take by mouth, Disp: , Rfl:     atorvastatin (LIPITOR) 20 mg tablet, TAKE ONE TABLET BY MOUTH EVERY DAY, Disp: 90 tablet, Rfl: 1    carbidopa-levodopa (SINEMET)  mg per tablet, 1/2 tab tid x 2 weeks then 1 tab tid, Disp: 90 tablet, Rfl: 3    finasteride (PROSCAR) 5 mg tablet, Take 1 tablet (5 mg total) by mouth daily, Disp: 90 tablet, Rfl: 0    midodrine (PROAMATINE) 2 5 mg tablet, 1 tab up to twice daily, Disp: 60 tablet, Rfl: 4    TRAZODONE HCL PO, Take by mouth daily at bedtime, Disp: , Rfl:     triamcinolone (KENALOG) 0 1 % cream, Apply topically 2 (two) times a day, Disp: 45 g, Rfl: 0    tamsulosin (FLOMAX) 0 4 mg, TAKE ONE CAPSULE BY MOUTH EVERY DAY AT BEDTIME (Patient not taking: Reported on 8/4/2021), Disp: 90 capsule, Rfl: 0      Physical Exam:   /70 (BP Location: Left arm, Patient Position: Sitting, Cuff Size: Adult)   Pulse 62   Ht 6' (1 829 m)   Wt 99 3 kg (219 lb)   BMI 29 70 kg/m²   GEN: no acute distress    RESP: breathing comfortably with no accessory muscle use    ABD: soft, non-tender, non-distended   INCISION:    EXT: no significant peripheral edema       RADIOLOGY:    none     Assessment:    1  BPH with obstruction   2   Elevated PSA     Plan:   - the next step in evaluation would be cystoscopy truss and uroflow  - he will call me if he wishes to proceed with any further testing  - prostate is been over 100 g so whether he would be candidate for a TURP or simple prostatectomy was discussed  - from a PSA standpoint it appears his PSA has been stable for over 10 years  - he will follow-up in 1 year for his annual exam no further PSA testing required

## 2021-08-25 DIAGNOSIS — G20 PARKINSON'S DISEASE (HCC): ICD-10-CM

## 2021-08-31 PROCEDURE — 87186 SC STD MICRODIL/AGAR DIL: CPT | Performed by: DERMATOLOGY

## 2021-08-31 PROCEDURE — 87070 CULTURE OTHR SPECIMN AEROBIC: CPT | Performed by: DERMATOLOGY

## 2021-08-31 PROCEDURE — 87205 SMEAR GRAM STAIN: CPT | Performed by: DERMATOLOGY

## 2021-09-01 ENCOUNTER — LAB REQUISITION (OUTPATIENT)
Dept: LAB | Facility: HOSPITAL | Age: 76
End: 2021-09-01
Payer: MEDICARE

## 2021-09-01 DIAGNOSIS — B95.8 UNSPECIFIED STAPHYLOCOCCUS AS THE CAUSE OF DISEASES CLASSIFIED ELSEWHERE: ICD-10-CM

## 2021-09-04 LAB
BACTERIA WND AEROBE CULT: ABNORMAL
GRAM STN SPEC: ABNORMAL
GRAM STN SPEC: ABNORMAL

## 2021-10-01 ENCOUNTER — OFFICE VISIT (OUTPATIENT)
Dept: FAMILY MEDICINE CLINIC | Facility: CLINIC | Age: 76
End: 2021-10-01
Payer: MEDICARE

## 2021-10-01 VITALS
HEART RATE: 66 BPM | DIASTOLIC BLOOD PRESSURE: 82 MMHG | RESPIRATION RATE: 16 BRPM | OXYGEN SATURATION: 97 % | HEIGHT: 72 IN | BODY MASS INDEX: 29.93 KG/M2 | WEIGHT: 221 LBS | SYSTOLIC BLOOD PRESSURE: 120 MMHG

## 2021-10-01 DIAGNOSIS — I95.1 ORTHOSTATIC HYPOTENSION: ICD-10-CM

## 2021-10-01 DIAGNOSIS — Z79.899 OTHER LONG TERM (CURRENT) DRUG THERAPY: ICD-10-CM

## 2021-10-01 DIAGNOSIS — Z11.59 NEED FOR HEPATITIS C SCREENING TEST: ICD-10-CM

## 2021-10-01 DIAGNOSIS — E78.2 MIXED HYPERLIPIDEMIA: ICD-10-CM

## 2021-10-01 DIAGNOSIS — F41.9 ANXIETY: ICD-10-CM

## 2021-10-01 DIAGNOSIS — F51.04 PSYCHOPHYSIOLOGICAL INSOMNIA: ICD-10-CM

## 2021-10-01 DIAGNOSIS — R73.03 PREDIABETES: ICD-10-CM

## 2021-10-01 DIAGNOSIS — Z23 NEED FOR INFLUENZA VACCINATION: Primary | ICD-10-CM

## 2021-10-01 DIAGNOSIS — G20 PARKINSON'S DISEASE (HCC): ICD-10-CM

## 2021-10-01 PROCEDURE — 90662 IIV NO PRSV INCREASED AG IM: CPT | Performed by: FAMILY MEDICINE

## 2021-10-01 PROCEDURE — 99214 OFFICE O/P EST MOD 30 MIN: CPT | Performed by: FAMILY MEDICINE

## 2021-10-01 PROCEDURE — G0008 ADMIN INFLUENZA VIRUS VAC: HCPCS | Performed by: FAMILY MEDICINE

## 2021-10-01 RX ORDER — KETOCONAZOLE 20 MG/ML
SHAMPOO TOPICAL
COMMUNITY
Start: 2021-08-31 | End: 2022-04-08

## 2021-10-01 RX ORDER — DOXYCYCLINE 100 MG/1
CAPSULE ORAL
COMMUNITY
Start: 2021-09-07 | End: 2021-10-01 | Stop reason: ALTCHOICE

## 2021-10-06 ENCOUNTER — TELEPHONE (OUTPATIENT)
Dept: NEUROLOGY | Facility: CLINIC | Age: 76
End: 2021-10-06

## 2021-10-18 DIAGNOSIS — G20 PARKINSON'S DISEASE (HCC): ICD-10-CM

## 2021-10-23 ENCOUNTER — IMMUNIZATIONS (OUTPATIENT)
Dept: FAMILY MEDICINE CLINIC | Facility: HOSPITAL | Age: 76
End: 2021-10-23

## 2021-10-23 DIAGNOSIS — Z23 ENCOUNTER FOR IMMUNIZATION: Primary | ICD-10-CM

## 2021-10-23 PROCEDURE — 91300 COVID-19 PFIZER VACC 0.3 ML: CPT

## 2021-10-23 PROCEDURE — 0001A COVID-19 PFIZER VACC 0.3 ML: CPT

## 2021-11-03 DIAGNOSIS — F51.04 PSYCHOPHYSIOLOGICAL INSOMNIA: ICD-10-CM

## 2021-11-05 RX ORDER — ALPRAZOLAM 0.5 MG/1
TABLET ORAL
Qty: 30 TABLET | Refills: 2 | Status: SHIPPED | OUTPATIENT
Start: 2021-11-05 | End: 2022-03-21

## 2021-11-09 ENCOUNTER — APPOINTMENT (OUTPATIENT)
Dept: LAB | Facility: CLINIC | Age: 76
End: 2021-11-09
Payer: MEDICARE

## 2021-11-09 ENCOUNTER — TRANSCRIBE ORDERS (OUTPATIENT)
Dept: LAB | Facility: CLINIC | Age: 76
End: 2021-11-09

## 2021-11-09 DIAGNOSIS — R21 RASH AND OTHER NONSPECIFIC SKIN ERUPTION: ICD-10-CM

## 2021-11-09 DIAGNOSIS — R73.03 PREDIABETES: ICD-10-CM

## 2021-11-09 DIAGNOSIS — L29.9 PRURITUS OF SKIN: Primary | ICD-10-CM

## 2021-11-09 DIAGNOSIS — Z11.59 NEED FOR HEPATITIS C SCREENING TEST: ICD-10-CM

## 2021-11-09 DIAGNOSIS — E78.2 MIXED HYPERLIPIDEMIA: ICD-10-CM

## 2021-11-09 DIAGNOSIS — G20 PARKINSON'S DISEASE (HCC): ICD-10-CM

## 2021-11-09 DIAGNOSIS — F41.9 ANXIETY: ICD-10-CM

## 2021-11-09 DIAGNOSIS — Z79.899 OTHER LONG TERM (CURRENT) DRUG THERAPY: ICD-10-CM

## 2021-11-09 DIAGNOSIS — L29.9 PRURITUS OF SKIN: ICD-10-CM

## 2021-11-09 LAB
ALBUMIN SERPL BCP-MCNC: 3.7 G/DL (ref 3.5–5)
ALP SERPL-CCNC: 80 U/L (ref 46–116)
ALT SERPL W P-5'-P-CCNC: 21 U/L (ref 12–78)
ANION GAP SERPL CALCULATED.3IONS-SCNC: 5 MMOL/L (ref 4–13)
AST SERPL W P-5'-P-CCNC: 14 U/L (ref 5–45)
BASOPHILS # BLD AUTO: 0.05 THOUSANDS/ΜL (ref 0–0.1)
BASOPHILS NFR BLD AUTO: 1 % (ref 0–1)
BILIRUB SERPL-MCNC: 0.64 MG/DL (ref 0.2–1)
BUN SERPL-MCNC: 16 MG/DL (ref 5–25)
CALCIUM SERPL-MCNC: 9.5 MG/DL (ref 8.3–10.1)
CHLORIDE SERPL-SCNC: 108 MMOL/L (ref 100–108)
CHOLEST SERPL-MCNC: 165 MG/DL (ref 50–200)
CO2 SERPL-SCNC: 28 MMOL/L (ref 21–32)
CREAT SERPL-MCNC: 1.02 MG/DL (ref 0.6–1.3)
EOSINOPHIL # BLD AUTO: 0.14 THOUSAND/ΜL (ref 0–0.61)
EOSINOPHIL NFR BLD AUTO: 2 % (ref 0–6)
ERYTHROCYTE [DISTWIDTH] IN BLOOD BY AUTOMATED COUNT: 12.9 % (ref 11.6–15.1)
EST. AVERAGE GLUCOSE BLD GHB EST-MCNC: 114 MG/DL
GFR SERPL CREATININE-BSD FRML MDRD: 72 ML/MIN/1.73SQ M
GLUCOSE P FAST SERPL-MCNC: 97 MG/DL (ref 65–99)
HBA1C MFR BLD: 5.6 %
HCT VFR BLD AUTO: 45.8 % (ref 36.5–49.3)
HCV AB SER QL: NORMAL
HDLC SERPL-MCNC: 52 MG/DL
HGB BLD-MCNC: 15 G/DL (ref 12–17)
IMM GRANULOCYTES # BLD AUTO: 0.02 THOUSAND/UL (ref 0–0.2)
IMM GRANULOCYTES NFR BLD AUTO: 0 % (ref 0–2)
LDLC SERPL CALC-MCNC: 88 MG/DL (ref 0–100)
LYMPHOCYTES # BLD AUTO: 1.49 THOUSANDS/ΜL (ref 0.6–4.47)
LYMPHOCYTES NFR BLD AUTO: 23 % (ref 14–44)
MCH RBC QN AUTO: 31.3 PG (ref 26.8–34.3)
MCHC RBC AUTO-ENTMCNC: 32.8 G/DL (ref 31.4–37.4)
MCV RBC AUTO: 96 FL (ref 82–98)
MONOCYTES # BLD AUTO: 0.69 THOUSAND/ΜL (ref 0.17–1.22)
MONOCYTES NFR BLD AUTO: 11 % (ref 4–12)
NEUTROPHILS # BLD AUTO: 4.13 THOUSANDS/ΜL (ref 1.85–7.62)
NEUTS SEG NFR BLD AUTO: 63 % (ref 43–75)
NRBC BLD AUTO-RTO: 0 /100 WBCS
PLATELET # BLD AUTO: 186 THOUSANDS/UL (ref 149–390)
PMV BLD AUTO: 11.7 FL (ref 8.9–12.7)
POTASSIUM SERPL-SCNC: 4.1 MMOL/L (ref 3.5–5.3)
PROT SERPL-MCNC: 6.9 G/DL (ref 6.4–8.2)
RBC # BLD AUTO: 4.79 MILLION/UL (ref 3.88–5.62)
SODIUM SERPL-SCNC: 141 MMOL/L (ref 136–145)
TRIGL SERPL-MCNC: 123 MG/DL
TSH SERPL DL<=0.05 MIU/L-ACNC: 1.15 UIU/ML (ref 0.36–3.74)
WBC # BLD AUTO: 6.52 THOUSAND/UL (ref 4.31–10.16)

## 2021-11-09 PROCEDURE — 80061 LIPID PANEL: CPT

## 2021-11-09 PROCEDURE — 86803 HEPATITIS C AB TEST: CPT

## 2021-11-09 PROCEDURE — 36415 COLL VENOUS BLD VENIPUNCTURE: CPT

## 2021-11-09 PROCEDURE — 85025 COMPLETE CBC W/AUTO DIFF WBC: CPT

## 2021-11-09 PROCEDURE — 86255 FLUORESCENT ANTIBODY SCREEN: CPT

## 2021-11-09 PROCEDURE — 84443 ASSAY THYROID STIM HORMONE: CPT

## 2021-11-09 PROCEDURE — 83036 HEMOGLOBIN GLYCOSYLATED A1C: CPT

## 2021-11-09 PROCEDURE — 80053 COMPREHEN METABOLIC PANEL: CPT

## 2021-11-11 ENCOUNTER — TELEPHONE (OUTPATIENT)
Dept: FAMILY MEDICINE CLINIC | Facility: CLINIC | Age: 76
End: 2021-11-11

## 2021-11-15 LAB — MISCELLANEOUS LAB TEST RESULT: NORMAL

## 2021-11-24 DIAGNOSIS — N40.1 BENIGN PROSTATIC HYPERPLASIA WITH LOWER URINARY TRACT SYMPTOMS, SYMPTOM DETAILS UNSPECIFIED: ICD-10-CM

## 2021-11-26 RX ORDER — FINASTERIDE 5 MG/1
TABLET, FILM COATED ORAL
Qty: 90 TABLET | Refills: 0 | Status: SHIPPED | OUTPATIENT
Start: 2021-11-26 | End: 2022-02-17

## 2021-12-29 ENCOUNTER — TELEPHONE (OUTPATIENT)
Dept: NEUROLOGY | Facility: CLINIC | Age: 76
End: 2021-12-29

## 2021-12-29 NOTE — TELEPHONE ENCOUNTER
Called pt and offered an appt on 01/04/2021 w/ Fran Tomlin in [facility] as there was an opening in her schedule, but the patients wife stated she is having surgery tomorrow so he would not be able to get there for that appt  I then asked the patients wife of the preferred location to which Lee stated "[place] only please"  I then looked into Rolo Ham's schedule to then only find openings in May  I then placed the patient on a brief hold and discussed with Dr Koby Bailey about scheduling the patient with Aydee Erwin and then schedule next appt with Dr Koby Bailey after  Dr Koby Bailey agreed to the scheduling with Aydee Erwin so I got back on the phone with Lee and offered an appt on 02/18/2022 w/ Aydee Erwin in [facility] at 1:15 PM I then scheduled a 4m f/u after this appt with Dr Koby Bailey and scheduled an appt on 07/11/2022 w/ Dr Koby Bailey in [facility] at 8:30 AM  Patient is now scheduled for both appointments

## 2022-02-17 DIAGNOSIS — G20 PARKINSON'S DISEASE (HCC): ICD-10-CM

## 2022-02-17 RX ORDER — MIDODRINE HYDROCHLORIDE 2.5 MG/1
TABLET ORAL
Qty: 60 TABLET | Refills: 4 | Status: SHIPPED | OUTPATIENT
Start: 2022-02-17
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
## 2022-03-21 DIAGNOSIS — F51.04 PSYCHOPHYSIOLOGICAL INSOMNIA: ICD-10-CM

## 2022-03-21 RX ORDER — ALPRAZOLAM 0.5 MG/1
TABLET ORAL
Qty: 30 TABLET | Refills: 2 | Status: SHIPPED | OUTPATIENT
Start: 2022-03-21 | End: 2022-04-08

## 2022-03-23 ENCOUNTER — TELEPHONE (OUTPATIENT)
Dept: NEUROLOGY | Facility: CLINIC | Age: 77
End: 2022-03-23

## 2022-03-23 NOTE — TELEPHONE ENCOUNTER
Called and spoke w/ pt and confirmed upcoming appt w/ Giselle Mantel  Confirmed appt date, time and location, and informed pt that check in is at least 15 mins prior to appt  Time

## 2022-04-01 ENCOUNTER — TELEPHONE (OUTPATIENT)
Dept: NEUROLOGY | Facility: CLINIC | Age: 77
End: 2022-04-01

## 2022-04-01 ENCOUNTER — OFFICE VISIT (OUTPATIENT)
Dept: NEUROLOGY | Facility: CLINIC | Age: 77
End: 2022-04-01
Payer: MEDICARE

## 2022-04-01 VITALS
WEIGHT: 225.5 LBS | TEMPERATURE: 96.5 F | SYSTOLIC BLOOD PRESSURE: 138 MMHG | BODY MASS INDEX: 30.54 KG/M2 | HEIGHT: 72 IN | HEART RATE: 56 BPM | DIASTOLIC BLOOD PRESSURE: 84 MMHG

## 2022-04-01 DIAGNOSIS — G20 PARKINSON'S DISEASE (HCC): Primary | ICD-10-CM

## 2022-04-01 DIAGNOSIS — I95.1 ORTHOSTATIC HYPOTENSION: ICD-10-CM

## 2022-04-01 PROCEDURE — 99214 OFFICE O/P EST MOD 30 MIN: CPT | Performed by: NURSE PRACTITIONER

## 2022-04-01 NOTE — ASSESSMENT & PLAN NOTE
Patient with mild progression since last seen  He did feel a benefit from starting sinemet  He does continue to feel slower overall along with some leg fatigue if he walks longer distances or stands for a long period of time  He also notes that he feels his walking and balance are worse in the morning and notes some wearing off just prior to his second dose  He does notes slightly worsening symptoms in the afternoon only if he works longer hours  He denies any other wearing off  Overall his exam is improved from last visit with initiation of sinemet  Given his morning symptoms, will plan for an increase in his morning dose of sinemet  We did discuss his leg fatigue and I do question some neurogenic claudication, declined MRI lumbar spine today  He did not feel a benefit from formal PT-is working with a  at home  Plan:  -increase sinemet 25/100 mg to 1 5 tabs at 7 am, continue 1 tab at 12pm, 5 pm, and 10pm), if he continues with wearing off we can consider adjusting the timing of his medicaiton  Will have him monitor BP with adjustment given hx of OH  -encouraged to stay active  He has follow up scheduled with Dr Caridad Hendrix in 3 months, To contact the office sooner with any concerns or worsening symptoms

## 2022-04-01 NOTE — ASSESSMENT & PLAN NOTE
Improved with addition of midodrine as well as measures of wedge pillow and compression stockings  His BP was initially elevated in the office today but recheck was normal  He denies any dizziness  I did ask for for him to monitor his blood pressure a few times over the next 2 weeks to ensure he is not regularly hypertensive on midodrine, also to monitor for any symptoms with increase in sinemet  Will continue current dosing for now

## 2022-04-01 NOTE — PROGRESS NOTES
Patient ID: Sherman Galindo is a 78 y o  male  Assessment/Plan:    Orthostatic hypotension  Improved with addition of midodrine as well as measures of wedge pillow and compression stockings  His BP was initially elevated in the office today but recheck was normal  He denies any dizziness  I did ask for for him to monitor his blood pressure a few times over the next 2 weeks to ensure he is not regularly hypertensive on midodrine, also to monitor for any symptoms with increase in sinemet  Will continue current dosing for now  Parkinson's disease [facility])  Patient with mild progression since last seen  He did feel a benefit from starting sinemet  He does continue to feel slower overall along with some leg fatigue if he walks longer distances or stands for a long period of time  He also notes that he feels his walking and balance are worse in the morning and notes some wearing off just prior to his second dose  He does notes slightly worsening symptoms in the afternoon only if he works longer hours  He denies any other wearing off  Overall his exam is improved from last visit with initiation of sinemet  Given his morning symptoms, will plan for an increase in his morning dose of sinemet  We did discuss his leg fatigue and I do question some neurogenic claudication, declined MRI lumbar spine today  He did not feel a benefit from formal PT-is working with a  at home  Plan:  -increase sinemet 25/100 mg to 1 5 tabs at 7 am, continue 1 tab at 12pm, 5 pm, and 10pm), if he continues with wearing off we can consider adjusting the timing of his medicaiton  Will have him monitor BP with adjustment given hx of [place]  -encouraged to stay active  He has follow up scheduled with Dr Eva Srinivasan in 3 months, To contact the office sooner with any concerns or worsening symptoms           Diagnoses and all orders for this visit:    Parkinson's disease ([facility] [address] )    Orthostatic hypotension           Subjective:    HPI  Judith Tucker Blade Fitzgerald is a 78 y o  male practicing  who presents for evaluation of parkinsonism  To review, symptom onset in 2016 with slowness of gait followed by left hand tremor, course complicated by orthostatic hypotension  When first seen by Dr Otis Blanton he had been started on Sinemet, which was exacerbating his orthostatic intolerance  His parkinsonism was not particularly bothersome so the plan was to discontinue his Sinemet to focus on his orthostatic symptoms  Last office visit 4/2021 in which he was to monitor BP, consider starting midodrine  Goal was better BP control to be able to tolerate sinemet  Prior medication trials:  Sinemet: worsening OH  zoloft: worsened OH      Current Medication:  Sinemet 25/100 mg 1 tab QID (7 am, noon, 5 pm, 10 pm)  Midodrine 2 5 mg 1 tab BID    Interval History:  He does not check his BP regularly at home  He denies any dizziness other then with he is doing PT and is doing certain position changes  He does sleep with a wedge and compression stocking  He feels his walking is ok-off balance at times, No recent falls  His wife has noticed he has been walking more slowly over time  He finds there are times during the day where his walking is better and worse but can't pinpoint a time  He feels sometimes his legs are fatigued  He finds if he walks a great distance he will have a harder time moving his left leg, legs can feel tired  Also cannot stand for a long period of time as legs will feel fatigued as well  He denies any wearing off other then perhaps an hour before he is due for the next dose  No recent falls  He feels his endurance is better since doing PT  He does not notice a tremor but his wife does notice a slight resting tremor at times and something when he will hold an item  He denies any stiffness other then in his back when he is working  He continues to work as a   Has a hard time lasting the whole day    He sleeps well overnight other then he will wake up around 330 am for one reason or other and sometimes may have trouble falling back asleep and may need to utilize a xanax  He has been on melatonin which he had diarrhea  Did have some RBD behaviors int he past but now he occasionally talks in his sleep  There was a period of time where he was having trouble swallowing pills however this has improved  He does find his voice will get lower and more raspy when he gets fatigued  No drooling  He feels his memory is ok, is hard of hearing so unclear if he is completely processing things, harder to process newer information but continues to perform well at the job  No hallucinations  Objective:    Blood pressure 138/84, pulse 56, temperature (!) 96 5 °F (35 8 °C), temperature source Temporal, height 6' (1 829 m), weight 102 kg (225 lb 8 oz)  Physical Exam  Constitutional:       General: He is awake  HENT:      Right Ear: Hearing normal       Left Ear: Hearing normal    Eyes:      General: Lids are normal       Extraocular Movements: Extraocular movements intact  Pupils: Pupils are equal, round, and reactive to light  Neurological:      Mental Status: He is alert  Deep Tendon Reflexes: Strength normal          Neurological Exam  Mental Status  Awake and alert  Oriented to person, place, time and situation  Speech: hypophonia  Language is fluent with no aphasia  Cranial Nerves  CN III, IV, VI: Extraocular movements intact bilaterally  Normal lids and orbits bilaterally  Pupils equal round and reactive to light bilaterally  CN V:  Right: Facial sensation is normal   Left: Facial sensation is normal on the left  CN VII:  Right: There is no facial weakness  Left: There is no facial weakness  CN VIII:  Right: Hearing is normal   Left: Hearing is normal   CN XI:  Right: Trapezius strength is normal   Left: Trapezius strength is normal   CN XII: Tongue midline without atrophy or fasciculations      Motor   Strength is 5/5 throughout all four extremities  Strength: Strength 5/5 upper and lower extremities  Sensory  Light touch is normal in upper and lower extremities  Coordination  Right: Finger-to-nose normal  Rapid alternating movement abnormality:  Left: Finger-to-nose normal  Rapid alternating movement abnormality:  See UPDRS III    Gait  Casual gait: Able to rise from chair without using arms  Narrow based gait, good stride, speed, and arm swing  UPDRS motor:                              Time since last dose:  4/1/22 4/29/21   Speech  1  1   Facial Expression  1  1   Rigidity - Neck       Rigidity - Upper Extremity (Right)  1     Rigidity - Upper Extremity (Left)   1     Rigidity - Lower Extremity (Right)  0     Rigidity - Lower Extremity (Left)   0     Finger Taps (Right)   1  1   Finger Taps (Left)   1  1   Hand Movement (Right)  1  1   Hand Movement (Left)   1  2   Pronation/Supination (Right)  1  1   Pronation/Supination (Left)   1  1   Toe Tapping (Right) 0     Toe Tapping (Left) 0     Leg Agility (Right)  0     Leg Agility (Left)   0     Arising from Chair   1  1   Gait   1  2   Freezing of Gait 0  0   Postural Stability        Posture 1  1   Global spontaneity of movement 1  1   Postural Tremor (Right) 0  1   Postural Tremor (Left) 0  1   Kinetic Tremor (Right)  0  0   Kinetic Tremor (Left)  0  0   Rest tremor amplitude RUE 0  0   Rest tremor amplitude LUE 0  0   Rest tremor amplitude RLE 0  0   Reset tremor amplitude LLE 0  0   Lip/Jaw Tremor  0  0   Consistency of tremor 0  0   Motor Exam Total:           I have personally reviewed the ROS performed by the MA     ROS:    Review of Systems   Constitutional: Negative  Negative for appetite change and fever  HENT: Negative  Negative for hearing loss, tinnitus, trouble swallowing and voice change  Eyes: Negative  Negative for photophobia and pain  Respiratory: Negative  Negative for shortness of breath  Cardiovascular: Negative    Negative for palpitations  Gastrointestinal: Negative  Negative for nausea and vomiting  Endocrine: Negative  Negative for cold intolerance  Genitourinary: Negative  Negative for dysuria, frequency and urgency  Musculoskeletal: Negative  Negative for myalgias and neck pain  Skin: Negative  Negative for rash  Neurological: Positive for light-headedness  Negative for dizziness, tremors, seizures, syncope, facial asymmetry, speech difficulty, weakness, numbness and headaches  Hematological: Negative  Does not bruise/bleed easily  Psychiatric/Behavioral: Positive for sleep disturbance  Negative for confusion and hallucinations  All other systems reviewed and are negative

## 2022-04-01 NOTE — PATIENT INSTRUCTIONS
Increase sinemet to 1 5 tabs at 7 am, remain on 1 tab at noon, 5 pm, and 10 pm    If increasing dizziness or no improvement after 2 weeks please contact the office and we will likely adjust timing of medications  Would recommend to monitor BP 1-2 daily for the next 2 weeks and let me know how readings are    If leg symptoms (fatigue in the legs) persists would pursue MRI lumbar spine, continue PT and to stay active

## 2022-04-08 ENCOUNTER — OFFICE VISIT (OUTPATIENT)
Dept: FAMILY MEDICINE CLINIC | Facility: CLINIC | Age: 77
End: 2022-04-08
Payer: MEDICARE

## 2022-04-08 VITALS
HEART RATE: 65 BPM | OXYGEN SATURATION: 96 % | BODY MASS INDEX: 30.34 KG/M2 | WEIGHT: 224 LBS | DIASTOLIC BLOOD PRESSURE: 80 MMHG | RESPIRATION RATE: 16 BRPM | HEIGHT: 72 IN | SYSTOLIC BLOOD PRESSURE: 122 MMHG

## 2022-04-08 DIAGNOSIS — I95.1 ORTHOSTATIC HYPOTENSION: ICD-10-CM

## 2022-04-08 DIAGNOSIS — Z00.00 WELL ADULT EXAM: Primary | ICD-10-CM

## 2022-04-08 DIAGNOSIS — G20 PARKINSON'S DISEASE (HCC): ICD-10-CM

## 2022-04-08 DIAGNOSIS — N40.0 ENLARGED PROSTATE WITHOUT LOWER URINARY TRACT SYMPTOMS (LUTS): ICD-10-CM

## 2022-04-08 DIAGNOSIS — F51.04 PSYCHOPHYSIOLOGICAL INSOMNIA: ICD-10-CM

## 2022-04-08 PROCEDURE — 1123F ACP DISCUSS/DSCN MKR DOCD: CPT | Performed by: FAMILY MEDICINE

## 2022-04-08 PROCEDURE — 1101F PT FALLS ASSESS-DOCD LE1/YR: CPT | Performed by: FAMILY MEDICINE

## 2022-04-08 PROCEDURE — 1170F FXNL STATUS ASSESSED: CPT | Performed by: FAMILY MEDICINE

## 2022-04-08 PROCEDURE — 3008F BODY MASS INDEX DOCD: CPT | Performed by: FAMILY MEDICINE

## 2022-04-08 PROCEDURE — 99214 OFFICE O/P EST MOD 30 MIN: CPT | Performed by: FAMILY MEDICINE

## 2022-04-08 PROCEDURE — 3725F SCREEN DEPRESSION PERFORMED: CPT | Performed by: FAMILY MEDICINE

## 2022-04-08 PROCEDURE — 1036F TOBACCO NON-USER: CPT | Performed by: FAMILY MEDICINE

## 2022-04-08 PROCEDURE — 1125F AMNT PAIN NOTED PAIN PRSNT: CPT | Performed by: FAMILY MEDICINE

## 2022-04-08 PROCEDURE — G0439 PPPS, SUBSEQ VISIT: HCPCS | Performed by: FAMILY MEDICINE

## 2022-04-08 PROCEDURE — 1160F RVW MEDS BY RX/DR IN RCRD: CPT | Performed by: FAMILY MEDICINE

## 2022-04-08 PROCEDURE — 3288F FALL RISK ASSESSMENT DOCD: CPT | Performed by: FAMILY MEDICINE

## 2022-04-08 RX ORDER — ALPRAZOLAM 0.5 MG/1
0.5 TABLET, EXTENDED RELEASE ORAL
Qty: 14 TABLET | Refills: 0 | Status: SHIPPED | OUTPATIENT
Start: 2022-04-08 | End: 2022-04-22 | Stop reason: SDUPTHER

## 2022-04-08 NOTE — PROGRESS NOTES
Assessment and Plan:     Problem List Items Addressed This Visit        Cardiovascular and Mediastinum    Orthostatic hypotension       Nervous and Auditory    Parkinson's disease ([facility] [address] )       Other    Enlarged prostate without lower urinary tract symptoms (luts)    Psychophysiological insomnia     Taking 1/2 xanax 1030pm good for 4hrs then tries to sleep   Takes the other 1/2 then take bit longer to kick in and wakes up at 7am but still groggy          Relevant Medications    ALPRAZolam ER (XANAX XR) 0 5 MG 24 hr tablet      Other Visit Diagnoses     Well adult exam    -  Primary        BMI Counseling: There is no height or weight on file to calculate BMI  The BMI is above normal  Nutrition recommendations include decreasing portion sizes, encouraging healthy choices of fruits and vegetables and limiting drinks that contain sugar  Exercise recommendations include moderate physical activity 150 minutes/week  No pharmacotherapy was ordered  Depression Screening and Follow-up Plan: Patient was screened for depression during today's encounter  They screened negative with a PHQ-2 score of 0  Falls Plan of Care: balance, strength, and gait training instructions were provided  Medications that increase falls were reviewed  Preventive health issues were discussed with patient, and age appropriate screening tests were ordered as noted in patient's After Visit Summary  Personalized health advice and appropriate referrals for health education or preventive services given if needed, as noted in patient's After Visit Summary       History of Present Illness:     Patient presents for Medicare Annual Wellness visit    Patient Care Team:  Marcela Reyes MD as PCP - General  MD Michael Shah MD     Problem List:     Patient Active Problem List   Diagnosis    REM behavioral disorder    Parkinson's disease ([facility] [address] )    Orthostatic hypotension    Enlarged prostate without lower urinary tract symptoms (luts)    Anxiety    Psychophysiological insomnia    Hip pain, chronic, right      Past Medical and Surgical History:     History reviewed  No pertinent past medical history  Past Surgical History:   Procedure Laterality Date    CYST REMOVAL      Right wrist    PROSTATE SURGERY      Biopsy    TONSILLECTOMY        Family History:     Family History   Problem Relation Age of Onset    Heart disease Mother     Heart disease Father       Social History:     E-Cigarette/Vaping    E-Cigarette Use Never User      E-Cigarette/Vaping Substances    Nicotine No     THC No     CBD No     Flavoring No     Other No     Unknown No      Social History     Socioeconomic History    Marital status: /Civil Union     Spouse name: None    Number of children: 3    Years of education: None    Highest education level: None   Occupational History    Occupation:    still active    Occupation: retired    Tobacco Use    Smoking status: Never Smoker    Smokeless tobacco: Never Used   Vaping Use    Vaping Use: Never used   Substance and Sexual Activity    Alcohol use: Yes     Comment: social    Drug use: No    Sexual activity: Not Currently   Other Topics Concern    None   Social History Narrative    Most recent tobacco use screenin-    Do you currently or have you served in the [employer]: Yes    If Yes, What branch of service: [employer]    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, into active duty, 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    Advance directive: Yes    Alcohol intake: Moderate    Caffeine intake:  Moderate    Illicit drugs: none    Occupation: retired    Exercise level: Occasional    Single or multi-level home/work: single level home    Live alone or with others: with others    Chewing tobacco: none    Marital status:     Number of children: 3    Diet: Regular    Sexual orientation: Heterosexual    Smoke alarm in home: Yes    General stress level: Medium    Sunscreen used routinely: No    Education: Post Graduate    Guns present in home: No    Presence of domestic violence: No     Social Determinants of Health     Financial Resource Strain: Not on file   Food Insecurity: Not on file   Transportation Needs: Not on file   Physical Activity: Not on file   Stress: Not on file   Social Connections: Not on file   Intimate Partner Violence: Not on file   Housing Stability: Not on file      Medications and Allergies:     Current Outpatient Medications   Medication Sig Dispense Refill    atorvastatin (LIPITOR) 20 mg tablet TAKE ONE TABLET BY MOUTH EVERY DAY 90 tablet 1    carbidopa-levodopa (SINEMET)  mg per tablet TAKE ONE TABLET BY MOUTH FOUR TIMES A  tablet 3    finasteride (PROSCAR) 5 mg tablet TAKE ONE TABLET BY MOUTH EVERY DAY 90 tablet 3    midodrine (PROAMATINE) 2 5 mg tablet TAKE 1 TABLET UP TO TWICE DAILY 60 tablet 4    triamcinolone (KENALOG) 0 1 % cream Apply topically 2 (two) times a day 45 g 0    ALPRAZolam ER (XANAX XR) 0 5 MG 24 hr tablet Take 1 tablet (0 5 mg total) by mouth daily at bedtime 14 tablet 0    aspirin 81 MG tablet Take by mouth (Patient not taking: Reported on 10/1/2021)       No current facility-administered medications for this visit       No Known Allergies   Immunizations:     Immunization History   Administered Date(s) Administered    COVID-19 PFIZER VACCINE 0 3 ML IM 02/17/2021, 03/09/2021, 10/23/2021    INFLUENZA 01/10/2013, 01/13/2015, 02/01/2018, 11/12/2018    Influenza, high dose seasonal 0 7 mL 09/29/2020, 10/01/2021    Pneumococcal Conjugate 13-Valent 12/19/2019    Pneumococcal Polysaccharide PPV23 03/13/2018      Health Maintenance:         Topic Date Due    Colorectal Cancer Screening  05/25/2024    Hepatitis C Screening  Completed         Topic Date Due    DTaP,Tdap,and Td Vaccines (1 - Tdap) Never done      Medicare Health Risk Assessment:     /80   Pulse 65   Resp 16   Ht 6' (1 829 m) Wt 102 kg (224 lb)   SpO2 96%   BMI 30 38 kg/m²      Kyle Fernandez is here for his Subsequent Wellness visit  Last Medicare Wellness visit information reviewed, patient interviewed and updates made to the record today  Health Risk Assessment:   Patient rates overall health as fair  Patient feels that their physical health rating is slightly worse  Patient is very satisfied with their life  Eyesight was rated as slightly worse  Hearing was rated as slightly worse  Patient feels that their emotional and mental health rating is same  Patients states they are never, rarely angry  Patient states they are sometimes unusually tired/fatigued  Pain experienced in the last 7 days has been none  Patient states that he has experienced no weight loss or gain in last 6 months  Depression Screening:   PHQ-2 Score: 0      Fall Risk Screening: In the past year, patient has experienced: no history of falling in past year      Home Safety:  Patient has trouble with stairs inside or outside of their home  Patient has working smoke alarms and has working carbon monoxide detector  Home safety hazards include: none  Nutrition:   Current diet is Regular  Medications:   Patient is currently taking over-the-counter supplements  OTC medications include: see medication list  Patient is able to manage medications  Activities of Daily Living (ADLs)/Instrumental Activities of Daily Living (IADLs):   Walk and transfer into and out of bed and chair?: Yes  Dress and groom yourself?: Yes    Bathe or shower yourself?: Yes    Feed yourself? Yes  Do your laundry/housekeeping?: No  Manage your money, pay your bills and track your expenses?: No  Make your own meals?: No    Do your own shopping?: No    Previous Hospitalizations:   Any hospitalizations or ED visits within the last 12 months?: No      Advance Care Planning:   Living will: Yes    Durable POA for healthcare:  Yes    Advanced directive: Yes    Five wishes given: No Cognitive Screening:   Provider or family/friend/caregiver concerned regarding cognition?: No    PREVENTIVE SCREENINGS      Cardiovascular Screening:    General: Screening Not Indicated and History Lipid Disorder    Due for: Lipid Panel      Diabetes Screening:     General: Screening Current    Due for: Blood Glucose      Colorectal Cancer Screening:     General: Screening Current    Due for: Cologuard      Prostate Cancer Screening:    General: History Prostate Cancer      Osteoporosis Screening:    General: Screening Not Indicated      Abdominal Aortic Aneurysm (AAA) Screening:    Risk factors include: age between 73-69 yo        General: Screening Not Indicated      Lung Cancer Screening:     General: Screening Not Indicated      Hepatitis C Screening:    General: Screening Current    Screening, Brief Intervention, and Referral to Treatment (SBIRT)    Screening  Typical number of drinks in a day: 1  Typical number of drinks in a week: 1  Interpretation: Low risk drinking behavior  Single Item Drug Screening:  How often have you used an illegal drug (including marijuana) or a prescription medication for non-medical reasons in the past year? never    Single Item Drug Screen Score: 0  Interpretation: Negative screen for possible drug use disorder    Brief Intervention  Alcohol & drug use screenings were reviewed  No concerns regarding substance use disorder identified         Syed Gonsaelz MD

## 2022-04-08 NOTE — PATIENT INSTRUCTIONS

## 2022-04-08 NOTE — ASSESSMENT & PLAN NOTE
Taking 1/2 xanax 1030pm good for 4hrs then tries to sleep   Takes the other 1/2 then take bit longer to kick in and wakes up at 7am but still groggy

## 2022-04-10 NOTE — PROGRESS NOTES
Assessment/Plan:    Cont with uro for BPH bx neg   And neuro for parkinson's   PT at home and try to do the ex at about 10am     We can try xanax XR to see if that lasts longer at night   I wouldn't want to use ambien CR given the age and comorbitities   He can tolerate xanax well but just is not lasting long     1  Well adult exam    2  Psychophysiological insomnia  Assessment & Plan:  Taking 1/2 xanax 1030pm good for 4hrs then tries to sleep   Takes the other 1/2 then take bit longer to kick in and wakes up at 7am but still groggy     Orders:  -     ALPRAZolam ER (XANAX XR) 0 5 MG 24 hr tablet; Take 1 tablet (0 5 mg total) by mouth daily at bedtime    3  Parkinson's disease ([address] )    4  Orthostatic hypotension    5  Enlarged prostate without lower urinary tract symptoms (luts)       Subjective:      Patient ID: Baljeet Lal is a 78 y o  male  HPI     Here to go over chronic issues and labs / imaging studies if applicable  Parkinsons   Insomnia   BPH        The following portions of the patient's history were reviewed and updated as appropriate: allergies, current medications, past family history, past medical history, past social history, past surgical history and problem list     Review of Systems   Genitourinary: Positive for frequency  Neurological: Positive for tremors and weakness  Psychiatric/Behavioral: Positive for sleep disturbance  The patient is nervous/anxious  Objective:      /80   Pulse 65   Resp 16   Ht 6' (1 829 m)   Wt 102 kg (224 lb)   SpO2 96%   BMI 30 38 kg/m²     No visits with results within 2 Week(s) from this visit     Latest known visit with results is:   Appointment on 11/09/2021   Component Date Value    Cholesterol 11/09/2021 165     Triglycerides 11/09/2021 123     HDL, Direct 11/09/2021 52     LDL Calculated 11/09/2021 88     Hemoglobin A1C 11/09/2021 5 6     EAG 11/09/2021 114     WBC 11/09/2021 6 52     RBC 11/09/2021 4 79     Hemoglobin 11/09/2021 15 0     Hematocrit 11/09/2021 45 8     MCV 11/09/2021 96     MCH 11/09/2021 31 3     MCHC 11/09/2021 32 8     RDW 11/09/2021 12 9     MPV 11/09/2021 11 7     Platelets 18/79/1666 186     nRBC 11/09/2021 0     Neutrophils Relative 11/09/2021 63     Immat GRANS % 11/09/2021 0     Lymphocytes Relative 11/09/2021 23     Monocytes Relative 11/09/2021 11     Eosinophils Relative 11/09/2021 2     Basophils Relative 11/09/2021 1     Neutrophils Absolute 11/09/2021 4 13     Immature Grans Absolute 11/09/2021 0 02     Lymphocytes Absolute 11/09/2021 1 49     Monocytes Absolute 11/09/2021 0 69     Eosinophils Absolute 11/09/2021 0 14     Basophils Absolute 11/09/2021 0 05     Sodium 11/09/2021 141     Potassium 11/09/2021 4 1     Chloride 11/09/2021 108     CO2 11/09/2021 28     ANION GAP 11/09/2021 5     BUN 11/09/2021 16     Creatinine 11/09/2021 1 02     Glucose, Fasting 11/09/2021 97     Calcium 11/09/2021 9 5     AST 11/09/2021 14     ALT 11/09/2021 21     Alkaline Phosphatase 11/09/2021 80     Total Protein 11/09/2021 6 9     Albumin 11/09/2021 3 7     Total Bilirubin 11/09/2021 0 64     eGFR 11/09/2021 72     TSH 3RD GENERATON 11/09/2021 1 150     Hepatitis C Ab 11/09/2021 Non-reactive     Miscellaneous Lab Test R* 11/09/2021 SEE WRITTEN REPORT           Physical Exam  Vitals and nursing note reviewed  Constitutional:       General: He is not in acute distress  Appearance: He is well-developed  He is obese  HENT:      Head: Normocephalic and atraumatic  Cardiovascular:      Rate and Rhythm: Normal rate and regular rhythm  Heart sounds: Normal heart sounds  No murmur heard  Pulmonary:      Effort: Pulmonary effort is normal       Breath sounds: Normal breath sounds  No wheezing or rales  Abdominal:      General: Bowel sounds are normal  There is no distension  Palpations: Abdomen is soft  Tenderness: There is no abdominal tenderness   There is no guarding or rebound  Musculoskeletal:         General: No tenderness  Normal range of motion  Cervical back: Normal range of motion and neck supple  Lymphadenopathy:      Cervical: No cervical adenopathy  Skin:     General: Skin is warm and dry  Capillary Refill: Capillary refill takes less than 2 seconds  Findings: No rash  Neurological:      Mental Status: He is alert and oriented to person, place, and time  Cranial Nerves: No cranial nerve deficit  Sensory: No sensory deficit  Motor: No abnormal muscle tone  Psychiatric:         Behavior: Behavior normal          Thought Content:  Thought content normal          Judgment: Judgment normal              Maryam Brown MD  [place] [id number]

## 2022-04-19 ENCOUNTER — TELEPHONE (OUTPATIENT)
Dept: FAMILY MEDICINE CLINIC | Facility: CLINIC | Age: 77
End: 2022-04-19

## 2022-04-19 NOTE — TELEPHONE ENCOUNTER
Patient's wife called and stated Wes Raza was doing his pills and not even thinking cut the Xanax in half but since they're extended release he hasn't started them  She asked if a new two week supply can be sent and she will make sure he does not cut them

## 2022-04-22 DIAGNOSIS — F51.04 PSYCHOPHYSIOLOGICAL INSOMNIA: ICD-10-CM

## 2022-04-22 RX ORDER — ALPRAZOLAM 0.5 MG/1
0.5 TABLET, EXTENDED RELEASE ORAL
Qty: 14 TABLET | Refills: 0 | Status: SHIPPED | OUTPATIENT
Start: 2022-04-22 | End: 2022-05-16 | Stop reason: SDUPTHER

## 2022-05-16 DIAGNOSIS — F51.04 PSYCHOPHYSIOLOGICAL INSOMNIA: ICD-10-CM

## 2022-05-16 RX ORDER — ALPRAZOLAM 0.5 MG/1
0.5 TABLET, EXTENDED RELEASE ORAL
Qty: 30 TABLET | Refills: 5 | Status: SHIPPED | OUTPATIENT
Start: 2022-05-16

## 2022-05-16 NOTE — TELEPHONE ENCOUNTER
Wife called and stated that the RX is working well for [place] [employer] and they would like a new script sent to the pharmacy  PDMP checked and last fill was 04/22/22 for #14 filled at [facility]

## 2022-06-23 DIAGNOSIS — G20 PARKINSON'S DISEASE (HCC): ICD-10-CM

## 2022-06-23 NOTE — TELEPHONE ENCOUNTER
Pt's wife called and states that Alicia Baxter had advised pt that he can take extra tab during wearing off  Pt has been taking 1 tab 5x/day for about a month  Pt is doing well on this dosage  Requesting updated script be sent to [facility] pharmacy  Rx entered   Pls review and sign off

## 2022-06-23 NOTE — TELEPHONE ENCOUNTER
Patient requesting refill of carbidopa-levodopa  - 1 tab QID    LOV - 4/2022    Script pended below  Layton Cano - please sign if agreeable

## 2022-07-11 ENCOUNTER — OFFICE VISIT (OUTPATIENT)
Dept: NEUROLOGY | Facility: CLINIC | Age: 77
End: 2022-07-11
Payer: MEDICARE

## 2022-07-11 VITALS — SYSTOLIC BLOOD PRESSURE: 120 MMHG | HEART RATE: 74 BPM | DIASTOLIC BLOOD PRESSURE: 74 MMHG

## 2022-07-11 DIAGNOSIS — G20 PARKINSON'S DISEASE (HCC): ICD-10-CM

## 2022-07-11 DIAGNOSIS — N40.0 ENLARGED PROSTATE WITHOUT LOWER URINARY TRACT SYMPTOMS (LUTS): ICD-10-CM

## 2022-07-11 DIAGNOSIS — I95.1 ORTHOSTATIC HYPOTENSION: Primary | ICD-10-CM

## 2022-07-11 PROCEDURE — 99215 OFFICE O/P EST HI 40 MIN: CPT | Performed by: PSYCHIATRY & NEUROLOGY

## 2022-07-11 NOTE — PROGRESS NOTES
Patient ID: Jarod Rader is a 78 y o  male    Assessment/Plan:    Orthostatic hypotension  Controlled on midodrine 2 5 mg q a m     Much improvement noted after time he lives in was discontinued and he starts to wear compression stockings several days awake along with the use of a wedge pillow at night  He does report some swelling of the legs when he takes off his compression stockings  Parkinson's disease ([facility] [address] )  Parkinson's disease with wearing off  He has noted medications wearing off at least 4 hours into his dose with return of worsening gait, dragging of the left foot, nausea, and backache  Symptoms improved after taking levodopa and eating  He has tried moving his 12 no dose up by 30 minutes but did not move any of the other doses  He has more difficulty ambulating in the afternoon  Will try increasing Sinemet 25/100  to 1 tab 5 times daily   At 7am, 10:30am, 2:30pm, 6:30pm, 10-10:30pm   He will see how he does over the next month  If there is no improvement he will contact the office and we will consider further increases in levodopa verses adding another medication such as a COMT inhibitor or monoamine oxidase inhibitor (although somewhat hesitant given history of orthostatic hypotension)  We discussed the importance of exercise in Parkinson's disease  He works with a  twice weekly who is performing strength exercises with him and make strategies  He is no longer able to walk long distances since his tendon injury over a year ago  We will see if reducing off time allows for improvement in walking  He would benefit from another type of aerobic activity  Suggestion made for considering the use of a stationary bike  They have noted cognitive symptoms in recent months  We discussed changes in cognition as related to Parkinson's disease  Once motor symptoms are more optimized we will further assess memory  Will perform [place] on follow-up    B12 level could be added to his next routine labs  Enlarged prostate without lower urinary tract symptoms (luts)  He expressed concerns with regards to urinary urgency  He has since stopped his finasteride as he felt this was making things worse  We can see if opt to my Zing dopaminergic supplementation improves things  I will reach out to urology to see if there are any other options in his case  Diagnoses and all orders for this visit:    Orthostatic hypotension    Parkinson's disease (HCC)  -     carbidopa-levodopa (SINEMET)  mg per tablet; TAKE ONE TABLET BY MOUTH FIVE TIMES A DAY    Enlarged prostate without lower urinary tract symptoms (luts)    Spent over 42 minutes on patient visit, counseling, care coordination, review of prior history, and documentation  Subjective:      Junior Salmon is a  who presents for follow up for parkinsonism  To review, symptom onset in 2016 with slowness of gait with subsequent development of LH tremor and orthostatic hypotension  Initially seen by Dr Maurilio Delgado and started on Sinemet, which was exacerbating his orthostatic intolerance  Sinemet discontinued and focus initially on treating orthostatic symptoms                                                                                                                        Prior medication trials:  Sinemet: worsening OH  zoloft: worsened OH       Current Medication:  Sinemet 25/100 mg 1 tab @ 7am, 11:30, 4-5 pm, 10 pm   Midodrine 2 5 mg 1 tab qam    He is no longer experiencing orthostatic symptoms  He takes midodrine only in the am  He also noted improvement after stopping tamulosin  He uses compression stocking  Sleeps with wedge  He has noted changes in speech  He works 1/2 a day  He develops nausea and back ache with shuffling and gait changes around 11am  This improves only after food at noon  Backache improves after laying on the floor or reclining chair    He started taking his Sinemet 30 minutes earlier and he notes his back ache is dissipates quicker  He has a  twice weekly who does weight training with him  He has been unable to build up his walking  He use to walk a lot and 1 5 years ago he injured a tendon  He now only walks less than 1/2 a mile due to imbalance  He does ADL's independently but slowly  Sleeps well at night with Xanax XR PCP  Prescribed for ruminating thought keeping him awake two hours into his sleep  He no longer has vivid dreams or talking  He can have periods where his nose "feels stuffed up" during the day and night  He has urinary urgency  He discontinued finasteride as he felt this was making things worse  No constipations  He is forgetting conversations  He needs others to repeat  He writes things down as he cannot keep them in his mind long enough  He drives to the office with no issues  He is slower  No accidents  No hallucinations  Objective:    /74 (BP Location: Left arm, Patient Position: Standing, Cuff Size: Standard)   Pulse 74       Physical Exam  Vitals reviewed  Eyes:      Extraocular Movements: Extraocular movements intact  Pupils: Pupils are equal, round, and reactive to light  Neurological:      Mental Status: He is alert  Deep Tendon Reflexes: Strength normal          Neurological Exam  Mental Status  Alert  Oriented only to person, place and situation  Speech: hypophonia  Language is fluent with no aphasia  Attention and concentration are normal     Cranial Nerves  CN III, IV, VI: Extraocular movements intact bilaterally  Pupils equal round and reactive to light bilaterally  CN VII: Full and symmetric facial movement  CN VIII: Hearing is normal   CN IX, X: Palate elevates symmetrically  CN XI: Shoulder shrug strength is normal   CN XII: Tongue midline without atrophy or fasciculations  Motor   Increased muscle tone  Strength is 5/5 throughout all four extremities      Sensory  Light touch is normal in upper and lower extremities  Coordination  Right: Finger-to-nose normal  Rapid alternating movement abnormality:Left: Finger-to-nose normal  Rapid alternating movement abnormality:  See MDS UPDRS III  Gait  Casual gait: Abnormal pull test  Recovered in 4 steps   Able to rise from chair without using arms  Reduced arm swing         MDS UPDRS III                                       Time since last dose:    Speech  1   Facial Expression  1   Rigidity - Neck  0   Rigidity - Upper Extremity (R)  2   Rigidity - Upper Extremity (L)   0   Rigidity - Lower Extremity (R)  0   Rigidity - Lower Extremity (L)   1   Finger Taps (R)   0   Finger Taps (L)   1   Hand Movement (R)  1   Hand Movement (L)   1   Pronation/Supination (R)  1   Pronation/Supination (L)   2   Toe Tapping (R) 0   Toe Tapping (L) 1   Leg Agility (R)  0   Leg Agility (L)   1   Arising from Chair   0   Gait   1   Freezing of Gait 0   Postural Stability   1   Posture 0   Global spontaneity of movement 1   Postural Tremor (Ri 0   Postural Tremor (L) 0   Kinetic Tremor (R)  0   Kinetic Tremor (L)  0   Rest tremor amplitude RUE 0   Rest tremor amplitude LUE 0   Rest tremor amplitude RLE 0   Reset tremor amplitude LLE 0   Lip/Jaw Tremor  0   Consistency of tremor 0   Motor Exam Total:   One          Current Outpatient Medications on File Prior to Visit   Medication Sig Dispense Refill    ALPRAZolam ER (XANAX XR) 0 5 MG 24 hr tablet Take 1 tablet (0 5 mg total) by mouth daily at bedtime dont cut in 1/2 30 tablet 5    atorvastatin (LIPITOR) 20 mg tablet TAKE ONE TABLET BY MOUTH EVERY DAY 90 tablet 1    midodrine (PROAMATINE) 2 5 mg tablet TAKE 1 TABLET UP TO TWICE DAILY (Patient taking differently: in the morning TAKE 1 TABLET UP TO TWICE DAILY) 60 tablet 4    [DISCONTINUED] carbidopa-levodopa (SINEMET)  mg per tablet TAKE ONE TABLET BY MOUTH FIVE TIMES A  tablet 6    aspirin 81 MG tablet Take by mouth (Patient not taking: No sig reported)      finasteride (PROSCAR) 5 mg tablet TAKE ONE TABLET BY MOUTH EVERY DAY (Patient not taking: Reported on 7/11/2022) 90 tablet 3    triamcinolone (KENALOG) 0 1 % cream Apply topically 2 (two) times a day (Patient not taking: Reported on 7/11/2022) 45 g 0     No current facility-administered medications on file prior to visit           Dodie Rogers MD  Movement disorder physician  [address] [place] [address]

## 2022-07-11 NOTE — PROGRESS NOTES
Review of Systems   Constitutional: Positive for fatigue  Negative for appetite change and fever  HENT: Positive for voice change  Negative for hearing loss, tinnitus and trouble swallowing  Eyes: Positive for visual disturbance (A little bit)  Negative for photophobia and pain  Respiratory: Negative  Negative for shortness of breath  Cardiovascular: Negative  Negative for palpitations  Gastrointestinal: Positive for nausea (at 11 AM usually)  Negative for vomiting  Endocrine: Negative  Negative for cold intolerance  Genitourinary: Positive for urgency  Negative for dysuria and frequency  Musculoskeletal: Positive for back pain and gait problem (Shuffling feet)  Negative for myalgias and neck pain  Balance issues     Skin: Negative  Negative for rash  Allergic/Immunologic: Negative  Neurological: Positive for dizziness (Occasional), tremors (Left hand ongoing) and speech difficulty (Not as good as it was)  Negative for seizures, syncope, facial asymmetry, weakness, light-headedness, numbness and headaches  Hematological: Negative  Does not bruise/bleed easily  Psychiatric/Behavioral: Negative  Negative for confusion, hallucinations and sleep disturbance  All other systems reviewed and are negative

## 2022-07-11 NOTE — PATIENT INSTRUCTIONS
Will try increasing Sinemet 25/100  to 1 tab 5 times daily   At 7am, 10:30am, 2:30pm, 6:30pm, 10-10:30pm are    Contact the office in 1 month if ineffective

## 2022-07-11 NOTE — ASSESSMENT & PLAN NOTE
He expressed concerns with regards to urinary urgency  He has since stopped his finasteride as he felt this was making things worse    We can see if dopaminergic supplementation improves things, if not I will reach out to urology to see if there are any other options in his case (ie Myrbetriq?)

## 2022-07-11 NOTE — ASSESSMENT & PLAN NOTE
Parkinson's disease with wearing off  He has noted medications wearing off at least 4 hours into his dose with return of worsening gait, dragging of the left foot, nausea, and backache  Symptoms improved after taking levodopa and eating  He has tried moving his 12 no dose up by 30 minutes but did not move any of the other doses  He has more difficulty ambulating in the afternoon  Will try increasing Sinemet 25/100  to 1 tab 5 times daily   At 7am, 10:30am, 2:30pm, 6:30pm, 10-10:30pm   He will see how he does over the next month  If there is no improvement he will contact the office and we will consider further increases in levodopa verses adding another medication such as a COMT inhibitor or monoamine oxidase inhibitor (although somewhat hesitant given history of orthostatic hypotension)  We discussed the importance of exercise in Parkinson's disease  He works with a  twice weekly who is performing strength exercises with him and make strategies  He is no longer able to walk long distances since his tendon injury over a year ago  We will see if reducing off time allows for improvement in walking  He would benefit from another type of aerobic activity  Suggestion made for considering the use of a stationary bike  They have noted cognitive symptoms in recent months  We discussed changes in cognition as related to Parkinson's disease  Once motor symptoms are more optimized we will further assess memory  Will perform Bledsoe on follow-up  B12 level could be added to his next routine labs

## 2022-07-11 NOTE — ASSESSMENT & PLAN NOTE
Controlled on midodrine 2 5 mg q a m     Much improvement noted after time he lives in was discontinued and he starts to wear compression stockings several days awake along with the use of a wedge pillow at night  He does report some swelling of the legs when he takes off his compression stockings

## 2022-10-11 ENCOUNTER — OFFICE VISIT (OUTPATIENT)
Dept: FAMILY MEDICINE CLINIC | Facility: CLINIC | Age: 77
End: 2022-10-11
Payer: MEDICARE

## 2022-10-11 VITALS
HEIGHT: 72 IN | DIASTOLIC BLOOD PRESSURE: 82 MMHG | RESPIRATION RATE: 16 BRPM | BODY MASS INDEX: 29.8 KG/M2 | SYSTOLIC BLOOD PRESSURE: 118 MMHG | WEIGHT: 220 LBS | OXYGEN SATURATION: 98 % | HEART RATE: 68 BPM

## 2022-10-11 DIAGNOSIS — Z23 NEED FOR VACCINATION: ICD-10-CM

## 2022-10-11 DIAGNOSIS — Z79.899 OTHER LONG TERM (CURRENT) DRUG THERAPY: ICD-10-CM

## 2022-10-11 DIAGNOSIS — E55.9 VITAMIN D DEFICIENCY: ICD-10-CM

## 2022-10-11 DIAGNOSIS — F51.04 PSYCHOPHYSIOLOGICAL INSOMNIA: ICD-10-CM

## 2022-10-11 DIAGNOSIS — I95.1 ORTHOSTATIC HYPOTENSION: ICD-10-CM

## 2022-10-11 DIAGNOSIS — G20 PARKINSON'S DISEASE (HCC): Primary | ICD-10-CM

## 2022-10-11 DIAGNOSIS — E53.8 B12 DEFICIENCY: ICD-10-CM

## 2022-10-11 DIAGNOSIS — N40.0 ENLARGED PROSTATE WITHOUT LOWER URINARY TRACT SYMPTOMS (LUTS): ICD-10-CM

## 2022-10-11 PROCEDURE — 90662 IIV NO PRSV INCREASED AG IM: CPT

## 2022-10-11 PROCEDURE — G0008 ADMIN INFLUENZA VIRUS VAC: HCPCS

## 2022-10-11 PROCEDURE — 99214 OFFICE O/P EST MOD 30 MIN: CPT | Performed by: FAMILY MEDICINE

## 2022-10-11 NOTE — PROGRESS NOTES
Assessment/Plan: Things are going well for the most part    Have PT consider neurogenic claudication with lower back pain given the fact that everything other than the left leg is getting stronger and beter with PT         1  Parkinson's disease ([address] )  -     carbidopa-levodopa (SINEMET)  mg per tablet; Take 1@ 7am 2 @1030am  [facility]ina[facility] 1@630pm 1@1030pm  -     Comprehensive metabolic panel; Future  -     Lipid Panel with Direct LDL reflex; Future  -     TSH, 3rd generation with Free T4 reflex; Future    2  Need for vaccination  -     influenza vaccine, high-dose, PF 0 7 mL (FLUZONE HIGH-DOSE)    3  Orthostatic hypotension  -     Lipid Panel with Direct LDL reflex; Future  -     CBC and differential; Future    4  Enlarged prostate without lower urinary tract symptoms (luts)  -     PSA Total, Diagnostic; Future    5  Psychophysiological insomnia    6  BMI 29 0-29 9,adult    7  Other long term (current) drug therapy  -     Comprehensive metabolic panel; Future  -     Lipid Panel with Direct LDL reflex; Future  -     CBC and differential; Future  -     HEMOGLOBIN A1C W/ EAG ESTIMATION; Future  -     TSH, 3rd generation with Free T4 reflex; Future  -     Vitamin D 25 hydroxy; Future  -     Vitamin B12; Future  -     PSA Total, Diagnostic; Future    8  B12 deficiency  -     Vitamin B12; Future    9  Vitamin D deficiency  -     Vitamin D 25 hydroxy; Future       Subjective:      Patient ID: Shaka Hernandez is a 78 y o  male  HPI  Here to go over chronic issues and labs / imaging studies if applicable       bph issues and urinary leakage   Stopped flomax due to orthostatics   Changed to finasteride but not helpful     And now off everything   And less issues but still not great     7 1030    Around lunch time or late in the afternoon       The following portions of the patient's history were reviewed and updated as appropriate: allergies, current medications, past family history, past medical history, past social history, past surgical history and problem list     Review of Systems   Constitutional: Negative for activity change, appetite change and fever  HENT: Negative for congestion, nosebleeds and trouble swallowing  Eyes: Negative for itching  Respiratory: Negative for cough and chest tightness  Cardiovascular: Negative for chest pain and palpitations  Gastrointestinal: Negative for abdominal pain, constipation, diarrhea and nausea  Endocrine: Negative for cold intolerance  Genitourinary: Negative for frequency  Musculoskeletal: Positive for arthralgias, back pain and gait problem  Negative for joint swelling  Skin: Negative for rash  Allergic/Immunologic: Negative for immunocompromised state  Neurological: Negative for dizziness, tremors, seizures, syncope and headaches  Psychiatric/Behavioral: Negative for hallucinations and suicidal ideas  Objective:      /82 (BP Location: Left arm, Patient Position: Sitting, Cuff Size: Standard)   Pulse 68   Resp 16   Ht 6' (1 829 m)   Wt 99 8 kg (220 lb)   SpO2 98%   BMI 29 84 kg/m²     No visits with results within 2 Week(s) from this visit     Latest known visit with results is:   Appointment on 11/09/2021   Component Date Value   • Cholesterol 11/09/2021 165    • Triglycerides 11/09/2021 123    • HDL, Direct 11/09/2021 52    • LDL Calculated 11/09/2021 88    • Hemoglobin A1C 11/09/2021 5 6    • EAG 11/09/2021 114    • WBC 11/09/2021 6 52    • RBC 11/09/2021 4 79    • Hemoglobin 11/09/2021 15 0    • Hematocrit 11/09/2021 45 8    • MCV 11/09/2021 96    • MCH 11/09/2021 31 3    • MCHC 11/09/2021 32 8    • RDW 11/09/2021 12 9    • MPV 11/09/2021 11 7    • Platelets 65/31/0709 186    • nRBC 11/09/2021 0    • Neutrophils Relative 11/09/2021 63    • Immat GRANS % 11/09/2021 0    • Lymphocytes Relative 11/09/2021 23    • Monocytes Relative 11/09/2021 11    • Eosinophils Relative 11/09/2021 2    • Basophils Relative 11/09/2021 1 • Neutrophils Absolute 11/09/2021 4 13    • Immature Grans Absolute 11/09/2021 0 02    • Lymphocytes Absolute 11/09/2021 1 49    • Monocytes Absolute 11/09/2021 0 69    • Eosinophils Absolute 11/09/2021 0 14    • Basophils Absolute 11/09/2021 0 05    • Sodium 11/09/2021 141    • Potassium 11/09/2021 4 1    • Chloride 11/09/2021 108    • CO2 11/09/2021 28    • ANION GAP 11/09/2021 5    • BUN 11/09/2021 16    • Creatinine 11/09/2021 1 02    • Glucose, Fasting 11/09/2021 97    • Calcium 11/09/2021 9 5    • AST 11/09/2021 14    • ALT 11/09/2021 21    • Alkaline Phosphatase 11/09/2021 80    • Total Protein 11/09/2021 6 9    • Albumin 11/09/2021 3 7    • Total Bilirubin 11/09/2021 0 64    • eGFR 11/09/2021 72    • TSH 3RD GENERATON 11/09/2021 1 150    • Hepatitis C Ab 11/09/2021 Non-reactive    • Miscellaneous Lab Test R* 11/09/2021 SEE WRITTEN REPORT           Physical Exam  Vitals and nursing note reviewed  Constitutional:       General: He is not in acute distress  Appearance: He is well-developed  HENT:      Head: Normocephalic and atraumatic  Cardiovascular:      Rate and Rhythm: Normal rate and regular rhythm  Heart sounds: Normal heart sounds  No murmur heard  Pulmonary:      Effort: Pulmonary effort is normal       Breath sounds: Normal breath sounds  No wheezing or rales  Abdominal:      General: Bowel sounds are normal  There is no distension  Palpations: Abdomen is soft  Tenderness: There is no abdominal tenderness  There is no guarding or rebound  Musculoskeletal:         General: No tenderness  Normal range of motion  Cervical back: Normal range of motion and neck supple  Lymphadenopathy:      Cervical: No cervical adenopathy  Skin:     General: Skin is warm and dry  Capillary Refill: Capillary refill takes less than 2 seconds  Findings: No rash  Neurological:      Mental Status: He is alert and oriented to person, place, and time  Cranial Nerves:  No cranial nerve deficit  Sensory: No sensory deficit  Motor: No abnormal muscle tone  Psychiatric:         Behavior: Behavior normal          Thought Content: Thought content normal          Judgment: Judgment normal            BMI Counseling: Body mass index is 29 84 kg/m²  The BMI is above normal  Nutrition recommendations include decreasing portion sizes, encouraging healthy choices of fruits and vegetables, decreasing fast food intake, consuming healthier snacks and limiting drinks that contain sugar  Exercise recommendations include exercising 3-5 times per week  No pharmacotherapy was ordered  Rationale for BMI follow-up plan is due to patient being overweight or obese  Depression Screening and Follow-up Plan: Patient was screened for depression during today's encounter  They screened negative with a PHQ-2 score of 0  Falls Plan of Care: balance, strength, and gait training instructions were provided  Medications that increase falls were reviewed         Sandeep Tinoco

## 2022-11-16 ENCOUNTER — APPOINTMENT (OUTPATIENT)
Dept: LAB | Facility: CLINIC | Age: 77
End: 2022-11-16

## 2022-11-16 DIAGNOSIS — I95.1 ORTHOSTATIC HYPOTENSION: ICD-10-CM

## 2022-11-16 DIAGNOSIS — Z79.899 OTHER LONG TERM (CURRENT) DRUG THERAPY: ICD-10-CM

## 2022-11-16 DIAGNOSIS — E53.8 B12 DEFICIENCY: ICD-10-CM

## 2022-11-16 DIAGNOSIS — N40.0 ENLARGED PROSTATE WITHOUT LOWER URINARY TRACT SYMPTOMS (LUTS): ICD-10-CM

## 2022-11-16 DIAGNOSIS — E55.9 VITAMIN D DEFICIENCY: ICD-10-CM

## 2022-11-16 DIAGNOSIS — G20 PARKINSON'S DISEASE (HCC): ICD-10-CM

## 2022-11-16 LAB
25(OH)D3 SERPL-MCNC: 26.6 NG/ML (ref 30–100)
ALBUMIN SERPL BCP-MCNC: 3.6 G/DL (ref 3.5–5)
ALP SERPL-CCNC: 68 U/L (ref 46–116)
ALT SERPL W P-5'-P-CCNC: 14 U/L (ref 12–78)
ANION GAP SERPL CALCULATED.3IONS-SCNC: 7 MMOL/L (ref 4–13)
AST SERPL W P-5'-P-CCNC: 14 U/L (ref 5–45)
BASOPHILS # BLD AUTO: 0.03 THOUSANDS/ÂΜL (ref 0–0.1)
BASOPHILS NFR BLD AUTO: 1 % (ref 0–1)
BILIRUB SERPL-MCNC: 0.6 MG/DL (ref 0.2–1)
BUN SERPL-MCNC: 18 MG/DL (ref 5–25)
CALCIUM SERPL-MCNC: 9.3 MG/DL (ref 8.3–10.1)
CHLORIDE SERPL-SCNC: 110 MMOL/L (ref 96–108)
CHOLEST SERPL-MCNC: 221 MG/DL
CO2 SERPL-SCNC: 25 MMOL/L (ref 21–32)
CREAT SERPL-MCNC: 1.19 MG/DL (ref 0.6–1.3)
EOSINOPHIL # BLD AUTO: 0.14 THOUSAND/ÂΜL (ref 0–0.61)
EOSINOPHIL NFR BLD AUTO: 2 % (ref 0–6)
ERYTHROCYTE [DISTWIDTH] IN BLOOD BY AUTOMATED COUNT: 13.1 % (ref 11.6–15.1)
GFR SERPL CREATININE-BSD FRML MDRD: 59 ML/MIN/1.73SQ M
GLUCOSE P FAST SERPL-MCNC: 106 MG/DL (ref 65–99)
HCT VFR BLD AUTO: 46.1 % (ref 36.5–49.3)
HDLC SERPL-MCNC: 39 MG/DL
HGB BLD-MCNC: 14.6 G/DL (ref 12–17)
IMM GRANULOCYTES # BLD AUTO: 0.02 THOUSAND/UL (ref 0–0.2)
IMM GRANULOCYTES NFR BLD AUTO: 0 % (ref 0–2)
LDLC SERPL CALC-MCNC: 155 MG/DL (ref 0–100)
LYMPHOCYTES # BLD AUTO: 0.98 THOUSANDS/ÂΜL (ref 0.6–4.47)
LYMPHOCYTES NFR BLD AUTO: 16 % (ref 14–44)
MCH RBC QN AUTO: 30.5 PG (ref 26.8–34.3)
MCHC RBC AUTO-ENTMCNC: 31.7 G/DL (ref 31.4–37.4)
MCV RBC AUTO: 96 FL (ref 82–98)
MONOCYTES # BLD AUTO: 0.46 THOUSAND/ÂΜL (ref 0.17–1.22)
MONOCYTES NFR BLD AUTO: 8 % (ref 4–12)
NEUTROPHILS # BLD AUTO: 4.38 THOUSANDS/ÂΜL (ref 1.85–7.62)
NEUTS SEG NFR BLD AUTO: 73 % (ref 43–75)
NRBC BLD AUTO-RTO: 0 /100 WBCS
PLATELET # BLD AUTO: 172 THOUSANDS/UL (ref 149–390)
PMV BLD AUTO: 11.6 FL (ref 8.9–12.7)
POTASSIUM SERPL-SCNC: 3.8 MMOL/L (ref 3.5–5.3)
PROT SERPL-MCNC: 7.2 G/DL (ref 6.4–8.4)
PSA SERPL-MCNC: 11.9 NG/ML (ref 0–4)
RBC # BLD AUTO: 4.79 MILLION/UL (ref 3.88–5.62)
SODIUM SERPL-SCNC: 142 MMOL/L (ref 135–147)
TRIGL SERPL-MCNC: 133 MG/DL
TSH SERPL DL<=0.05 MIU/L-ACNC: 1.4 UIU/ML (ref 0.45–4.5)
VIT B12 SERPL-MCNC: 314 PG/ML (ref 100–900)
WBC # BLD AUTO: 6.01 THOUSAND/UL (ref 4.31–10.16)

## 2022-11-17 ENCOUNTER — TELEPHONE (OUTPATIENT)
Dept: FAMILY MEDICINE CLINIC | Facility: CLINIC | Age: 77
End: 2022-11-17

## 2022-11-17 LAB
EST. AVERAGE GLUCOSE BLD GHB EST-MCNC: 108 MG/DL
HBA1C MFR BLD: 5.4 %

## 2022-11-17 NOTE — TELEPHONE ENCOUNTER
----- Message from Janet Vickers MD sent at 11/16/2022  4:34 PM EST -----  Waqas Crawley I would follow what Urology has to say   This PSA shot up quite a bit   And the cholesterol pill was really working   We should consider going back on it   Take vit D 2K daily please

## 2022-11-18 ENCOUNTER — OFFICE VISIT (OUTPATIENT)
Dept: UROLOGY | Facility: CLINIC | Age: 77
End: 2022-11-18

## 2022-11-18 VITALS
SYSTOLIC BLOOD PRESSURE: 118 MMHG | OXYGEN SATURATION: 96 % | BODY MASS INDEX: 29.53 KG/M2 | DIASTOLIC BLOOD PRESSURE: 80 MMHG | HEART RATE: 55 BPM | HEIGHT: 72 IN | WEIGHT: 218 LBS

## 2022-11-18 DIAGNOSIS — R97.20 ELEVATED PSA: Primary | ICD-10-CM

## 2022-11-18 LAB — POST-VOID RESIDUAL VOLUME, ML POC: 94 ML

## 2022-11-18 RX ORDER — ATORVASTATIN CALCIUM 20 MG/1
20 TABLET, FILM COATED ORAL DAILY
COMMUNITY

## 2022-11-18 NOTE — PROGRESS NOTES
UROLOGY PROGRESS NOTE   Patient Identifiers: Myra Ford (MRN [id number])  Date of Service: 11/18/2022    Subjective:    53-year-old man history of BPH  He is a history of elevated PSA and has had 2 biopsies by Dr Musa Stewart about 10 years ago  Current PSA 11 9  He had to stop Flomax due to postural hypotension  Also stop finasteride after his last visit  His wife reports he is basically paralyzed by his urinary symptoms and is afraid to leave the house  PVR today about 95 mL  Reason for visit:  Elevated PSA follow-up    Objective:     VITALS:    There were no vitals filed for this visit  LABS:  Lab Results   Component Value Date    HGB 14 6 11/16/2022    HCT 46 1 11/16/2022    WBC 6 01 11/16/2022     11/16/2022   ]    Lab Results   Component Value Date    K 3 8 11/16/2022     (H) 11/16/2022    CO2 25 11/16/2022    BUN 18 11/16/2022    CREATININE 1 19 11/16/2022    CALCIUM 9 3 11/16/2022   ]        INPATIENT MEDS:    Current Outpatient Medications:   •  ALPRAZolam ER (XANAX XR) 0 5 MG 24 hr tablet, Take 1 tablet (0 5 mg total) by mouth daily at bedtime dont cut in 1/2, Disp: 30 tablet, Rfl: 5  •  carbidopa-levodopa (SINEMET)  mg per tablet, Take 1@ 7am 2 @1030am  Op[facility] 1@630pm 1@1030pm, Disp: 540 tablet, Rfl: 1  •  midodrine (PROAMATINE) 2 5 mg tablet, TAKE 1 TABLET UP TO TWICE DAILY (Patient taking differently: in the morning TAKE 1 TABLET UP TO TWICE DAILY), Disp: 60 tablet, Rfl: 4      Physical Exam:   There were no vitals taken for this visit  GEN: no acute distress    RESP: breathing comfortably with no accessory muscle use    ABD: soft, non-tender, non-distended   INCISION:    EXT: no significant peripheral edema   (Male): Penis circumcised, phallus normal, meatus patent  Testicles descended into scrotum bilaterally without masses nor tenderness  No inguinal hernias bilaterally  NATALI: Prostate is enlarged at 50 grams  The prostate is not boggy   The prostate is tender  No nodules noted      RADIOLOGY:  August 15th, 2018  MULTIPARAMETRIC MRI OF THE PROSTATE WITH AND WITHOUT CONTRAST   IMPRESSION:     Moderate hypertrophic prostate as detailed above  No suspicious peripheral zone lesions  Region of ill-defined moderate decreased T2 signal within the left  PI-RADS v2 Assessment Category: PI-RADS 3: Intermediate (the presence of clinically significant cancer is equivocal      Assessment:   1   Elevated PSA  2  Bladder outlet obstruction    Plan:   -long discussion with the patient and his wife regarding both symptoms and problems  -I recommend a repeat multiparametric MRI since he had PI-RADS 3 previously  -we discussed cystoscopy and transrectal ultrasound for evaluation of his outlet complaints  -he may be a candidate for a TURP and prostate biopsy at same time

## 2022-11-21 ENCOUNTER — TELEPHONE (OUTPATIENT)
Dept: FAMILY MEDICINE CLINIC | Facility: CLINIC | Age: 77
End: 2022-11-21

## 2022-11-28 DIAGNOSIS — F51.04 PSYCHOPHYSIOLOGICAL INSOMNIA: ICD-10-CM

## 2022-11-28 DIAGNOSIS — G20 PARKINSON'S DISEASE (HCC): ICD-10-CM

## 2022-11-29 RX ORDER — ATORVASTATIN CALCIUM 20 MG/1
TABLET, FILM COATED ORAL
Qty: 90 TABLET | Refills: 1 | Status: SHIPPED | OUTPATIENT
Start: 2022-11-29

## 2022-11-29 RX ORDER — ALPRAZOLAM 0.5 MG/1
TABLET, EXTENDED RELEASE ORAL
Qty: 30 TABLET | Refills: 5 | Status: SHIPPED | OUTPATIENT
Start: 2022-11-29

## 2022-11-29 RX ORDER — MIDODRINE HYDROCHLORIDE 2.5 MG/1
TABLET ORAL
Qty: 60 TABLET | Refills: 4 | Status: SHIPPED | OUTPATIENT
Start: 2022-11-29

## 2023-01-03 ENCOUNTER — TELEPHONE (OUTPATIENT)
Dept: UROLOGY | Facility: AMBULATORY SURGERY CENTER | Age: 78
End: 2023-01-03

## 2023-01-03 NOTE — TELEPHONE ENCOUNTER
Pt wife called and stated pt has  Very bad cold and she r/s MRI for 1/26/23 does the pt appt for cysto with Dr Jennifer Kerr need to be r/s for after MRI    Pt call back-[phone]

## 2023-01-04 NOTE — TELEPHONE ENCOUNTER
CALLED PT LM TO R/S CYSTO AS NOTED BELOW  WHEN PT CALLS BACK PLEASE RESCHEDULE CYSTO AFTER MRI ON NEXT AVAILABLE WITH DR Galvez Stuart Damon [facility]

## 2023-01-06 NOTE — TELEPHONE ENCOUNTER
2nd attempt: CALLED PT LM TO R/S CYSTO AS NOTED BELOW  WHEN PT CALLS BACK PLEASE RESCHEDULE CYSTO AFTER MRI ON NEXT AVAILABLE WITH   [facility]

## 2023-01-09 ENCOUNTER — TELEPHONE (OUTPATIENT)
Dept: NEUROLOGY | Facility: CLINIC | Age: 78
End: 2023-01-09

## 2023-01-09 NOTE — TELEPHONE ENCOUNTER
Call complete to pt wife offering earlier appt time off the waitlist   Pt unable to make it to earlier appt   Stated that we would continue to keep the pt appt and keep them on the waitlist and call them if anything else opens up

## 2023-01-18 ENCOUNTER — TELEPHONE (OUTPATIENT)
Dept: NEUROLOGY | Facility: CLINIC | Age: 78
End: 2023-01-18

## 2023-01-18 NOTE — TELEPHONE ENCOUNTER
Attempted to reach pt to offer earlier appt off the waitlist for [facility]   Provided call back number and instructed pt to call back if interested in earlier appt   Stated that we could not guarantee the appt would still be available by the time they call back

## 2023-01-25 ENCOUNTER — TELEPHONE (OUTPATIENT)
Dept: NEUROLOGY | Facility: CLINIC | Age: 78
End: 2023-01-25

## 2023-01-25 NOTE — TELEPHONE ENCOUNTER
Call complete to pt offering earlier appt time off the waitlist on   Pt unable to make it to earlier appt   Stated that we would continue to keep the pt appt and keep them on the waitlist and call them if anything else opens up     Per wife - pt preference [facility] location in afternoons  Call pt in mid-feb before appt if anything earlier comes up

## 2023-01-31 ENCOUNTER — TELEPHONE (OUTPATIENT)
Dept: NEUROLOGY | Facility: CLINIC | Age: 78
End: 2023-01-31

## 2023-01-31 NOTE — TELEPHONE ENCOUNTER
Attempted to reach pt to offer earlier appt off the waitlist on 02/01  [facility]   Provided call back number and instructed pt to call back if interested in earlier appt   Stated that we could not guarantee the appt would still be available by the time they call back

## 2023-02-15 DIAGNOSIS — G20 PARKINSON'S DISEASE (HCC): ICD-10-CM

## 2023-02-22 ENCOUNTER — HOSPITAL ENCOUNTER (OUTPATIENT)
Dept: RADIOLOGY | Age: 78
Discharge: HOME | End: 2023-02-22

## 2023-02-22 DIAGNOSIS — R97.20 ELEVATED PSA: ICD-10-CM

## 2023-02-22 RX ADMIN — GADOBUTROL 10 ML: 604.72 INJECTION INTRAVENOUS at 15:12

## 2023-02-24 ENCOUNTER — TELEPHONE (OUTPATIENT)
Dept: NEUROLOGY | Facility: CLINIC | Age: 78
End: 2023-02-24

## 2023-03-02 ENCOUNTER — TELEPHONE (OUTPATIENT)
Dept: UROLOGY | Facility: AMBULATORY SURGERY CENTER | Age: 78
End: 2023-03-02

## 2023-03-02 DIAGNOSIS — R97.20 ELEVATED PSA: Primary | ICD-10-CM

## 2023-03-02 NOTE — TELEPHONE ENCOUNTER
Radiology Test Results - Radiology Calling with report update on MRI prostate    Pt under care of:  Jai Figueroa in [place]     Significant Findings - Please review

## 2023-03-03 ENCOUNTER — OFFICE VISIT (OUTPATIENT)
Dept: NEUROLOGY | Facility: CLINIC | Age: 78
End: 2023-03-03

## 2023-03-03 VITALS
DIASTOLIC BLOOD PRESSURE: 72 MMHG | TEMPERATURE: 98.2 F | SYSTOLIC BLOOD PRESSURE: 122 MMHG | BODY MASS INDEX: 29.99 KG/M2 | HEART RATE: 62 BPM | WEIGHT: 221.1 LBS

## 2023-03-03 DIAGNOSIS — I95.1 ORTHOSTATIC HYPOTENSION: ICD-10-CM

## 2023-03-03 DIAGNOSIS — G20 PARKINSON'S DISEASE (HCC): Primary | ICD-10-CM

## 2023-03-03 NOTE — PROGRESS NOTES
Patient ID: Rosie Porter is a 78 y o  male  Assessment/Plan:    Orthostatic hypotension  Much improved from previous, he remains on midodrine 2 5 mg daily  He does continue to be occasionally symptomatic if he is not cautious, he did have elevated BP with drop systolically noted today but was asymptomatic  He was advised to increase his hydration, consider compression stockings, change positions slowly  If worsens, could consider reduction of PD medications versus increase in midodrine, however with caution given he was hypertensive initially on exam today  Parkinson's disease ([facility] [address] )  Patient overall stable since last visit  He has noticed improvement in wearing off with increased frequency of his Sinemet dosing  He does note occasional dragging of his left leg in the afternoon if he is fatigued-he is not interested in medication adjustment for this presently given not regular occurrence  He has had some difficulty with sleep overnight a few nights a week, may be wearing off of his medication  We did discuss switching to an extended release Sinemet at bedtime however given his issues are not a regular occurrence he opted to hold off for now  He does note some increasing hypophonia and was interested in referral to speech therapy for loud program     He was encouraged to stay physically active  He will continue his current dose of Sinemet 25/100 mg 1 tab @ 7am, 2 tabs at 10:30, 1 tab at 230 pm, 1 tab at 630 pm 1 tab at 1030 pm      Plan for follow-up in 4 months  To contact the office sooner with any concerns or worsening symptoms  Diagnoses and all orders for this visit:    Parkinson's disease [facility])  -     Ambulatory Referral to Speech Therapy; Future    Orthostatic hypotension           Subjective:    HPI     Rosie Porter is a  who presents for follow up for parkinsonism   To review, symptom onset in 2016 with slowness of gait with subsequent development of LH tremor and orthostatic hypotension  Initially seen by Dr Jeanette Mejía and started on Sinemet, which was exacerbating his orthostatic intolerance  Sinemet discontinued and focus initially on treating orthostatic symptoms  Last office visit 7/2022 in which frequency of his sinemet was changed due to wearing off                                                                                                                           Prior medication trials:  Sinemet: worsening OH  zoloft: worsened OH       Current Medication:  Sinemet 25/100 mg 1 tab @ 7am, 2 tabs at 10:30, 1 tab at 230 pm, 1 tab at 630 pm 1 tab at 1030 pm  Midodrine 2 5 mg 1 tab qam    Interval History:  He feels no wearing off-increased frequency helped with this  He will only notice worsening symptoms if late with medication  Tremor is stable  He can sometimes have minor difficulty with cutting up his food and doing buttons, just takes longer  His wife has noted certain positioning of his fingers while he is walking but patient is not aware of this  He will notice some days in the afternoon/evening he will drag his left leg more, seems to be more so pending his activity level/fatigue that day  No freezing   No recent falls  He continues to walk and work with his  a few times a week  He continues with very occassional positional dizziness-  He has to take his time change positions  He continues to wear compression stockings  He drinks perhaps 1-2 cups of fluid daily  He will find himself a few times a week waking up mid night-takes about 1-2 hours to return to sleep  Other then this he sleeps well  He will occasionally talk in his sleep  He has had a few occasional where he will see shadows or a possible object that isn't there first thing in the morning  No trouble swallow  He has noted speech is getting softer    He feels some mild changes in cognition-he has trouble retaining as much information as he could in the past    He continues to do his own medication, finances, he does continue to work without much difficulty other then recall  He continues to follow with urology-he has plans for upcoming possible TURP versus PAE and possible prostate biopsy  The following portions of the patient's history were reviewed and updated as appropriate: allergies, current medications, past family history, past medical history, past social history, past surgical history and problem list        Objective:    Blood pressure 122/72, pulse 62, temperature 98 2 °F (36 8 °C), temperature source Temporal, weight 100 kg (221 lb 1 6 oz)  Physical Exam  Constitutional:       General: He is awake  Eyes:      General: Lids are normal       Extraocular Movements: Extraocular movements intact  Pupils: Pupils are equal, round, and reactive to light  Neurological:      Mental Status: He is alert  Motor: Motor strength is normal          Neurological Exam  Mental Status  Awake and alert  Oriented only to person, place and situation  Speech: hypophonia  Language is fluent with no aphasia  Attention and concentration are normal     Cranial Nerves  CN III, IV, VI: Extraocular movements intact bilaterally  Normal lids and orbits bilaterally  Pupils equal round and reactive to light bilaterally  CN VII: Full and symmetric facial movement  CN VIII: Hearing is normal   CN XI: Shoulder shrug strength is normal   CN XII: Tongue midline without atrophy or fasciculations  Motor   Increased muscle tone  Strength is 5/5 throughout all four extremities  Sensory  Light touch is normal in upper and lower extremities  Coordination  Right: Finger-to-nose normal  Rapid alternating movement abnormality:Left: Finger-to-nose normal  Rapid alternating movement abnormality:  See MDS UPDRS III  Gait  Casual gait: Able to rise from chair without using arms  Reduced arm swing, mild striatal posturing of the hands, antalgic gait at times        MDS UPDRS III                                7/011/22     3/3/23   Time since last dose:      Speech  1 1   Facial Expression  1 1   Rigidity - Neck  0 0   Rigidity - Upper Extremity (R)  2 1   Rigidity - Upper Extremity (L)   0 1   Rigidity - Lower Extremity (R)  0 0   Rigidity - Lower Extremity (L)   1 1   Finger Taps (R)   0 0   Finger Taps (L)   1 1   Hand Movement (R)  1 0   Hand Movement (L)   1 1   Pronation/Supination (R)  1 1   Pronation/Supination (L)   2 2   Toe Tapping (R) 0 0   Toe Tapping (L) 1 1   Leg Agility (R)  0 1   Leg Agility (L)   1 1   Arising from Chair   0 0   Gait   1 1   Freezing of Gait 0 0   Postural Stability   1    Posture 0 1   Global spontaneity of movement 1 1   Postural Tremor (Ri 0 1   Postural Tremor (L) 0 1   Kinetic Tremor (R)  0 0   Kinetic Tremor (L)  0 0   Rest tremor amplitude RUE 0 0   Rest tremor amplitude LUE 0 0   Rest tremor amplitude RLE 0 0   Reset tremor amplitude LLE 0 0   Lip/Jaw Tremor  0 0   Consistency of tremor 0 0   Motor Exam Total:   One        I have personally reviewed the ROS performed by the MA     ROS:    Review of Systems   Constitutional: Negative  Negative for appetite change and fever  HENT: Positive for voice change (increase in raspy voice, quiet voice)  Negative for hearing loss, tinnitus and trouble swallowing  Eyes: Negative  Negative for photophobia, pain and visual disturbance  Respiratory: Negative  Negative for shortness of breath  Cardiovascular: Negative  Negative for palpitations  Gastrointestinal: Negative  Negative for nausea and vomiting  Endocrine: Negative  Negative for cold intolerance  Genitourinary: Negative  Negative for dysuria, frequency and urgency  Musculoskeletal: Positive for gait problem (left leg drag)  Negative for myalgias and neck pain  Skin: Negative  Negative for rash  Allergic/Immunologic: Negative      Neurological: Positive for dizziness (with posture changes), tremors (mainly hands - same), speech difficulty, weakness (left leg), light-headedness (with posture changes), numbness (bottoms of feet sometimes) and headaches (slight in morning)  Negative for seizures, syncope and facial asymmetry  Hematological: Negative  Does not bruise/bleed easily  Psychiatric/Behavioral: Positive for hallucinations (visual sometimes - mainly upon waking) and sleep disturbance  Negative for confusion  All other systems reviewed and are negative

## 2023-03-03 NOTE — ASSESSMENT & PLAN NOTE
Patient overall stable since last visit  He has noticed improvement in wearing off with increased frequency of his Sinemet dosing  He does note occasional dragging of his left leg in the afternoon if he is fatigued-he is not interested in medication adjustment for this presently given not regular occurrence  He has had some difficulty with sleep overnight a few nights a week, may be wearing off of his medication  We did discuss switching to an extended release Sinemet at bedtime however given his issues are not a regular occurrence he opted to hold off for now  He does note some increasing hypophonia and was interested in referral to speech therapy for loud program     He was encouraged to stay physically active  He will continue his current dose of Sinemet 25/100 mg 1 tab @ 7am, 2 tabs at 10:30, 1 tab at 230 pm, 1 tab at 630 pm 1 tab at 1030 pm      Plan for follow-up in 4 months  To contact the office sooner with any concerns or worsening symptoms

## 2023-03-03 NOTE — ASSESSMENT & PLAN NOTE
Much improved from previous, he remains on midodrine 2 5 mg daily  He does continue to be occasionally symptomatic if he is not cautious, he did have elevated BP with drop systolically noted today but was asymptomatic  He was advised to increase his hydration, consider compression stockings, change positions slowly  If worsens, could consider reduction of PD medications versus increase in midodrine, however with caution given he was hypertensive initially on exam today

## 2023-03-03 NOTE — TELEPHONE ENCOUNTER
Contacted patient    Left voice message advising prescription for blood work advised was in the system and he can go to any [facility] lab to have testing done

## 2023-03-03 NOTE — PATIENT INSTRUCTIONS
Continue current dose of sinemet and midodrine call for any symptoms becoming more regular      Will refer to speech therapy-LOU[facility] program if you want to pursue

## 2023-03-08 ENCOUNTER — TELEPHONE (OUTPATIENT)
Dept: OTHER | Facility: OTHER | Age: 78
End: 2023-03-08

## 2023-03-08 NOTE — TELEPHONE ENCOUNTER
Spoke to Emma Sharp and his wife on speaker phone and explained office procedure expectations, time and risks and benefits of in office cystoscopy  They are keeping scheduled appointment with Dr Elana Mae 3/17/2023 in [place] location  They were thankful for the call

## 2023-03-08 NOTE — TELEPHONE ENCOUNTER
The patient's wife is calling because her  says he doesn't need his CYSTO  procedure and she tells him he does  She wants a call back from the office to speak to the patient and she wants to be present to explain why he needs the procedure

## 2023-03-16 ENCOUNTER — RA CDI HCC (OUTPATIENT)
Dept: OTHER | Facility: HOSPITAL | Age: 78
End: 2023-03-16

## 2023-03-16 NOTE — PROGRESS NOTES
[address]  coding opportunities       Chart reviewed, no opportunity found: CHART REVIEWED, NO OPPORTUNITY FOUND        Patients Insurance     Medicare Insurance: Medicare

## 2023-03-20 ENCOUNTER — TELEPHONE (OUTPATIENT)
Dept: UROLOGY | Facility: CLINIC | Age: 78
End: 2023-03-20

## 2023-03-20 NOTE — TELEPHONE ENCOUNTER
Spoke with patient's wife, Rupa Swanson, who states patient is very apprehensive and his Parkinson's results in even more confusion regarding understanding cysto/TRUS  She states patient was under care of Dr Luana Florentino years ago, and he is the only one patient seems to remember or want to discuss his situation with  Rupa Cricketmaame is asking if it is possible to have virtual visit with Dr Luana Florentino just to make patient more comfortable and reassure him he should have cysto/TRUS  He is rescheduled with Dr Quinten Watkins on 5/11/23 for it  A sooner appt was offered but wife was out of town that day  Informed wife I will send this message over to [facility] clinical pool to see if a virtual visit with Dr Luana Florentino is a possibility

## 2023-03-20 NOTE — TELEPHONE ENCOUNTER
Regarding: upcoming cystoscope; follow up to phone call  Contact: [phone]  ----- Message from Robin Obrien sent at 3/16/2023  9:30 AM EDT -----       ----- Message sent from Kathleen Aviles RN to Ana Maier at 3/7/2023 12:39 PM -----   A cystoscopy is a diagnostic tool, therefore it will not "fix" any of his urination symptoms  It will however, show the doctor the inside of the bladder and the results will determine if patient is eligible for surgery due to bladder outlet obstruction  If you have any further questions or concerns, please let us know  Thank you      ----- Message -----       From:Gabriel Gaming       Sent:3/7/2023  7:39 AM EST         To:Patient Medical Advice Request Message List    Subject:upcoming cystoscope; follow up to phone call    Please advise purpose of cystoscopy and expected results/improvements  Thank you       ----- Message -----       From:Daija Casillas       Sent:3/6/2023 10:57 AM EST         To:Gabriel Gonzalez    Subject:upcoming cystoscope; follow up to phone call    Shawn Maravilla, you should proceed with the cystoscopy as scheduled  ----- Message -----       From:Gabriel Figueroa       Sent:3/2/2023  4:29 PM EST         To:Robbie Montiel    Subject:upcoming cystoscope; follow up to phone call    Dr Calvin Middleton:    Apparently Leticia Hooker spoke to you today while I was out  He attempted to tell me about the MRI results but he said he couldn't remember most of what was said  I read the report but without professional guidance I cannot come to any conclusions  Leticia Hooker has a cystoscope (?) set for later this month  My recollection is that you indicated this procedure should help him significantly with the constant need to urinate which has caused him to confine himself to the house and not have a normal life  Because of the phone call he says he is not having any procedure        Can you help with this discussion or can we get an appointment soon to discuss?     Thank you,  Evelyn Dennis (spouse)

## 2023-03-21 NOTE — TELEPHONE ENCOUNTER
Emaline Pita Pypiuk, PA-C Purcell Boas, MD; [facility] [place] [facility] 45 minutes ago (8:33 AM)     He has a cystoscopy with Dr Everton Goldman in May   I do not generally do virtual visits for these patients   If you read my last note he is "paralyzed" by his lower tract symptoms but is also afraid of any intervention  [address] [facility] 21 hours ago (12:06 PM)     TZ  Please schedule vv with Alyssa Fonseca per Dr Aniya Graf recommendations      Purcell Boas, MD  You; Joao Rinaldi RN; Taj Hamilton MD; Ronnie Montiel PA-C 23 hours ago (9:31 AM)     VV is fine  Bin Kinds be with Nessa Negron had MRI that shows 130 gm prostate so he doesn't need trus   PIRADs 3 noted   At 76 with parkinsons and elevated PSA with 2 prior neg biopsies, I would not do another prostate biopsy   Would only consider bladder outlet procedure if he is in retention or has significant LUTS    Prostate too big for a TURP  Would need HoLap or robotic simple   He may not be in condition for that and GP can assess on a VV   In addition, he could be at risk for incontinence with his parkinsons       FT

## 2023-03-21 NOTE — TELEPHONE ENCOUNTER
Spoke with wife and explained the messages between providers  She verbalized understanding and will relay to patient that Dr Mali Caldwell does not think it is necessary to speak with patient regarding following thru with cysto  She is in hopes she will be able to convince patient to proceed with cysto on 5/11/23  She also plans on having PCP address with patient also at his next appt

## 2023-03-23 ENCOUNTER — OFFICE VISIT (OUTPATIENT)
Dept: FAMILY MEDICINE CLINIC | Facility: CLINIC | Age: 78
End: 2023-03-23

## 2023-03-23 VITALS
DIASTOLIC BLOOD PRESSURE: 78 MMHG | WEIGHT: 221 LBS | RESPIRATION RATE: 16 BRPM | SYSTOLIC BLOOD PRESSURE: 126 MMHG | BODY MASS INDEX: 29.93 KG/M2 | OXYGEN SATURATION: 96 % | HEIGHT: 72 IN | HEART RATE: 66 BPM

## 2023-03-23 DIAGNOSIS — R35.0 BENIGN PROSTATIC HYPERPLASIA WITH URINARY FREQUENCY: ICD-10-CM

## 2023-03-23 DIAGNOSIS — N40.1 BENIGN PROSTATIC HYPERPLASIA WITH URINARY FREQUENCY: ICD-10-CM

## 2023-03-23 DIAGNOSIS — I95.1 ORTHOSTATIC HYPOTENSION: ICD-10-CM

## 2023-03-23 DIAGNOSIS — F51.04 PSYCHOPHYSIOLOGICAL INSOMNIA: ICD-10-CM

## 2023-03-23 DIAGNOSIS — R29.898 RIGHT LEG WEAKNESS: ICD-10-CM

## 2023-03-23 DIAGNOSIS — R26.81 UNSTEADY GAIT: ICD-10-CM

## 2023-03-23 DIAGNOSIS — G20 PARKINSON'S DISEASE (HCC): Primary | ICD-10-CM

## 2023-03-23 DIAGNOSIS — E78.2 MIXED HYPERLIPIDEMIA: ICD-10-CM

## 2023-03-23 RX ORDER — CHOLECALCIFEROL (VITAMIN D3) 125 MCG
CAPSULE ORAL
COMMUNITY

## 2023-03-23 NOTE — PROGRESS NOTES
Assessment/Plan: He is going to reschedule the cystoscopy in the meantime he is using a pad change the once a day he also follows with a home exercise assistant he would like to find out why some days he is more weak than others he is looking for the pattern I advised he keep a journal he is on Sinemet for Parkinson's and then some midodrine for the hypotension he continues with a Xanax for the insomnia  I advised that he should get to the [facility] balance center he will let me know he still working and when things are more stressful at the office he is more weak at home  Please be more mindful of the diet with less sweets      1  Parkinson's disease ([address] )    2  Orthostatic hypotension    3  Benign prostatic hyperplasia with urinary frequency    4  Right leg weakness    5  Unsteady gait    6  Psychophysiological insomnia    7  Mixed hyperlipidemia    8  BMI 29 0-29 9,adult       Subjective:      Patient ID: Mari Mart is a 78 y o  male  HPI  Here to go over chronic issues and labs / imaging studies if applicable  The following portions of the patient's history were reviewed and updated as appropriate: allergies, current medications, past family history, past medical history, past social history, past surgical history and problem list     Review of Systems   Constitutional: Negative for activity change, appetite change and fever  HENT: Negative for congestion, nosebleeds and trouble swallowing  Eyes: Negative for itching  Respiratory: Negative for cough and chest tightness  Cardiovascular: Negative for chest pain and palpitations  Gastrointestinal: Negative for abdominal pain, constipation, diarrhea and nausea  Endocrine: Negative for cold intolerance  Genitourinary: Negative for frequency  Musculoskeletal: Positive for arthralgias, back pain and gait problem  Negative for joint swelling  Skin: Negative for rash  Allergic/Immunologic: Negative for immunocompromised state  Neurological: Negative for dizziness, tremors, seizures, syncope and headaches  Psychiatric/Behavioral: Negative for hallucinations and suicidal ideas  Objective:      /78 (BP Location: Right arm, Patient Position: Sitting, Cuff Size: Standard)   Pulse 66   Resp 16   Ht 6' (1 829 m)   Wt 100 kg (221 lb)   SpO2 96%   BMI 29 97 kg/m²     No visits with results within 2 Week(s) from this visit  Latest known visit with results is:   Office Visit on 11/18/2022   Component Date Value   • POST-VOID RESIDUAL VOLUM* 11/18/2022 94           Physical Exam  Vitals and nursing note reviewed  Constitutional:       General: He is not in acute distress  Appearance: He is well-developed  HENT:      Head: Normocephalic and atraumatic  Cardiovascular:      Rate and Rhythm: Normal rate and regular rhythm  Heart sounds: Normal heart sounds  No murmur heard  Pulmonary:      Effort: Pulmonary effort is normal       Breath sounds: Normal breath sounds  No wheezing or rales  Abdominal:      General: Bowel sounds are normal  There is no distension  Palpations: Abdomen is soft  Tenderness: There is no abdominal tenderness  There is no guarding or rebound  Musculoskeletal:         General: No tenderness  Normal range of motion  Cervical back: Normal range of motion and neck supple  Lymphadenopathy:      Cervical: No cervical adenopathy  Skin:     General: Skin is warm and dry  Capillary Refill: Capillary refill takes less than 2 seconds  Findings: No rash  Neurological:      Mental Status: He is alert and oriented to person, place, and time  Cranial Nerves: No cranial nerve deficit  Sensory: No sensory deficit  Motor: No abnormal muscle tone  Comments: Romberg negative   Psychiatric:         Behavior: Behavior normal          Thought Content: Thought content normal          Judgment: Judgment normal            BMI Counseling:  Body mass index is 29 97 kg/m²  The BMI is above normal  Nutrition recommendations include decreasing portion sizes, encouraging healthy choices of fruits and vegetables, decreasing fast food intake, consuming healthier snacks and limiting drinks that contain sugar  Exercise recommendations include exercising 3-5 times per week  No pharmacotherapy was ordered  Rationale for BMI follow-up plan is due to patient being overweight or obese  Falls Plan of Care: balance, strength, and gait training instructions were provided  Medications that increase falls were reviewed         Shannan Garcia MD  [place] [id number]

## 2023-05-11 ENCOUNTER — TELEPHONE (OUTPATIENT)
Dept: UROLOGY | Facility: AMBULATORY SURGERY CENTER | Age: 78
End: 2023-05-11

## 2023-05-11 ENCOUNTER — PROCEDURE VISIT (OUTPATIENT)
Dept: UROLOGY | Facility: AMBULATORY SURGERY CENTER | Age: 78
End: 2023-05-11

## 2023-05-11 VITALS
OXYGEN SATURATION: 97 % | WEIGHT: 215 LBS | HEIGHT: 72 IN | SYSTOLIC BLOOD PRESSURE: 134 MMHG | DIASTOLIC BLOOD PRESSURE: 78 MMHG | BODY MASS INDEX: 29.12 KG/M2 | RESPIRATION RATE: 18 BRPM | HEART RATE: 67 BPM

## 2023-05-11 DIAGNOSIS — R97.20 ELEVATED PSA: ICD-10-CM

## 2023-05-11 DIAGNOSIS — N40.1 BENIGN PROSTATIC HYPERPLASIA WITH LOWER URINARY TRACT SYMPTOMS, SYMPTOM DETAILS UNSPECIFIED: Primary | ICD-10-CM

## 2023-05-11 DIAGNOSIS — N40.1 BENIGN PROSTATIC HYPERPLASIA WITH URINARY FREQUENCY: ICD-10-CM

## 2023-05-11 DIAGNOSIS — R35.0 BENIGN PROSTATIC HYPERPLASIA WITH URINARY FREQUENCY: ICD-10-CM

## 2023-05-11 LAB — POST-VOID RESIDUAL VOLUME, ML POC: 82 ML

## 2023-05-11 RX ORDER — SILODOSIN 4 MG/1
1 CAPSULE ORAL DAILY
Qty: 30 CAPSULE | Refills: 11 | Status: SHIPPED | OUTPATIENT
Start: 2023-05-11

## 2023-05-11 RX ORDER — OXYBUTYNIN CHLORIDE 5 MG/1
5 TABLET, EXTENDED RELEASE ORAL
Qty: 30 TABLET | Refills: 11 | Status: SHIPPED | OUTPATIENT
Start: 2023-05-11

## 2023-05-11 NOTE — TELEPHONE ENCOUNTER
In speaking with patient's wife at check out she asked for the number to call in case her  should have bleeding, fever or chills after his Cysto  She stated Saida Adjutant would give them a number to call  I provided them with the main #

## 2023-05-11 NOTE — PROGRESS NOTES
Assessment/Plan:    Benign prostatic hyperplasia with urinary frequency  The patient has significant issues with urinary frequency and urgency  He has an enlarged prostate with obstructive changes in his bladder and moderately elevated PVRs  We discussed options  I think he would be best served by outlet surgery (HoLEP or robot simple prostatectomy or prostate artery embolization by interventional radiology) and then possible antimuscarinic or Botox if his OAB symptoms not improve enough with surgery alone  Patient is hesitant to consider surgery because of his Parkinson's disease  This is not unreasonable  We discussed other options  In the end he would like to restart an alpha-blocker (and will monitor for hypotension) as well as try an antimuscarinic  I prescribed both mirabegron (in case it is affordable) as well as low-dose Ditropan after we discussed risks including confusion  He will call us back if he wants to move forward with surgery  Elevated PSA  The patient's PSA has risen but he has had 2 negative biopsies the past and his MRI shows stable findings compared to 5 years ago  Therefore I do not think he warrants another biopsy at this time especially considering his age  If he has outlet surgery we will certainly send his tissue for pathology          Subjective:      Patient ID: Yoli Talbot is a 78 y o  male  HPI    74-year-old male with history of BPH and elevated PSA  He has lower urine tract symptoms manifesting primarily in urgency and frequency issues  He stream is also weak and he sometimes has some intermittency  Also does not feel like he empties to completion at times  His symptoms are severe enough that it significantly limits his quality of life and daily activities per his wife and the patient admits that this is true  PVRs have been near 100 cc in the last 2 checks in 2023  He has been on Flomax in the past but stopped because of hypotension    He is also been on finasteride and stopped for unclear reasons  Does not appear he has tried an antimuscarinic medication  Cystoscopy showed obstructive appearing prostate with bilobar hypertrophy and prostatic extrusion into the bladder with a large distended bladder with trabeculations and diverticula  TRUS volume 115 cc  The patient has had elevated PSA for many years and had negative prostate biopsy x2 with Dr Kait Marquez last in 2016 PSA was 8  More recently his PSA has risen to 12 in 2022  He has had 2 MRIs of the prostate including recently in February 2023 showing PI-RADS 3 lesion which appears to be stable from prior MRI in 2018 with a enlarged gland measuring 130 cc versus 104 cc in 2018  Patient medical history significant for Parkinson's disease and is on levodopa  Continues to practice as a general       Past Surgical History:   Procedure Laterality Date   • CYST REMOVAL      Right wrist   • PROSTATE SURGERY      Biopsy   • TONSILLECTOMY          History reviewed  No pertinent past medical history  Review of Systems   Constitutional: Negative for chills and fever  HENT: Negative for ear pain and sore throat  Eyes: Negative for pain and visual disturbance  Respiratory: Negative for cough and shortness of breath  Cardiovascular: Negative for chest pain and palpitations  Gastrointestinal: Negative for abdominal pain and vomiting  Genitourinary: Positive for difficulty urinating, frequency and urgency  Negative for dysuria and hematuria  Musculoskeletal: Negative for arthralgias and back pain  Skin: Negative for color change and rash  Neurological: Negative for seizures and syncope  All other systems reviewed and are negative          Objective:      /78 (BP Location: Left arm, Patient Position: Sitting, Cuff Size: Adult)   Pulse 67   Resp 18   Ht 6' (1 829 m)   Wt 97 5 kg (215 lb)   SpO2 97%   BMI 29 16 kg/m²     Lab Results   Component Value Date "PSA 11 9 (H) 11/16/2022    PSA 7 6 (H) 07/27/2021          Physical Exam  Vitals reviewed  Constitutional:       Appearance: Normal appearance  He is normal weight  HENT:      Head: Normocephalic and atraumatic  Eyes:      Pupils: Pupils are equal, round, and reactive to light  Abdominal:      General: Abdomen is flat  Neurological:      General: No focal deficit present  Mental Status: He is alert and oriented to person, place, and time  Psychiatric:         Mood and Affect: Mood normal          Thought Content: Thought content normal                 Cystoscopy     Date/Time 5/11/2023 1:30 PM     Performed by  Jonathan Wynn MD     Authorized by Jonathan Wynn MD      Universal Protocol:  Consent: Written consent obtained  Risks and benefits: risks, benefits and alternatives were discussed  Consent given by: patient  Time out: Immediately prior to procedure a \"time out\" was called to verify the correct patient, procedure, equipment, support staff and site/side marked as required  Patient understanding: patient states understanding of the procedure being performed  Patient consent: the patient's understanding of the procedure matches consent given  Procedure consent: procedure consent matches procedure scheduled  Patient identity confirmed: verbally with patient        Procedure Details:  Procedure type: cystoscopy    Patient tolerance: Patient tolerated the procedure well with no immediate complications    Additional Procedure Details: A time-out was performed identifying the correct patient site and procedure  A MA chaperone was in the room  A flexible cystoscope was introduced into the urethra  The pendulous urethra was normal   The prostatic urethra showed significant bilateral lobar hypertrophy without a median lobe but with significant prostatic extrusion to the bladder  The bladder did not have any lesions concerning for malignancy    There were difficult trabeculations and 2 " "moderate-sized diverticula  The ureteral orifices were in orthotopic position  Biopsy prostate     Date/Time 5/11/2023 1:30 PM     Performed by  Josefina Garcia MD     Authorized by Josefina Garcia MD      Universal Protocol   Consent: Written consent obtained  Consent given by: patient  Time out: Immediately prior to procedure a \"time out\" was called to verify the correct patient, procedure, equipment, support staff and site/side marked as required  Patient understanding: patient states understanding of the procedure being performed  Patient consent: the patient's understanding of the procedure matches consent given  Procedure consent: procedure consent matches procedure scheduled  Relevant documents: relevant documents present and verified  Patient identity confirmed: verbally with patient        Local anesthesia used: no     Anesthesia   Local anesthesia used: no     Sedation   Patient sedated: no        Specimen: no   Procedure Details   Procedure Notes: The patient was placed in left lateral decubitus position  A digital rectal examination was performed  The prostate did not have any nodules  An ultrasound probe was introduced into the rectum  The prostate was evaluated and measurements made showing the prostate to be 115 cc in size    Patient tolerance: patient tolerated the procedure well with no immediate complications             Orders  Orders Placed This Encounter   Procedures   • Cystoscopy     This order was created via procedure documentation   • Biopsy prostate     This order was created via procedure documentation   • POCT Measure PVR     "

## 2023-05-11 NOTE — ASSESSMENT & PLAN NOTE
The patient's PSA has risen but he has had 2 negative biopsies the past and his MRI shows stable findings compared to 5 years ago  Therefore I do not think he warrants another biopsy at this time especially considering his age    If he has outlet surgery we will certainly send his tissue for pathology

## 2023-05-11 NOTE — ASSESSMENT & PLAN NOTE
The patient has significant issues with urinary frequency and urgency  He has an enlarged prostate with obstructive changes in his bladder and moderately elevated PVRs  We discussed options  I think he would be best served by outlet surgery (HoLEP or robot simple prostatectomy or prostate artery embolization by interventional radiology) and then possible antimuscarinic or Botox if his OAB symptoms not improve enough with surgery alone  Patient is hesitant to consider surgery because of his Parkinson's disease  This is not unreasonable  We discussed other options  In the end he would like to restart an alpha-blocker (and will monitor for hypotension) as well as try an antimuscarinic  I prescribed both mirabegron (in case it is affordable) as well as low-dose Ditropan after we discussed risks including confusion  He will call us back if he wants to move forward with surgery

## 2023-05-16 DIAGNOSIS — R53.1 WEAKNESS: ICD-10-CM

## 2023-05-16 DIAGNOSIS — R35.0 BENIGN PROSTATIC HYPERPLASIA WITH URINARY FREQUENCY: Primary | ICD-10-CM

## 2023-05-16 DIAGNOSIS — N40.1 BENIGN PROSTATIC HYPERPLASIA WITH URINARY FREQUENCY: Primary | ICD-10-CM

## 2023-05-18 ENCOUNTER — APPOINTMENT (OUTPATIENT)
Dept: LAB | Facility: CLINIC | Age: 78
End: 2023-05-18

## 2023-05-18 DIAGNOSIS — N40.1 BENIGN PROSTATIC HYPERPLASIA WITH URINARY FREQUENCY: ICD-10-CM

## 2023-05-18 DIAGNOSIS — R35.0 BENIGN PROSTATIC HYPERPLASIA WITH URINARY FREQUENCY: ICD-10-CM

## 2023-05-18 DIAGNOSIS — R53.1 WEAKNESS: ICD-10-CM

## 2023-05-19 ENCOUNTER — NURSE TRIAGE (OUTPATIENT)
Dept: OTHER | Facility: OTHER | Age: 78
End: 2023-05-19

## 2023-05-19 DIAGNOSIS — R30.0 DYSURIA: Primary | ICD-10-CM

## 2023-05-19 RX ORDER — CEPHALEXIN 500 MG/1
500 CAPSULE ORAL EVERY 6 HOURS SCHEDULED
Qty: 28 CAPSULE | Refills: 0 | Status: SHIPPED | OUTPATIENT
Start: 2023-05-19 | End: 2023-05-26

## 2023-05-19 NOTE — TELEPHONE ENCOUNTER
"Regarding: fever /weak / poss UTI / awaiting Dr  to give results  ----- Message from Francisco Young sent at 5/19/2023  4:57 PM EDT -----  Melissa Jenkins called this morning and I spoke with a nurse who said she would speak with Dr Vern Young and someone would get back to us, but no one has  My  is feeling really miserable  He has a low fever and he's weak  We really would like to speak to the doctor to get him something  I don't want him to lay in bed miserable all weekend  I don't know why the doctor didn't call back about his test results  \"    "

## 2023-05-19 NOTE — TELEPHONE ENCOUNTER
"Regarding: weak, dizzy, confused, low grade fever, awaiting urine culutre results  ----- Message from Annabelle Patino sent at 5/19/2023 10:00 AM EDT -----  :My  is weak, dizzy, confused and has a low grade fever; he had a cystoscopy on the 11th and are awaiting results of urine cultures  \"    " Attending Psychiatrist supervising NP/Trainee and meeting pt

## 2023-05-19 NOTE — TELEPHONE ENCOUNTER
"Awaiting urine culture results  Culture is back but looks like sensitivities are still pending  Pt symptoms have not improved  Answer Assessment - Initial Assessment Questions  1  SYMPTOM: \"What's the main symptom you're concerned about? \" (e g , frequency, incontinence)      Started with burning and frequency after cysto that has resolved  Main symptoms is the fatigue, dizzy/lightheaded episodes, low grade temp, and forgetfulness  2  ONSET: \"When did the    start? \"      Since 5/11    3  PAIN: \"Is there any pain? \" If Yes, ask: \"How bad is it? \" (Scale: 1-10; mild, moderate, severe)      No    4  CAUSE: \"What do you think is causing the symptoms? \"      UTI    5  OTHER SYMPTOMS: \"Do you have any other symptoms? \" (e g , fever, flank pain, blood in urine, pain with urination)      Pt still alert and oriented      Protocols used: URINARY SYMPTOMS-ADULT-[place]    "

## 2023-05-19 NOTE — TELEPHONE ENCOUNTER
"patient had submitted UA C&S yesterday  Spouse concerned due to his increased symptoms  Patient continues to be weak, dizzy, low grade fever, and burning with urination  Questioning if something can be called in to the [facility] on [address] [facility] [address] [address]  On call provider notified     Reason for Disposition  • [1] Caller has URGENT question (includes prescribed medication questions) AND [2] triager unable to answer question    Answer Assessment - Initial Assessment Questions  1  MAIN CONCERN OR SYMPTOM:  \"What is your main concern right now? \" \"What question do you have? \" \"What's the main symptom you're worried about? \" (e g , breathing difficulty, cough, fever  pain)      Continuation of symptoms    3  BETTER-SAME-WORSE: \"Are you getting better, staying the same, or getting worse compared to how you felt at your last visit to the doctor (most recent medical visit)? \"      same    Protocols used: RECENT MEDICAL VISIT FOR ILLNESS FOLLOW-UP CALL-ADULT-      "

## 2023-05-20 LAB — BACTERIA UR CULT: NORMAL

## 2023-06-01 DIAGNOSIS — F51.04 PSYCHOPHYSIOLOGICAL INSOMNIA: ICD-10-CM

## 2023-06-01 RX ORDER — ATORVASTATIN CALCIUM 20 MG/1
TABLET, FILM COATED ORAL
Qty: 90 TABLET | Refills: 1 | Status: SHIPPED | OUTPATIENT
Start: 2023-06-01

## 2023-06-23 ENCOUNTER — OFFICE VISIT (OUTPATIENT)
Dept: FAMILY MEDICINE CLINIC | Facility: CLINIC | Age: 78
End: 2023-06-23
Payer: MEDICARE

## 2023-06-23 VITALS
WEIGHT: 217 LBS | HEIGHT: 72 IN | HEART RATE: 69 BPM | OXYGEN SATURATION: 97 % | SYSTOLIC BLOOD PRESSURE: 110 MMHG | BODY MASS INDEX: 29.39 KG/M2 | DIASTOLIC BLOOD PRESSURE: 62 MMHG | RESPIRATION RATE: 16 BRPM

## 2023-06-23 DIAGNOSIS — F51.04 PSYCHOPHYSIOLOGICAL INSOMNIA: ICD-10-CM

## 2023-06-23 DIAGNOSIS — N40.1 BENIGN PROSTATIC HYPERPLASIA WITH URINARY FREQUENCY: ICD-10-CM

## 2023-06-23 DIAGNOSIS — E55.9 VITAMIN D DEFICIENCY: ICD-10-CM

## 2023-06-23 DIAGNOSIS — I95.1 ORTHOSTATIC HYPOTENSION: ICD-10-CM

## 2023-06-23 DIAGNOSIS — N18.31 STAGE 3A CHRONIC KIDNEY DISEASE (HCC): ICD-10-CM

## 2023-06-23 DIAGNOSIS — R35.0 BENIGN PROSTATIC HYPERPLASIA WITH URINARY FREQUENCY: ICD-10-CM

## 2023-06-23 DIAGNOSIS — G20 PARKINSON'S DISEASE (HCC): ICD-10-CM

## 2023-06-23 DIAGNOSIS — E78.2 MIXED HYPERLIPIDEMIA: ICD-10-CM

## 2023-06-23 DIAGNOSIS — Z00.00 WELL ADULT EXAM: ICD-10-CM

## 2023-06-23 DIAGNOSIS — Z00.00 MEDICARE ANNUAL WELLNESS VISIT, SUBSEQUENT: Primary | ICD-10-CM

## 2023-06-23 PROCEDURE — G0439 PPPS, SUBSEQ VISIT: HCPCS | Performed by: FAMILY MEDICINE

## 2023-06-23 PROCEDURE — 99214 OFFICE O/P EST MOD 30 MIN: CPT | Performed by: FAMILY MEDICINE

## 2023-06-23 NOTE — PROGRESS NOTES
Assessment and Plan:     Problem List Items Addressed This Visit        Cardiovascular and Mediastinum    Orthostatic hypotension     -stable/controlled, continue same medication  Will evaluate again next visit               Nervous and Auditory    Parkinson's disease ([facility] [address] )     -stable/controlled, continue same medication  Will evaluate again next visit               Genitourinary    Stage 3a chronic kidney disease ([facility] [address] )     Lab Results   Component Value Date    EGFR 59 11/16/2022    EGFR 72 11/09/2021    CREATININE 1 19 11/16/2022    CREATININE 1 02 11/09/2021   smart water please             Other    Benign prostatic hyperplasia with urinary frequency     Restart the flomax in 2 weeks check dizziness          Psychophysiological insomnia     Can try 2 xanax after the flomax          Relevant Orders    CBC and differential    Mixed hyperlipidemia     -stable/controlled, continue same medication  Will evaluate again next visit            Relevant Orders    Lipid Panel with Direct LDL reflex    Comprehensive metabolic panel   Other Visit Diagnoses     Medicare annual wellness visit, subsequent    -  Primary    Vitamin D deficiency        Relevant Orders    Vitamin D 25 hydroxy    Well adult exam        BMI 29 0-29 9,adult               Preventive health issues were discussed with patient, and age appropriate screening tests were ordered as noted in patient's After Visit Summary  Personalized health advice and appropriate referrals for health education or preventive services given if needed, as noted in patient's After Visit Summary  History of Present Illness:     Patient presents for a Medicare Wellness Visit    HPI   Here to go over chronic issues and labs / imaging studies if applicable  Patient Care Team:  Geena Choudhury MD as PCP - General  MD Hayder Almazan MD     Review of Systems:     Review of Systems   Constitutional: Negative for activity change, appetite change and fever  HENT: Negative for congestion, nosebleeds and trouble swallowing  Eyes: Negative for itching  Respiratory: Negative for cough and chest tightness  Cardiovascular: Negative for chest pain and palpitations  Gastrointestinal: Negative for abdominal pain, constipation, diarrhea and nausea  Endocrine: Negative for cold intolerance  Genitourinary: Negative for frequency  Musculoskeletal: Positive for arthralgias, back pain and gait problem  Negative for joint swelling  Skin: Negative for rash  Allergic/Immunologic: Negative for immunocompromised state  Neurological: Negative for dizziness, tremors, seizures, syncope and headaches  Psychiatric/Behavioral: Negative for hallucinations and suicidal ideas  Problem List:     Patient Active Problem List   Diagnosis   • REM behavioral disorder   • Parkinson's disease ([place] [address] )   • Orthostatic hypotension   • Benign prostatic hyperplasia with urinary frequency   • Anxiety   • Psychophysiological insomnia   • Hip pain, chronic, right   • Mixed hyperlipidemia   • Unsteady gait   • Right leg weakness   • Elevated PSA   • Stage 3a chronic kidney disease ([place] [address] )      Past Medical and Surgical History:     History reviewed  No pertinent past medical history    Past Surgical History:   Procedure Laterality Date   • CYST REMOVAL      Right wrist   • PROSTATE SURGERY      Biopsy   • TONSILLECTOMY        Family History:     Family History   Problem Relation Age of Onset   • Heart disease Mother    • Heart disease Father       Social History:     Social History     Socioeconomic History   • Marital status: /Civil Union     Spouse name: None   • Number of children: 3   • Years of education: None   • Highest education level: None   Occupational History   • Occupation:    still active   • Occupation: retired    Tobacco Use   • Smoking status: Never   • Smokeless tobacco: Never   Vaping Use   • Vaping Use: Never used   Substance and Sexual Activity   • Alcohol use: Yes     Comment: social   • Drug use: No   • Sexual activity: Not Currently   Other Topics Concern   • None   Social History Narrative    Most recent tobacco use screenin-    Do you currently or have you served in the [employer]: Yes    If Yes, What branch of service: [employer]    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, into active duty, as a member of the [employer] or as a Reservist: Yes    Advance directive: Yes    Alcohol intake: Moderate    Caffeine intake: Moderate    Illicit drugs: none    Occupation: retired    Exercise level: Occasional    Single or multi-level home/work: single level home    Live alone or with others: with others    Chewing tobacco: none    Marital status:     Number of children: 3    Diet: Regular    Sexual orientation: Heterosexual    Smoke alarm in home: Yes    General stress level: Medium    Sunscreen used routinely: No    Education:  [facility] present in home: No    Presence of domestic violence: No     Social Determinants of Health     Financial Resource Strain: Low Risk  (2023)    Overall Financial Resource Strain (CARDIA)    • Difficulty of Paying Living Expenses: Not hard at all   Food Insecurity: Not on file   Transportation Needs: No Transportation Needs (2023)    PRAPARE - Transportation    • Lack of Transportation (Medical): No    • Lack of Transportation (Non-Medical): No   Physical Activity: Not on file   Stress: Not on file   Social Connections: Not on file   Intimate Partner Violence: Not on file   Housing Stability: Not on file      Medications and Allergies:     Current Outpatient Medications   Medication Sig Dispense Refill   • ALPRAZolam XR 0 5 MG 24 hr tablet TAKE ONE TABLET BY MOUTH DAILY AT BEDTIME  DO NOT CUT IN HALF   90 tablet 1   • atorvastatin (LIPITOR) 20 mg tablet TAKE ONE TABLET BY MOUTH EVERY DAY 90 tablet 1   • carbidopa-levodopa (SINEMET)  mg per tablet TAKE 1 TABLET AT 7AM, 2 TABLETS AT 10:30AM, 1TABLET AT 2:30PM, 1 TABLET AT 6:30PM, AND 1 TABLET AT 10:30PM  540 tablet 1   • Cholecalciferol (Vitamin D) 125 MCG (5000 UT) CAPS Take by mouth     • midodrine (PROAMATINE) 2 5 mg tablet TAKE ONE TABLET BY MOUTH UP TO TWICE A DAY (Patient taking differently: TAKE ONE TABLET BY MOUTH ONCE A DAY) 60 tablet 4   • Mirabegron ER 50 MG TB24 Take 1 tablet (50 mg total) by mouth daily 60 tablet 0   • Silodosin 4 MG CAPS Take 1 capsule (4 mg total) by mouth daily 30 capsule 11     No current facility-administered medications for this visit  No Known Allergies   Immunizations:     Immunization History   Administered Date(s) Administered   • COVID-19 PFIZER VACCINE 0 3 ML IM 02/17/2021, 03/09/2021, 10/23/2021, 05/17/2022   • INFLUENZA 01/10/2013, 01/13/2015, 02/01/2018, 11/12/2018, 10/11/2022   • Influenza, high dose seasonal 0 7 mL 09/29/2020, 10/01/2021, 10/11/2022   • Pneumococcal Conjugate 13-Valent 12/19/2019   • Pneumococcal Polysaccharide PPV23 03/13/2018      Health Maintenance:         Topic Date Due   • Hepatitis C Screening  Completed   • Colorectal Cancer Screening  Discontinued         Topic Date Due   • COVID-19 Vaccine (5 - Pfizer series) 07/12/2022      Medicare Screening Tests and Risk Assessments:     Lluvia Rosenthal is here for his Subsequent Wellness visit  Last Medicare Wellness visit information reviewed, patient interviewed and updates made to the record today  Health Risk Assessment:   Patient rates overall health as fair  Patient feels that their physical health rating is slightly worse  Patient is very satisfied with their life  Eyesight was rated as slightly worse  Hearing was rated as slightly worse  Patient feels that their emotional and mental health rating is same  Patients states they are never, rarely angry  Patient states they are sometimes unusually tired/fatigued  Pain experienced in the last 7 days has been none   Patient states that he has experienced no weight loss or gain in last 6 months  Fall Risk Screening: In the past year, patient has experienced: no history of falling in past year      Home Safety:  Patient has trouble with stairs inside or outside of their home  Patient has working smoke alarms and has working carbon monoxide detector  Home safety hazards include: none  Nutrition:   Current diet is Regular  Medications:   Patient is currently taking over-the-counter supplements  OTC medications include: see medication list  Patient is able to manage medications  Activities of Daily Living (ADLs)/Instrumental Activities of Daily Living (IADLs):   Walk and transfer into and out of bed and chair?: Yes  Dress and groom yourself?: Yes    Bathe or shower yourself?: Yes    Feed yourself? Yes  Do your laundry/housekeeping?: No  Manage your money, pay your bills and track your expenses?: No  Make your own meals?: No    Do your own shopping?: No    Previous Hospitalizations:   Any hospitalizations or ED visits within the last 12 months?: No      Advance Care Planning:   Living will: Yes    Durable POA for healthcare:  Yes    Advanced directive: Yes    Five wishes given: No      Cognitive Screening:   Provider or family/friend/caregiver concerned regarding cognition?: No    PREVENTIVE SCREENINGS      Cardiovascular Screening:    General: Screening Not Indicated and History Lipid Disorder    Due for: Lipid Panel      Diabetes Screening:     General: Screening Current    Due for: Blood Glucose      Colorectal Cancer Screening:     General: Screening Current    Due for: Cologuard      Prostate Cancer Screening:    General: Screening Not Indicated      Osteoporosis Screening:    General: Screening Not Indicated      Abdominal Aortic Aneurysm (AAA) Screening:    Risk factors include: age between 73-67 yo        General: Screening Not Indicated      Lung Cancer Screening:     General: Screening Not Indicated      Hepatitis C Screening:    General: Screening Current    Screening, Brief Intervention, and Referral to Treatment (SBIRT)    Screening  Typical number of drinks in a day: 1  Typical number of drinks in a week: 1  Interpretation: Low risk drinking behavior  Single Item Drug Screening:  How often have you used an illegal drug (including marijuana) or a prescription medication for non-medical reasons in the past year? never    Single Item Drug Screen Score: 0  Interpretation: Negative screen for possible drug use disorder    Brief Intervention  Alcohol & drug use screenings were reviewed  No concerns regarding substance use disorder identified  No results found  Physical Exam:     /62   Pulse 69   Resp 16   Ht 6' (1 829 m)   Wt 98 4 kg (217 lb)   SpO2 97%   BMI 29 43 kg/m²     Physical Exam  Vitals and nursing note reviewed  Constitutional:       Appearance: He is well-developed  HENT:      Head: Normocephalic and atraumatic  Eyes:      Conjunctiva/sclera: Conjunctivae normal       Pupils: Pupils are equal, round, and reactive to light  Cardiovascular:      Rate and Rhythm: Normal rate and regular rhythm  Heart sounds: Normal heart sounds  Pulmonary:      Effort: Pulmonary effort is normal       Breath sounds: Normal breath sounds  No wheezing or rales  Abdominal:      General: Bowel sounds are normal  There is no distension  Palpations: Abdomen is soft  Tenderness: There is no abdominal tenderness  Musculoskeletal:         General: No tenderness  Normal range of motion  Cervical back: Normal range of motion and neck supple  Skin:     General: Skin is warm and dry  Findings: No rash  Neurological:      Mental Status: He is alert and oriented to person, place, and time  Cranial Nerves: No cranial nerve deficit  Sensory: No sensory deficit  Coordination: Coordination normal    Psychiatric:         Behavior: Behavior normal          Thought Content:  Thought content normal          Judgment: Judgment normal  Not sure when I'm bringing him back   Kristy Cazares MD

## 2023-06-23 NOTE — PATIENT INSTRUCTIONS
Medicare Preventive Visit Patient Instructions  Thank you for completing your Welcome to Medicare Visit or Medicare Annual Wellness Visit today  Your next wellness visit will be due in one year (6/23/2024)  The screening/preventive services that you may require over the next 5-10 years are detailed below  Some tests may not apply to you based off risk factors and/or age  Screening tests ordered at today's visit but not completed yet may show as past due  Also, please note that scanned in results may not display below  Preventive Screenings:  Service Recommendations Previous Testing/Comments   Colorectal Cancer Screening  · Colonoscopy    · Fecal Occult Blood Test (FOBT)/Fecal Immunochemical Test (FIT)  · Fecal DNA/Cologuard Test  · Flexible Sigmoidoscopy Age: 39-70 years old   Colonoscopy: every 10 years (May be performed more frequently if at higher risk)  OR  FOBT/FIT: every 1 year  OR  Cologuard: every 3 years  OR  Sigmoidoscopy: every 5 years  Screening may be recommended earlier than age 39 if at higher risk for colorectal cancer  Also, an individualized decision between you and your healthcare provider will decide whether screening between the ages of 74-80 would be appropriate   Colonoscopy: 05/25/2021  FOBT/FIT: Not on file  Cologuard: 05/25/2021  Sigmoidoscopy: Not on file    Screening Current  Due for Cologuard     Prostate Cancer Screening Individualized decision between patient and health care provider in men between ages of 53-78   Medicare will cover every 12 months beginning on the day after your 50th birthday PSA: 11 9 ng/mL     Screening Not Indicated     Hepatitis C Screening Once for adults born between 1945 and 1965  More frequently in patients at high risk for Hepatitis C Hep C Antibody: 11/09/2021    Screening Current   Diabetes Screening 1-2 times per year if you're at risk for diabetes or have pre-diabetes Fasting glucose: 106 mg/dL (11/16/2022)  A1C: 5 4 % (11/16/2022)  Screening Current  Due for Blood Glucose   Cholesterol Screening Once every 5 years if you don't have a lipid disorder  May order more often based on risk factors  Lipid panel: 11/16/2022  Screening Not Indicated  History Lipid Disorder  Due for Lipid Panel      Other Preventive Screenings Covered by Medicare:  1  Abdominal Aortic Aneurysm (AAA) Screening: covered once if your at risk  You're considered to be at risk if you have a family history of AAA or a male between the age of 73-68 who smoking at least 100 cigarettes in your lifetime  2  Lung Cancer Screening: covers low dose CT scan once per year if you meet all of the following conditions: (1) Age 50-69; (2) No signs or symptoms of lung cancer; (3) Current smoker or have quit smoking within the last 15 years; (4) You have a tobacco smoking history of at least 20 pack years (packs per day x number of years you smoked); (5) You get a written order from a healthcare provider  3  Glaucoma Screening: covered annually if you're considered high risk: (1) You have diabetes OR (2) Family history of glaucoma OR (3)  aged 48 and older OR (3)  American aged 72 and older  3  Osteoporosis Screening: covered every 2 years if you meet one of the following conditions: (1) Have a vertebral abnormality; (2) On glucocorticoid therapy for more than 3 months; (3) Have primary hyperparathyroidism; (4) On osteoporosis medications and need to assess response to drug therapy  5  HIV Screening: covered annually if you're between the age of 12-76  Also covered annually if you are younger than 13 and older than 72 with risk factors for HIV infection  For pregnant patients, it is covered up to 3 times per pregnancy      Immunizations:  Immunization Recommendations   Influenza Vaccine Annual influenza vaccination during flu season is recommended for all persons aged >= 6 months who do not have contraindications   Pneumococcal Vaccine   * Pneumococcal conjugate vaccine = PCV13 (Prevnar 13), PCV15 (Vaxneuvance), PCV20 (Prevnar 20)  * Pneumococcal polysaccharide vaccine = PPSV23 (Pneumovax) Adults 2364 years old: 1-3 doses may be recommended based on certain risk factors  Adults 72 years old: 1-2 doses may be recommended based off what pneumonia vaccine you previously received   Hepatitis B Vaccine 3 dose series if at intermediate or high risk (ex: diabetes, end stage renal disease, liver disease)   Tetanus (Td) Vaccine - COST NOT COVERED BY MEDICARE PART B Following completion of primary series, a booster dose should be given every 10 years to maintain immunity against tetanus  Td may also be given as tetanus wound prophylaxis  Tdap Vaccine - COST NOT COVERED BY MEDICARE PART B Recommended at least once for all adults  For pregnant patients, recommended with each pregnancy  Shingles Vaccine (Shingrix) - COST NOT COVERED BY MEDICARE PART B  2 shot series recommended in those aged 48 and above     Health Maintenance Due:      Topic Date Due   • Hepatitis C Screening  Completed   • Colorectal Cancer Screening  Discontinued     Immunizations Due:      Topic Date Due   • COVID-19 Vaccine (5 - Pfizer series) 07/12/2022     Advance Directives   What are advance directives? Advance directives are legal documents that state your wishes and plans for medical care  These plans are made ahead of time in case you lose your ability to make decisions for yourself  Advance directives can apply to any medical decision, such as the treatments you want, and if you want to donate organs  What are the types of advance directives? There are many types of advance directives, and each state has rules about how to use them  You may choose a combination of any of the following:  · Living will: This is a written record of the treatment you want  You can also choose which treatments you do not want, which to limit, and which to stop at a certain time   This includes surgery, medicine, IV fluid, and tube feedings  · Durable power of  for healthcare [facility]): This is a written record that states who you want to make healthcare choices for you when you are unable to make them for yourself  This person, called a proxy, is usually a family member or a friend  You may choose more than 1 proxy  · Do not resuscitate (DNR) order:  A DNR order is used in case your heart stops beating or you stop breathing  It is a request not to have certain forms of treatment, such as CPR  A DNR order may be included in other types of advance directives  · Medical directive: This covers the care that you want if you are in a coma, near death, or unable to make decisions for yourself  You can list the treatments you want for each condition  Treatment may include pain medicine, surgery, blood transfusions, dialysis, IV or tube feedings, and a ventilator (breathing machine)  · Values history: This document has questions about your views, beliefs, and how you feel and think about life  This information can help others choose the care that you would choose  Why are advance directives important? An advance directive helps you control your care  Although spoken wishes may be used, it is better to have your wishes written down  Spoken wishes can be misunderstood, or not followed  Treatments may be given even if you do not want them  An advance directive may make it easier for your family to make difficult choices about your care  Weight Management   Why it is important to manage your weight:  Being overweight increases your risk of health conditions such as heart disease, high blood pressure, type 2 diabetes, and certain types of cancer  It can also increase your risk for osteoarthritis, sleep apnea, and other respiratory problems  Aim for a slow, steady weight loss  Even a small amount of weight loss can lower your risk of health problems    How to lose weight safely:  A safe and healthy way to lose weight is to eat fewer calories and get regular exercise  You can lose up about 1 pound a week by decreasing the number of calories you eat by 500 calories each day  Healthy meal plan for weight management:  A healthy meal plan includes a variety of foods, contains fewer calories, and helps you stay healthy  A healthy meal plan includes the following:  · Eat whole-grain foods more often  A healthy meal plan should contain fiber  Fiber is the part of grains, fruits, and vegetables that is not broken down by your body  Whole-grain foods are healthy and provide extra fiber in your diet  Some examples of whole-grain foods are whole-wheat breads and pastas, oatmeal, brown rice, and bulgur  · Eat a variety of vegetables every day  Include dark, leafy greens such as spinach, kale, nga greens, and mustard greens  Eat yellow and orange vegetables such as carrots, sweet potatoes, and winter squash  · Eat a variety of fruits every day  Choose fresh or canned fruit (canned in its own juice or light syrup) instead of juice  Fruit juice has very little or no fiber  · Eat low-fat dairy foods  Drink fat-free (skim) milk or 1% milk  Eat fat-free yogurt and low-fat cottage cheese  Try low-fat cheeses such as mozzarella and other reduced-fat cheeses  · Choose meat and other protein foods that are low in fat  Choose beans or other legumes such as split peas or lentils  Choose fish, skinless poultry (chicken or turkey), or lean cuts of red meat (beef or pork)  Before you cook meat or poultry, cut off any visible fat  · Use less fat and oil  Try baking foods instead of frying them  Add less fat, such as margarine, sour cream, regular salad dressing and mayonnaise to foods  Eat fewer high-fat foods  Some examples of high-fat foods include french fries, doughnuts, ice cream, and cakes  · Eat fewer sweets  Limit foods and drinks that are high in sugar  This includes candy, cookies, regular soda, and sweetened drinks    Exercise:  Exercise at least 30 minutes per day on most days of the week  Some examples of exercise include walking, biking, dancing, and swimming  You can also fit in more physical activity by taking the stairs instead of the elevator or parking farther away from stores  Ask your healthcare provider about the best exercise plan for you  © Copyright [employer] 2018 Information is for End User's use only and may not be sold, redistributed or otherwise used for commercial purposes   All illustrations and images included in CareNotes® are the copyrighted property of [employer]  or [address] [place] [address]

## 2023-06-24 NOTE — ASSESSMENT & PLAN NOTE
Lab Results   Component Value Date    EGFR 59 11/16/2022    EGFR 72 11/09/2021    CREATININE 1 19 11/16/2022    CREATININE 1 02 11/09/2021   smart water please

## 2023-07-21 ENCOUNTER — TELEPHONE (OUTPATIENT)
Dept: NEUROLOGY | Facility: CLINIC | Age: 78
End: 2023-07-21

## 2023-07-21 NOTE — TELEPHONE ENCOUNTER
Pts wife called in. Pt does not feel well and cant make appt today with Sarah Saleem. I offered virtual pt did not want it. Pt only wants Ej for appt. Offered a few dates pt could not take them. Appt made for 11/6 @ 2:15. Added to wait list. PT may cancel appt and just keep Dr. Alma Gibbs appt for December.

## 2023-08-01 DIAGNOSIS — N40.1 BENIGN PROSTATIC HYPERPLASIA WITH LOWER URINARY TRACT SYMPTOMS, SYMPTOM DETAILS UNSPECIFIED: ICD-10-CM

## 2023-08-01 RX ORDER — MIRABEGRON 50 MG/1
1 TABLET, FILM COATED, EXTENDED RELEASE ORAL DAILY
Qty: 60 TABLET | Refills: 0 | Status: SHIPPED | OUTPATIENT
Start: 2023-08-01

## 2023-08-11 ENCOUNTER — PATIENT MESSAGE (OUTPATIENT)
Dept: NEUROLOGY | Facility: CLINIC | Age: 78
End: 2023-08-11

## 2023-08-11 NOTE — PATIENT COMMUNICATION
Spoke with [facility] [place] pts wife. He does not like going to any other location but [place]. Pts wife took an appt on 9/28 @ 11:30 in  and is going to talk to pt letting him know he needs to be seen there to be seen sooner.

## 2023-08-25 DIAGNOSIS — G20 PARKINSON'S DISEASE (HCC): ICD-10-CM

## 2023-08-25 DIAGNOSIS — F51.04 PSYCHOPHYSIOLOGICAL INSOMNIA: ICD-10-CM

## 2023-08-25 RX ORDER — ALPRAZOLAM 0.5 MG/1
0.5 TABLET, EXTENDED RELEASE ORAL
Qty: 90 TABLET | Refills: 1 | Status: SHIPPED | OUTPATIENT
Start: 2023-08-25

## 2023-08-25 RX ORDER — MIDODRINE HYDROCHLORIDE 2.5 MG/1
TABLET ORAL
Qty: 60 TABLET | Refills: 4 | Status: SHIPPED | OUTPATIENT
Start: 2023-08-25

## 2023-08-25 RX ORDER — MIDODRINE HYDROCHLORIDE 2.5 MG/1
TABLET ORAL
Qty: 60 TABLET | Refills: 0 | Status: CANCELLED | OUTPATIENT
Start: 2023-08-25

## 2023-08-28 RX ORDER — ALPRAZOLAM 0.5 MG/1
TABLET, EXTENDED RELEASE ORAL
Qty: 90 TABLET | Refills: 1 | OUTPATIENT
Start: 2023-08-28

## 2023-08-29 DIAGNOSIS — R06.09 DOE (DYSPNEA ON EXERTION): Primary | ICD-10-CM

## 2023-09-07 ENCOUNTER — NURSE TRIAGE (OUTPATIENT)
Age: 78
End: 2023-09-07

## 2023-09-07 ENCOUNTER — APPOINTMENT (OUTPATIENT)
Dept: LAB | Facility: CLINIC | Age: 78
End: 2023-09-07
Payer: MEDICARE

## 2023-09-07 DIAGNOSIS — N30.00 ACUTE CYSTITIS WITHOUT HEMATURIA: ICD-10-CM

## 2023-09-07 DIAGNOSIS — R35.0 URINARY FREQUENCY: ICD-10-CM

## 2023-09-07 DIAGNOSIS — R35.0 URINARY FREQUENCY: Primary | ICD-10-CM

## 2023-09-07 LAB
BACTERIA UR QL AUTO: ABNORMAL /HPF
BILIRUB UR QL STRIP: NEGATIVE
CLARITY UR: CLEAR
COLOR UR: ABNORMAL
GLUCOSE UR STRIP-MCNC: NEGATIVE MG/DL
HGB UR QL STRIP.AUTO: NEGATIVE
KETONES UR STRIP-MCNC: NEGATIVE MG/DL
LEUKOCYTE ESTERASE UR QL STRIP: NEGATIVE
NITRITE UR QL STRIP: NEGATIVE
NON-SQ EPI CELLS URNS QL MICRO: ABNORMAL /HPF
PH UR STRIP.AUTO: 6.5 [PH]
PROT UR STRIP-MCNC: NEGATIVE MG/DL
RBC #/AREA URNS AUTO: ABNORMAL /HPF
SP GR UR STRIP.AUTO: 1.01 (ref 1–1.03)
UROBILINOGEN UR STRIP-ACNC: <2 MG/DL
WBC #/AREA URNS AUTO: ABNORMAL /HPF

## 2023-09-07 PROCEDURE — 87086 URINE CULTURE/COLONY COUNT: CPT

## 2023-09-07 PROCEDURE — 81001 URINALYSIS AUTO W/SCOPE: CPT

## 2023-09-07 RX ORDER — CEPHALEXIN 500 MG/1
500 CAPSULE ORAL EVERY 6 HOURS SCHEDULED
Qty: 28 CAPSULE | Refills: 0 | Status: SHIPPED | OUTPATIENT
Start: 2023-09-07 | End: 2023-09-14

## 2023-09-07 NOTE — TELEPHONE ENCOUNTER
Spoke to wife and patient on speaker phone. States he is having burning and more frequent trips to the bathroom History of BPH, UTI and Parkinson's. The last time this happened no preculture antibiotic was issued and they ran into difficulties needing an on call provider to assist in medication. He is currently not experiencing any abdominal pain or fevers. No visualized hematuria. He will attempt to hydrate better. He moves his bowels with no difficulty. Urine orders are placed and they are aware that results take 24 hours for a preliminary report. ER and UC precautions were reviewed. Please advise on antibiotic and when should he follow up again? Follows Dr. Joaquin Cook. Regarding: UTI symptoms  ----- Message from [employer] sent at 9/7/2023  8:34 AM EDT -----  Patient's wife calling to request urine testing. She stated he thinks he has another UTI. He is having burning and increased frequency. She does not think he is having any fevers.      Patient's wife requesting a call back [phone]

## 2023-09-07 NOTE — TELEPHONE ENCOUNTER
Reason for Disposition  • Urinating more frequently than usual (i.e., frequency)    Answer Assessment - Initial Assessment Questions  1. SYMPTOM: "What's the main symptom you're concerned about?" (e.g., frequency, incontinence)      Burning with urination and frequent trips to the bathroom   2. ONSET: "When did the  Symptoms start?"      yesterday  3. PAIN: "Is there any pain?" If Yes, ask: "How bad is it?" (Scale: 1-10; mild, moderate, severe)      Only the burning portion   4. CAUSE: "What do you think is causing the symptoms?"      unsure  5.  OTHER SYMPTOMS: "Do you have any other symptoms?" (e.g., fever, flank pain, blood in urine, pain with urination)     No other noted or verbalized symptoms    Protocols used: URINARY SYMPTOMS-ADULT-[place]

## 2023-09-07 NOTE — TELEPHONE ENCOUNTER
Agree with urine testing  I'll send keflex can start first dose after specimen given today    Last visit May 2023 with Bradley De Luna discussed new meds and surgical options. Pt was to f/u when decided either way.   Can arrange routine visit Dec/Jan for med check that he since started

## 2023-09-08 LAB — BACTERIA UR CULT: NORMAL

## 2023-09-08 NOTE — TELEPHONE ENCOUNTER
LM for patient the after he gives urine - can start antibiotic however, He looks like urine studies were done yesterday are negative - will notify provider

## 2023-09-19 DIAGNOSIS — N40.1 BENIGN PROSTATIC HYPERPLASIA WITH LOWER URINARY TRACT SYMPTOMS, SYMPTOM DETAILS UNSPECIFIED: ICD-10-CM

## 2023-09-19 RX ORDER — MIRABEGRON 50 MG/1
1 TABLET, FILM COATED, EXTENDED RELEASE ORAL DAILY
Qty: 60 TABLET | Refills: 0 | Status: SHIPPED | OUTPATIENT
Start: 2023-09-19

## 2023-09-19 RX ORDER — MIRABEGRON 50 MG/1
1 TABLET, FILM COATED, EXTENDED RELEASE ORAL DAILY
Qty: 60 TABLET | Refills: 0 | OUTPATIENT
Start: 2023-09-19

## 2023-10-16 ENCOUNTER — TELEPHONE (OUTPATIENT)
Dept: NEUROLOGY | Facility: CLINIC | Age: 78
End: 2023-10-16

## 2023-10-23 DIAGNOSIS — F51.04 PSYCHOPHYSIOLOGICAL INSOMNIA: ICD-10-CM

## 2023-10-23 RX ORDER — ATORVASTATIN CALCIUM 20 MG/1
TABLET, FILM COATED ORAL
Qty: 90 TABLET | Refills: 1 | Status: SHIPPED | OUTPATIENT
Start: 2023-10-23

## 2023-10-24 ENCOUNTER — OFFICE VISIT (OUTPATIENT)
Dept: NEUROLOGY | Facility: CLINIC | Age: 78
End: 2023-10-24
Payer: MEDICARE

## 2023-10-24 VITALS
DIASTOLIC BLOOD PRESSURE: 60 MMHG | OXYGEN SATURATION: 92 % | HEIGHT: 72 IN | HEART RATE: 80 BPM | WEIGHT: 208.7 LBS | SYSTOLIC BLOOD PRESSURE: 99 MMHG | TEMPERATURE: 98.6 F | BODY MASS INDEX: 28.27 KG/M2

## 2023-10-24 DIAGNOSIS — I95.1 ORTHOSTATIC HYPOTENSION: ICD-10-CM

## 2023-10-24 DIAGNOSIS — G20.A1 PARKINSON'S DISEASE: Primary | ICD-10-CM

## 2023-10-24 PROCEDURE — 99214 OFFICE O/P EST MOD 30 MIN: CPT | Performed by: NURSE PRACTITIONER

## 2023-10-24 NOTE — PATIENT INSTRUCTIONS
Recommend to monitor BP twice daily for the next 2 weeks and report back  Increase hydration to 40-60 oz daily  Wear compression stockings during the day  Change positions slowly      For sleep can take extra 1 tab of sinemet as needed overnight if you awaken-if this helps call for update script     Referral to BIG program.

## 2023-10-24 NOTE — PROGRESS NOTES
Patient ID: Fredrick Ba is a 70 y.o. male. Assessment/Plan:    Orthostatic hypotension  Reports ongoing dizziness. Did attempt to obtain orthostatic blood pressure today however with standing patient became symptomatic and needed to sit down. He is borderline hypotensive in office today. In discussion he does not drink enough. Would recommend he increase his fluids and salt intake, wear compression stockings during the day. I would like him to keep a log of his blood pressure for the next 2 weeks and report readings to our office. If he continues to have increased dizziness and hypotension may need to consider increase in his midodrine. He is currently on midodrine 2.5 mg daily, does not typically take second dose. Parkinson's disease [facility])  Patient reports progression of symptoms since last visit. He feels increase shuffling and some mild freezing at times. There is no clear pattern to his symptoms he denies regular occurrence of wearing off. He does also report worsening of his short-term memory and some mild nonbothersome hallucinations. He continues to have difficulty maintaining his sleep. We did discuss considering an increase in his Sinemet to assist with his gait, however given his dizziness and hallucinations we opted to hold off on this for now. He would benefit from referral to BIG program in which he may consider pursuing. We did discuss options for his sleep as he will awaken around 2 am.  He could consider changing his bedtime dose of Sinemet to extended release or taking an additional dose of Sinemet IR as needed overnight if he awakens. His wife is concerned for taking different formulations of medications as this may confuse patient. Plan:  -Refer to BIG program  -Continue Sinemet 25/100 mg 1 tab at 7am, 2 tabs at 10:30, 2 tab at 230 pm, 1 tab at 630 pm 1 tab at 1030 pm-can consider further increases in the future if BP remains stable.   He can take an extra 1 tab of Sinemet overnight if he awakens to see if assists with sleep.  -if no improvement in sleep could consider sinemet CR or addition of trazodone        Plan for follow-up in 4 months. To contact the office sooner with any concerns or worsening symptoms. Diagnoses and all orders for this visit:    Parkinson's disease  -     Ambulatory Referral to Physical Therapy; Future    Orthostatic hypotension           Subjective:    ROSETTE Jackson is a  who presents for follow up for parkinsonism. To review, symptom onset in 2016 with slowness of gait with subsequent development of LH tremor and orthostatic hypotension. Initially seen by Dr Vicente Allen and started on Sinemet, which was exacerbating his orthostatic intolerance. Sinemet discontinued and focus initially on treating orthostatic symptoms. Last office visit 3/2023 in which no changes were made. Prior medication trials:  Sinemet: worsening OH  zoloft: worsened OH       Current Medication:  Sinemet 25/100 mg 1 tab @ 7am, 2 tabs at 10:30, 2 tab at 230 pm, 1 tab at 630 pm 1 tab at 1030 pm  Midodrine 2.5 mg 1 tab qam     Interval History:  He feels his walking and balance has gotten worsen, no recent falls. Has had some near falls. Has noted increased shuffling or freezing, occurs throughout the day, worse after sitting for a long period of time and gets up to walk. He feels some wearing off at times but it no predictable, doesn't happen every day. He has a strength  that comes to his house twice a week. No tremor or dyskinesias. No trouble swallowing, some mild drooling at times. Voice is getting softer, worse towards the end of the day. He finds he will wake at 2 am and have difficulty getting back to sleep. His PCP did add on xanax at bedtime which hasn't seemed to help.      He feels memory is getting slightly worse, more difficulty with recall and short term memory, misplacing items. 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 He manages his own medications, does have to be reminded rarely. Continues to drive to familiar place only. He has noted hallucinations-shadows, animals, there is a question of illusions, this was prior to sinemet change. The following portions of the patient's history were reviewed and updated as appropriate: allergies, current medications, past family history, past medical history, past social history, past surgical history and problem list.          Objective:    Blood pressure 99/60, pulse 80, temperature 98.6 °F (37 °C), temperature source Temporal, height 6' (1.829 m), weight 94.7 kg (208 lb 11.2 oz), SpO2 92 %. Physical Exam  Constitutional:       General: He is awake. Eyes:      General: Lids are normal.      Extraocular Movements: Extraocular movements intact. Pupils: Pupils are equal, round, and reactive to light. Neurological:      Mental Status: He is alert. Motor: Motor strength is normal.        Neurological Exam  Mental Status  Awake and alert. Oriented only to person, place and situation. Speech: hypophonia. Language is fluent with no aphasia. Attention and concentration are normal.    Cranial Nerves  CN III, IV, VI: Extraocular movements intact bilaterally. Normal lids and orbits bilaterally. Pupils equal round and reactive to light bilaterally. CN V:  Right: Facial sensation is normal.  Left: Facial sensation is normal on the left. CN VII: Full and symmetric facial movement. CN VIII: Hearing is normal.  CN XI: Shoulder shrug strength is normal.  CN XII: Tongue midline without atrophy or fasciculations. Motor   Increased muscle tone. Strength is 5/5 throughout all four extremities. Sensory  Light touch is normal in upper and lower extremities.      Coordination  Right: Finger-to-nose normal. Rapid alternating movement abnormality:Left: Finger-to-nose normal. Rapid alternating movement abnormality:  See MDS UPDRS III. Gait  Casual gait: Able to rise from chair without using arms. Reduced arm swing, mild striatal posturing of the hands, antalgic gait at times. .        MDS UPDRS III                              10/24/23 3/3/23   Time since last dose:      Speech  2 1   Facial Expression  1 1   Rigidity - Neck   0   Rigidity - Upper Extremity (R)  2 1   Rigidity - Upper Extremity (L)   2 1   Rigidity - Lower Extremity (R)  0 0   Rigidity - Lower Extremity (L)   0 1   Finger Taps (R)   1 0   Finger Taps (L)   2 1   Hand Movement (R)  0 0   Hand Movement (L)   1 1   Pronation/Supination (R)  1 1   Pronation/Supination (L)   2 2   Toe Tapping (R) 0 0   Toe Tapping (L) 1 1   Leg Agility (R)  1 1   Leg Agility (L)   1 1   Arising from Chair   1 0   Gait   1 1   Freezing of Gait 0 0   Postural Stability        Posture 1 1   Global spontaneity of movement 1 1   Postural Tremor (Ri 1 1   Postural Tremor (L) 1 1   Kinetic Tremor (R)  0 0   Kinetic Tremor (L)  0 0   Rest tremor amplitude RUE 0 0   Rest tremor amplitude LUE 0 0   Rest tremor amplitude RLE 0 0   Reset tremor amplitude LLE 0 0   Lip/Jaw Tremor  0 0   Consistency of tremor 0 0   Motor Exam Total:             I have personally reviewed the ROS performed by the MA.    ROS:    Review of Systems   Constitutional:  Positive for fatigue. Negative for appetite change and fever. HENT: Negative. Negative for hearing loss, tinnitus, trouble swallowing and voice change. Eyes: Negative. Negative for photophobia, pain and visual disturbance. Respiratory: Negative. Negative for shortness of breath. Cardiovascular: Negative. Negative for palpitations. Gastrointestinal: Negative. Negative for nausea and vomiting. Endocrine: Negative. Negative for cold intolerance. Genitourinary: Negative. Negative for dysuria, frequency and urgency. Musculoskeletal:  Positive for gait problem (balance). Negative for back pain, myalgias and neck pain. Skin: Negative. Negative for rash. Allergic/Immunologic: Negative. Neurological:  Positive for dizziness, numbness and headaches. Negative for tremors, seizures, syncope, facial asymmetry, speech difficulty, weakness and light-headedness. Hematological: Negative. Does not bruise/bleed easily. Psychiatric/Behavioral:  Positive for hallucinations and sleep disturbance. Negative for confusion. All other systems reviewed and are negative.

## 2023-10-25 NOTE — ASSESSMENT & PLAN NOTE
Patient reports progression of symptoms since last visit. He feels increase shuffling and some mild freezing at times. There is no clear pattern to his symptoms he denies regular occurrence of wearing off. He does also report worsening of his short-term memory and some mild nonbothersome hallucinations. He continues to have difficulty maintaining his sleep. We did discuss considering an increase in his Sinemet to assist with his gait, however given his dizziness and hallucinations we opted to hold off on this for now. He would benefit from referral to BIG program in which he may consider pursuing. We did discuss options for his sleep as he will awaken around 2 am.  He could consider changing his bedtime dose of Sinemet to extended release or taking an additional dose of Sinemet IR as needed overnight if he awakens. His wife is concerned for taking different formulations of medications as this may confuse patient. Plan:  -Refer to BIG program  -Continue Sinemet 25/100 mg 1 tab at 7am, 2 tabs at 10:30, 2 tab at 230 pm, 1 tab at 630 pm 1 tab at 1030 pm-can consider further increases in the future if BP remains stable. He can take an extra 1 tab of Sinemet overnight if he awakens to see if assists with sleep.  -if no improvement in sleep could consider sinemet CR or addition of trazodone        Plan for follow-up in 4 months. To contact the office sooner with any concerns or worsening symptoms.

## 2023-10-25 NOTE — ASSESSMENT & PLAN NOTE
Reports ongoing dizziness. Did attempt to obtain orthostatic blood pressure today however with standing patient became symptomatic and needed to sit down. He is borderline hypotensive in office today. In discussion he does not drink enough. Would recommend he increase his fluids and salt intake, wear compression stockings during the day. I would like him to keep a log of his blood pressure for the next 2 weeks and report readings to our office. If he continues to have increased dizziness and hypotension may need to consider increase in his midodrine. He is currently on midodrine 2.5 mg daily, does not typically take second dose.

## 2023-11-03 ENCOUNTER — NURSE TRIAGE (OUTPATIENT)
Dept: OTHER | Facility: OTHER | Age: 78
End: 2023-11-03

## 2023-11-03 ENCOUNTER — TELEPHONE (OUTPATIENT)
Dept: NEUROLOGY | Facility: CLINIC | Age: 78
End: 2023-11-03

## 2023-11-03 NOTE — TELEPHONE ENCOUNTER
Spouse is concerned that 's visual hallucinations and altered mental state are becoming constant and severe. She was unable to get him to agree to be seen in urgent care today. Advised her that based on the worsening symptoms, the situation could deteriorate quickly if patient is not assessed soon. She would like to discuss with her son and  first before going to the ED. Reiterated option to call EMS if needed.

## 2023-11-03 NOTE — TELEPHONE ENCOUNTER
Patient's wife called and would like a call back ASAP before lunch time preferably. She said it was urgent but not urgent to the point where 911 had to be called. She stated she left couple of messages on the nurse line.

## 2023-11-03 NOTE — TELEPHONE ENCOUNTER
Spoke with patient's son Mannie Martin. Will take patient to  to r/o UTI. I told son I will call and follow up with patient's wife Monday and hopefully have UA results back by then. Lawanda--Burak  Also advised on taking extra Sinemet overnight if needed. Can discuss Melatonin further if need be.

## 2023-11-03 NOTE — TELEPHONE ENCOUNTER
Still recommend PCP /urgent care for UA to make sure he does not have UTI, if negative then would decrease sinemet, can trial melatonin at bedtime for sleep.

## 2023-11-03 NOTE — TELEPHONE ENCOUNTER
Reason for Disposition  • Very strange or paranoid behavior    Answer Assessment - Initial Assessment Questions  1. LEVEL OF CONSCIOUSNESS: "How is he (she, the patient) acting right now?" (e.g., alert-oriented, confused, lethargic, stuporous, comatose)      Visual hallucinations x3 days. Paranoia, fearful. 2. ONSET: "When did the confusion start?"  (minutes, hours, days)      3 days of confusion, altered mental status and visual hallucinations. 3. PATTERN "Does this come and go, or has it been constant since it started?"  "Is it present now?"      Hallucinations are waxing and wanning over last 2 days and is now constant. Currently present. 5. NARCOTIC MEDICATIONS: "Has he been receiving any narcotic medications?" (e.g., morphine, Vicodin)      None. 6. CAUSE: "What do you think is causing the confusion?"       Spouse is concerned for UTI.    7. OTHER SYMPTOMS: "Are there any other symptoms?" (e.g., difficulty breathing, headache, fever, weakness)      No other symptoms. Patient reports no urinary symptoms.     Protocols used: Confusion - Delirium-ADULT-

## 2023-11-03 NOTE — TELEPHONE ENCOUNTER
Patients spouse, Nereida Merino  calling to report that they left 2 messages on the nurse l;ine yesterday and they were awaiting a call back    Informed the patient that they message were not received yet and we can send a message to the provider and medical team.    Patient since seeing the provider on 10/24/23 is having Hallucinations and not getting any better. Patient spouse report that the patient is not breaking thru back to reality. Patient spouse asking for a CB to discuss ending the current medication or adding or ordering something else that may help the patient .     Patient is currently home with their son and the spouse is going out of town today and will be on a plane from 2:30 until 4:30 and unable to get a call at these time     Patient hoping for a Call before that time if possible or to call the son  at the home number    [address] [facility] # [phone] cell  Or call the house # listed  to speak to their son( did not give son's name before hanging up)

## 2023-11-03 NOTE — TELEPHONE ENCOUNTER
Regarding: UA / Culture Rx  ----- Message from Indio Foster sent at 11/3/2023  7:03 PM EDT -----  " I need an order put in for a UA for my .  His neurologist recommended we go to a walk in care, but my  refuses to go so I need the order put in "

## 2023-11-03 NOTE — TELEPHONE ENCOUNTER
Per son patient is not sleeping through the night all the time. But said last night slept well. Patient is not taking extra tab overnight if he awakens. Please advise if he should still decrease Sinemet.

## 2023-11-03 NOTE — TELEPHONE ENCOUNTER
Would suggest going to PCP or urgent care to rule out UTI given abrupt change. Also Has he been taking the extra tab overnight? Has he been sleeping ok? If these are negative then we can try to decrease his sinemet to 1 tab at 7 am, 1.5 tabs at 1030am and 230 pm, 1 tab at 630 pm and 1 tab at 1030 pm to see if behaviors improve would watch for any worsening PD symptoms with med decrease.

## 2023-11-03 NOTE — TELEPHONE ENCOUNTER
Called and left message for Bambi Dovleona acknowledging her VM's and that I will call patient's son at their home number. Called and spoke with son Sage Lowery. Patient is having an increase in Hallucinations since Monday. It is constant through out the day as well as Paranoia. Sage Lowery states his mother has been away since Monday. He also states patient is becoming a little more aware that he is seeing people that are not there. Son is constantly reassuring patient there is no one in the house or outside. Son states patient has had episodes like this before but the concern is now they have increased and are constant all day. Please advise. CB# [phone]      Meds confirmed as:    Carbidopa-Levodopa  mg 1.5 ( early AM) / 2 ( afternoon) /1.5 or 2 ( late afternoon)/1.5 ( dinnertime)/1 at hs  Midodrine 2.5 mg in the AM  Xanax before HS ( prn)    Son denies that patient has been recently sick or any active infections. Last UTI was in September and resolved.  Denies any recent changes in urinary habits or complaints

## 2023-11-04 ENCOUNTER — OFFICE VISIT (OUTPATIENT)
Dept: URGENT CARE | Facility: CLINIC | Age: 78
End: 2023-11-04
Payer: MEDICARE

## 2023-11-04 VITALS
RESPIRATION RATE: 20 BRPM | OXYGEN SATURATION: 98 % | DIASTOLIC BLOOD PRESSURE: 78 MMHG | HEART RATE: 66 BPM | TEMPERATURE: 97.9 F | SYSTOLIC BLOOD PRESSURE: 155 MMHG

## 2023-11-04 DIAGNOSIS — R41.0 CONFUSION: Primary | ICD-10-CM

## 2023-11-04 LAB
SL AMB  POCT GLUCOSE, UA: NORMAL
SL AMB LEUKOCYTE ESTERASE,UA: NORMAL
SL AMB POCT BILIRUBIN,UA: NORMAL
SL AMB POCT BLOOD,UA: NORMAL
SL AMB POCT CLARITY,UA: CLEAR
SL AMB POCT COLOR,UA: NORMAL
SL AMB POCT KETONES,UA: NORMAL
SL AMB POCT NITRITE,UA: NORMAL
SL AMB POCT PH,UA: 6.5
SL AMB POCT SPECIFIC GRAVITY,UA: 1.01
SL AMB POCT URINE PROTEIN: NORMAL
SL AMB POCT UROBILINOGEN: 0.2

## 2023-11-04 PROCEDURE — 87086 URINE CULTURE/COLONY COUNT: CPT | Performed by: PREVENTIVE MEDICINE

## 2023-11-04 PROCEDURE — 81002 URINALYSIS NONAUTO W/O SCOPE: CPT | Performed by: PREVENTIVE MEDICINE

## 2023-11-04 PROCEDURE — 99203 OFFICE O/P NEW LOW 30 MIN: CPT | Performed by: PREVENTIVE MEDICINE

## 2023-11-04 PROCEDURE — G0463 HOSPITAL OUTPT CLINIC VISIT: HCPCS | Performed by: PREVENTIVE MEDICINE

## 2023-11-04 NOTE — PATIENT INSTRUCTIONS
This does not appear to be a urinary infection. Please call tomorrow and check and I will discuss where we go from here.

## 2023-11-04 NOTE — PROGRESS NOTES
[facility]        NAME: Shiv Adam is a 70 y.o. male  : 1945    MRN: [id number]  DATE: 2023  TIME: 11:39 AM    Assessment and Plan   Confusion [R41.0]  1. Confusion  POCT urine dip            Patient Instructions       Follow up with PCP in 3-5 days. Proceed to  ER if symptoms worsen. Chief Complaint     Chief Complaint   Patient presents with    Possible UTI     Wife states he has been having confusion which started in the last 3 days. States he always has some confusion but it has gotten worse. No physical UTI sx. History of Present Illness       History of Parkinson's disease with confusion. In the past he has had confusion when he has developed a UTI. Because he is having increased confusion his physician wanted us to check the urine. Review of Systems   Review of Systems   Genitourinary: Negative. Psychiatric/Behavioral:  Positive for confusion.           Current Medications       Current Outpatient Medications:     ALPRAZolam ER (ALPRAZolam XR) 0.5 MG 24 hr tablet, Take 1 tablet (0.5 mg total) by mouth daily at bedtime, Disp: 90 tablet, Rfl: 1    atorvastatin (LIPITOR) 20 mg tablet, TAKE ONE TABLET BY MOUTH EVERY DAY, Disp: 90 tablet, Rfl: 1    carbidopa-levodopa (SINEMET)  mg per tablet, TAKE 1 TABLET AT 7AM, 2 TABLETS AT 10:30AM,  1TABLET AT 2:30PM, 1 TABLET AT 6:30PM, AND 1 TABLET AT 10:30PM., Disp: 540 tablet, Rfl: 1    midodrine (PROAMATINE) 2.5 mg tablet, TAKE ONE TABLET BY MOUTH UP TO TWICE A DAY, Disp: 60 tablet, Rfl: 4    Mirabegron ER (Myrbetriq) 50 MG TB24, Take 1 tablet (50 mg total) by mouth daily, Disp: 60 tablet, Rfl: 0    Cholecalciferol (Vitamin D) 125 MCG (5000 UT) CAPS, Take by mouth (Patient not taking: Reported on 10/24/2023), Disp: , Rfl:     Mirabegron ER 50 MG TB24, Take 1 tablet (50 mg total) by mouth daily (Patient not taking: Reported on 10/24/2023), Disp: 60 tablet, Rfl: 0    Silodosin 4 MG CAPS, Take 1 capsule (4 mg total) by mouth daily (Patient not taking: Reported on 10/24/2023), Disp: 30 capsule, Rfl: 11    Current Allergies     Allergies as of 11/04/2023    (No Known Allergies)            The following portions of the patient's history were reviewed and updated as appropriate: allergies, current medications, past family history, past medical history, past social history, past surgical history and problem list.     No past medical history on file. Past Surgical History:   Procedure Laterality Date    CYST REMOVAL      Right wrist    PROSTATE SURGERY      Biopsy    TONSILLECTOMY         Family History   Problem Relation Age of Onset    Heart disease Mother     Heart disease Father          Medications have been verified. Objective   /78   Pulse 66   Temp 97.9 °F (36.6 °C) (Temporal)   Resp 20   SpO2 98%   No LMP for male patient. Physical Exam     Physical Exam  Constitutional:       General: He is not in acute distress. Appearance: Normal appearance. He is not ill-appearing, toxic-appearing or diaphoretic. Neurological:      Mental Status: He is alert.      Urinalysis has no red cells or white cells

## 2023-11-05 ENCOUNTER — TELEPHONE (OUTPATIENT)
Dept: URGENT CARE | Facility: CLINIC | Age: 78
End: 2023-11-05

## 2023-11-06 DIAGNOSIS — G20.A1 PARKINSON'S DISEASE: ICD-10-CM

## 2023-11-06 LAB — BACTERIA UR CULT: NORMAL

## 2023-11-06 NOTE — TELEPHONE ENCOUNTER
Please see my chart message and reach out to patient's wife today to see how he is doing. I did see urgent care visit and UA was normal. Would make sure he is hydrating well given his abrupt change. Also just confirming his sinemet dose is  7:30 am - 1.5 tablets, 10:30 am, 2 tablets, 2:30 pm, 2 tablets, 6:30 pm, 1.5 tablets, bedtime (or 10:30 pm), 1 tablet, and if he wakes up and cannot go back to sleep, 1 tablet in the middle of the night. If this is the case and the hallucination and delusions are persisting, could decrease his dosing to 1 tab at 730, 1.5 tabs at 1030/2/630, and 1 tab at bedtime with 1 tab overnight as needed. If the behaviors do no improve or he has a worsening of PD symptoms  would recommend adding on donepezil or a low dose seroquel to help. With donepezil would have to watch his BP as there is a risk of orthostatic hypotension so seroquel might be a better choice.

## 2023-11-08 ENCOUNTER — APPOINTMENT (OUTPATIENT)
Dept: LAB | Facility: CLINIC | Age: 78
End: 2023-11-08
Payer: MEDICARE

## 2023-11-08 DIAGNOSIS — E55.9 VITAMIN D DEFICIENCY: ICD-10-CM

## 2023-11-08 DIAGNOSIS — R41.0 CONFUSION: ICD-10-CM

## 2023-11-08 DIAGNOSIS — G20.A1 PARKINSON'S DISEASE: ICD-10-CM

## 2023-11-08 DIAGNOSIS — F51.04 PSYCHOPHYSIOLOGICAL INSOMNIA: ICD-10-CM

## 2023-11-08 DIAGNOSIS — E78.2 MIXED HYPERLIPIDEMIA: ICD-10-CM

## 2023-11-08 DIAGNOSIS — R41.0 CONFUSION: Primary | ICD-10-CM

## 2023-11-08 DIAGNOSIS — R97.20 ELEVATED PSA: ICD-10-CM

## 2023-11-08 LAB
ALBUMIN SERPL BCP-MCNC: 4.2 G/DL (ref 3.5–5)
ALP SERPL-CCNC: 66 U/L (ref 34–104)
ALT SERPL W P-5'-P-CCNC: 5 U/L (ref 7–52)
ANION GAP SERPL CALCULATED.3IONS-SCNC: 8 MMOL/L
AST SERPL W P-5'-P-CCNC: 12 U/L (ref 13–39)
BASOPHILS # BLD AUTO: 0.05 THOUSANDS/ÂΜL (ref 0–0.1)
BASOPHILS NFR BLD AUTO: 1 % (ref 0–1)
BILIRUB SERPL-MCNC: 0.85 MG/DL (ref 0.2–1)
BUN SERPL-MCNC: 17 MG/DL (ref 5–25)
CALCIUM SERPL-MCNC: 9.1 MG/DL (ref 8.4–10.2)
CHLORIDE SERPL-SCNC: 104 MMOL/L (ref 96–108)
CO2 SERPL-SCNC: 30 MMOL/L (ref 21–32)
CREAT SERPL-MCNC: 1.07 MG/DL (ref 0.6–1.3)
EOSINOPHIL # BLD AUTO: 0.04 THOUSAND/ÂΜL (ref 0–0.61)
EOSINOPHIL NFR BLD AUTO: 1 % (ref 0–6)
ERYTHROCYTE [DISTWIDTH] IN BLOOD BY AUTOMATED COUNT: 13.2 % (ref 11.6–15.1)
GFR SERPL CREATININE-BSD FRML MDRD: 67 ML/MIN/1.73SQ M
GLUCOSE SERPL-MCNC: 75 MG/DL (ref 65–140)
HCT VFR BLD AUTO: 41.8 % (ref 36.5–49.3)
HGB BLD-MCNC: 13.6 G/DL (ref 12–17)
IMM GRANULOCYTES # BLD AUTO: 0.02 THOUSAND/UL (ref 0–0.2)
IMM GRANULOCYTES NFR BLD AUTO: 0 % (ref 0–2)
LYMPHOCYTES # BLD AUTO: 1.17 THOUSANDS/ÂΜL (ref 0.6–4.47)
LYMPHOCYTES NFR BLD AUTO: 15 % (ref 14–44)
MCH RBC QN AUTO: 32.3 PG (ref 26.8–34.3)
MCHC RBC AUTO-ENTMCNC: 32.5 G/DL (ref 31.4–37.4)
MCV RBC AUTO: 99 FL (ref 82–98)
MONOCYTES # BLD AUTO: 0.78 THOUSAND/ÂΜL (ref 0.17–1.22)
MONOCYTES NFR BLD AUTO: 10 % (ref 4–12)
NEUTROPHILS # BLD AUTO: 5.66 THOUSANDS/ÂΜL (ref 1.85–7.62)
NEUTS SEG NFR BLD AUTO: 73 % (ref 43–75)
NRBC BLD AUTO-RTO: 0 /100 WBCS
PLATELET # BLD AUTO: 155 THOUSANDS/UL (ref 149–390)
PMV BLD AUTO: 11.8 FL (ref 8.9–12.7)
POTASSIUM SERPL-SCNC: 3.9 MMOL/L (ref 3.5–5.3)
PROT SERPL-MCNC: 6.3 G/DL (ref 6.4–8.4)
RBC # BLD AUTO: 4.21 MILLION/UL (ref 3.88–5.62)
SODIUM SERPL-SCNC: 142 MMOL/L (ref 135–147)
WBC # BLD AUTO: 7.72 THOUSAND/UL (ref 4.31–10.16)

## 2023-11-08 PROCEDURE — 85025 COMPLETE CBC W/AUTO DIFF WBC: CPT

## 2023-11-08 PROCEDURE — 80053 COMPREHEN METABOLIC PANEL: CPT

## 2023-11-08 PROCEDURE — 36415 COLL VENOUS BLD VENIPUNCTURE: CPT

## 2023-11-09 ENCOUNTER — HOSPITAL ENCOUNTER (EMERGENCY)
Facility: HOSPITAL | Age: 78
Discharge: HOME | End: 2023-11-09
Attending: EMERGENCY MEDICINE
Payer: MEDICARE

## 2023-11-09 ENCOUNTER — APPOINTMENT (EMERGENCY)
Dept: RADIOLOGY | Facility: HOSPITAL | Age: 78
End: 2023-11-09
Payer: MEDICARE

## 2023-11-09 VITALS
TEMPERATURE: 97.9 F | BODY MASS INDEX: 28.32 KG/M2 | HEART RATE: 69 BPM | RESPIRATION RATE: 18 BRPM | SYSTOLIC BLOOD PRESSURE: 182 MMHG | DIASTOLIC BLOOD PRESSURE: 79 MMHG | OXYGEN SATURATION: 98 % | WEIGHT: 208.78 LBS

## 2023-11-09 DIAGNOSIS — R44.1 VISUAL HALLUCINATIONS: Primary | ICD-10-CM

## 2023-11-09 DIAGNOSIS — G20.A1 PARKINSON'S DISEASE: ICD-10-CM

## 2023-11-09 DIAGNOSIS — R03.0 ELEVATED BLOOD PRESSURE READING: ICD-10-CM

## 2023-11-09 DIAGNOSIS — R06.00 DYSPNEA: ICD-10-CM

## 2023-11-09 DIAGNOSIS — J90 PLEURAL EFFUSION: ICD-10-CM

## 2023-11-09 PROBLEM — R44.3 HALLUCINATIONS: Status: ACTIVE | Noted: 2023-11-09

## 2023-11-09 LAB
2HR DELTA HS TROPONIN: 1 NG/L
ALBUMIN SERPL BCP-MCNC: 4.4 G/DL (ref 3.5–5)
ALP SERPL-CCNC: 69 U/L (ref 34–104)
ALT SERPL W P-5'-P-CCNC: 6 U/L (ref 7–52)
ANION GAP SERPL CALCULATED.3IONS-SCNC: 8 MMOL/L
AST SERPL W P-5'-P-CCNC: 10 U/L (ref 13–39)
ATRIAL RATE: 63 BPM
BASOPHILS # BLD AUTO: 0.03 THOUSANDS/ÂΜL (ref 0–0.1)
BASOPHILS NFR BLD AUTO: 0 % (ref 0–1)
BILIRUB SERPL-MCNC: 0.74 MG/DL (ref 0.2–1)
BUN SERPL-MCNC: 17 MG/DL (ref 5–25)
CALCIUM SERPL-MCNC: 9.2 MG/DL (ref 8.4–10.2)
CARDIAC TROPONIN I PNL SERPL HS: 6 NG/L
CARDIAC TROPONIN I PNL SERPL HS: 7 NG/L
CHLORIDE SERPL-SCNC: 106 MMOL/L (ref 96–108)
CO2 SERPL-SCNC: 27 MMOL/L (ref 21–32)
CREAT SERPL-MCNC: 1.05 MG/DL (ref 0.6–1.3)
EOSINOPHIL # BLD AUTO: 0.07 THOUSAND/ÂΜL (ref 0–0.61)
EOSINOPHIL NFR BLD AUTO: 1 % (ref 0–6)
ERYTHROCYTE [DISTWIDTH] IN BLOOD BY AUTOMATED COUNT: 13.3 % (ref 11.6–15.1)
GFR SERPL CREATININE-BSD FRML MDRD: 68 ML/MIN/1.73SQ M
GLUCOSE SERPL-MCNC: 101 MG/DL (ref 65–140)
HCT VFR BLD AUTO: 42.3 % (ref 36.5–49.3)
HGB BLD-MCNC: 14 G/DL (ref 12–17)
IMM GRANULOCYTES # BLD AUTO: 0.03 THOUSAND/UL (ref 0–0.2)
IMM GRANULOCYTES NFR BLD AUTO: 0 % (ref 0–2)
LYMPHOCYTES # BLD AUTO: 0.94 THOUSANDS/ÂΜL (ref 0.6–4.47)
LYMPHOCYTES NFR BLD AUTO: 13 % (ref 14–44)
MCH RBC QN AUTO: 31.9 PG (ref 26.8–34.3)
MCHC RBC AUTO-ENTMCNC: 33.1 G/DL (ref 31.4–37.4)
MCV RBC AUTO: 96 FL (ref 82–98)
MONOCYTES # BLD AUTO: 0.72 THOUSAND/ÂΜL (ref 0.17–1.22)
MONOCYTES NFR BLD AUTO: 10 % (ref 4–12)
NEUTROPHILS # BLD AUTO: 5.41 THOUSANDS/ÂΜL (ref 1.85–7.62)
NEUTS SEG NFR BLD AUTO: 76 % (ref 43–75)
NRBC BLD AUTO-RTO: 0 /100 WBCS
PLATELET # BLD AUTO: 159 THOUSANDS/UL (ref 149–390)
PMV BLD AUTO: 11.8 FL (ref 8.9–12.7)
POTASSIUM SERPL-SCNC: 3.9 MMOL/L (ref 3.5–5.3)
PR INTERVAL: 182 MS
PROT SERPL-MCNC: 6.8 G/DL (ref 6.4–8.4)
QRS AXIS: -33 DEGREES
QRSD INTERVAL: 82 MS
QT INTERVAL: 378 MS
QTC INTERVAL: 386 MS
RBC # BLD AUTO: 4.39 MILLION/UL (ref 3.88–5.62)
SODIUM SERPL-SCNC: 141 MMOL/L (ref 135–147)
T WAVE AXIS: 38 DEGREES
VENTRICULAR RATE: 63 BPM
WBC # BLD AUTO: 7.2 THOUSAND/UL (ref 4.31–10.16)

## 2023-11-09 PROCEDURE — 99285 EMERGENCY DEPT VISIT HI MDM: CPT

## 2023-11-09 PROCEDURE — 71046 X-RAY EXAM CHEST 2 VIEWS: CPT

## 2023-11-09 PROCEDURE — 85025 COMPLETE CBC W/AUTO DIFF WBC: CPT | Performed by: EMERGENCY MEDICINE

## 2023-11-09 PROCEDURE — 80053 COMPREHEN METABOLIC PANEL: CPT | Performed by: EMERGENCY MEDICINE

## 2023-11-09 PROCEDURE — 99285 EMERGENCY DEPT VISIT HI MDM: CPT | Performed by: EMERGENCY MEDICINE

## 2023-11-09 PROCEDURE — 93005 ELECTROCARDIOGRAM TRACING: CPT

## 2023-11-09 PROCEDURE — 36415 COLL VENOUS BLD VENIPUNCTURE: CPT | Performed by: EMERGENCY MEDICINE

## 2023-11-09 PROCEDURE — 99245 OFF/OP CONSLTJ NEW/EST HI 55: CPT | Performed by: STUDENT IN AN ORGANIZED HEALTH CARE EDUCATION/TRAINING PROGRAM

## 2023-11-09 PROCEDURE — 93010 ELECTROCARDIOGRAM REPORT: CPT | Performed by: INTERNAL MEDICINE

## 2023-11-09 PROCEDURE — 84484 ASSAY OF TROPONIN QUANT: CPT | Performed by: EMERGENCY MEDICINE

## 2023-11-09 RX ORDER — QUETIAPINE FUMARATE 25 MG/1
25 TABLET, FILM COATED ORAL
Qty: 7 TABLET | Refills: 0 | Status: SHIPPED | OUTPATIENT
Start: 2023-11-09 | End: 2023-11-13 | Stop reason: SDUPTHER

## 2023-11-09 NOTE — ED NOTES
PT awake and alert, no distress noted. No other questions upon d/c.      Sharda Baez RN  11/09/23 [id number]

## 2023-11-09 NOTE — ASSESSMENT & PLAN NOTE
70 y.o. male with Parkinson's disease since 2016, orthostatic hypotension on Midodrine who presented with worsening hallucinations. Patient was sent to the ED by outpatient Neurology to rule out any acute underlying etiologies causing worsening hallucination. In ED, labs are unremarkable. No signs of infection. Plan:   -Discussed plan with neurology attending, Dr. Merrill Sánchez. Please refer to the attestation for additional recommendation and plan  -11/4 urine dip and urine culture resulted on 11/6/2023 were unremarkable. Patient has no new changes in his symptoms over the past few days so ED team did not get a repeat UA  -Will initiate Seroquel 25mg nightly and outpatient movement disorders team can manage further  -Patient and his wife insist on not staying longer since he has to go to his office. They will do Mri without brain outpatient    No further management from Neurology.  Please call with questions or concerns

## 2023-11-09 NOTE — ED NOTES
Received verbal verification from provider that patient may take his 1030 dose of carbidopa-levadopa.      Sharda Puckett RN  11/09/23 [id number]

## 2023-11-09 NOTE — ED NOTES
Wife asking if patient can take his 1030 dose of carbidopa-levadopa. Tiger Text sent to provider.      April Kathline Meckel, RN  11/09/23 [id number]

## 2023-11-09 NOTE — CONSULTS
NEUROLOGY RESIDENCY CONSULT NOTE     Name: Aiyana Miranda   Age & Sex: 70 y.o. male   MRN: [id number]  Unit/Bed#: ED-32   Encounter: [id number]  Length of Stay: 0    ASSESSMENT & PLAN     * Hallucinations  Assessment & Plan  70 y.o. male with Parkinson's disease since 2016, orthostatic hypotension on Midodrine who presented with worsening hallucinations. Patient was sent to the ED by outpatient Neurology to rule out any acute underlying etiologies causing worsening hallucination. In ED, labs are unremarkable. No signs of infection. Plan:   -Discussed plan with neurology attending, Dr. Bill Zavala. Please refer to the attestation for additional recommendation and plan  -11/4 urine dip and urine culture resulted on 11/6/2023 were unremarkable. Patient has no new changes in his symptoms over the past few days so ED team did not get a repeat UA  -Will initiate Seroquel 25mg nightly and outpatient movement disorders team can manage further  -Patient and his wife insist on not staying longer since he has to go to his office. They will do Mri without brain outpatient    No further management from Neurology. Please call with questions or concerns        Aiyana Miranda will need follow up in at the next regular appointment with movement disorder and memory attending Dr. Geri Andrews and Almedia Kawasaki ([facility]) . He will require outpatient MRI brain w/o contrat. Pending for discharge: None from Neurology standpoint    SUBJECTIVE     Reason for Consult / Principal Problem: worsening hallucinations  Hx and PE limited by: none    HPI: Aiyana Miranda is a 70 y.o. male with Parkinson's disease since 2016, orthostatic hypotension on Midodrine who presented with worsening hallucinations. Patient was sent to the ED by outpatient Movement disorder team to rule out any acute underlying etiologies causing worsening hallucination. In ED, labs are unremarkable.     Per patient's wife, patient has mild hallucination for the past 6 months and he knows he is having hallucination and they are not real. However, for the past 8 days, patient has been seeing people trespassing into his yard and house. He believes that those people are going to take his belongings or take over the house. He worries about securities and he checks the rooms multiple times. It significantly induces his anxiety. His wife mentioned that she was away for about 2 days and when she came back, his hallucination was worsened. He is currently taking Sinemet 25/100 mg 1 tab @ 7am, 1.5 tabs @ 10:30, 1.5 tab @ 230 pm, 1 tab @ 630 pm, 1 tab at 1030 pm   Then as needed 1 tablet in the middle of the night. Inpatient consult to Neurology  Consult performed by: Rachelle Carey MD  Consult ordered by: Mary Perez MD        Historical Information   History reviewed. No pertinent past medical history. Past Surgical History:   Procedure Laterality Date    CYST REMOVAL      Right wrist    PROSTATE SURGERY      Biopsy    TONSILLECTOMY       Social History   Social History     Substance and Sexual Activity   Alcohol Use Yes    Comment: social     Social History     Substance and Sexual Activity   Drug Use No     E-Cigarette/Vaping    E-Cigarette Use Never User      E-Cigarette/Vaping Substances    Nicotine No     THC No     CBD No     Flavoring No     Other No     Unknown No      Social History     Tobacco Use   Smoking Status Never   Smokeless Tobacco Never     Family History:   Family History   Problem Relation Age of Onset    Heart disease Mother     Heart disease Father      Meds/Allergies   PTA meds:   Prior to Admission Medications   Prescriptions Last Dose Informant Patient Reported? Taking?    ALPRAZolam ER (ALPRAZolam XR) 0.5 MG 24 hr tablet  Self No No   Sig: Take 1 tablet (0.5 mg total) by mouth daily at bedtime   Cholecalciferol (Vitamin D) 125 MCG (5000 UT) CAPS  Self Yes No   Sig: Take by mouth   Patient not taking: Reported on 10/24/2023   Mirabegron ER (Myrbetriq) 50 MG TB24  Self No No   Sig: Take 1 tablet (50 mg total) by mouth daily   Mirabegron ER 50 MG TB24  Self No No   Sig: Take 1 tablet (50 mg total) by mouth daily   Patient not taking: Reported on 10/24/2023   Silodosin 4 MG CAPS  Self No No   Sig: Take 1 capsule (4 mg total) by mouth daily   Patient not taking: Reported on 10/24/2023   atorvastatin (LIPITOR) 20 mg tablet  Self No No   Sig: TAKE ONE TABLET BY MOUTH EVERY DAY   carbidopa-levodopa (SINEMET)  mg per tablet   No No   Sig: TAKE 1 TABLET AT 7AM, 2 TABLETS AT 10:30AM,  1TABLET AT 2:30PM, 1 TABLET AT 6:30PM, AND 1 TABLET AT 10:30PM.   midodrine (PROAMATINE) 2.5 mg tablet  Self No No   Sig: TAKE ONE TABLET BY MOUTH UP TO TWICE A DAY      Facility-Administered Medications: None     No Known Allergies    Review of previous medical records was completed. Review of Systems   Constitutional:  Negative for chills and fever. HENT:  Negative for ear pain and sore throat. Eyes:  Negative for pain and visual disturbance. Respiratory:  Negative for cough and shortness of breath. Cardiovascular:  Negative for chest pain and palpitations. Gastrointestinal:  Negative for abdominal pain and vomiting. Genitourinary:  Negative for dysuria and hematuria. Musculoskeletal:  Negative for arthralgias and back pain. Skin:  Negative for color change and rash. Neurological:  Positive for tremors. Negative for seizures, syncope, weakness and headaches. Psychiatric/Behavioral:  Positive for hallucinations. Negative for confusion. The patient is nervous/anxious. All other systems reviewed and are negative. OBJECTIVE     Patient ID: Dillon Gaming is a 70 y.o. male.     Vitals:   Vitals:    11/09/23 0923 11/09/23 0926 11/09/23 1004   BP: (!) 209/101  (!) 182/79   BP Location: Right arm  Right arm   Pulse: 69     Resp: 18     Temp:  97.9 °F (36.6 °C)    TempSrc:  Oral    SpO2: 98%     Weight:  94.7 kg (208 lb 12.4 oz)       Body mass index is 28.32 kg/m². No intake or output data in the 24 hours ending 23 1706    Temperature:   Temp (24hrs), Av.9 °F (36.6 °C), Min:97.9 °F (36.6 °C), Max:97.9 °F (36.6 °C)    Temperature: 97.9 °F (36.6 °C)    Invasive Devices: Invasive Devices       None                   Physical Exam  Vitals and nursing note reviewed. Constitutional:       General: He is not in acute distress. Appearance: He is well-developed. HENT:      Head: Normocephalic. Eyes:      Extraocular Movements: Extraocular movements intact and EOM normal.      Pupils: Pupils are equal, round, and reactive to light. Cardiovascular:      Rate and Rhythm: Normal rate. Pulmonary:      Effort: Pulmonary effort is normal.   Musculoskeletal:         General: No swelling. Normal range of motion. Cervical back: Neck supple. Skin:     General: Skin is warm. Neurological:      Mental Status: He is alert and oriented to person, place, and time. Motor: Motor strength is normal.     Coordination: Finger-Nose-Finger Test normal.      Deep Tendon Reflexes:      Reflex Scores:       Achilles reflexes are 1+ on the right side and 1+ on the left side. Psychiatric:         Speech: Speech normal.          Neurologic Exam     Mental Status   Oriented to person, place, and time. Speech: speech is normal   Level of consciousness: alert    Cranial Nerves     CN II   Visual fields full to confrontation. CN III, IV, VI   Pupils are equal, round, and reactive to light. Extraocular motions are normal.     CN V   Facial sensation intact. CN VII   Facial expression full, symmetric. CN VIII   CN VIII normal.     CN IX, X   CN IX normal.   CN X normal.     CN XI   CN XI normal.     CN XII   CN XII normal.     Motor Exam   Muscle bulk: normal  Overall muscle tone: increased  Right arm tone: cogwheel rigidity  Left arm tone: cogwheel rigidity  Right leg tone: increased  Left leg tone: increased    Strength   Strength 5/5 throughout. Sensory Exam   Light touch normal.     Gait, Coordination, and Reflexes     Gait  Gait: (antalgic gait. Reduced arm swing.)    Coordination   Finger to nose coordination: normal    Tremor   Resting tremor: present (very mild)    Reflexes   Reflexes 2+ except as noted. Right achilles: 1+  Left achilles: 1+       LABORATORY DATA     Labs: I have personally reviewed pertinent reports. Results from last 7 days   Lab Units 11/09/23  1026 11/08/23  1343   WBC Thousand/uL 7.20 7.72   HEMOGLOBIN g/dL 14.0 13.6   HEMATOCRIT % 42.3 41.8   PLATELETS Thousands/uL 159 155   NEUTROS PCT % 76* 73   MONOS PCT % 10 10   EOS PCT % 1 1      Results from last 7 days   Lab Units 11/09/23  1026 11/08/23  1343   POTASSIUM mmol/L 3.9 3.9   CHLORIDE mmol/L 106 104   CO2 mmol/L 27 30   BUN mg/dL 17 17   CREATININE mg/dL 1.05 1.07   CALCIUM mg/dL 9.2 9.1   ALK PHOS U/L 69 66   ALT U/L 6* 5*   AST U/L 10* 12*         IMAGING & DIAGNOSTIC TESTING     Radiology Results: I have personally reviewed pertinent reports. XR chest 2 views   ED Interpretation by Frandy Childs MD (11/09 1112)   Per my independent interpretation: Left pleural effusion      Final Result by Ashley Maxwell MD (11/09 1049)      Small left pleural effusion. Workstation performed: [id number]             Other Diagnostic Testing: I have personally reviewed pertinent reports. ACTIVE MEDICATIONS     No current facility-administered medications for this encounter. Prior to Admission medications    Medication Sig Start Date End Date Taking?  Authorizing Provider   ALPRAZolam ER (ALPRAZolam XR) 0.5 MG 24 hr tablet Take 1 tablet (0.5 mg total) by mouth daily at bedtime 8/25/23   Debbi Mendez MD   atorvastatin (LIPITOR) 20 mg tablet TAKE ONE TABLET BY MOUTH EVERY DAY 10/23/23   Debbi Mendez MD   carbidopa-levodopa (SINEMET)  mg per tablet TAKE 1 TABLET AT 7AM, 2 TABLETS AT 10:30AM,  1TABLET AT 2:30PM, 1 TABLET AT 6:30PM, AND 1 TABLET AT 10:30PM. 11/7/23   Az Parra MD   Cholecalciferol (Vitamin D) 125 MCG (5000 UT) CAPS Take by mouth  Patient not taking: Reported on 10/24/2023    Historical Provider, MD freirerine (PROAMATINE) 2.5 mg tablet TAKE ONE TABLET BY MOUTH UP TO TWICE A DAY 8/25/23   OFELIA Rivera   Mirabegron ER (Myrbetriq) 50 MG TB24 Take 1 tablet (50 mg total) by mouth daily 9/19/23   Jackie Paris MD   Mirabegron ER 50 MG TB24 Take 1 tablet (50 mg total) by mouth daily  Patient not taking: Reported on 10/24/2023 8/1/23   OFELIA Juárez   Silodosin 4 MG CAPS Take 1 capsule (4 mg total) by mouth daily  Patient not taking: Reported on 10/24/2023 5/11/23   Jackie Paris MD         CODE STATUS & ADVANCED DIRECTIVES     Code Status: No Order  Advance Directive and Living Will:      Power of :    POLST:        VTE Pharmacologic Prophylaxis:  Recommend DVT ppx if needed per primary team  VTE Mechanical Prophylaxis: sequential compression device    ==    I have discussed the patient's history, physical exam findings, assessment, and plan in detail with attending, Dr. Leora Hall MD   [facility] Neurology Residency, PGY-III

## 2023-11-09 NOTE — ED PROVIDER NOTES
History  Chief Complaint   Patient presents with    Hallucinations     Pt c/o increased hallucinations x6 weeks. Pt reports seeing people who are not there. Per family "he would have these hallucinations but then be able to orient himself but now he is not able to do so."     Patient is a 74-year-old male, with a history significant for Parkinson's disease and hypotension managed with midodrine, who presents to the ED today due to worsening hallucinations. Patient was sent in at the recommendation of his neurologist as his hallucinations, which have been present for 6 months, have been progressively worsening in regards to frequency/elaborateness. Per patient's wife, present in room, they have also become distressing to the patient resulting in paranoia and affecting his work: Patient is currently still practicing as a . Patient expresses some insight into the hallucinatory nature of his visual symptoms which include seeing children hitting dead animals with sticks; however, in regards to the hallucinations about individuals trying to take over his household, patient did not express insight into this and continued to try to express his reasoning for why they are doing so. Patient's wife feels comfortable with him at home at this time as she is able to usually talk him out of this. As patient has not gotten confused with UTIs in the past, he had a urine dip and culture checked on 11/4; per my review of the medical record, urine culture and dip were negative. Patient denies fever, headache, chest pain, urinary symptoms, abdominal pain. Patient does report intermittent exertional dyspnea. Patient and wife are without other concerns at this time. Prior to Admission Medications   Prescriptions Last Dose Informant Patient Reported? Taking?    ALPRAZolam ER (ALPRAZolam XR) 0.5 MG 24 hr tablet  Self No No   Sig: Take 1 tablet (0.5 mg total) by mouth daily at bedtime   Cholecalciferol (Vitamin D) 125 MCG (5000 UT) CAPS  Self Yes No   Sig: Take by mouth   Patient not taking: Reported on 10/24/2023   Mirabegron ER (Myrbetriq) 50 MG TB24  Self No No   Sig: Take 1 tablet (50 mg total) by mouth daily   Mirabegron ER 50 MG TB24  Self No No   Sig: Take 1 tablet (50 mg total) by mouth daily   Patient not taking: Reported on 10/24/2023   Silodosin 4 MG CAPS  Self No No   Sig: Take 1 capsule (4 mg total) by mouth daily   Patient not taking: Reported on 10/24/2023   atorvastatin (LIPITOR) 20 mg tablet  Self No No   Sig: TAKE ONE TABLET BY MOUTH EVERY DAY   carbidopa-levodopa (SINEMET)  mg per tablet   No No   Sig: TAKE 1 TABLET AT 7AM, 2 TABLETS AT 10:30AM,  1TABLET AT 2:30PM, 1 TABLET AT 6:30PM, AND 1 TABLET AT 10:30PM.   midodrine (PROAMATINE) 2.5 mg tablet  Self No No   Sig: TAKE ONE TABLET BY MOUTH UP TO TWICE A DAY      Facility-Administered Medications: None       History reviewed. No pertinent past medical history. Past Surgical History:   Procedure Laterality Date    CYST REMOVAL      Right wrist    PROSTATE SURGERY      Biopsy    TONSILLECTOMY         Family History   Problem Relation Age of Onset    Heart disease Mother     Heart disease Father      I have reviewed and agree with the history as documented. E-Cigarette/Vaping    E-Cigarette Use Never User      E-Cigarette/Vaping Substances    Nicotine No     THC No     CBD No     Flavoring No     Other No     Unknown No      Social History     Tobacco Use    Smoking status: Never    Smokeless tobacco: Never   Vaping Use    Vaping Use: Never used   Substance Use Topics    Alcohol use: Yes     Comment: social    Drug use: No       Review of Systems   Constitutional:  Negative for fever. HENT:  Negative for trouble swallowing. Eyes:  Positive for visual disturbance. Respiratory:  Positive for shortness of breath. Cardiovascular:  Negative for chest pain. Gastrointestinal:  Negative for abdominal pain. Endocrine: Negative for polyuria. Genitourinary:  Negative for dysuria. Musculoskeletal:  Negative for gait problem. Skin:  Positive for rash (Previous rash on ankle that has since resolved). Allergic/Immunologic: Negative for environmental allergies. Neurological:  Negative for weakness, numbness and headaches. Hematological:  Negative for adenopathy. Psychiatric/Behavioral:  Positive for hallucinations. All other systems reviewed and are negative. Physical Exam  Physical Exam  Vitals and nursing note reviewed. Constitutional:       General: He is not in acute distress. Appearance: He is not ill-appearing, toxic-appearing or diaphoretic. Comments: Patient appears comfortable during my evaluation    HENT:      Head: Normocephalic and atraumatic. Right Ear: External ear normal.      Left Ear: External ear normal.      Nose: Nose normal. No rhinorrhea. Mouth/Throat:      Mouth: Mucous membranes are moist.      Pharynx: Oropharynx is clear. No oropharyngeal exudate or posterior oropharyngeal erythema. Comments: Uvula midline. No oropharyngeal or submandibular mass/swelling  Eyes:      General: No scleral icterus. Right eye: No discharge. Left eye: No discharge. Conjunctiva/sclera: Conjunctivae normal.      Pupils: Pupils are equal, round, and reactive to light. Neck:      Comments: Patient is spontaneously rotating their neck to the left and right during the history and physical exam interaction without difficulty or apparent discomfort    Cardiovascular:      Rate and Rhythm: Normal rate and regular rhythm. Pulses: Normal pulses. Heart sounds: Normal heart sounds. No murmur heard. No friction rub. No gallop. Comments: 2+ Radial  Pulmonary:      Effort: Pulmonary effort is normal. No respiratory distress. Breath sounds: Normal breath sounds. No stridor. No wheezing, rhonchi or rales. Abdominal:      General: Abdomen is flat. There is no distension. Palpations: Abdomen is soft. Tenderness: There is no abdominal tenderness. There is no right CVA tenderness, left CVA tenderness, guarding or rebound. Musculoskeletal:         General: No deformity. Cervical back: Neck supple. No tenderness. No muscular tenderness. Lymphadenopathy:      Cervical: No cervical adenopathy. Skin:     General: Skin is warm and dry. Capillary Refill: Capillary refill takes less than 2 seconds. Neurological:      Mental Status: He is alert. Comments: Cranial nerves 2-12 intact. 5/5 strength in all four extremities including finger extension against resistance. Sensation to light touch subjectively intact/equal in all four extremities and the face. Patient is speaking clearly in complete sentences. Patient is answering appropriately and able to follow commands. Resting tremor present   Psychiatric:         Mood and Affect: Mood normal.         Behavior: Behavior normal.      Comments: Linear and goal-directed thought process.   Partial insight into symptoms         Vital Signs  ED Triage Vitals   Temperature Pulse Respirations Blood Pressure SpO2   11/09/23 0926 11/09/23 0923 11/09/23 0923 11/09/23 0923 11/09/23 0923   97.9 °F (36.6 °C) 69 18 (!) 209/101 98 %      Temp Source Heart Rate Source Patient Position - Orthostatic VS BP Location FiO2 (%)   11/09/23 0926 11/09/23 0923 11/09/23 0923 11/09/23 0923 --   Oral Monitor Sitting Right arm       Pain Score       --                  Vitals:    11/09/23 0923 11/09/23 1004   BP: (!) 209/101 (!) 182/79   Pulse: 69    Patient Position - Orthostatic VS: Sitting Lying         Visual Acuity      ED Medications  Medications - No data to display    Diagnostic Studies  Results Reviewed       Procedure Component Value Units Date/Time    HS Troponin I 2hr [[id number]]  (Normal) Collected: 11/09/23 1231    Lab Status: Final result Specimen: Blood from Arm, Right Updated: 11/09/23 1306     hs TnI 2hr 7 ng/L      Delta 2hr hsTnI 1 ng/L     HS Troponin I 4hr [[id number]]     Lab Status: No result Specimen: Blood     HS Troponin 0hr (reflex protocol) [[id number]]  (Normal) Collected: 11/09/23 1026    Lab Status: Final result Specimen: Blood from Arm, Right Updated: 11/09/23 1100     hs TnI 0hr 6 ng/L     Comprehensive metabolic panel [[id number]]  (Abnormal) Collected: 11/09/23 1026    Lab Status: Final result Specimen: Blood from Arm, Right Updated: 11/09/23 1058     Sodium 141 mmol/L      Potassium 3.9 mmol/L      Chloride 106 mmol/L      CO2 27 mmol/L      ANION GAP 8 mmol/L      BUN 17 mg/dL      Creatinine 1.05 mg/dL      Glucose 101 mg/dL      Calcium 9.2 mg/dL      AST 10 U/L      ALT 6 U/L      Alkaline Phosphatase 69 U/L      Total Protein 6.8 g/dL      Albumin 4.4 g/dL      Total Bilirubin 0.74 mg/dL      eGFR 68 ml/min/1.73sq m     Narrative:      [facility] Disease (CKD):     Stage 1 with normal or high GFR (GFR > 90 mL/min/1.73 square meters)    Stage 2 Mild CKD (GFR = 60-89 mL/min/1.73 square meters)    Stage 3A Moderate CKD (GFR = 45-59 mL/min/1.73 square meters)    Stage 3B Moderate CKD (GFR = 30-44 mL/min/1.73 square meters)    Stage 4 Severe CKD (GFR = 15-29 mL/min/1.73 square meters)    Stage 5 End Stage CKD (GFR <15 mL/min/1.73 square meters)  Note: GFR calculation is accurate only with a steady state creatinine    CBC and differential [[id number]]  (Abnormal) Collected: 11/09/23 1026    Lab Status: Final result Specimen: Blood from Arm, Right Updated: 11/09/23 1041     WBC 7.20 Thousand/uL      RBC 4.39 Million/uL      Hemoglobin 14.0 g/dL      Hematocrit 42.3 %      MCV 96 fL      MCH 31.9 pg      MCHC 33.1 g/dL      RDW 13.3 %      MPV 11.8 fL      Platelets 505 Thousands/uL      nRBC 0 /100 WBCs      Neutrophils Relative 76 %      Immat GRANS % 0 %      Lymphocytes Relative 13 %      Monocytes Relative 10 %      Eosinophils Relative 1 %      Basophils Relative 0 %      Neutrophils Absolute 5.41 Thousands/µL      Immature Grans Absolute 0.03 Thousand/uL      Lymphocytes Absolute 0.94 Thousands/µL      Monocytes Absolute 0.72 Thousand/µL      Eosinophils Absolute 0.07 Thousand/µL      Basophils Absolute 0.03 Thousands/µL                    XR chest 2 views   ED Interpretation by Abraham Wilson MD (11/09 1112)   Per my independent interpretation: Left pleural effusion      Final Result by Yuko Akins MD (11/09 1049)      Small left pleural effusion. Workstation performed: [id number]                    Procedures  Procedures         ED Course  ED Course as of 11/09/23 1330   Thu Nov 09, 2023   1016 I discussed case with Dr. Claudio Lambert from neurology    1016 Blood Pressure(!): 182/79  Improving   1112 hs TnI 0hr: 6  WNL   1239 Discussed with Dr. Sloan Hebert from neurology: 25mg Seroquel QHS recommended as well as outpatient follow up including MRI. Patient and wife continue to want to be discharged    1 Delta 2hr hsTnI: 1  WNL   1330 Results reviewed with patient and wife. They again confirmed that they want to go home rather than be admitted. Patient has neither questions nor concerns after receiving discharge instructions and return precautions                                              Medical Decision Making  Patient is a 42-year-old male, with a history significant for Parkinson's disease and hypotension managed with midodrine, who presents to the ED today due to worsening hallucinations. Patient was sent in at the recommendation of his neurologist as his hallucinations, which have been present for 6 months, have been progressively worsening in regards to frequency/elaborateness. Per patient's wife, present in room, they have also become distressing to the patient resulting in paranoia and affecting his work: Patient is currently still practicing as a .   Patient expresses some insight into the hallucinatory nature of his visual symptoms which include seeing children hitting dead animals with sticks; however, in regards to the hallucinations about individuals trying to take over his household, patient did not express insight into this and continued to try to express his reasoning for why they are doing so. Patient's wife feels comfortable with him at home at this time as she is able to usually talk him out of this. As patient has not gotten confused with UTIs in the past, he had a urine dip and culture checked on 11/4; per my review of the medical record, urine culture and dip were negative. Patient denies fever, headache, chest pain, urinary symptoms, abdominal pain. Patient does report intermittent exertional dyspnea. Patient is currently afebrile and hemodynamically stable. His physical exam is notable for resting tremor but otherwise no focal neurodeficit, clear heart and lungs, soft nontender abdomen, partial insight into disease process. This presentation is concerning for: Progression of Parkinson's disease, ACS, electrolyte abnormality, CHUCK. Patient at risk for pneumonia. No reason to suspect UTI based upon recent negative urine culture and lack of urinary symptoms. Will investigate with cardiac work-up, neurology consultation. Will manage based on work-up/recommendations. Amount and/or Complexity of Data Reviewed  Labs: ordered. Decision-making details documented in ED Course. Radiology: ordered. Risk  Prescription drug management. Disposition  Final diagnoses:   Parkinson's disease   Visual hallucinations   Dyspnea   Elevated blood pressure reading   Pleural effusion     Time reflects when diagnosis was documented in both MDM as applicable and the Disposition within this note       Time User Action Codes Description Comment    11/9/2023 10:05 AM Dai Brunson Add [G20. A1] Parkinson's disease     11/9/2023 10:05 AM Dai Brunson Add [R44.1] Visual hallucinations     11/9/2023 10:05 AM Boy Patricia Ching A Add [R06.00] Dyspnea     11/9/2023 12:38 PM Patricia Brunson A Add [R03.0] Elevated blood pressure reading     11/9/2023 12:41 PM Patricia Brunson Modify [G20. A1] Parkinson's disease     11/9/2023 12:41 PM Zenon Gouge A Modify [R44.1] Visual hallucinations     11/9/2023 12:41 PM Yarbrough Gouge A Add [J90] Pleural effusion           ED Disposition       ED Disposition   Discharge    Condition   Stable    Date/Time   Thu Nov 9, 2023  1:17 PM    Comment   Emery Bur discharge to home/self care. Follow-up Information       Follow up With Specialties Details Why Contact Info    Jayson Olivarez MD Family Medicine Schedule an appointment as soon as possible for a visit   [address] [facility] [address], [address] [place] [address] Neurology, Nurse Practitioner Schedule an appointment as soon as possible for a visit   [address] [phone]              Patient's Medications   Discharge Prescriptions    QUETIAPINE (SEROQUEL) 25 MG TABLET    Take 1 tablet (25 mg total) by mouth daily at bedtime for 7 days       Start Date: 11/9/2023 End Date: 11/16/2023       Order Dose: 25 mg       Quantity: 7 tablet    Refills: 0       No discharge procedures on file.     PDMP Review         Value Time User    PDMP Reviewed  Yes 5/14/2021  4:23 PM Jayson Olivarez MD            ED Provider  Electronically Signed by             Avril Guadarrama MD  11/09/23 7270

## 2023-11-09 NOTE — DISCHARGE INSTRUCTIONS
You were evaluated in the emergency department for: hallucinations. You can access your current and pending results through [facility]. A radiologist will take a second look at your X-Rays, if you had any, and you will be contacted with any new findings. You should follow-up with your primary care provider and your neurologist, as soon as possible, for re-evaluation. If you do not have a primary care provider, I have referred you to [facility]. You will be contacted about scheduling an appointment. Their phone number is also included on this paperwork. Your workup revealed no emergent features at this time; however, many disease processes are dynamic:    Please, return to the emergency department if you experience new or worsening symptoms, fever, chest pain, shortness of breath, difficulty breathing, dizziness, abdominal pain, persistent nausea/vomiting, syncope or passing out, blood in your urine or stool, coughing up blood, leg swelling/pain, urinary retention, bowel or bladder incontinence, numbness between your legs. Additionally, your blood pressure was measured to be high. This is something that you should discuss with your primary care provider and have re-checked within one week.

## 2023-11-10 ENCOUNTER — IMMUNIZATIONS (OUTPATIENT)
Dept: FAMILY MEDICINE CLINIC | Facility: CLINIC | Age: 78
End: 2023-11-10
Payer: MEDICARE

## 2023-11-10 DIAGNOSIS — Z23 ENCOUNTER FOR IMMUNIZATION: Primary | ICD-10-CM

## 2023-11-10 PROCEDURE — G0008 ADMIN INFLUENZA VIRUS VAC: HCPCS

## 2023-11-10 PROCEDURE — 90662 IIV NO PRSV INCREASED AG IM: CPT

## 2023-11-11 DIAGNOSIS — F51.04 PSYCHOPHYSIOLOGICAL INSOMNIA: ICD-10-CM

## 2023-11-13 ENCOUNTER — TELEPHONE (OUTPATIENT)
Dept: NEUROLOGY | Facility: CLINIC | Age: 78
End: 2023-11-13

## 2023-11-13 ENCOUNTER — TELEPHONE (OUTPATIENT)
Age: 78
End: 2023-11-13

## 2023-11-13 DIAGNOSIS — R44.1 VISUAL HALLUCINATIONS: ICD-10-CM

## 2023-11-13 RX ORDER — QUETIAPINE FUMARATE 25 MG/1
25 TABLET, FILM COATED ORAL
Qty: 90 TABLET | Refills: 1 | Status: SHIPPED | OUTPATIENT
Start: 2023-11-13 | End: 2023-11-14 | Stop reason: SDUPTHER

## 2023-11-13 RX ORDER — QUETIAPINE FUMARATE 25 MG/1
25 TABLET, FILM COATED ORAL
Qty: 90 TABLET | Refills: 1 | Status: SHIPPED | OUTPATIENT
Start: 2023-11-13 | End: 2023-11-13 | Stop reason: SDUPTHER

## 2023-11-13 RX ORDER — ALPRAZOLAM 0.5 MG/1
TABLET, EXTENDED RELEASE ORAL
Qty: 90 TABLET | Refills: 1 | Status: SHIPPED | OUTPATIENT
Start: 2023-11-13 | End: 2023-11-14 | Stop reason: SDUPTHER

## 2023-11-13 NOTE — TELEPHONE ENCOUNTER
Pts wife called in. She has been talking to Meeta Shahid over the last few days. Pts wife said Meeta Shahid would like to see him ASAP. Can you please assist with this appt? I want to make sure its in the appropriate time frame she is asking for. Thank you.

## 2023-11-13 NOTE — TELEPHONE ENCOUNTER
Spoke to pt's wife to schedule an appt ronel/ Wilbur Scherer in [place] as pt's symptoms have gotten worse. Pt's wife accepted 11/21/23 at 2:15 but requested to be seen before that. I explained that [facility] doesn't have any openings but if there is any cancellations I will give her a call.

## 2023-11-14 DIAGNOSIS — F51.04 PSYCHOPHYSIOLOGICAL INSOMNIA: ICD-10-CM

## 2023-11-14 DIAGNOSIS — R44.1 VISUAL HALLUCINATIONS: ICD-10-CM

## 2023-11-14 RX ORDER — ALPRAZOLAM 0.5 MG/1
0.5 TABLET, EXTENDED RELEASE ORAL
Qty: 90 TABLET | Refills: 1 | Status: SHIPPED | OUTPATIENT
Start: 2023-11-14

## 2023-11-14 NOTE — TELEPHONE ENCOUNTER
[facility], taken off 11/14: This is Nitesh Singer, this is about my , your patient. His name is [place]. His date of birth is [birth year]. I've been in a conversation online through [address] [place] [address] with Alfonso Barth, about his medication seroquel 25 mg. Davey Gonzalez in her conversation stated to double that, take 2 tablets a day. If that is the case, the prescription needs to be increased, so we have enough for the week. That's why I'm calling. We use [facility] on [facility].  _____________________________________________________________    Leim Blunt med as requested and routed to provider to review.

## 2023-11-15 RX ORDER — QUETIAPINE FUMARATE 25 MG/1
50 TABLET, FILM COATED ORAL
Qty: 60 TABLET | Refills: 1 | Status: SHIPPED | OUTPATIENT
Start: 2023-11-15 | End: 2024-05-13

## 2023-11-21 ENCOUNTER — TELEPHONE (OUTPATIENT)
Age: 78
End: 2023-11-21

## 2023-11-21 ENCOUNTER — OFFICE VISIT (OUTPATIENT)
Dept: NEUROLOGY | Facility: CLINIC | Age: 78
End: 2023-11-21
Payer: MEDICARE

## 2023-11-21 VITALS
BODY MASS INDEX: 27.86 KG/M2 | HEIGHT: 72 IN | TEMPERATURE: 97.8 F | HEART RATE: 68 BPM | OXYGEN SATURATION: 98 % | SYSTOLIC BLOOD PRESSURE: 126 MMHG | WEIGHT: 205.7 LBS | DIASTOLIC BLOOD PRESSURE: 72 MMHG

## 2023-11-21 DIAGNOSIS — G20.A1 PARKINSON'S DISEASE: ICD-10-CM

## 2023-11-21 DIAGNOSIS — I95.1 ORTHOSTATIC HYPOTENSION: ICD-10-CM

## 2023-11-21 DIAGNOSIS — R44.3 HALLUCINATIONS: Primary | ICD-10-CM

## 2023-11-21 PROCEDURE — 99214 OFFICE O/P EST MOD 30 MIN: CPT | Performed by: NURSE PRACTITIONER

## 2023-11-21 NOTE — TELEPHONE ENCOUNTER
Patient and spouse called in post OV with neurology today. Neurology would like PCP to wean patient off ALPRAZolam ER (ALPRAZolam XR) 0.5 MG 24 hr tablet. Patient currently takes one tablet at bedtime. Neurologist does not want patient to stop taking medication suddenly, but wait for PCP to have plan to wean medication. Please follow up with patient and spouse. Reports neurologist is sending message to PCP regarding this.

## 2023-11-21 NOTE — PROGRESS NOTES
Patient ID: Esteban Salgado is a 70 y.o. male. Assessment/Plan:    Orthostatic hypotension  Appears improved, should continue to monitor, currently on midodrine. He should continue to hydrate well. Parkinson's disease ([address])  Patient's motor symptoms stable since last visit, recently reports abrupt worsening of hallucinations, delusions, and paranoia. This started when his wife left for a short trip and he was home with his son and has persisted. He did have workup to rule out infection due to abrupt worsening which was negative. He was asked to try to reduce his Sinemet however he was reluctant due to potential worsening of his motor symptoms. He was started on Seroquel and has not noted much improvement in his behaviors. His family does feel fatigue and anxiety do worsen the symptoms. He is currently sleeping well. He is not driving at this time due to symptoms. I have also advised him to cut back on his working hours as this does worsen his anxiety per his family. Worsening of his behaviors tend to occur in the evening around worse around 6 to 7 PM.    In review of his medications he is on Xanax XR 0.5 mg at bedtime for sleep and anxiety. I have asked them to discuss potential weaning of medication with PCP and alternative treatment options for sleep and anxiety. With reduction/potential discontinuation of this medication if symptoms do not improve could consider further increases of Seroquel versus change to Nuplazid. Black box warning was discussed. I have advised the family that patient should not be driving at this time due to his symptoms, if they do improve we could revisit this topic. 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        He will continue his current dose of Sinemet  25/100 mg 1 tab at 7am, 2 tabs at 10:30, 2 tab at 230 pm, 1 tab at 630 pm 1 tab at 1030 pm, can take additional 1 tab overnight upon awakening as needed. Will change his Seroquel 25 mg to 1 tab in the morning, 1 tab at 5 PM, and 1 tab at bedtime to see if this assists with sundowning behaviors. He does have regular follow-up scheduled in 1 month and  he will keep this appt for ongoing management of his condition. His family should contact the office for any new or worsening symptoms. Diagnoses and all orders for this visit:    Hallucinations    Parkinson's disease    Orthostatic hypotension           Subjective:    ROSETTE Bartonjustyn Alford is a  who presents for follow up for parkinsonism. To review, symptom onset in 2016 with slowness of gait with subsequent development of LH tremor and orthostatic hypotension. Initially seen by Dr Adan Larkin and started on Sinemet, which was exacerbating his orthostatic intolerance. Sinemet discontinued and focus initially on treating orthostatic symptoms. Last office visit 10/2023 in which he was to add a dose of sinemet overnight. Prior medication trials:  Sinemet: worsening OH  zoloft: worsened OH       Current Medication:  Sinemet 25/100 mg 1 tab @ 7am, 2 tabs at 10:30, 2 tab at 230 pm, 1 tab at 630 pm 1 tab at 1030 pm, 1 tab overnight if awakens  Midodrine 2.5 mg 1 tab qam  Seroquel 25 mg 1 tab in the AM and 2 tabs qhs     Interval History:  Since last visit he had increased confusion/delusions/paranoia, hallucinations. Did have UA and labs with no signficant findings. In retrosepct his wife does feel his cognitive complaints and hallucinations were more significant then what was led on in previous visits with our office. Does appear this started abruptly when his wife left for a few days for a trip.    He was have delusions and paranoia in which he thought people came to the home and were destroying their items and selling the house. he was seen in the ER due to worsening symptoms-started on seroquel. Has not really helped with these symptoms, was increased. Has noted that symptoms are worsen in the evening 6-7 pm. Fatigue and anxiety appear to exacerbate symptoms. 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 The following portions of the patient's history were reviewed and updated as appropriate: allergies, current medications, past family history, past medical history, past social history, past surgical history and problem list.       Objective:    Blood pressure 126/72, pulse 68, temperature 97.8 °F (36.6 °C), temperature source Temporal, height 6' (1.829 m), weight 93.3 kg (205 lb 11.2 oz), SpO2 98 %. Physical Exam  Constitutional:       General: He is awake. Eyes:      General: Lids are normal.      Extraocular Movements: Extraocular movements intact. Pupils: Pupils are equal, round, and reactive to light. Neurological:      Mental Status: He is alert. Motor: Motor strength is normal.        Neurological Exam  Mental Status  Awake and alert. Oriented only to person, place and situation. Speech: hypophonia. Language is fluent with no aphasia. Attention and concentration are normal.    Cranial Nerves  CN III, IV, VI: Extraocular movements intact bilaterally. Normal lids and orbits bilaterally. Pupils equal round and reactive to light bilaterally. CN V:  Right: Facial sensation is normal.  Left: Facial sensation is normal on the left. CN VII: Full and symmetric facial movement. CN VIII: Hearing is normal.  CN XI: Shoulder shrug strength is normal.  CN XII: Tongue midline without atrophy or fasciculations. Motor   Increased muscle tone. Strength is 5/5 throughout all four extremities. Sensory  Light touch is normal in upper and lower extremities.      Coordination  Right: Finger-to-nose normal. Rapid alternating movement abnormality:Left: Finger-to-nose normal. Rapid alternating movement abnormality:  See MDS UPDRS III. Gait  Casual gait: Able to rise from chair without using arms. Reduced arm swing, mild striatal posturing of the hands, antalgic gait at times. .    MDS UPDRS III                              10/24/23 11/22/23   Time since last dose:      Speech  2 2   Facial Expression  1 1   Rigidity - Neck       Rigidity - Upper Extremity (R)  2 1   Rigidity - Upper Extremity (L)   2 1   Rigidity - Lower Extremity (R)  0 0   Rigidity - Lower Extremity (L)   0 0   Finger Taps (R)   1 1   Finger Taps (L)   2 2   Hand Movement (R)  0 0   Hand Movement (L)   1 1   Pronation/Supination (R)  1 1   Pronation/Supination (L)   2 2   Toe Tapping (R) 0 0   Toe Tapping (L) 1 1   Leg Agility (R)  1 1   Leg Agility (L)   1 1   Arising from Chair   1 1   Gait   1 1   Freezing of Gait 0 0   Postural Stability        Posture 1 1   Global spontaneity of movement 1 1   Postural Tremor (Ri 1 1   Postural Tremor (L) 1 1   Kinetic Tremor (R)  0 0   Kinetic Tremor (L)  0 0   Rest tremor amplitude RUE 0 0   Rest tremor amplitude LUE 0 0   Rest tremor amplitude RLE 0 0   Reset tremor amplitude LLE 0 0   Lip/Jaw Tremor  0 0   Consistency of tremor 0 0   Motor Exam Total:          I have personally reviewed the ROS performed by the MA.    ROS:    Review of Systems   Constitutional:  Negative for appetite change, fatigue and fever. HENT: Negative. Negative for hearing loss, tinnitus, trouble swallowing and voice change. Eyes:  Positive for visual disturbance (blurry vision). Negative for photophobia and pain. Respiratory: Negative. Negative for shortness of breath. Cardiovascular: Negative. Negative for palpitations. Gastrointestinal: Negative. Negative for nausea and vomiting. Endocrine: Negative. Negative for cold intolerance. Genitourinary: Negative. Negative for dysuria, frequency and urgency.    Musculoskeletal: Negative for back pain, gait problem, myalgias and neck pain. Skin: Negative. Negative for rash. Allergic/Immunologic: Negative. Neurological: Negative. Negative for dizziness, tremors, seizures, syncope, facial asymmetry, speech difficulty, weakness, light-headedness, numbness and headaches. Hematological: Negative. Does not bruise/bleed easily. Psychiatric/Behavioral:  Positive for confusion (forgetful- short term) and hallucinations. Negative for sleep disturbance. All other systems reviewed and are negative.

## 2023-11-21 NOTE — PATIENT INSTRUCTIONS
Take seroquel 25 mg 1 tab in the AM, 1 tab at 5 pm, 1 tab at bedtime  Discuss with PCP about weaning off xanax to see if some behaviors improve.    Can treat any issues with anxiety, sleep disturbance if worsen with coming off xanax  If no improvement in behaviors could consider further increases in seroquel vs trial of nuplazid

## 2023-11-22 ENCOUNTER — HOSPITAL ENCOUNTER (OUTPATIENT)
Dept: MRI IMAGING | Facility: HOSPITAL | Age: 78
Discharge: HOME | End: 2023-11-22

## 2023-11-22 DIAGNOSIS — R41.0 CONFUSION: ICD-10-CM

## 2023-11-22 DIAGNOSIS — G20.A1 PARKINSON'S DISEASE: ICD-10-CM

## 2023-11-22 NOTE — ASSESSMENT & PLAN NOTE
Patient's motor symptoms stable since last visit, recently reports abrupt worsening of hallucinations, delusions, and paranoia. This started when his wife left for a short trip and he was home with his son and has persisted. He did have workup to rule out infection due to abrupt worsening which was negative. He was asked to try to reduce his Sinemet however he was reluctant due to potential worsening of his motor symptoms. He was started on Seroquel and has not noted much improvement in his behaviors. His family does feel fatigue and anxiety do worsen the symptoms. He is currently sleeping well. He is not driving at this time due to symptoms. I have also advised him to cut back on his working hours as this does worsen his anxiety per his family. Worsening of his behaviors tend to occur in the evening around worse around 6 to 7 PM.    In review of his medications he is on Xanax XR 0.5 mg at bedtime for sleep and anxiety. I have asked them to discuss potential weaning of medication with PCP and alternative treatment options for sleep and anxiety. With reduction/potential discontinuation of this medication if symptoms do not improve could consider further increases of Seroquel versus change to Nuplazid. Black box warning was discussed. I have advised the family that patient should not be driving at this time due to his symptoms, if they do improve we could revisit this topic. 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 He will continue his current dose of Sinemet  25/100 mg 1 tab at 7am, 2 tabs at 10:30, 2 tab at 230 pm, 1 tab at 630 pm 1 tab at 1030 pm, can take additional 1 tab overnight upon awakening as needed. Will change his Seroquel 25 mg to 1 tab in the morning, 1 tab at 5 PM, and 1 tab at bedtime to see if this assists with sundowning behaviors.     He does have regular follow-up scheduled in 1 month and  he will keep this appt for ongoing management of his condition. His family should contact the office for any new or worsening symptoms.

## 2023-11-26 ENCOUNTER — HOSPITAL ENCOUNTER (OUTPATIENT)
Dept: MRI IMAGING | Facility: HOSPITAL | Age: 78
Discharge: HOME | End: 2023-11-26
Payer: MEDICARE

## 2023-11-26 PROCEDURE — 70551 MRI BRAIN STEM W/O DYE: CPT

## 2023-11-26 PROCEDURE — G1004 CDSM NDSC: HCPCS

## 2023-11-27 ENCOUNTER — TELEPHONE (OUTPATIENT)
Age: 78
End: 2023-11-27

## 2023-11-27 DIAGNOSIS — R44.1 VISUAL HALLUCINATIONS: Primary | ICD-10-CM

## 2023-11-27 DIAGNOSIS — F51.04 PSYCHOPHYSIOLOGICAL INSOMNIA: ICD-10-CM

## 2023-11-27 RX ORDER — ALPRAZOLAM 0.25 MG/1
0.25 TABLET ORAL
Qty: 7 TABLET | Refills: 0 | Status: SHIPPED | OUTPATIENT
Start: 2023-11-27 | End: 2023-12-04

## 2023-11-27 NOTE — TELEPHONE ENCOUNTER
Patient wife calling stating that neurologist is recommending that Dr. Marilyn Dangelo reduce the mg on the xanax to start weaning him of because the medication is causing hallucinations. Patient wife stated that he is getting worse and wants to start as soon as possible. If Dr. Marilyn Dangelo is not available Wife is asking for someone else to cover.     Gavino Hunter

## 2023-11-30 ENCOUNTER — TELEPHONE (OUTPATIENT)
Dept: NEUROLOGY | Facility: CLINIC | Age: 78
End: 2023-11-30

## 2023-11-30 DIAGNOSIS — G20.A1 PARKINSON'S DISEASE: ICD-10-CM

## 2023-11-30 DIAGNOSIS — N40.1 BENIGN PROSTATIC HYPERPLASIA WITH LOWER URINARY TRACT SYMPTOMS, SYMPTOM DETAILS UNSPECIFIED: ICD-10-CM

## 2023-11-30 RX ORDER — MIRABEGRON 50 MG/1
1 TABLET, FILM COATED, EXTENDED RELEASE ORAL DAILY
Qty: 60 TABLET | Refills: 0 | Status: SHIPPED | OUTPATIENT
Start: 2023-11-30

## 2023-11-30 NOTE — TELEPHONE ENCOUNTER
LMOM to let pt and Virgilio Muñoz know that we have the nuplazid forms filled out just need Pt or Dariana's signature before faxing over. Explained forms will be in the [facility] office today or tomorrow they can come in and sign.

## 2023-12-01 ENCOUNTER — TELEPHONE (OUTPATIENT)
Dept: NEUROLOGY | Facility: CLINIC | Age: 78
End: 2023-12-01

## 2023-12-03 DIAGNOSIS — R44.1 VISUAL HALLUCINATIONS: ICD-10-CM

## 2023-12-06 ENCOUNTER — PATIENT MESSAGE (OUTPATIENT)
Dept: NEUROLOGY | Facility: CLINIC | Age: 78
End: 2023-12-06

## 2023-12-06 DIAGNOSIS — G20.A1: ICD-10-CM

## 2023-12-06 DIAGNOSIS — F02.82: ICD-10-CM

## 2023-12-06 DIAGNOSIS — R44.3 HALLUCINATIONS: Primary | ICD-10-CM

## 2023-12-07 RX ORDER — QUETIAPINE FUMARATE 25 MG/1
75 TABLET, FILM COATED ORAL
Qty: 90 TABLET | Refills: 5 | Status: SHIPPED | OUTPATIENT
Start: 2023-12-07 | End: 2024-06-04

## 2023-12-07 RX ORDER — MIDODRINE HYDROCHLORIDE 2.5 MG/1
TABLET ORAL
Qty: 60 TABLET | Refills: 0 | Status: SHIPPED | OUTPATIENT
Start: 2023-12-07

## 2023-12-08 ENCOUNTER — TELEPHONE (OUTPATIENT)
Dept: NEUROLOGY | Facility: CLINIC | Age: 78
End: 2023-12-08

## 2023-12-08 NOTE — TELEPHONE ENCOUNTER
12:03 pm:    My name is Trudy Forrest,  I'm one of the Patient Access Managers with [facility]. I am calling regarding a prescription that we received from your office from Su Wong,  regarding a patient of yours by the name of Vinnie Anthony. YOB: 1945. My reason for calling today is to see if your office was able to secure an authorization for the specialty drug called Nuplazid. If someone can please give me a call back. My direct number is [phone]. Thank you.

## 2023-12-18 ENCOUNTER — TELEMEDICINE (OUTPATIENT)
Dept: NEUROLOGY | Facility: CLINIC | Age: 78
End: 2023-12-18
Payer: MEDICARE

## 2023-12-18 ENCOUNTER — TELEPHONE (OUTPATIENT)
Dept: OTHER | Facility: OTHER | Age: 78
End: 2023-12-18

## 2023-12-18 DIAGNOSIS — I95.1 ORTHOSTATIC HYPOTENSION: ICD-10-CM

## 2023-12-18 DIAGNOSIS — G20.A1 PARKINSON'S DISEASE WITHOUT DYSKINESIA OR FLUCTUATING MANIFESTATIONS: Primary | ICD-10-CM

## 2023-12-18 DIAGNOSIS — R44.3 HALLUCINATIONS: ICD-10-CM

## 2023-12-18 PROCEDURE — 99215 OFFICE O/P EST HI 40 MIN: CPT | Performed by: PSYCHIATRY & NEUROLOGY

## 2023-12-18 NOTE — ASSESSMENT & PLAN NOTE
Parkinson disease with good motor control, more recent development of mild hallucinations over the past year which appear to have abruptly worsened with delusions and early of November.  Currently having psychosis during the day and night.  No clear improvement with addition of quetiapine other than perhaps improved sleep overnight.  Previously unable to tolerate a.m. dose but it is unclear if he gave this a good try.  Unable to tolerate Nuplazid.    -We will try further lowering carbidopa/levodopa to 1 tablet at 7, 1030, 230, 630 and 1 tablet as needed overnight if he is unable to go back to sleep.    -If tolerating this reduction after 3 days he will then retry adding an a.m. dose of Seroquel by taking half a tablet for couple days and then increasing to a full 25 mg tablet.  Will see if daytime psychosis improves.  If no improvement we may further increase Seroquel by adding an extra 25 mg in the afternoon.  -I have asked that he take a break from working at this point while we are making medication adjustments.  -We spoke of the likelihood that this is progression of his Parkinson disease with the development of dementia.  May consider adding donepezil in the near future.  MoCA to be performed on follow-up.

## 2023-12-18 NOTE — TELEPHONE ENCOUNTER
Patient has appointment this morning, but is afraid to get into the car with the bad weather. Patient's wife reports that patient desperately needs this appointment. Is it possible to change to telehealth or reschedule appointment for after the rain stops. Please follow up with patient's wife.

## 2023-12-18 NOTE — PROGRESS NOTES
Patient ID: Gabriel Gonzalez is a 78 y.o. male    Assessment/Plan:    No problem-specific Assessment & Plan notes found for this encounter.      There are no diagnoses linked to this encounter.    Subjective:      HPI    Objective:    There were no vitals taken for this visit.      Physical Exam    Neurological Exam    MDS UPDRS III                              10/24/23 12/18/23   Time since last dose:      Speech  2    Facial Expression  1    Rigidity - Neck       Rigidity - Upper Extremity (R)  2    Rigidity - Upper Extremity (L)   2    Rigidity - Lower Extremity (R)  0    Rigidity - Lower Extremity (L)   0    Finger Taps (R)   1    Finger Taps (L)   2    Hand Movement (R)  0    Hand Movement (L)   1    Pronation/Supination (R)  1    Pronation/Supination (L)   2    Toe Tapping (R) 0    Toe Tapping (L) 1    Leg Agility (R)  1    Leg Agility (L)   1    Arising from Chair   1    Gait   1    Freezing of Gait 0    Postural Stability        Posture 1    Global spontaneity of movement 1    Postural Tremor (Ri 1    Postural Tremor (L) 1    Kinetic Tremor (R)  0    Kinetic Tremor (L)  0    Rest tremor amplitude RUE 0    Rest tremor amplitude LUE 0    Rest tremor amplitude RLE 0    Reset tremor amplitude LLE 0    Lip/Jaw Tremor  0    Consistency of tremor 0    Motor Exam Total:                 Dariana Mari MD  Movement disorder physician  [facility]

## 2023-12-18 NOTE — PROGRESS NOTES
Virtual Regular Visit    Verification of patient location:    Patient is located at Home in the following state in which I hold an active license [place]      Assessment/Plan:    Problem List Items Addressed This Visit          Cardiovascular and Mediastinum    Orthostatic hypotension       Nervous and Auditory    Parkinson's disease - Primary     Parkinson disease with good motor control, more recent development of mild hallucinations over the past year which appear to have abruptly worsened with delusions and early of November.  Currently having psychosis during the day and night.  No clear improvement with addition of quetiapine other than perhaps improved sleep overnight.  Previously unable to tolerate a.m. dose but it is unclear if he gave this a good try.  Unable to tolerate Nuplazid.    -We will try further lowering carbidopa/levodopa to 1 tablet at 7, 1030, 230, 630 and 1 tablet as needed overnight if he is unable to go back to sleep.    -If tolerating this reduction after 3 days he will then retry adding an a.m. dose of Seroquel by taking half a tablet for couple days and then increasing to a full 25 mg tablet.  Will see if daytime psychosis improves.  If no improvement we may further increase Seroquel by adding an extra 25 mg in the afternoon.  -I have asked that he take a break from working at this point while we are making medication adjustments.  -We spoke of the likelihood that this is progression of his Parkinson disease with the development of dementia.  May consider adding donepezil in the near future.  MoCA to be performed on follow-up.            Other    Hallucinations            Reason for visit is   Chief Complaint   Patient presents with    Virtual Regular Visit          Encounter provider Dariana Mari MD    Provider located at [address] [place] [address] [facility] [address] [address]      Recent Visits  No visits were found meeting these  conditions.  Showing recent visits within past 7 days and meeting all other requirements  Today's Visits  Date Type Provider Dept   12/18/23 Telemedicine Dariana Mari MD [facility]   Showing today's visits and meeting all other requirements  Future Appointments  No visits were found meeting these conditions.  Showing future appointments within next 150 days and meeting all other requirements       The patient was identified by name and date of birth. Gabriel Gonzalez was informed that this is a telemedicine visit and that the visit is being conducted through the Epic Embedded platform. He agrees to proceed..  My office door was closed. No one else was in the room.  He acknowledged consent and understanding of privacy and security of the video platform. The patient has agreed to participate and understands they can discontinue the visit at any time.    Patient is aware this is a billable service.     Subjective  Gabriel Gonzalez is a 78 y.o. malewho presents for follow up for parkinsonism with concerns regarding recent development of hallucinations. To review, symptom onset in 2016 with slowness of gait with subsequent development of LH tremor and orthostatic hypotension. Initially seen by Dr Quinn and started on Sinemet, which was exacerbating his orthostatic intolerance. Sinemet discontinued and focus initially on treating orthostatic symptoms which improved on midodrine.      Patient called 11/3/2023 with concerns regarding hallucinations.  UA negative.  Hallucinations persisted and he was seen in the ER 11/9/2023.  Infectious workup negative.  He was started on Seroquel 25 mg nightly.  He was seen by Lawanda 11/23/2023 and was reluctant to make any reductions in levodopa as previously recommended.  He was weaned off Xanax XR by his PCP after recommendation.  Forms for Nuplazid sent in.  He is not driving. He continues to work part time the past two years.     Presents today with his wife  who helps with history.  He continues to have delusions where he thinks there were threats regarding his job.  He is seeing children in his backyard and people in the home.  Delusions and hallucinations occur all day.  Remains on quetiapine 50 mg nightly which may be helping with sleep.  Attempts to add an a.m. does cause daytime sedation so they only tried this for 3 days and stopped.  Attempts to start Nuplazid led to more confusion and hallucinations it was discontinued after 3 days.  There has been some reduction in levodopa with no change.    Sickly he is doing well.  He still is going into the office for 3 hours at a time on certain days of the week.  He no longer drives.  His wife is driving him.  Sleeps well for the most part on Seroquel.  On occasion he awakens and cannot get back to sleep and he was taken extra levodopa.    Prior medication trials:  Sinemet: worsening OH  zoloft: worsened OH    Nuplazid: taken for 3 days but he was more and more confused and hallucinating each day  Xanax    Current Medication:  Sinemet 25/100 mg 1 tab @ 7am, 1.5 tabs at 10:30, 1.5 tab at 230 pm, 1.5 tab 630 pm, 1 tab at 10pm, and 1 overnight prn (650-750mg daily)   Midodrine 2.5 mg 1 tab qam  Quetiapine 50mg qhs (additional am 25mg dose associated with sleepiness)              History reviewed. No pertinent past medical history.    Past Surgical History:   Procedure Laterality Date    CYST REMOVAL      Right wrist    PROSTATE SURGERY      Biopsy    TONSILLECTOMY         Current Outpatient Medications   Medication Sig Dispense Refill    atorvastatin (LIPITOR) 20 mg tablet TAKE ONE TABLET BY MOUTH EVERY DAY 90 tablet 1    carbidopa-levodopa (SINEMET)  mg per tablet TAKE 1 TABLET AT 7AM, 2 TABLETS AT 10:30AM,  1TABLET AT 2:30PM, 1 TABLET AT 6:30PM, AND 1 TABLET AT 10:30PM. (Patient taking differently: TAKE 1.5 TABLET AT 7AM, 1.5 TABLETS AT 10:30AM,  1.5 TABLET AT 2:30PM, 1.5 TABLET AT 6:30PM, AND 1 TABLET AT 10:30PM.)  540 tablet 0    midodrine (PROAMATINE) 2.5 mg tablet Take 1 tab twice daily as needed (Patient taking differently: in the morning Take 1 tab twice daily as needed) 60 tablet 0    Myrbetriq 50 MG TB24 TAKE ONE TABLET BY MOUTH EVERY DAY 60 tablet 0    QUEtiapine (SEROquel) 25 mg tablet Take 3 tablets (75 mg total) by mouth daily at bedtime (Patient taking differently: Take 50 mg by mouth daily at bedtime) 90 tablet 5    ALPRAZolam (XANAX) 0.25 mg tablet Take 1 tablet (0.25 mg total) by mouth daily at bedtime for 7 days Then stop 7 tablet 0     No current facility-administered medications for this visit.        No Known Allergies    Review of Systems   Constitutional:  Positive for appetite change (low appetite). Negative for fatigue and fever.   HENT:  Positive for voice change (only when tired). Negative for hearing loss, tinnitus and trouble swallowing.    Eyes: Negative.  Negative for photophobia, pain and visual disturbance.   Respiratory: Negative.  Negative for shortness of breath.    Cardiovascular: Negative.  Negative for palpitations.   Gastrointestinal: Negative.  Negative for nausea and vomiting.   Endocrine: Negative.  Negative for cold intolerance.   Genitourinary: Negative.  Negative for dysuria, frequency and urgency.   Musculoskeletal:  Positive for gait problem (due to leg weakness). Negative for back pain, myalgias and neck pain.        Balance Issues     Skin: Negative.  Negative for rash.   Allergic/Immunologic: Negative.    Neurological:  Positive for dizziness (a couple times a day, has to sit to help go away), tremors (Mild in hands, Left worse than Right, has stayed the same) and weakness (Right Leg which causes issues with gait). Negative for seizures, syncope, facial asymmetry, speech difficulty, light-headedness, numbness and headaches.   Hematological: Negative.  Does not bruise/bleed easily.   Psychiatric/Behavioral:  Positive for confusion and hallucinations. Negative for sleep  disturbance.         Delusions  Paranoid   All other systems reviewed and are negative.      Video Exam    There were no vitals filed for this visit.    Physical Exam   Mental status: Patient is awake, alert and oriented to situation and place, Follows commands.   Cranial Nerves:   CN III-VI- extra ocular muscles intact by independent movement  CN VII- symmetric facial movements  Motor: no pronator drift  Coordination: No tremor with hands outstretched. No intentional tremor on nose to extended arm bilaterally.  Normal handgrips.  Decrement on finger taps and rapid altering movements.  Gait: Arose using his hands.  Able to ambulate a short distance with mild imbalance.     Visit Time  Total Visit Duration: 30min  Patient was 10 minutes spent on reviewing chart with recent phone calls, prior ER visit history, and documentation.

## 2023-12-26 ENCOUNTER — TELEPHONE (OUTPATIENT)
Age: 78
End: 2023-12-26

## 2023-12-26 NOTE — TELEPHONE ENCOUNTER
See wife's message ( Dariana) grandchildren who were with them left today sick. Just got a call they tested positive for flu A and on with strep. Patient started with some nasal congestion and scratchy throat 2 hours ago, denies fever or any other symptoms  . Asking for Tamaflu. Uses [facility] . Please advise if an appointment is advised, or [place] for swabbing, aware the office may not reach out to them until the morning

## 2024-01-02 ENCOUNTER — PATIENT MESSAGE (OUTPATIENT)
Dept: NEUROLOGY | Facility: CLINIC | Age: 79
End: 2024-01-02

## 2024-01-02 DIAGNOSIS — R44.1 VISUAL HALLUCINATIONS: ICD-10-CM

## 2024-01-02 RX ORDER — QUETIAPINE FUMARATE 25 MG/1
TABLET, FILM COATED ORAL
Qty: 120 TABLET | Refills: 5 | Status: SHIPPED | OUTPATIENT
Start: 2024-01-02

## 2024-01-02 NOTE — PATIENT COMMUNICATION
MARCOS at 9:47 am:    This is Dariana Gonzalez, calling for my , Taiwo Gonzalez, who is a patient of Dr. Patel. I have sent 2 or 3 messages over the holidays to Dr. Patel. I'm very anxious to speak with her or someone for your team because we definitely need to have some sort of a change. All the information is in the messages I sent. 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 I hope that we can change the medication and look for something that's going to be more effective. My phone number is [phone].

## 2024-01-03 ENCOUNTER — TELEPHONE (OUTPATIENT)
Dept: NEUROLOGY | Facility: CLINIC | Age: 79
End: 2024-01-03

## 2024-01-03 ENCOUNTER — TELEPHONE (OUTPATIENT)
Dept: PSYCHIATRY | Facility: CLINIC | Age: 79
End: 2024-01-03

## 2024-01-03 ENCOUNTER — TELEPHONE (OUTPATIENT)
Age: 79
End: 2024-01-03

## 2024-01-03 NOTE — TELEPHONE ENCOUNTER
Dariana (wife) called would like a referral for Geriatric Psychiatrist and states Seroquel dosing is not effective feels has gotten worse. Current dose is 100 mg. Has been over 9 weeks and feels her  has gotten worse. Needs to know why. Would like a call back at [phone]

## 2024-01-03 NOTE — TELEPHONE ENCOUNTER
Patient is currently seen at [facility] and is on medication but medication is not working, provider suggested pt is seeks services from a geriatric psychiatrist.     Patient has been added to the Medication Management wait list without a referral.  Referral will be sent in via pcp  Insurance: medicare  Insurance Type:    Commercial []   Medicaid []   [facility] (if applicable)   Medicare [x]  [place] Preference[place]  Provider Preference: n/a  Virtual: Yes [x] No []  Were outside resources sent: Yes [] No [x]    Patient has been added to the Talk Therapy wait list without a referral.    Insurance: medicare  Insurance Type:    Commercial []   Medicaid []   County (if applicable)   Medicare [x]  Location Preference: [facility]  Provider Preference: n/a  Virtual: Yes [x] No []  Were outside resources sent: Yes [] No [x]

## 2024-01-04 ENCOUNTER — TELEPHONE (OUTPATIENT)
Dept: PSYCHIATRY | Facility: CLINIC | Age: 79
End: 2024-01-04

## 2024-01-04 DIAGNOSIS — G20.B2 PARKINSON'S DISEASE WITH DYSKINESIA AND FLUCTUATING MANIFESTATIONS: ICD-10-CM

## 2024-01-04 DIAGNOSIS — F51.04 PSYCHOPHYSIOLOGICAL INSOMNIA: Primary | ICD-10-CM

## 2024-01-04 NOTE — TELEPHONE ENCOUNTER
"Behavioral Health Outpatient Intake Questions    Referred By   : Dariana Mari @ [facility] neuro & PCP Dr. Rudolph @ [facility]    Please advise interviewee that they need to answer all questions truthfully to allow for best care, and any misrepresentations of information may affect their ability to be seen at this clinic   => Was this discussed? Yes     If Minor Child (under age 18)    Who is/are the legal guardian(s) of the child?     Is there a custody agreement?      If \"YES\"- Custody orders must be obtained prior to scheduling the first appointment  In addition, Consent to Treatment must be signed by all legal guardians prior to scheduling the first appointment    If \"NO\"- Consent to Treatment must be signed by all legal guardians prior to scheduling the first appointment    Behavioral Health Outpatient Intake History -     Presenting Problem (in patient's own words): diagnosed with Parkinson's in 2018, on Nov 1, 2023 pt became psychotic - has visions, hallucinations, paranoid delusions    Are there any communication barriers for this patient?     No                                               If yes, please describe barriers:   If there is a unique situation, please refer to Rafael Jeffers/Gali Waterman for final determination.    Are you taking any psychiatric medications? Yes     If \"YES\" -What are they: Seroquel, Cardipoda-Levodopa     If \"YES\" -Who prescribes? Dariana Mari    Has the Patient previously received outpatient Talk Therapy or Medication Management from [facility]  Yes        If \"YES\"- When, Where and with Whom? Pt at [facility] neurology        If \"NO\" -Has Patient received these services elsewhere?       If \"YES\" -When, Where, and with Whom?    Has the Patient abused alcohol or other substances in the last 6 months ? No  No concerns of substance abuse are reported.     If \"YES\" -What substance, How much, How often?     If illegal substance: Refer to [facility] (for RUFUS) or [facility] " "Offices.   If Alcohol in excess of 10 drinks per week:  Refer to [facility] (for RUFUS) or [facility] Offices    Legal History-     Is this treatment court ordered? No   If \"yes \"send to :  Talk Therapy : Send to Rafael Jeffers/Gali Waterman for final determination   Med Management: Send to Dr Aponte for final determination     Has the Patient been convicted of a felony?  NO   If \"Yes\" send to -When, What?  Talk Therapy : Send to Rafael Waterman for final determination   Med Management: Send to Dr Aponte for final determination     ACCEPTED as a patient Yes  If \"Yes\" Appointment Date:  with Elzbieta Carreno: Fri, Feb 2, 2024 @ 11am & Fri, Mar 1, 2024 @ 11am  With Erica Sherman: Fri, Jan 26, 2024 @ 11am & Fri, Feb 9, 2024 @ 11am    Referred Elsewhere? No  If “Yes” - (Where? Ex: [facility], [facility], [facility], [facility], etc.)       Name of Insurance Co: Medicare  Insurance ID# [id number]  Insurance Phone #   If ins is primary or secondary? primary  If patient is a minor, parents information such as Name, D.O.B of guarantor.    RTE could not be run for Feb/Mar appts due to being too far out.  "

## 2024-01-04 NOTE — TELEPHONE ENCOUNTER
IC left voice message for pt about scheduling talk therapy and medication management appts at the [facility] office.

## 2024-01-05 NOTE — TELEPHONE ENCOUNTER
IC spoke with pt's wife and talk therapy appts have been cancelled until pt sees Elzbieta Carreno and she can determine the most appropriate level of care for pt.

## 2024-01-10 DIAGNOSIS — N40.1 BENIGN PROSTATIC HYPERPLASIA WITH LOWER URINARY TRACT SYMPTOMS, SYMPTOM DETAILS UNSPECIFIED: ICD-10-CM

## 2024-01-10 DIAGNOSIS — G20.A1 PARKINSON'S DISEASE WITHOUT DYSKINESIA OR FLUCTUATING MANIFESTATIONS: Primary | ICD-10-CM

## 2024-01-10 RX ORDER — RIVASTIGMINE TARTRATE 1.5 MG/1
1.5 CAPSULE ORAL 2 TIMES DAILY
Qty: 60 CAPSULE | Refills: 2 | Status: SHIPPED | OUTPATIENT
Start: 2024-01-10

## 2024-01-17 DIAGNOSIS — G20.A1 PARKINSON'S DISEASE: ICD-10-CM

## 2024-01-23 ENCOUNTER — TELEMEDICINE (OUTPATIENT)
Dept: NEUROLOGY | Facility: CLINIC | Age: 79
End: 2024-01-23
Payer: MEDICARE

## 2024-01-23 DIAGNOSIS — I95.1 ORTHOSTATIC HYPOTENSION: ICD-10-CM

## 2024-01-23 DIAGNOSIS — R44.3 HALLUCINATIONS: ICD-10-CM

## 2024-01-23 DIAGNOSIS — G20.A1 PARKINSON'S DISEASE WITHOUT DYSKINESIA OR FLUCTUATING MANIFESTATIONS: Primary | ICD-10-CM

## 2024-01-23 PROCEDURE — 99215 OFFICE O/P EST HI 40 MIN: CPT | Performed by: PSYCHIATRY & NEUROLOGY

## 2024-01-23 NOTE — PATIENT INSTRUCTIONS
Parkinson disease complicated by the development of mild cognitive changes, followed by hallucinations delusions which have worsened since onset in October despite reductions in levodopa, addition of quetiapine and trials of Nuplazid.    -Will increase rivastigmine to 3 mg twice daily  -Quetiapine initially helped with sleep and evening symptoms.  There has not been any additional benefit noted during the daytime.  Will therefore have her reduce quetiapine to 1 tablet 3 times daily for a week and then 1 tablet twice daily in the afternoon and at night.  -Will continue on carbidopa/levodopa at his current dose for now.  Will consider if we can further reduce levodopa in the future by switching to an extended release version.  Recommended in the meantime he go to physical therapy to improve motor function.  He also discussed the option of cognitive therapy.  -He is scheduled to meet with psychiatry next week.  Will appreciate any input on any other additional occasions for mood

## 2024-01-23 NOTE — ASSESSMENT & PLAN NOTE
Parkinson disease complicated by the development of mild cognitive changes, followed by hallucinations delusions which have worsened since onset in October despite reductions in levodopa, addition of quetiapine and trials of Nuplazid.  Delusions and hallucinations have impacted quality of life as when they are occurring he has poor insight.  They can be threatening and that he sees people outside his home demonstrating.  Also appears to perhaps have Cagras symptoms as a fear of being transported to a similar duplicate home has him dressing to go to bed. This can be seen in Dementia with Lewy bodies.      -Will increase rivastigmine to 3 mg twice daily  -Quetiapine initially helped with sleep and evening symptoms.  There has not been any additional benefit noted during the daytime.  Will therefore have her reduce quetiapine to 1 tablet 3 times daily for a week and then 1 tablet twice daily in the afternoon and at night.  -Will continue on carbidopa/levodopa at his current dose for now.  Will consider if we can further reduce levodopa in the future by switching to an extended release version.  Recommended in the meantime he go to physical therapy to improve motor function.  He also discussed the option of cognitive therapy.  -He is scheduled to meet with psychiatry next week.  Will appreciate any input on any other additional occasions for mood

## 2024-01-23 NOTE — PROGRESS NOTES
Virtual Regular Visit    Verification of patient location:    Patient is located at Home in the following state in which I hold an active license [place]      Assessment/Plan:    Problem List Items Addressed This Visit       Parkinson's disease - Primary     Parkinson disease complicated by the development of mild cognitive changes, followed by hallucinations delusions which have worsened since onset in October despite reductions in levodopa, addition of quetiapine and trials of Nuplazid.  Delusions and hallucinations have impacted quality of life as when they are occurring he has poor insight.  They can be threatening and that he sees people outside his home demonstrating.  Also appears to perhaps have Cagras symptoms as a fear of being transported to a similar duplicate home has him dressing to go to bed. This can be seen in Dementia with Lewy bodies.      -Will increase rivastigmine to 3 mg twice daily  -Quetiapine initially helped with sleep and evening symptoms.  There has not been any additional benefit noted during the daytime.  Will therefore have her reduce quetiapine to 1 tablet 3 times daily for a week and then 1 tablet twice daily in the afternoon and at night.  -Will continue on carbidopa/levodopa at his current dose for now.  Will consider if we can further reduce levodopa in the future by switching to an extended release version.  Recommended in the meantime he go to physical therapy to improve motor function.  He also discussed the option of cognitive therapy.  -He is scheduled to meet with psychiatry next week.  Will appreciate any input on any other additional occasions for mood           Orthostatic hypotension    Hallucinations            Reason for visit is   Chief Complaint   Patient presents with    Virtual Regular Visit          Encounter provider Dariana Mari MD    [place] [facility] [facility] [place] [address] [address]  [address]  [place]  [id number]  [phone]      Recent Visits  No visits were found meeting these conditions.  Showing recent visits within past 7 days and meeting all other requirements  Today's Visits  Date Type Provider Dept   01/23/24 Telemedicine Dariana Mari MD [facility]   Showing today's visits and meeting all other requirements  Future Appointments  No visits were found meeting these conditions.  Showing future appointments within next 150 days and meeting all other requirements       The patient was identified by name and date of birth. Gabriel Gonzalez was informed that this is a telemedicine visit and that the visit is being conducted through the Epic Embedded platform. He agrees to proceed..  My office door was closed. No one else was in the room.  He acknowledged consent and understanding of privacy and security of the video platform. The patient has agreed to participate and understands they can discontinue the visit at any time.    Patient is aware this is a billable service.     Subjective  Gabriel Gonzalez is a 78 y.o. male      Gabriel Gonzalez is a 78 y.o. male who presents for follow up for parkinsonism with concerns regarding recent development of hallucinations. To review, symptom onset in 2016 with slowness of gait with subsequent development of LH tremor and orthostatic hypotension. Initially seen by Dr Quinn and started on Sinemet, which was exacerbating his orthostatic intolerance. Sinemet discontinued and focus initially on treating orthostatic symptoms which improved on midodrine.      -Called 11/3/2023 with concerns regarding hallucinations. UA negative. Hallucinations persisted and he was seen in the ER 11/9/2023. Infectious workup negative. He was started on Seroquel 25 mg nightly.   -Seen by Lawanda 11/23/2023 and was reluctant to make any reductions in levodopa as previously recommended. He was weaned off Xanax XR by his PCP after recommendation. Delusions/ hallucinations day  and night (children in yard, threat related to his job.)  -Nuplazid increased hallucinations and confusion and was discontinued in 3 days.   -Quetiapine 50 mg nightly initially helped with sleep. No improvement with reduction in levodopa.      Seen Dec 18th. Recommended further reduction in carbidopa/levodopa to 1 tablet at 7, 1030, 230, 630 and 1 tablet as needed overnight if he is unable to go back to sleep. Slow titration of quetiapine to 25mg am and afternoon and night. Recommended he take a break from working. We spoke of the likelihood that this is progression of his Parkinson disease with the development of dementia with hallucinations.  In reviewing chart mild cognitive changes noted by patient and family 7/2022.  No mention again until 10/2023 where there was worsening of short term memory and hallucinations.   Rivastigmine 1.5mg bid added 1/10/24. Quieter on 100mg total daily quetiapine   Mybetriq discontinued.  He was having increased urinary frequency over the past week so Myrbetriq was restarted last night with no adverse effects.  So far no improvement on the rivastigmine but tolerating.     He continues to have delusions and paranoia. He sleeps in clothing as he feels that there is risk of him being transported to a another similar location overnight.  When asked he does state it does seem like it is another home but different parts of the home.  Does not occur each night.  He continues to have threatening hallucinations during the day where he sees demonstrations outside.  Poor insight experiencing hallucinations.  He is meeting what he is doing.  In part he states he is going into work to get out of the home where that his hallucinations are occurring.  Dary he is overall bit slower and but still functioning well.  Able to do all his activities of daily living independently.  Denies any dysphagia.  Gait is overall slower.  He has a prescription for physical therapy but he is not yet to start it.   He denies any wearing off the medication.  He takes midodrine once daily seems to be controlling any orthostatic symptoms he was experiencing.     Current PD regimen:  Carbidopa/levodopa 25/100 1.5 tabs amt 7 am, 10:30am , 2:30pm and  1 tab at 6pm and bed time (650mg daily)   Rivastigmine 1.5mg bid  Quetiapine 25mg qam, noon, 4pm and night   midodrine once daily           No past medical history on file.    Past Surgical History:   Procedure Laterality Date    CYST REMOVAL      Right wrist    PROSTATE SURGERY      Biopsy    TONSILLECTOMY         Current Outpatient Medications   Medication Sig Dispense Refill    atorvastatin (LIPITOR) 20 mg tablet TAKE ONE TABLET BY MOUTH EVERY DAY 90 tablet 1    carbidopa-levodopa (SINEMET)  mg per tablet TAKE 1 TABLET BY MOUTH AT  7AM, TAKE 2 TABLETS AT 10:30AM, TAKE 1 TABLET AT 2:30PM, TAKE 1 TABLET AT 6:30PM, AND TAKE 1 TABLET AT 10:30PM. 540 tablet 1    midodrine (PROAMATINE) 2.5 mg tablet Take 1 tab twice daily as needed (Patient taking differently: in the morning Take 1 tab twice daily as needed) 60 tablet 0    Myrbetriq 50 MG TB24 TAKE ONE TABLET BY MOUTH EVERY DAY 60 tablet 0    QUEtiapine (SEROquel) 25 mg tablet 1 po am and afternoon, 2 po qhs 120 tablet 5    rivastigmine (EXELON) 1.5 mg capsule Take 1 capsule (1.5 mg total) by mouth 2 (two) times a day 60 capsule 2     No current facility-administered medications for this visit.        No Known Allergies    Review of Systems    Video Exam    There were no vitals filed for this visit.    Physical Exam   Mental status: Patient is awake, alert.   Follows commands. Mild hypophonia  Blind MOCA 14/30   Registered 4 out of 5 (no points)   Attention: Digits 2/2  Letters: 1/1  Serial 7's 3/3  Repetition 2/2  Fluency 0 (8 words in 1 min)  Abstraction 2/2  Recall 1/5  Orientation:  3 Month: feb, Date 22  Cranial Nerves:   CN VII- symmetric facial movements  CN VIII-used to voice    Motor: no pronator drift  Coordination: No  tremor with hands outstretched. No intentional tremor on nose to extended arm bilaterally.  Mild decrement on finger taps right 1 left to, hand  normal, rapid altering movements normal.  Gait: Slow ambulation but no freezing or festination.  Visit Time  Total Visit Duration: 55 minutes. Additional 15 minutes on review of chart and documentation.

## 2024-01-29 DIAGNOSIS — G20.A1 PARKINSON'S DISEASE WITHOUT DYSKINESIA OR FLUCTUATING MANIFESTATIONS: ICD-10-CM

## 2024-01-29 RX ORDER — RIVASTIGMINE TARTRATE 1.5 MG/1
1.5 CAPSULE ORAL 2 TIMES DAILY
Qty: 60 CAPSULE | Refills: 0 | Status: CANCELLED | OUTPATIENT
Start: 2024-01-29

## 2024-01-29 RX ORDER — RIVASTIGMINE TARTRATE 3 MG/1
3 CAPSULE ORAL 2 TIMES DAILY
Qty: 60 CAPSULE | Refills: 3 | Status: SHIPPED | OUTPATIENT
Start: 2024-01-29

## 2024-01-29 NOTE — TELEPHONE ENCOUNTER
Pt is requesting a script for rivastigmine 3 mg BID. This was changed during telemed visit on 1/23/24.

## 2024-01-29 NOTE — TELEPHONE ENCOUNTER
Called wife, Dariana, and left her a detailed message making her aware that a script for the 3 mg strength was sent to the pharmacy, so pt will only take one cab twice daily. Also sent a message through [employer].

## 2024-01-30 ENCOUNTER — OFFICE VISIT (OUTPATIENT)
Dept: FAMILY MEDICINE CLINIC | Facility: CLINIC | Age: 79
End: 2024-01-30
Payer: MEDICARE

## 2024-01-30 VITALS
DIASTOLIC BLOOD PRESSURE: 78 MMHG | WEIGHT: 200 LBS | SYSTOLIC BLOOD PRESSURE: 116 MMHG | OXYGEN SATURATION: 98 % | HEART RATE: 64 BPM | RESPIRATION RATE: 16 BRPM | HEIGHT: 72 IN | BODY MASS INDEX: 27.09 KG/M2

## 2024-01-30 DIAGNOSIS — G20.B2 PARKINSON'S DISEASE WITH DYSKINESIA AND FLUCTUATING MANIFESTATIONS: ICD-10-CM

## 2024-01-30 DIAGNOSIS — F51.04 PSYCHOPHYSIOLOGICAL INSOMNIA: Primary | ICD-10-CM

## 2024-01-30 DIAGNOSIS — R44.1 VISUAL HALLUCINATIONS: ICD-10-CM

## 2024-01-30 DIAGNOSIS — E53.8 B12 DEFICIENCY: ICD-10-CM

## 2024-01-30 DIAGNOSIS — R74.8 ABNORMAL LEVELS OF OTHER SERUM ENZYMES: ICD-10-CM

## 2024-01-30 DIAGNOSIS — F22 DELUSIONS (HCC): ICD-10-CM

## 2024-01-30 DIAGNOSIS — N18.31 STAGE 3A CHRONIC KIDNEY DISEASE (HCC): ICD-10-CM

## 2024-01-30 DIAGNOSIS — E53.9 VITAMIN B DEFICIENCY: ICD-10-CM

## 2024-01-30 PROCEDURE — 99214 OFFICE O/P EST MOD 30 MIN: CPT | Performed by: FAMILY MEDICINE

## 2024-01-30 NOTE — PROGRESS NOTES
Physical Exam  Vitals and nursing note reviewed.   Constitutional:       Appearance: He is well-developed.   HENT:      Head: Normocephalic and atraumatic.   Eyes:      Conjunctiva/sclera: Conjunctivae normal.      Pupils: Pupils are equal, round, and reactive to light.   Cardiovascular:      Rate and Rhythm: Normal rate and regular rhythm.      Heart sounds: Normal heart sounds.   Pulmonary:      Effort: Pulmonary effort is normal.      Breath sounds: Normal breath sounds. No wheezing or rales.   Abdominal:      General: Bowel sounds are normal. There is no distension.      Palpations: Abdomen is soft.      Tenderness: There is no abdominal tenderness.   Musculoskeletal:         General: No tenderness. Normal range of motion.      Cervical back: Normal range of motion and neck supple.   Skin:     General: Skin is warm and dry.      Findings: No rash.   Neurological:      Mental Status: He is alert.      Cranial Nerves: No cranial nerve deficit.      Sensory: No sensory deficit.      Coordination: Coordination normal.      Gait: Gait abnormal.         Name: Gabriel Gonzalez      : 1945      MRN: [id number]  Encounter Provider: Julian Rudolph MD  Encounter Date: 2024   Encounter department: [facility]    Assessment & Plan     Assessment & Plan  1. Dementia with Lewy body.  His hallucinations are part more of the Lewy body, but I do not recall if it has some parkinsonian effects first. We discussed about Zyprexa. And changing ohysical location as that he only has these at home and at the office   But he doesn't go anywhere else     Maybe consider getting a hotelroom for a night to see if he still gets them??? They will discuss with [facility]     2. Overactive bladder.  I will do a urinalysis.I think since the myrbetriq is not hleping he can just stop it       Follow-up  The patient will follow up in 4 months.  1. Psychophysiological insomnia  -     UA w Reflex to Microscopic w Reflex  to Culture; Future    2. Parkinson's disease with dyskinesia and fluctuating manifestations  -     UA w Reflex to Microscopic w Reflex to Culture; Future    3. Visual hallucinations  -     UA w Reflex to Microscopic w Reflex to Culture; Future    4. Delusions (HCC)    5. Stage 3a chronic kidney disease (HCC)    6. B12 deficiency  -     Vitamin B12    7. Vitamin B deficiency  -     Vitamin B1 (Thiamine), Serum/Plasma, LC/MS/MS  -     Vitamin B6    8. Abnormal levels of other serum enzymes  -     Vitamin B6        Depression Screening and Follow-up Plan: Patient was screened for depression during today's encounter. They screened negative with a PHQ-2 score of 1.      Subjective      History of Present Illness  The patient presents for evaluation of multiple medical concerns. He is accompanied by his wife.    He has been feeling pretty decent. He has not really had any major physical issues except for the psychological stuff. Physically, he has been doing better. He is walking around a lot and not falling over. He still has pain in his right thigh that radiates to the right leg when he walks. He is going to have a physical therapy evaluation next Friday as he has been out of physical therapy too long and needs to be back in. He has a  coming to his house. Over the last 2 or 3 months, this problems have occurred. He has missed most of the apts . He has an occasional hard to breathe feeling when he exerts himself, which he attributes to less exercise.     He saw Dr. Patel last week. The Seroquel did not really do anything at all. The highest she raised it to was 25 mg 4 times a day, 25 mg 4 times a day. She is working it back down again. She had him take him off the Xanax in case that was the issue. She added rivastigmine for focus, which he has been doing for 2.5 weeks. The delusional psychotic thing has not changed at all. They see the geriatric psychiatrist for the first time this Friday. They will look  at all the medications and see if any of that makes sense.     It was a repetitive event. He can go through a day and have 3 of those episodes where he can go through that day having 12 of those episodes. It happens every day and at night. It is a result of seeing something through the window. He sees things and then reacts to them. There is some recognition that is not real, but not full recognition. His handwriting is terrible. His short-term memory has been challenged by this. He was diagnosed with Parkinson's in 2017 or 2019. He was sent to [facility] neurologist.  They did not start any medication. He was sent to [facility], Dr. Hopkins, for 4 visits. Dr. Spain said he did not need to start the levodopa yet. He mainly treated the midodrine for the balance and the orthostatic hypotension. The orthostatic hypotension has been bothering him all this time, but in the last 3 months, it is not so much.     The Zoloft did not really do anything for him. Dr. Patel and wife were discussing with him on the telehealth appointment last week that Zoloft may calm him from these hallucinations and anxiety. Dr. Hopkins did not want to start Zoloft until he sees the geriatric psychiatrist. He has never significantly gotten into the exercise mode. He saw Dr. Rashid twice a week for 2 years. Most of that was not focused on cardio exercise. He was totally focused on strength training and balance.     In the last 3 months, the paranoid delusions came on suddenly. The event that he gets upset about is always the same event he is being accused of not making a chartable gift to a [employer]. The children and young adults appear outside their house and yell epithels at him. It lasts for 5 to 10 minutes and then it goes away. He can usually respond to it without a problem. The next day, it may happen 3 or 4 times. During narrative events, things have gotten more and more gruelsome like somebody who is urinating on him and  people who are smoking do not in their house. When he gets in bed, there are things that fight his tones. It is extremely stressful for him.     The first time they mentioned hallucination was in 10/2023, but they were more nonbothersome and not bothersome. He had 1 severe delusional episode in 08/2023. They went to the hospital on 11/09/2023 and they did a complete work-up, which showed no stroke. The levodopa was increased at a different time. The paranoia would reverse itself. The forgetfulness and short-term memory can happen 12 times in a day. Today is a fairly good day.     The house has a lot of windows around it. By 7:00 in the morning, the kids from these schools are already on their front yard waiting to see them. When he is in the window, people start yelling and screaming, but there is no physical confrontation. He withdraws, and it goes away. He gets dressed and eats breakfast and goes to work, and the same thing will happen again in the morning. It happens only at home. They are in the process of downsizing their house. It has been 5 years since his wife left their house to stay. Dr. Patel agreed that talking through with the psychiatrist to find some mental tools to diminish and hopefully reverse.    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 The Myrbetriq was supposed to help with his urology issues. He takes it at night now instead of during the day because it seemed to affect the other drugs more when he took it in the morning. He had a cystoscope in 05/2023, and about a week later, he got a bad UTI. He was extremely confused. The first symptom was the burning. He was treated with an antibiotic for about 3 weeks. He has done urine tests twice since then. He does not actively have any sort of urine infection.      Review of Systems   Constitutional:  Positive for appetite change (low appetite). Negative for fatigue and fever.   HENT:  Positive for voice change (only when tired). Negative  for hearing loss, tinnitus and trouble swallowing.    Eyes: Negative.  Negative for photophobia, pain and visual disturbance.   Respiratory: Negative.  Negative for shortness of breath.    Cardiovascular: Negative.  Negative for palpitations.   Gastrointestinal: Negative.  Negative for nausea and vomiting.   Endocrine: Negative.  Negative for cold intolerance.   Genitourinary: Negative.  Negative for dysuria, frequency and urgency.   Musculoskeletal:  Positive for gait problem (due to leg weakness) and myalgias. Negative for back pain and neck pain.        Balance Issues     Skin: Negative.  Negative for rash.   Allergic/Immunologic: Negative.    Neurological:  Positive for dizziness (a couple times a day, has to sit to help go away), tremors (Mild in hands, Left worse than Right, has stayed the same) and weakness (Right Leg which causes issues with gait). Negative for seizures, syncope, facial asymmetry, speech difficulty, light-headedness, numbness and headaches.   Hematological: Negative.  Does not bruise/bleed easily.   Psychiatric/Behavioral:  Positive for confusion and hallucinations. Negative for sleep disturbance.         Delusions  Paranoid   All other systems reviewed and are negative.      Current Outpatient Medications on File Prior to Visit   Medication Sig    atorvastatin (LIPITOR) 20 mg tablet TAKE ONE TABLET BY MOUTH EVERY DAY    carbidopa-levodopa (SINEMET)  mg per tablet TAKE 1 TABLET BY MOUTH AT  7AM, TAKE 2 TABLETS AT 10:30AM, TAKE 1 TABLET AT 2:30PM, TAKE 1 TABLET AT 6:30PM, AND TAKE 1 TABLET AT 10:30PM.    midodrine (PROAMATINE) 2.5 mg tablet Take 1 tab twice daily as needed (Patient taking differently: in the morning Take 1 tab twice daily as needed)    Myrbetriq 50 MG TB24 TAKE ONE TABLET BY MOUTH EVERY DAY    QUEtiapine (SEROquel) 25 mg tablet 1 po am and afternoon, 2 po qhs    rivastigmine (EXELON) 3 mg capsule Take 1 capsule (3 mg total) by mouth 2 (two) times a day       Objective      /78 (BP Location: Right arm, Patient Position: Sitting, Cuff Size: Standard)   Pulse 64   Resp 16   Ht 6' (1.829 m)   Wt 90.7 kg (200 lb)   SpO2 98%   BMI 27.12 kg/m²     Physical Exam    Julian Rudolph MD

## 2024-02-01 NOTE — PSYCH
"This note was not shared with the patient due to reasonable likelihood of causing patient harm    PSYCHIATRIC EVALUATION     [facility]    Name and Date of Birth:  Gabriel Gonzalez 78 y.o. 1945    Date of Visit: 02/02/24    Reason for visit:   Chief Complaint   Patient presents with    Establish Care    Medication Management     HPI     Gabriel Gonzalez is a nuha 78 y.o. male with a history of  Parkinson's dz with hallucinations and delusions  who presents today to establish care and for medication management. His wife, Dariana, also presents with him and helped with the history. He reports he has never seen a psychiatrist or done talk therapy before. He was referred by his neurologist to evaluate for any med changes suggested for mood as well as visual hallucinations/delusions. He currently takes quetiapine 25 mg qPM and 50 mg qhs. He has never been diagnosed with any psychiatric conditions prior to this (current dx is apparently \"parkinson's psychosis dementia\"). He feels his main problems are the visual hallucinations and delusions and the anxiety they cause. He does admit to always being a \"worrier\" throughout his life and he has had panic attacks in the past but none recently. He has been more anxious and preoccupied since the onset of the hallucinations and says it is now all day, everyday. He does report sleeping well, though apparently he thinks he goes on a \"ride\" every night and there are machines that attack his toes. He reports that he has had intermittent, benign hallucinations for the last year or so (dx with PD in 2017) but since November 1, 2023 he has had near daily hallucinations that have led to delusions about the people he sees. He reports it started with seeing teenagers hitting a dead deer in his yard. He went out to confront them, but no one was there. He told his son who confirmed no one was there. Since then these teenagers have been " "following him insistently including being in their yard but now also in their home (both doing benign things like watching TV but also doing other things like urinating on him and being aggressive towards the dogs). He also has seen them smoking pot in their house and smelled it as well, but his wife is often present for these and he will check with her to see if she is experiencing them as well, and the answer is always no. He has now started to believe that they built a secret way into and out of his house in their sunroom and that they steal him at night, and that they are trying to get the house from him. He sometimes sees things at his office as well, but he associates all of these happenings with his home. He is often aware that no one else can see these things and he is aware after the fact as well. He thinks about the hallucinations and the fact that they want his house obsessively. He denies any rituals associated with this. He has never had any auditory hallucinations. He does admit to some concentration issues since the hallucinations started. He and his wife feel that this started quite suddenly but all tests for underlying physiologic causes have been negative. He has been dx with parkinson's dz and tried on Nuplazid (had worse hallucinations but only 3 day trial), Zoloft (no benefit), and Xanax for sleep. He rarely feels down, maybe about once a week but it doesn't last all day. His mood is \"generally good\". He denies thoughts of hopelessness. He admits to some thoughts of worthlessness. He denies anhedonia, anergy, and appetite changes. He denies history of SIB, SI, HI, and suicide attempts. He denies any inpatient hospitalizations for mental health.  He denies any known family history of psychiatric diagnoses or suicide attempts. He denies history of monalisa, eating disorders, AH, and trauma/abuse. He lives with his wife, Dariana, and their two dachshunds (Sterling and Ayaan). He is self-employed as an " " and is still working (and generally still doing this well). He doesn't do much outside of work. He feels his main stressor is the health issues. He feels work, talking to his wife, and petting his dogs are good de-stressors for him. He has bout 1 alcoholic drink every 2 months. He denies history or current use of tobacco/nicotine, cannabis, or illicit drugs. He denies access to firearms in the home. His PMH includes orthostatic hypotension, HLD, PD, and prostate issues.     He denies any suicidal ideation, intent or plan at present, denies any homicidal ideation, intent or plan at present.  He denies any auditory hallucinations, denies any visual hallucinations, denies any delusions.  He denies any side effects from current psychiatric medications.  .  HPI ROS Appetite Changes and Sleep: normal sleep, normal appetite, normal energy level    Psychiatric Review Of Systems:    Sleep changes: no  Appetite changes: no  Weight changes: no  Energy/anergy: no  Interest/pleasure/anhedonia: no  Somatic symptoms: no  Anxiety/panic: yes, panic attacks, worrying daily  Shamika: no  Guilty/hopeless: no  Self injurious behavior/risky behavior: no  Suicidal ideation: no  Homicidal ideation: no  Auditory hallucinations: no  Visual hallucinations: yes, visual hallucinations of \"people\"  Other hallucinations: yes, olfactory (can smell the people smoking pot in his house)  Delusional thinking: paranoid delusions, persecutory delusions  Eating disorder history: no  Obsessive/compulsive symptoms: obsessive thoughts    Review Of Systems:    Mood Anxiety   Behavior Normal    Thought Content Disturbing Thoughts, Feelings and Unreasonalbe or Irrational Fears   General Normal    Personality Normal   Other Psych Symptoms Normal   Constitutional as noted in HPI   ENT as noted in HPI   Cardiovascular as noted in HPI   Respiratory as noted in HPI   Gastrointestinal as noted in HPI   Genitourinary as noted in HPI   Musculoskeletal as noted " in HPI   Integumentary as noted in HPI   Neurological as noted in HPI   Endocrine negative   Other Symptoms none       Past Psychiatric History:     Past Inpatient Psychiatric Treatment:   No history of past inpatient psychiatric admissions  Past Outpatient Psychiatric Treatment:    No history of past outpatient psychiatric treatment  Past Suicide Attempts: no  Past Violent Behavior: no  Past Psychiatric Medication Trials:  Nuplazid, Zoloft, and Xanax    Family Psychiatric History:     Family History   Problem Relation Age of Onset    Heart disease Mother     Heart disease Father        Social History     Substance and Sexual Activity   Drug Use No     Social History     Socioeconomic History    Marital status: /Civil Union     Spouse name: Not on file    Number of children: 3    Years of education: Not on file    Highest education level: Not on file   Occupational History    Occupation:    still active    Occupation: retired    Tobacco Use    Smoking status: Never    Smokeless tobacco: Never   Vaping Use    Vaping status: Never Used   Substance and Sexual Activity    Alcohol use: Yes     Comment: social    Drug use: No    Sexual activity: Not Currently   Other Topics Concern    Not on file   Social History Narrative    Most recent tobacco use screenin-    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    If Yes, What branch of service: [employer]    [employer]    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, into active duty, 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    Advance directive: Yes    Alcohol intake: Moderate    Caffeine intake: Moderate    Illicit drugs: none    Occupation: retired    Exercise level: Occasional    Single or multi-level home/work: single level home    Live alone or with others: with others    Chewing tobacco: none    Marital status:     Number of children: 3    Diet: Regular    Sexual orientation: Heterosexual    Smoke alarm in home: Yes    General  stress level: Medium    Sunscreen used routinely: No    Education: Post Graduate    Guns present in home: No    Presence of domestic violence: No     Social Determinants of Health     Financial Resource Strain: Low Risk  (2023)    Overall Financial Resource Strain (CARDIA)     Difficulty of Paying Living Expenses: Not hard at all   Food Insecurity: Not on file   Transportation Needs: No Transportation Needs (2023)    PRAPARE - Transportation     Lack of Transportation (Medical): No     Lack of Transportation (Non-Medical): No   Physical Activity: Not on file   Stress: Not on file   Social Connections: Not on file   Intimate Partner Violence: Not on file   Housing Stability: Not on file     Social History     Social History Narrative    Most recent tobacco use screenin-    Do you currently or have you served in the  [employer]: Yes    If Yes, What branch of service: [employer]    [employer]    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, into active duty, 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    Advance directive: Yes    Alcohol intake: Moderate    Caffeine intake: Moderate    Illicit drugs: none    Occupation: retired    Exercise level: Occasional    Single or multi-level home/work: single level home    Live alone or with others: with others    Chewing tobacco: none    Marital status:     Number of children: 3    Diet: Regular    Sexual orientation: Heterosexual    Smoke alarm in home: Yes    General stress level: Medium    Sunscreen used routinely: No    Education: Post Graduate    Guns present in home: No    Presence of domestic violence: No       Traumatic History:     Abuse:  N/A  Other Traumatic Events:  N/A    History Review:    normal bulk, normal tone    OBJECTIVE:     Mental Status Evaluation:    Appearance age appropriate, casually dressed, dressed appropriately, adequate grooming, looks older than stated age   Behavior pleasant, cooperative   Speech normal rate, normal volume,  "normal pitch   Mood euthymic   Affect mood-congruent   Thought Processes organized, logical   Associations intact associations   Thought Content persecutory delusions, obsessive thoughts, ruminations   Perceptual Disturbances: no auditory hallucinations, visual hallucinations of \"people\"   Abnormal Thoughts  Risk Potential Suicidal ideation - None  Homicidal ideation - None  Potential for aggression - No   Orientation oriented to person, place, and time/date   Memory recent and remote memory grossly intact   Cosciousness alert and awake   Attention Span attention span and concentration are age appropriate   Intellect Appears to be of Average Intelligence   Insight fair   Judgement fair   Muscle Strength and  Gait normal muscle strength and normal muscle tone, normal gait and normal balance   Language no difficulty naming common objects   Fund of Knowledge displays adequate knowledge of current events   Pain none   Pain Scale N/A     No abnormal movements noted throughout visit.    Laboratory Results: No results found for this or any previous visit.    Assessment/Plan:     Pt is suffering from significant visual hallucinations with associated delusions which I feel are likely related to the parkinson's like process, possibly Lewy Body dementia. Unfortunately this is very difficult to treat and SGA use has to be monitored closely. He is already on quetiapine 25 mg qPM and 50 mg qhs. As they have not noticed a benefit to the daytime dose I have advised stopping this and just keeping the nighttime dose as they do feel it helps with sleep. Nuplazid was already tried and lead to increased hallucinations (though only given a 3 day trial). I would like to discuss this case with my supervisor and the neurologist before making any other recommendations. He is not stable enough to benefit from therapy at this time. We have discussed their safety plan and pt agrees that if they experience unsafe thoughts that they will reach " out to their supports including this office, the suicide hotline, and emergency services if necessary. Patient is aware of non-emergent and emergent mental health resources. They are able to contract for their own safety at this time.    Will follow up in 2 weeks. Patient is aware to call the office if questions or concerns arise sooner.       Diagnoses and all orders for this visit:    Delusions (HCC)    Hallucinations    Psychosis due to Parkinson's disease    Visual hallucinations  -     QUEtiapine (SEROquel) 25 mg tablet; Take 2 tablets (50 mg total) by mouth daily at bedtime 1 po am and afternoon, 2 po qhs          Treatment Recommendations/Precautions:    Decrease medications:    - quetiapine to just 50 mg qhs    Risks/Benefits      Risks, Benefits And Possible Side Effects Of Medications:    Risks, benefits, and possible side effects of medications explained to patient and patient verbalizes understanding and agreement for treatment.    Controlled Medication Discussion:     Not applicable    Psychotherapy Provided:     Individual psychotherapy provided: No    Visit Start Time:  11:00 AM  Visit End Time:  12:00 PM  Total Visit Duration: 60 minutes    Elzbieta Carreno PA-C

## 2024-02-02 ENCOUNTER — OFFICE VISIT (OUTPATIENT)
Dept: PSYCHIATRY | Facility: CLINIC | Age: 79
End: 2024-02-02
Payer: MEDICARE

## 2024-02-02 DIAGNOSIS — R44.3 HALLUCINATIONS: ICD-10-CM

## 2024-02-02 DIAGNOSIS — F22 DELUSIONS (HCC): Primary | ICD-10-CM

## 2024-02-02 DIAGNOSIS — F06.8 PSYCHOSIS DUE TO PARKINSON'S DISEASE: ICD-10-CM

## 2024-02-02 DIAGNOSIS — R44.1 VISUAL HALLUCINATIONS: ICD-10-CM

## 2024-02-02 DIAGNOSIS — G20.A1 PSYCHOSIS DUE TO PARKINSON'S DISEASE: ICD-10-CM

## 2024-02-02 PROCEDURE — 90792 PSYCH DIAG EVAL W/MED SRVCS: CPT | Performed by: PHYSICIAN ASSISTANT

## 2024-02-02 RX ORDER — QUETIAPINE FUMARATE 25 MG/1
50 TABLET, FILM COATED ORAL
Qty: 60 TABLET | Refills: 5 | Status: SHIPPED | OUTPATIENT
Start: 2024-02-02

## 2024-02-06 ENCOUNTER — PATIENT MESSAGE (OUTPATIENT)
Dept: UROLOGY | Facility: AMBULATORY SURGERY CENTER | Age: 79
End: 2024-02-06

## 2024-02-06 ENCOUNTER — TELEPHONE (OUTPATIENT)
Dept: PSYCHIATRY | Facility: CLINIC | Age: 79
End: 2024-02-06

## 2024-02-06 DIAGNOSIS — R35.0 URINARY FREQUENCY: Primary | ICD-10-CM

## 2024-02-06 RX ORDER — MIRABEGRON 25 MG/1
25 TABLET, FILM COATED, EXTENDED RELEASE ORAL DAILY
Qty: 90 TABLET | Refills: 1 | Status: SHIPPED | OUTPATIENT
Start: 2024-02-06

## 2024-02-06 NOTE — TELEPHONE ENCOUNTER
"Received message forwarded to Elzbieta Carreno PA-C    From  [facility] (supporting Elzbieta Carreno PA-C) Sent  2/6/2024 10:01 AM       We stopped the afternoon dose of Seroquel (25 mg) and kept the evening dose (50 mg) for sleep.  He definitely needs that or something because 1 night he forgot to take it and couldn't sleep at all. We had to add the Myrbetriq (for bladder) back in because he said he needs it. I notified Urologist.   His daytime without the additional Seroquel is not significantly affected; perhaps a bit.  The \"fixed beliefs\" occupy his mind constantly and interfere with any semblance of ordinary normalcy.  Basically we are prisoners to it.  Please discuss with neurologist re further evaluation.   We need a definitive diagnosis.  We need to be able to tell him what is happening to him, because he does not believe at all in dementia. He says everyone has it (including me). No explanation or logic works with him despite his intelligence (which is high).  We desperately need help. His fixed beliefs rule the household and all our actions.     "

## 2024-02-09 ENCOUNTER — EVALUATION (OUTPATIENT)
Dept: PHYSICAL THERAPY | Facility: CLINIC | Age: 79
End: 2024-02-09
Payer: MEDICARE

## 2024-02-09 DIAGNOSIS — G20.A1 PARKINSON'S DISEASE: ICD-10-CM

## 2024-02-09 DIAGNOSIS — R26.9 GAIT ABNORMALITY: Primary | ICD-10-CM

## 2024-02-09 PROCEDURE — 97112 NEUROMUSCULAR REEDUCATION: CPT

## 2024-02-09 PROCEDURE — 97162 PT EVAL MOD COMPLEX 30 MIN: CPT

## 2024-02-09 NOTE — PROGRESS NOTES
PT Evaluation     Today's date: 2024  Patient name: Gabriel Gonzalez  : 1945  MRN: [id number]  Referring provider: Lawanda Joshi CRNP  Dx:   Encounter Diagnosis     ICD-10-CM    1. Gait abnormality  R26.9       2. Parkinson's disease  G20.A1 Ambulatory Referral to Physical Therapy          Start Time: 1300  Stop Time: 1345  Total time in clinic (min): 45 minutes    Assessment  Assessment details: Pt is a 78 year old male presenting to [facility] physical therapy for an initial evaluation secondary to dec funcational capacity assosiciated with parkinsons disease. Pt presents to clinic with gait and balance impairments inclusive of right sided trendelenburg gait, dec trunk rotation, dec arm swing w gait, dec endurance and LE strength, and dec functional balance.     Additionally pt demonstrates strength deficits as seen with M 5x STS time of 22.25s demonstrating dec LE strength and power as compared to age matched norms. Pt demonstrates endurance deficits as seen with 6mwt distance of 858 ft as compared to age matched norms of 1729 ft. Pt is at inc risk for falls based on their FGA score of 18/30  with scores < 20/30 at inc risk for spontaneous falls and scores < 22/30 considered at risk for falls.  Pt ambulates at 0.91 m/s making them a community Ambulator.  Additionally this score places the pt at inc risk for falls as scores < 1 m/s are considered at risk for falls.     Pt provided edu on symptoms of parkinson's such as festination, dec rotation, rigidity etc and exercises to address. HEP to be provided on first follow up visit.  Pt would benefit from skilled physical therapy to improve  overall QOL inclusive of, strength, coordination, balance, endurance and functional mobility in the home and in the community as well as reduce their fall risk for improved safety.    Impairments: abnormal gait, impaired balance, impaired physical strength, lacks appropriate home exercise program and safety  "issue    Plan  Patient would benefit from: skilled physical therapy  Planned therapy interventions: balance, gait training, home exercise program, neuromuscular re-education, patient education, postural training, strengthening, stretching, therapeutic activities, therapeutic exercise, functional ROM exercises and flexibility  Frequency: 2x week  Duration in visits: 16  Duration in weeks: 8  Plan of Care beginning date: 2/9/2024  Plan of Care expiration date: 4/5/2024  Treatment plan discussed with: patient      Subjective Evaluation    History of Present Illness  Mechanism of injury: Pt reports that he was diagnosed with parkinson's in 2017 and did not take any medications until 2019 (sinemet). Pt reprots that he previously had a  coming to the house to remain stain active but has not rk able to have those as of recent for several reasons and is looking for continued skilled car out of the house. Since stopping training he and his wife reports a decline in activity and function. Pt reports he would like to work on strength in legs and reports his balance is \"pretty crummy\" . Pt reports that he notices taking small steps and occasional freezing. Pts wife reports they used to walk a mile together but now he is only able to walk about a quarter of a mile due to endurance.            Recurrent probem    Patient Goals  Patient goals for therapy: improved balance and increased strength    Pain  Location: right hip occasional pain    Social Support  Steps to enter house: yes (ronel baca)    Treatments  Previous treatment: physical therapy        Objective       STG (4 weeks)     Improve FGA score of 18 by 2 indicating meaningful improvement in fall risk   Improve 5x STS score of 22.25 by 2.3 seconds indicating meaningful improvement in fall risk and power generation   Increase 6MWT of 858 by 82 ft indicating meaningful change in aerobic endurance       LTG (8 weeks)     Improve FGA score to > 22/30 indicating " meaningful improvement in fall risk   Improve/Reduce 5x STS score to < 18 seconds seconds indicating meaningful improvement in fall risk and power generation   Improve 6MWT score to 1022 better align with age related norms demonstrating improvement in aerobic capacity and endurance  Patient will improve gait speed to 1 m/s to improve functional community ambulation and reduce pts fall risk    Balance Test IE        6 Minute Walk Test (ft): 858 ft        2 Minute Walk Test (ft):         Gait Speed (m/s): 0.91         5x Sit To Stand (s): 22.25 no UE use        TUG (s) 13.22        FGA  3m bwd walk test 18/30  7.6s                  Short Term Goal Expiration Date:(3/8/24)  Long Term Goal Expiration Date: (4/5/24)  POC Expiration Date: (4/5/24)        POC expires Unit limit Auth Expiration date PT/OT/ST + Visit Limit?   4/5/24 BOMN N/a BOMN                           Visit/Unit Tracking  AUTH Status:  Date IE             N/a Used 1/10              Remaining                     Precautions parkinson's, Lewy body dementia       Manuals                                        Neuro Re-Ed  Edu as described above                                                               Ther Ex                                                                        Ther Activity                        Gait Training                        Modalities

## 2024-02-16 ENCOUNTER — OFFICE VISIT (OUTPATIENT)
Dept: PSYCHIATRY | Facility: CLINIC | Age: 79
End: 2024-02-16

## 2024-02-16 DIAGNOSIS — F22 DELUSIONS (HCC): Primary | ICD-10-CM

## 2024-02-16 RX ORDER — OLANZAPINE 2.5 MG/1
2.5 TABLET, FILM COATED ORAL
Qty: 30 TABLET | Refills: 1 | Status: SHIPPED | OUTPATIENT
Start: 2024-02-16

## 2024-02-16 NOTE — PSYCH
"This note was not shared with the patient due to reasonable likelihood of causing patient harm    PROGRESS NOTE        [facility]      Name and Date of Birth:  Gabriel Gonzalez 78 y.o. 1945    Reason for visit:   Chief Complaint   Patient presents with    Follow-up    Medication Management       Date of Visit: 02/16/24    SUBJECTIVE:    Gabriel Gonzalez is a nuha 78 y.o. male with a history of dementia who presents today for follow-up and medication management. He came in alone at the beginning and reported that there has been no change in hallucinations since stopping daytime quetiapine dose. He feels his mood and anxiety are stable. He showed varying amounts of insight into the hallucinations - at time admitting they are not real and at time believing he needed to intervene to prevent them from damaging his property. His wife, Dariana, came in towards the end and agreed there was no change with decrease in quetiapine but states this does allow him to sleep. He feels that he does want these hallucinations to stop completely. He is also worried they are affecting his marriage as he does not feel supported when his wife refuses to \"intervene\" when the hallucinations are threatening to damage his property.     He denies any suicidal ideation, intent or plan at present, denies any homicidal ideation, intent or plan at present.  He denies any auditory hallucinations, denies any visual hallucinations, denies any delusions.  He denies any side effects from current psychiatric medications.    HPI ROS Appetite Changes and Sleep: normal appetite, normal sleep    Review Of Systems:    Mood Normal   Behavior Normal    Thought Content Normal   General Normal    Personality Normal   Other Psych Symptoms Normal   Constitutional as noted in HPI   ENT as noted in HPI   Cardiovascular as noted in HPI   Respiratory as noted in HPI   Gastrointestinal as noted in HPI "   Genitourinary as noted in HPI   Musculoskeletal as noted in HPI   Integumentary as noted in HPI   Neurological as noted in HPI   Endocrine negative   Other Symptoms none, all other systems are negative       Laboratory Results: No results found for this or any previous visit.    Substance Abuse History:    Social History     Substance and Sexual Activity   Drug Use No       Family Psychiatric History:     Family History   Problem Relation Age of Onset    Heart disease Mother     Heart disease Father        The following portions of the patient's history were reviewed and updated as appropriate: past family history, past medical history, past social history, past surgical history and problem list.    Social History     Socioeconomic History    Marital status: /Civil Union     Spouse name: Not on file    Number of children: 3    Years of education: Not on file    Highest education level: Not on file   Occupational History    Occupation:    still active    Occupation: retired    Tobacco Use    Smoking status: Never    Smokeless tobacco: Never   Vaping Use    Vaping status: Never Used   Substance and Sexual Activity    Alcohol use: Yes     Comment: social    Drug use: No    Sexual activity: Not Currently   Other Topics Concern    Not on file   Social History Narrative    Most recent tobacco use screenin-    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    If Yes, What branch of service: [employer]    [employer]    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, into active duty, 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    Advance directive: Yes    Alcohol intake: Moderate    Caffeine intake: Moderate    Illicit drugs: none    Occupation: retired    Exercise level: Occasional    Single or multi-level home/work: single level home    Live alone or with others: with others    Chewing tobacco: none    Marital status:     Number of children: 3    Diet: Regular    Sexual orientation:  Heterosexual    Smoke alarm in home: Yes    General stress level: Medium    Sunscreen used routinely: No    Education: Post Graduate    Guns present in home: No    Presence of domestic violence: No     Social Determinants of Health     Financial Resource Strain: Low Risk  (2023)    Overall Financial Resource Strain (CARDIA)     Difficulty of Paying Living Expenses: Not hard at all   Food Insecurity: Not on file   Transportation Needs: No Transportation Needs (2023)    PRAPARE - Transportation     Lack of Transportation (Medical): No     Lack of Transportation (Non-Medical): No   Physical Activity: Not on file   Stress: Not on file   Social Connections: Not on file   Intimate Partner Violence: Not on file   Housing Stability: Not on file     Social History     Social History Narrative    Most recent tobacco use screenin-    Do you currently or have you served in the [employer]: Yes    If Yes, What branch of service: [employer]    [employer]    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, into active duty, 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    Advance directive: Yes    Alcohol intake: Moderate    Caffeine intake: Moderate    Illicit drugs: none    Occupation: retired    Exercise level: Occasional    Single or multi-level home/work: single level home    Live alone or with others: with others    Chewing tobacco: none    Marital status:     Number of children: 3    Diet: Regular    Sexual orientation: Heterosexual    Smoke alarm in home: Yes    General stress level: Medium    Sunscreen used routinely: No    Education: Post Graduate    Guns present in home: No    Presence of domestic violence: No        Social History       Tobacco History       Smoking Status  Never      Smokeless Tobacco Use  Never              Alcohol History       Alcohol Use Status  Yes Comment  social              Drug Use       Drug Use Status  No              Sexual Activity       Sexually Active  Not Currently          "     Activities of Daily Living    Not Asked                       OBJECTIVE:     Mental Status Evaluation:  Appearance:  age appropriate, dressed appropriately, adequate grooming, looks stated age   Behavior:  pleasant, cooperative, interacts appropriately with this writer   Speech:  normal rate, normal volume, normal pitch   Mood:  euthymic   Affect:  mood-congruent   Thought Process:  organized, coherent   Associations: intact associations   Thought Content:  persecutory delusions, obsessive thoughts, ruminations    Perceptual Disturbances: no auditory hallucinations, visual hallucinations of \"people\"    Risk Potential: Suicidal ideation - None  Homicidal ideation - None  Potential for aggression - No   Sensorium:  oriented to person, place, and time/date   Memory:  recent and remote memory grossly intact   Consciousness:  alert and awake   Attention/Concentration: attention span and concentration are age appropriate   Insight:  age appropriate   Judgment: age appropriate   Gait/Station: normal gait/station   Motor Activity: no abnormal movements     Assessment/Plan:     We discussed their current treatment plan in detail today. He showed varying amounts of insight into the hallucinations - at time admitting they are not real and at time believing he needed to intervene to prevent them from damaging his property. The decrease in quetiapine did not make any difference. I have advised switching over slowly to olanzapine instead of quetiapine due to some studies suggesting it can be effective in Lewy-Body dementia. I have advised adding in olanzapine 2.5 mg qhs x 1 week and then calling me with an update. If tolerated I will have them decrease the quetiapine to 25 mg qhs and increase olanzapine to 5 mg qhs. The risks, benefits, side effects, interactions and uncertainties of prescribed medications were discussed, including alternatives.  He is not appropriate for therapy at this time. We have discussed their safety " plan and pt agrees that if they experience unsafe thoughts that they will reach out to their supports including this office, the suicide hotline, and emergency services if necessary. Patient is aware of non-emergent and emergent mental health resources. He is able to contract for their own safety at this time.    Will follow up in 2 weeks. Patient is aware to call the office if questions or concerns arise sooner.       Diagnoses and all orders for this visit:    Delusions (HCC)  -     OLANZapine (ZyPREXA) 2.5 mg tablet; Take 1 tablet (2.5 mg total) by mouth daily at bedtime          Treatment Recommendations/Precautions:    Continue current medications:     - quetiapine 50 mg qhs    Start new medications:    - olanzapine 2.5 mg qhs    Risks/Benefits      Risks, Benefits And Possible Side Effects Of Medications:    Risks, benefits, and possible side effects of medications explained to patient and patient verbalizes understanding and agreement for treatment.    Controlled Medication Discussion:     Not applicable    Psychotherapy Provided:     Individual psychotherapy provided: No    Visit Start Time:  10:40 AM  Visit End Time:  11:10 AM  Total Visit Duration: 30 minutes    Elzbieta Carreno PA-C

## 2024-02-20 ENCOUNTER — TELEPHONE (OUTPATIENT)
Dept: PSYCHIATRY | Facility: CLINIC | Age: 79
End: 2024-02-20

## 2024-02-20 NOTE — TELEPHONE ENCOUNTER
"This was done thru My chart---forwarding to you from patient.    We stopped the afternoon dose of Seroquel (25 mg) and kept the evening dose (50 mg) for sleep.  He definitely needs that or something because 1 night he forgot to take it and couldn't sleep at all. We had to add the Myrbetriq (for bladder) back in because he said he needs it. I notified Urologist.   His daytime without the additional Seroquel is not significantly affected; perhaps a bit.  The \"fixed beliefs\" occupy his mind constantly and interfere with any semblance of ordinary normalcy.  Basically we are prisoners to it.  Please discuss with neurologist re further evaluation.   We need a definitive diagnosis.  We need to be able to tell him what is happening to him, because he does not believe at all in dementia. He says everyone has it (including me). No explanation or logic works with him despite his intelligence (which is high).  We desperately need help. His fixed beliefs rule the household and all our actions.  "

## 2024-02-21 ENCOUNTER — TELEPHONE (OUTPATIENT)
Dept: PSYCHIATRY | Facility: CLINIC | Age: 79
End: 2024-02-21

## 2024-02-21 NOTE — TELEPHONE ENCOUNTER
Dariana called and said Taiwo is doing ok on the new medication (Zyprexa) On Sunday and Monday he was a little dizzy, Dariana reduced the Seroquel to 25 mg and Monday night and Tuesday night were much better. She is giving it to him at night.She will continue give you updates.    Her call back # is [phone]

## 2024-02-22 ENCOUNTER — OFFICE VISIT (OUTPATIENT)
Dept: PHYSICAL THERAPY | Facility: CLINIC | Age: 79
End: 2024-02-22
Payer: MEDICARE

## 2024-02-22 DIAGNOSIS — R26.9 GAIT ABNORMALITY: ICD-10-CM

## 2024-02-22 DIAGNOSIS — G20.A1 PARKINSON'S DISEASE WITHOUT DYSKINESIA, UNSPECIFIED WHETHER MANIFESTATIONS FLUCTUATE: Primary | ICD-10-CM

## 2024-02-22 PROCEDURE — 97112 NEUROMUSCULAR REEDUCATION: CPT

## 2024-02-22 PROCEDURE — 97110 THERAPEUTIC EXERCISES: CPT

## 2024-02-22 NOTE — PROGRESS NOTES
Daily Note     Today's date: 2024  Patient name: Gabriel Gonzalez  : 1945  MRN: [id number]  Referring provider: Lawanda Joshi CRNP  Dx:   Encounter Diagnosis     ICD-10-CM    1. Parkinson's disease without dyskinesia, unspecified whether manifestations fluctuate  G20.A1       2. Gait abnormality  R26.9           Start Time: 1518  Stop Time: 1600  Total time in clinic (min): 42 minutes    Subjective: pt reports he has felt mor unsteady since IE and had a fall while carrying a cup of coffee down the stairs. Pt denies any injury or blow to the head      Objective: See treatment diary below      Assessment: Tolerated treatment well. Patient would benefit from continued PT pt demonstrates difficulty coordinating side step w arms for inc amplitude motions and inc power w BUE and BLE. Pt unable to perform twist with HKM due to ability to balance on SL and twist simultaneously. HEP provided and reviewed for improved carryover between sessions. Water held for several exercises for motor dual task and to be specific to pts fall in the last week      Plan: Continue per plan of care.      Short Term Goal Expiration Date:(3/8/24)  Long Term Goal Expiration Date: (24)  POC Expiration Date: (24)        POC expires Unit limit Auth Expiration date PT/OT/ST + Visit Limit?   24 BOMN N/a BOMN                           Visit/Unit Tracking  AUTH Status:  Date IE             N/a Used 1/10 2/10             Remaining                     Precautions parkinson's, Lewy body dementia       Manuals                                        Neuro Re-Ed  Edu as described above HEP provided and reviewed w pt      steps  SOLO    1 8'' 1 6'' 1 4'' step    Across x2 laps  X2 laps holding water cup    X2 laps lateral      Compliant surface  SOLO    across 5 airex    X2 laos  X2 laps w water cup      Side step  SOLO    X2 laps  X2 laps w star arms      Bwd walk  SOLO    X3 laps                              Ther Ex         nustep  Lvl 7 x10 min                                                              Ther Activity                        Gait Training                        Modalities                              Access Code: [email or web address]  URL: [email or web address]  Date: 02/22/2024  Prepared by: Caterina Rust    Exercises  - Standing March with Counter Support  - 1 x daily - 7 x weekly - 3 sets - 10 reps  - Heel Toe Raises with Unilateral Counter Support  - 1 x daily - 7 x weekly - 3 sets - 10 reps  - Sit to Stand with Armchair  - 1 x daily - 7 x weekly - 3 sets - 8 reps  - Sideways Walking  - 1 x daily - 7 x weekly - 3 sets - 10 reps

## 2024-02-23 ENCOUNTER — TELEPHONE (OUTPATIENT)
Dept: PSYCHIATRY | Facility: CLINIC | Age: 79
End: 2024-02-23

## 2024-02-28 ENCOUNTER — OFFICE VISIT (OUTPATIENT)
Dept: PHYSICAL THERAPY | Facility: CLINIC | Age: 79
End: 2024-02-28
Payer: MEDICARE

## 2024-02-28 ENCOUNTER — TELEPHONE (OUTPATIENT)
Dept: PSYCHIATRY | Facility: CLINIC | Age: 79
End: 2024-02-28

## 2024-02-28 DIAGNOSIS — R26.9 GAIT ABNORMALITY: ICD-10-CM

## 2024-02-28 DIAGNOSIS — G20.A1 PARKINSON'S DISEASE WITHOUT DYSKINESIA, UNSPECIFIED WHETHER MANIFESTATIONS FLUCTUATE: Primary | ICD-10-CM

## 2024-02-28 PROCEDURE — 97112 NEUROMUSCULAR REEDUCATION: CPT

## 2024-02-28 PROCEDURE — 97110 THERAPEUTIC EXERCISES: CPT

## 2024-02-28 NOTE — TELEPHONE ENCOUNTER
Patient's wife, Dariana called and left message on our voicemail to report side effect/interaction/symptoms from patient's increase in medication.  Wife states that motor skill and symptom getting worse and more brain fog.  Wife would like to know if normal or what to do next, does it go away?       Writer called patient's voicemail and left message stating that information would be passed to Elzbieta Carreno tomorrow in am and to call back with any questions or concerns.

## 2024-02-28 NOTE — PROGRESS NOTES
Daily Note     Today's date: 2024  Patient name: Gabriel Gonzalez  : 1945  MRN: [id number]  Referring provider: Lawanda Joshi CRNP  Dx:   Encounter Diagnosis     ICD-10-CM    1. Parkinson's disease without dyskinesia, unspecified whether manifestations fluctuate  G20.A1       2. Gait abnormality  R26.9           Start Time: 1215  Stop Time: 1300  Total time in clinic (min): 45 minutes    Subjective: pt reports Hep compliance every day except yesterday and feels rickety but is able to perform      Objective: See treatment diary below      Assessment: Tolerated treatment well. Patient would benefit from continued PT improved coordination of arms and legs with star side steps today. 3# Aw added for inc strengthening. Inc 4'' step to 6'' step today with good tolerance. Pt fatigued quickly on the TM continue w short bouts on TM or inc endurance with nustep.  Rotation added for dec rigidity and improved posture.       Plan: Continue per plan of care.      Short Term Goal Expiration Date:(3/8/24)  Long Term Goal Expiration Date: (24)  POC Expiration Date: (24)        POC expires Unit limit Auth Expiration date PT/OT/ST + Visit Limit?   24 BOMN N/a BOMN                           Visit/Unit Tracking  AUTH Status:  Date IE            N/a Used 1/10 2/10 3/10            Remaining                     Precautions parkinson's, Lewy body dementia       Manuals                                       Neuro Re-Ed  Edu as described above HEP provided and reviewed w pt      steps  SOLO    1 8'' 1 6'' 1 4'' step    Across x2 laps  X2 laps holding water cup    X2 laps lateral SOLO    1 8'' 2 6'' step    Across x3 laps fwd      X2 laps lateral     Compliant surface  SOLO    across 5 airex    X2 laos  X2 laps w water cup      Side step  SOLO    X2 laps  X2 laps w star arms SOLO    3# AW  X3 laps w star arms     Bwd walk  SOLO    X3 laps      HKM        hurdles   SOLO    3# AW    6''    X3 laps  fed    X2 laps lateral     rotation   Standing w med ball  X10 ea     Ther Ex        nustep  Lvl 7 x10 min      TM   SOLO    0.8-1 mph    3 min x2     STS   W airex in chair    2x10                                             Ther Activity                        Gait Training                        Modalities                              Access Code: [email or web address]  URL: [email or web address]  Date: 02/22/2024  Prepared by: Caterina Rust    Exercises  - Standing March with Counter Support  - 1 x daily - 7 x weekly - 3 sets - 10 reps  - Heel Toe Raises with Unilateral Counter Support  - 1 x daily - 7 x weekly - 3 sets - 10 reps  - Sit to Stand with Armchair  - 1 x daily - 7 x weekly - 3 sets - 8 reps  - Sideways Walking  - 1 x daily - 7 x weekly - 3 sets - 10 reps

## 2024-02-29 NOTE — TELEPHONE ENCOUNTER
Per request of provider, called patient's wife but left message on voicemail stating that Elzbieta Carreno PA-C feels it is best to discuss issues at the appointment tomorrow 3/1/2024 and she received message from the my chart.

## 2024-02-29 NOTE — PSYCH
"This note was not shared with the patient due to reasonable likelihood of causing patient harm    PROGRESS NOTE        [facility]      Name and Date of Birth:  Gabriel Gonzalez 78 y.o. 1945    Reason for visit:   Chief Complaint   Patient presents with    Follow-up    Medication Management       Date of Visit: 03/01/24    SUBJECTIVE:    Gabriel Gonzalez is a nuha 78 y.o. male with a history of dementia who presents today for follow-up and medication management. His wife, Dariana, also presents with him and helps with the history. She reports that since switching to olanzapine 5 mg qd and stopping quetiapine (about 8 days ago) he has been more tired, out of it, and imbalanced. She is concerned with the new physical sx. It does not seem to have affected the sx, though she states he talks about them more.   He reports:  - the events are still happening but they haven't seem to have changed  - more frequently happening at the office, putting small objects on window sill, recently one opened the window  - affecting his focus at work, anxious to go to work  - very rarely hears them, last time about 2 weeks ago and it was \"screaming and hollering\", no coherent words  - windows are mirrors they can see things in that you couldn't normally see; have banners that scroll across  - Dariana is affect so she doesn't see these things so has a different perspective    He denies any suicidal ideation, intent or plan at present, denies any homicidal ideation, intent or plan at present.  He denies any auditory hallucinations.  He denies any side effects from current psychiatric medications.    HPI ROS Appetite Changes and Sleep: normal appetite, normal sleep    Review Of Systems:    Mood Anxiety   Behavior Normal    Thought Content Normal   General Normal    Personality Normal   Other Psych Symptoms Normal   Constitutional as noted in HPI   ENT as noted in HPI   Cardiovascular as " noted in HPI   Respiratory as noted in HPI   Gastrointestinal as noted in HPI   Genitourinary as noted in HPI   Musculoskeletal as noted in HPI   Integumentary as noted in HPI   Neurological as noted in HPI   Endocrine negative   Other Symptoms none, all other systems are negative       Laboratory Results: No results found for this or any previous visit.    Substance Abuse History:    Social History     Substance and Sexual Activity   Drug Use No       Family Psychiatric History:     Family History   Problem Relation Age of Onset    Heart disease Mother     Heart disease Father        The following portions of the patient's history were reviewed and updated as appropriate: past family history, past medical history, past social history, past surgical history and problem list.    Social History     Socioeconomic History    Marital status: /Civil Union     Spouse name: Not on file    Number of children: 3    Years of education: Not on file    Highest education level: Not on file   Occupational History    Occupation:    still active    Occupation: retired    Tobacco Use    Smoking status: Never    Smokeless tobacco: Never   Vaping Use    Vaping status: Never Used   Substance and Sexual Activity    Alcohol use: Yes     Comment: social    Drug use: No    Sexual activity: Not Currently   Other Topics Concern    Not on file   Social History Narrative    Most recent tobacco use screenin-    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    If Yes, What branch of service: [employer]    [employer]    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, into active duty, 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    Advance directive: Yes    Alcohol intake: Moderate    Caffeine intake: Moderate    Illicit drugs: none    Occupation: retired    Exercise level: Occasional    Single or multi-level home/work: single level home    Live alone or with others: with others    Chewing tobacco: none    Marital  status:     Number of children: 3    Diet: Regular    Sexual orientation: Heterosexual    Smoke alarm in home: Yes    General stress level: Medium    Sunscreen used routinely: No    Education: Post Graduate    Guns present in home: No    Presence of domestic violence: No     Social Determinants of Health     Financial Resource Strain: Low Risk  (2023)    Overall Financial Resource Strain (CARDIA)     Difficulty of Paying Living Expenses: Not hard at all   Food Insecurity: Not on file   Transportation Needs: No Transportation Needs (2023)    PRAPARE - Transportation     Lack of Transportation (Medical): No     Lack of Transportation (Non-Medical): No   Physical Activity: Not on file   Stress: Not on file   Social Connections: Not on file   Intimate Partner Violence: Not on file   Housing Stability: Not on file     Social History     Social History Narrative    Most recent tobacco use screenin-    Do you currently or have you served in the [employer]: Yes    If Yes, What branch of service: [employer]    [employer]    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, into active duty, 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    Advance directive: Yes    Alcohol intake: Moderate    Caffeine intake: Moderate    Illicit drugs: none    Occupation: retired    Exercise level: Occasional    Single or multi-level home/work: single level home    Live alone or with others: with others    Chewing tobacco: none    Marital status:     Number of children: 3    Diet: Regular    Sexual orientation: Heterosexual    Smoke alarm in home: Yes    General stress level: Medium    Sunscreen used routinely: No    Education: Post Graduate    Guns present in home: No    Presence of domestic violence: No        Social History       Tobacco History       Smoking Status  Never      Smokeless Tobacco Use  Never              Alcohol History       Alcohol Use Status  Yes Comment  social              Drug Use       Drug Use  "Status  No              Sexual Activity       Sexually Active  Not Currently              Activities of Daily Living    Not Asked                       OBJECTIVE:     Mental Status Evaluation:  Appearance:  age appropriate, dressed appropriately, adequate grooming, looks stated age   Behavior:  pleasant, cooperative, interacts appropriately with this writer   Speech:  normal rate, normal volume, normal pitch   Mood:  anxious   Affect:  mood-congruent   Thought Process:  organized, logical, coherent   Associations: intact associations   Thought Content:  persecutory delusions, obsessive thoughts, ruminations     Perceptual Disturbances: no auditory hallucinations, visual hallucinations of \"people\"     Risk Potential: Suicidal ideation - None  Homicidal ideation - None  Potential for aggression - No   Sensorium:  oriented to person, place, and time/date   Memory:  recent and remote memory grossly intact   Consciousness:  alert and awake   Attention/Concentration: attention span and concentration are age appropriate   Insight:  age appropriate   Judgment: age appropriate   Gait/Station: Limp, imbalance   Motor Activity: Mild resting tremor in BUE     Assessment/Plan:     We discussed their current treatment plan in detail today. Initially the olanzapine seemed to go well but since increasing the dose they both report he has had physical sx including imbalance and brain fog. I have advised decreasing the olanzapine back to 2.5 mg qhs and staying off of the quetiapine. Options includes spreading out the olanzapine dosing, switching back to quetiapine 25 mg qhs, or switching to a new antipsychotic. His neurologist is not opposed to trying Nuplazid again. Dariana is interested in neuropsych testing which they do not think they have had. He is not appropriate for therapy at this time. We have discussed their safety plan and pt agrees that if they experience unsafe thoughts that they will reach out to their supports including " this office, the suicide hotline, and emergency services if necessary. Patient is aware of non-emergent and emergent mental health resources. He is able to contract for their own safety at this time.    Will follow up in 1 week. Patient is aware to call the office if questions or concerns arise sooner.       Diagnoses and all orders for this visit:    Delusions (HCC)    Hallucinations    Psychosis due to Parkinson's disease          Treatment Recommendations/Precautions:    Decrease medications:     - olanzapine back to 2.5 mg qhs    Risks/Benefits      Risks, Benefits And Possible Side Effects Of Medications:    Risks, benefits, and possible side effects of medications explained to patient and patient verbalizes understanding and agreement for treatment.    Controlled Medication Discussion:     Not applicable    Psychotherapy Provided:     Individual psychotherapy provided: No    Visit Start Time:  11:00 AM  Visit End Time:  11:25 AM  Total Visit Duration: 25 minutes    Elzbieta Carreno PA-C

## 2024-03-01 ENCOUNTER — OFFICE VISIT (OUTPATIENT)
Dept: PSYCHIATRY | Facility: CLINIC | Age: 79
End: 2024-03-01
Payer: MEDICARE

## 2024-03-01 DIAGNOSIS — F22 DELUSIONS (HCC): Primary | ICD-10-CM

## 2024-03-01 DIAGNOSIS — G20.A1 PSYCHOSIS DUE TO PARKINSON'S DISEASE: ICD-10-CM

## 2024-03-01 DIAGNOSIS — F06.8 PSYCHOSIS DUE TO PARKINSON'S DISEASE: ICD-10-CM

## 2024-03-01 DIAGNOSIS — R44.3 HALLUCINATIONS: ICD-10-CM

## 2024-03-01 PROCEDURE — 99214 OFFICE O/P EST MOD 30 MIN: CPT | Performed by: PHYSICIAN ASSISTANT

## 2024-03-01 NOTE — BH CRISIS PLAN
Client Name: Taiwo Gonzalez       Client YOB: 1945    Seun Safety Plan      Creation Date: 3/1/24 Update Date: 3/1/24   Created By: Elzbieta Carreno PA-C Last Updated By: Elzbieta Carreno PA-C      Step 1: Warning Signs:   Warning Signs   N/A            Step 2: Internal Coping Strategies:   Internal Coping Strategies   exercise            Step 3: People and social settings that provide distraction:   Name Contact Information   [facility]            Step 4: People whom I can ask for help during a crisis:      Name Contact Information    [facility]      Step 5: Professionals or agencies I can contact during a crisis:      Clinican/Agency Name Phone Emergency Contact    [facility]        Local Emergency Department Emergency Department Phone Emergency Department Address    [facility]          Crisis Phone Numbers:   Suicide Prevention Lifeline: Call or Text  988 Crisis Text Line: Text HOME to 260-934   Please note: Some [facility] do not have a separate number for Child/Adolescent specific crisis. If your county is not listed under Child/Adolescent, please call the adult number for your county      Adult Crisis Numbers: Child/Adolescent Crisis Numbers   [facility]: [phone] [facility]: [phone]   [facility]: [phone] [facility]: [phone]   [facility]: [phone] [place]: [phone]   [facility]: [phone] [place]: [phone]   [facility]: [phone]   [facility]: [phone]   [facility]: [phone]   [facility]: [phone] (daytime) [phone] (after hours, weekends, holidays)      Step 6: Making the environment safer (plan for lethal means safety):   Patient did not identify any lethal methods: Yes     Optional: What is most important to me and worth living for?   Dariana Kohli Safety Plan. Selina Canchola and  Robbie Townsend. Used with permission of the authors.

## 2024-03-01 NOTE — BH TREATMENT PLAN
TREATMENT PLAN (Medication Management Only)        [facility]    Name and Date of Birth:  Gabriel Gonzalez 78 y.o. 1945  Date of Treatment Plan: March 1, 2024  Diagnosis/Diagnoses:    1. Delusions (HCC)    2. Hallucinations    3. Psychosis due to Parkinson's disease      Strengths/Personal Resources for Self-Care: taking medications as prescribed, motivation for treatment, willingness to work on problems.  Area/Areas of need (in own words): hallucinations  1. Long Term Goal: decrease hallucinations.  Target Date:6 months - 9/1/2024  Person/Persons responsible for completion of goal: Gabriel  2.  Short Term Objective (s) - How will we reach this goal?:   A. Provider new recommended medication/dosage changes and/or continue medication(s): continue current medications as prescribed.  B. N/A.  C. N/A.  Target Date:6 months - 9/1/2024  Person/Persons Responsible for Completion of Goal: Gabriel  Progress Towards Goals: starting treatment  Treatment Modality: medication management every 2 weeks  Review due 180 days from date of this plan: 6 months - 9/1/2024  Expected length of service: ongoing treatment  My Physician/PA/NP and I have developed this plan together and I agree to work on the goals and objectives. I understand the treatment goals that were developed for my treatment.

## 2024-03-04 DIAGNOSIS — F22 DELUSIONS (HCC): ICD-10-CM

## 2024-03-04 RX ORDER — OLANZAPINE 2.5 MG/1
2.5 TABLET, FILM COATED ORAL
Qty: 30 TABLET | Refills: 1 | Status: SHIPPED | OUTPATIENT
Start: 2024-03-04 | End: 2024-03-05 | Stop reason: SDUPTHER

## 2024-03-04 NOTE — TELEPHONE ENCOUNTER
Medication Refill Request     Name of Medication  OLANZapine (ZyPREXA) 2.5 mg tablet   Dose/Frequency  Take 1 tablet (2.5 mg total) by mouth daily at bedtime   Quantity 30  Verified pharmacy   [x]  Verified ordering Provider   [x]  Does patient have enough for the next 3 days? Yes [] No [x]  Does patient have a follow-up appointment scheduled? Yes [x] No []   If so when is appointment: 03/08/24 @ 4:30 pm

## 2024-03-05 DIAGNOSIS — F22 DELUSIONS (HCC): ICD-10-CM

## 2024-03-05 RX ORDER — OLANZAPINE 2.5 MG/1
5 TABLET, FILM COATED ORAL
Qty: 60 TABLET | Refills: 1 | Status: SHIPPED | OUTPATIENT
Start: 2024-03-05

## 2024-03-05 NOTE — TELEPHONE ENCOUNTER
Spouse states that patient was increased to 2 tablets not 1 tablet of Zyprexa 2.5 mg.  Patient's pharmacy verified [facility] [address]. Patient will be out of medication soon.

## 2024-03-07 ENCOUNTER — EVALUATION (OUTPATIENT)
Dept: PHYSICAL THERAPY | Facility: CLINIC | Age: 79
End: 2024-03-07
Payer: MEDICARE

## 2024-03-07 DIAGNOSIS — G20.A1 PARKINSON'S DISEASE WITHOUT DYSKINESIA, UNSPECIFIED WHETHER MANIFESTATIONS FLUCTUATE: Primary | ICD-10-CM

## 2024-03-07 DIAGNOSIS — R26.9 GAIT ABNORMALITY: ICD-10-CM

## 2024-03-07 PROCEDURE — 97112 NEUROMUSCULAR REEDUCATION: CPT

## 2024-03-07 NOTE — PROGRESS NOTES
Daily Note     Today's date: 3/7/2024  Patient name: Gabriel Gonzalez  : 1945  MRN: [id number]  Referring provider: Lawanda Joshi CRNP  Dx:   Encounter Diagnosis     ICD-10-CM    1. Parkinson's disease without dyskinesia, unspecified whether manifestations fluctuate  G20.A1       2. Gait abnormality  R26.9           Start Time: 1300  Stop Time: 1345  Total time in clinic (min): 45 minutes    Subjective: pt reports doing HEP every other day with one day where he didn't he still took a walk      Objective: See treatment diary below      Assessment: Tolerated treatment well. Patient would benefit from continued PT Pt has attended 4 physical therapy sessions including IE and is demonstrating progress towards goals.  Pt has met 3/3 STG and is  progressing towards LTG at this time Pt has improved their 6mwt distance from 858 feet to 1000 ft indicating improved aerobic capacity and activity tolerance. Pts gait speed has improved from 0.91 m/s to 1 m/s indicating pt is a community Ambulator however is still at risk for falls based on her speed being < 1 m/s.  pts FGA score has improved from 18/30 to 22/30 reducing pts fall risk however still placing them at risk for falls as scores < 22 are considered at risk for falls. Pts 5x STS tests has improved from 22.25 s to 14.38 s indicating improved LE strength and power however is still less than age matched norms of 12.6s. Pts 3m bwd walking time has improved from 7.6 to 6.09 s  indicating pt is at inc risk for falls as scores > 4.5 s are at risk for falls.   Pt would continue to benefit from skilled physical therapy to improve overall QOL inclusive of, strength, coordination, balance, endurance and functional mobility in the home and in the community as well as reduce their fall risk for improved safety.      Goals    STG (4 weeks)     Improve FGA score of 18 by 2 indicating meaningful improvement in fall risk MET  Improve 5x STS score of 22.25 by 2.3 seconds  indicating meaningful improvement in fall risk and power generation MET  Increase 6MWT of 858 by 82 ft indicating meaningful change in aerobic endurance MET      LTG (8 weeks)     Improve FGA score to > 22/30 indicating meaningful improvement in fall risk   Improve/Reduce 5x STS score to < 18 seconds seconds indicating meaningful improvement in fall risk and power generation   Improve 6MWT score to 1022 better align with age related norms demonstrating improvement in aerobic capacity and endurance  Patient will improve gait speed to 1 m/s to improve functional community ambulation and reduce pts fall risk      Eval Assessment  Assessment details: Pt is a 78 year old male presenting to [facility] physical therapy for an initial evaluation secondary to dec funcational capacity assosiciated with parkinsons disease. Pt presents to clinic with gait and balance impairments inclusive of right sided trendelenburg gait, dec trunk rotation, dec arm swing w gait, dec endurance and LE strength, and dec functional balance.     Additionally pt demonstrates strength deficits as seen with M 5x STS time of 22.25s demonstrating dec LE strength and power as compared to age matched norms. Pt demonstrates endurance deficits as seen with 6mwt distance of 858 ft as compared to age matched norms of 1729 ft. Pt is at inc risk for falls based on their FGA score of 18/30  with scores < 20/30 at inc risk for spontaneous falls and scores < 22/30 considered at risk for falls.  Pt ambulates at 0.91 m/s making them a community Ambulator.  Additionally this score places the pt at inc risk for falls as scores < 1 m/s are considered at risk for falls.     Pt provided edu on symptoms of parkinson's such as festination, dec rotation, rigidity etc and exercises to address. HEP to be provided on first follow up visit.  Pt would benefit from skilled physical therapy to improve  overall QOL inclusive of, strength, coordination, balance, endurance and functional  "mobility in the home and in the community as well as reduce their fall risk for improved safety.    Impairments: abnormal gait, impaired balance, impaired physical strength, lacks appropriate home exercise program and safety issue    Plan  Patient would benefit from: skilled physical therapy  Planned therapy interventions: balance, gait training, home exercise program, neuromuscular re-education, patient education, postural training, strengthening, stretching, therapeutic activities, therapeutic exercise, functional ROM exercises and flexibility  Frequency: 2x week  Duration in visits: 16  Duration in weeks: 8  Plan of Care beginning date: 2/9/2024  Plan of Care expiration date: 4/5/2024  Treatment plan discussed with: patient      Subjective Evaluation    History of Present Illness  Mechanism of injury: Pt reports that he was diagnosed with parkinson's in 2017 and did not take any medications until 2019 (sinemet). Pt reprots that he previously had a  coming to the house to remain stain active but has not kr able to have those as of recent for several reasons and is looking for continued skilled car out of the house. Since stopping training he and his wife reports a decline in activity and function. Pt reports he would like to work on strength in legs and reports his balance is \"pretty crummy\" . Pt reports that he notices taking small steps and occasional freezing. Pts wife reports they used to walk a mile together but now he is only able to walk about a quarter of a mile due to endurance.            Recurrent probem    Patient Goals  Patient goals for therapy: improved balance and increased strength    Pain  Location: right hip occasional pain    Social Support  Steps to enter house: yes (w keven)    Treatments  Previous treatment: physical therapy        Objective           Balance Test IE 3/7       6 Minute Walk Test (ft): 858 ft 1000 ft       2 Minute Walk Test (ft):         Gait Speed (m/s): 0.91  1 " m/s       5x Sit To Stand (s): 22.25 no UE use 14.38s no UE use       TUG (s) 13.22        FGA  3m bwd walk test 18/30  7.6s 22/30  6.09s             Short Term Goal Expiration Date:(3/8/24)  Long Term Goal Expiration Date: (4/5/24)  POC Expiration Date: (4/5/24)        POC expires Unit limit Auth Expiration date PT/OT/ST + Visit Limit?   4/5/24 BOMN N/a BOMN                           Visit/Unit Tracking  AUTH Status:  Date IE 2/22 2/28 3/7          N/a Used 1/10 2/10 3/10 4/10           Remaining                     Precautions parkinson's, Lewy body dementia       Manuals  2/22 2/28 3/7                                    Neuro Re-Ed  Edu as described above HEP provided and reviewed w pt  HEP updated and reviewed    FGA  6mwt  10mwt  3m bwd walk test  5x STS    steps  SOLO    1 8'' 1 6'' 1 4'' step    Across x2 laps  X2 laps holding water cup    X2 laps lateral SOLO    1 8'' 2 6'' step    Across x3 laps fwd      X2 laps lateral SOLO    1 8'' 2 6'' step    5# AW    Across x3 laps fwd      X2 laps lateral    Compliant surface  SOLO    across 5 airex    X2 laos  X2 laps w water cup  SOLO    Lateral on 2 foam beams    X2 laps    Side step  SOLO    X2 laps  X2 laps w star arms SOLO    3# AW  X3 laps w star arms     Bwd walk  SOLO    X3 laps      HKM        hurdles   SOLO    3# AW    6''    X3 laps fed    X2 laps lateral     rotation   Standing w med ball  X10 ea     Ther Ex        nustep  Lvl 7 x10 min      TM   SOLO    0.8-1 mph    3 min x2     STS   W airex in chair    2x10                                             Ther Activity                        Gait Training                        Modalities                              Access Code: [email or web address]  URL: [email or web address]  Date: 03/07/2024  Prepared by: Caterina Rust    Exercises  - Standing March with Counter Support  - 1 x daily - 6 x weekly - 3 sets - 10 reps  - Heel Toe Raises with Unilateral Counter Support  - 1 x daily - 6 x weekly - 3 sets -  10 reps  - Sit to Stand with Armchair  - 1 x daily - 6 x weekly - 3 sets - 8 reps  - Sideways Walking  - 1 x daily - 6 x weekly - 3 sets - 10 reps  - Seated Hip Abduction with Resistance  - 1 x daily - 6 x weekly - 3 sets - 10 reps

## 2024-03-07 NOTE — PSYCH
"This note was not shared with the patient due to reasonable likelihood of causing patient harm    PROGRESS NOTE        [facility]      Name and Date of Birth:  Gabriel Gonzalez 78 y.o. 1945    Reason for visit:   Chief Complaint   Patient presents with    Follow-up    Medication Management       Date of Visit: 03/11/24    SUBJECTIVE:    Gabriel Gonzalez is a nuha 78 y.o. male with a history of dementia who presents today for follow-up and medication management. His wife, Dariana, also presents with him and helps with the history. He is tolerating the reduced dose of the olanzapine much better with no further physical sx including imbalance nor any fogginess. However, his wife does not feel that the frequency or intensity of the hallucinations have changed. He feels they have been more \"relaxed\" of late but his wife feels his sleep is affected by them and the belief that all of this is real. None of the hallucinations happen outside of work or home. He does report hearing \"music\" is certain doorways in his home, \"like an AM radio station\". This resolves once he moves through the doorway.     He denies any suicidal ideation, intent or plan at present, denies any homicidal ideation, intent or plan at present.  He denies any auditory hallucinations.  He denies any side effects from current psychiatric medications.    HPI ROS Appetite Changes and Sleep: normal appetite, normal sleep    Review Of Systems:    Mood Anxiety   Behavior Normal    Thought Content Normal   General Normal    Personality Normal   Other Psych Symptoms Normal   Constitutional as noted in HPI   ENT as noted in HPI   Cardiovascular as noted in HPI   Respiratory as noted in HPI   Gastrointestinal as noted in HPI   Genitourinary as noted in HPI   Musculoskeletal as noted in HPI   Integumentary as noted in HPI   Neurological as noted in HPI   Endocrine negative   Other Symptoms none, all other " systems are negative       Laboratory Results: No results found for this or any previous visit.    Substance Abuse History:    Social History     Substance and Sexual Activity   Drug Use No       Family Psychiatric History:     Family History   Problem Relation Age of Onset    Heart disease Mother     Heart disease Father        The following portions of the patient's history were reviewed and updated as appropriate: past family history, past medical history, past social history, past surgical history and problem list.    Social History     Socioeconomic History    Marital status: /Civil Union     Spouse name: Not on file    Number of children: 3    Years of education: Not on file    Highest education level: Not on file   Occupational History    Occupation:    still active    Occupation: retired    Tobacco Use    Smoking status: Never    Smokeless tobacco: Never   Vaping Use    Vaping status: Never Used   Substance and Sexual Activity    Alcohol use: Yes     Comment: social    Drug use: No    Sexual activity: Not Currently   Other Topics Concern    Not on file   Social History Narrative    Most recent tobacco use screenin-    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    If Yes, What branch of service: [employer]    [employer]    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, into active duty, 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    Advance directive: Yes    Alcohol intake: Moderate    Caffeine intake: Moderate    Illicit drugs: none    Occupation: retired    Exercise level: Occasional    Single or multi-level home/work: single level home    Live alone or with others: with others    Chewing tobacco: none    Marital status:     Number of children: 3    Diet: Regular    Sexual orientation: Heterosexual    Smoke alarm in home: Yes    General stress level: Medium    Sunscreen used routinely: No    Education: Post Graduate    Guns present in home: No    Presence of domestic  violence: No     Social Determinants of Health     Financial Resource Strain: Low Risk  (2023)    Overall Financial Resource Strain (CARDIA)     Difficulty of Paying Living Expenses: Not hard at all   Food Insecurity: Not on file   Transportation Needs: No Transportation Needs (2023)    PRAPARE - Transportation     Lack of Transportation (Medical): No     Lack of Transportation (Non-Medical): No   Physical Activity: Not on file   Stress: Not on file   Social Connections: Not on file   Intimate Partner Violence: Not on file   Housing Stability: Not on file     Social History     Social History Narrative    Most recent tobacco use screenin-    Do you currently or have you served in the [employer]: Yes    If Yes, What branch of service: [employer]    [employer]    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, into active duty, 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    Advance directive: Yes    Alcohol intake: Moderate    Caffeine intake: Moderate    Illicit drugs: none    Occupation: retired    Exercise level: Occasional    Single or multi-level home/work: single level home    Live alone or with others: with others    Chewing tobacco: none    Marital status:     Number of children: 3    Diet: Regular    Sexual orientation: Heterosexual    Smoke alarm in home: Yes    General stress level: Medium    Sunscreen used routinely: No    Education: Post Graduate    Guns present in home: No    Presence of domestic violence: No        Social History       Tobacco History       Smoking Status  Never      Smokeless Tobacco Use  Never              Alcohol History       Alcohol Use Status  Yes Comment  social              Drug Use       Drug Use Status  No              Sexual Activity       Sexually Active  Not Currently              Activities of Daily Living    Not Asked                       OBJECTIVE:     Mental Status Evaluation:  Appearance:  age appropriate, dressed appropriately, adequate grooming,  "looks stated age   Behavior:  pleasant, cooperative, interacts appropriately with this writer   Speech:  normal rate, normal volume, normal pitch   Mood:  anxious   Affect:  mood-congruent   Thought Process:  organized, logical, coherent   Associations: intact associations   Thought Content:  persecutory delusions, obsessive thoughts, ruminations     Perceptual Disturbances: no auditory hallucinations, visual hallucinations of \"people\"     Risk Potential: Suicidal ideation - None  Homicidal ideation - None  Potential for aggression - No   Sensorium:  oriented to person, place, and time/date   Memory:  recent and remote memory grossly intact   Consciousness:  alert and awake   Attention/Concentration: attention span and concentration are age appropriate   Insight:  age appropriate   Judgment: age appropriate   Gait/Station: Limp, imbalance   Motor Activity: Mild resting tremor in BUE     Assessment/Plan:     We discussed their current treatment plan in detail today. He is tolerating the olanzapine 2.5 mg qhs much better than the 5 mg qhs, however, per his wife there has been no change in sx. I have advised adding in a morning dose (1.25-2.5 mg qAM whichever is better tolerated). They are agreeable. Other options include a re-trial of Nuplazid or switching to Rexulti. He is not appropriate for therapy at this time. We have discussed their safety plan and pt agrees that if they experience unsafe thoughts that they will reach out to their supports including this office, the suicide hotline, and emergency services if necessary. Patient is aware of non-emergent and emergent mental health resources. He is able to contract for their own safety at this time.    Will follow up in 2 week. Patient is aware to call the office if questions or concerns arise sooner.       Diagnoses and all orders for this visit:    Psychosis due to Parkinson's disease  -     Ambulatory Referral to Neuropsychology; Future    Delusions (HCC)  -     " Ambulatory Referral to Neuropsychology; Future    Visual hallucinations  -     Ambulatory Referral to Neuropsychology; Future            Treatment Recommendations/Precautions:    Continue current medications:     - olanzapine 1.25-2.5 mg qAM, 2.5 mg qhs    Risks/Benefits      Risks, Benefits And Possible Side Effects Of Medications:    Risks, benefits, and possible side effects of medications explained to patient and patient verbalizes understanding and agreement for treatment.    Controlled Medication Discussion:     Not applicable    Psychotherapy Provided:     Individual psychotherapy provided: No    Visit Start Time:  3:30 PM  Visit End Time:  4:00 PM  Total Visit Duration: 30 minutes    Elzbieta Carreno PA-C

## 2024-03-08 ENCOUNTER — OFFICE VISIT (OUTPATIENT)
Dept: PSYCHIATRY | Facility: CLINIC | Age: 79
End: 2024-03-08
Payer: MEDICARE

## 2024-03-08 ENCOUNTER — APPOINTMENT (OUTPATIENT)
Dept: LAB | Facility: CLINIC | Age: 79
End: 2024-03-08
Payer: MEDICARE

## 2024-03-08 DIAGNOSIS — F51.04 PSYCHOPHYSIOLOGICAL INSOMNIA: ICD-10-CM

## 2024-03-08 DIAGNOSIS — F22 DELUSIONS (HCC): ICD-10-CM

## 2024-03-08 DIAGNOSIS — R44.3 HALLUCINATIONS: ICD-10-CM

## 2024-03-08 DIAGNOSIS — E55.9 VITAMIN D DEFICIENCY: ICD-10-CM

## 2024-03-08 DIAGNOSIS — G20.B2 PARKINSON'S DISEASE WITH DYSKINESIA AND FLUCTUATING MANIFESTATIONS: ICD-10-CM

## 2024-03-08 DIAGNOSIS — R44.1 VISUAL HALLUCINATIONS: ICD-10-CM

## 2024-03-08 DIAGNOSIS — G24.9 DYSTONIA: ICD-10-CM

## 2024-03-08 DIAGNOSIS — F06.8 PSYCHOSIS DUE TO PARKINSON'S DISEASE: Primary | ICD-10-CM

## 2024-03-08 DIAGNOSIS — E61.1 IRON DEFICIENCY: Primary | ICD-10-CM

## 2024-03-08 DIAGNOSIS — G25.79 OTHER DRUG INDUCED MOVEMENT DISORDERS: ICD-10-CM

## 2024-03-08 DIAGNOSIS — M62.81 MUSCLE WEAKNESS: ICD-10-CM

## 2024-03-08 DIAGNOSIS — G20.A2 PARKINSON'S DISEASE WITHOUT DYSKINESIA, WITH FLUCTUATING MANIFESTATIONS: ICD-10-CM

## 2024-03-08 DIAGNOSIS — G20.A1 PSYCHOSIS DUE TO PARKINSON'S DISEASE: Primary | ICD-10-CM

## 2024-03-08 DIAGNOSIS — R79.89 LFTS ABNORMAL: ICD-10-CM

## 2024-03-08 LAB
25(OH)D3 SERPL-MCNC: 35 NG/ML (ref 30–100)
ALBUMIN SERPL BCP-MCNC: 4.1 G/DL (ref 3.5–5)
ALP SERPL-CCNC: 63 U/L (ref 34–104)
ALT SERPL W P-5'-P-CCNC: 7 U/L (ref 7–52)
ANION GAP SERPL CALCULATED.3IONS-SCNC: 9 MMOL/L
AST SERPL W P-5'-P-CCNC: 15 U/L (ref 13–39)
B BURGDOR IGG SERPL QL IA: POSITIVE
B BURGDOR IGG+IGM SER QL IA: POSITIVE
B BURGDOR IGM SERPL QL IA: NEGATIVE
BASOPHILS # BLD AUTO: 0.03 THOUSANDS/ÂΜL (ref 0–0.1)
BASOPHILS NFR BLD AUTO: 0 % (ref 0–1)
BILIRUB DIRECT SERPL-MCNC: 0.11 MG/DL (ref 0–0.2)
BILIRUB SERPL-MCNC: 0.52 MG/DL (ref 0.2–1)
BUN SERPL-MCNC: 19 MG/DL (ref 5–25)
CALCIUM SERPL-MCNC: 8.9 MG/DL (ref 8.4–10.2)
CHLORIDE SERPL-SCNC: 105 MMOL/L (ref 96–108)
CK SERPL-CCNC: 63 U/L (ref 39–308)
CO2 SERPL-SCNC: 28 MMOL/L (ref 21–32)
CREAT SERPL-MCNC: 1.18 MG/DL (ref 0.6–1.3)
EOSINOPHIL # BLD AUTO: 0.05 THOUSAND/ÂΜL (ref 0–0.61)
EOSINOPHIL NFR BLD AUTO: 1 % (ref 0–6)
ERYTHROCYTE [DISTWIDTH] IN BLOOD BY AUTOMATED COUNT: 13.3 % (ref 11.6–15.1)
FERRITIN SERPL-MCNC: 194 NG/ML (ref 24–336)
FOLATE SERPL-MCNC: 8.4 NG/ML
GFR SERPL CREATININE-BSD FRML MDRD: 59 ML/MIN/1.73SQ M
GLUCOSE SERPL-MCNC: 85 MG/DL (ref 65–140)
HCT VFR BLD AUTO: 42.8 % (ref 36.5–49.3)
HGB BLD-MCNC: 13.3 G/DL (ref 12–17)
IMM GRANULOCYTES # BLD AUTO: 0.03 THOUSAND/UL (ref 0–0.2)
IMM GRANULOCYTES NFR BLD AUTO: 0 % (ref 0–2)
IRON SATN MFR SERPL: 25 % (ref 15–50)
IRON SERPL-MCNC: 68 UG/DL (ref 50–212)
LYMPHOCYTES # BLD AUTO: 1.14 THOUSANDS/ÂΜL (ref 0.6–4.47)
LYMPHOCYTES NFR BLD AUTO: 17 % (ref 14–44)
MAGNESIUM SERPL-MCNC: 2.1 MG/DL (ref 1.9–2.7)
MCH RBC QN AUTO: 31.1 PG (ref 26.8–34.3)
MCHC RBC AUTO-ENTMCNC: 31.1 G/DL (ref 31.4–37.4)
MCV RBC AUTO: 100 FL (ref 82–98)
MONOCYTES # BLD AUTO: 0.59 THOUSAND/ÂΜL (ref 0.17–1.22)
MONOCYTES NFR BLD AUTO: 9 % (ref 4–12)
NEUTROPHILS # BLD AUTO: 5.03 THOUSANDS/ÂΜL (ref 1.85–7.62)
NEUTS SEG NFR BLD AUTO: 73 % (ref 43–75)
NRBC BLD AUTO-RTO: 0 /100 WBCS
PLATELET # BLD AUTO: 171 THOUSANDS/UL (ref 149–390)
PMV BLD AUTO: 11.7 FL (ref 8.9–12.7)
POTASSIUM SERPL-SCNC: 4.2 MMOL/L (ref 3.5–5.3)
PROT SERPL-MCNC: 6.2 G/DL (ref 6.4–8.4)
RBC # BLD AUTO: 4.28 MILLION/UL (ref 3.88–5.62)
SODIUM SERPL-SCNC: 142 MMOL/L (ref 135–147)
TIBC SERPL-MCNC: 274 UG/DL (ref 250–450)
TREPONEMA PALLIDUM IGG+IGM AB [PRESENCE] IN SERUM OR PLASMA BY IMMUNOASSAY: NORMAL
TSH SERPL DL<=0.05 MIU/L-ACNC: 1.04 UIU/ML (ref 0.45–4.5)
UIBC SERPL-MCNC: 206 UG/DL (ref 155–355)
VIT B12 SERPL-MCNC: 181 PG/ML (ref 180–914)
WBC # BLD AUTO: 6.87 THOUSAND/UL (ref 4.31–10.16)

## 2024-03-08 PROCEDURE — 86618 LYME DISEASE ANTIBODY: CPT

## 2024-03-08 PROCEDURE — 99214 OFFICE O/P EST MOD 30 MIN: CPT | Performed by: PHYSICIAN ASSISTANT

## 2024-03-08 PROCEDURE — G2211 COMPLEX E/M VISIT ADD ON: HCPCS | Performed by: PHYSICIAN ASSISTANT

## 2024-03-08 PROCEDURE — 85025 COMPLETE CBC W/AUTO DIFF WBC: CPT

## 2024-03-08 PROCEDURE — 82746 ASSAY OF FOLIC ACID SERUM: CPT

## 2024-03-08 PROCEDURE — 80053 COMPREHEN METABOLIC PANEL: CPT

## 2024-03-08 PROCEDURE — 84443 ASSAY THYROID STIM HORMONE: CPT | Performed by: FAMILY MEDICINE

## 2024-03-08 PROCEDURE — 82248 BILIRUBIN DIRECT: CPT

## 2024-03-08 PROCEDURE — 86780 TREPONEMA PALLIDUM: CPT

## 2024-03-08 PROCEDURE — 36415 COLL VENOUS BLD VENIPUNCTURE: CPT

## 2024-03-08 PROCEDURE — 83735 ASSAY OF MAGNESIUM: CPT | Performed by: FAMILY MEDICINE

## 2024-03-08 PROCEDURE — 86617 LYME DISEASE ANTIBODY: CPT

## 2024-03-08 PROCEDURE — 83550 IRON BINDING TEST: CPT | Performed by: FAMILY MEDICINE

## 2024-03-08 PROCEDURE — 82728 ASSAY OF FERRITIN: CPT | Performed by: FAMILY MEDICINE

## 2024-03-08 PROCEDURE — 82550 ASSAY OF CK (CPK): CPT

## 2024-03-08 PROCEDURE — 83540 ASSAY OF IRON: CPT | Performed by: FAMILY MEDICINE

## 2024-03-08 PROCEDURE — 82306 VITAMIN D 25 HYDROXY: CPT

## 2024-03-12 ENCOUNTER — TELEPHONE (OUTPATIENT)
Dept: FAMILY MEDICINE CLINIC | Facility: CLINIC | Age: 79
End: 2024-03-12

## 2024-03-12 ENCOUNTER — APPOINTMENT (OUTPATIENT)
Dept: PHYSICAL THERAPY | Facility: CLINIC | Age: 79
End: 2024-03-12
Payer: MEDICARE

## 2024-03-12 DIAGNOSIS — Z86.19 HISTORY OF LYME DISEASE: Primary | ICD-10-CM

## 2024-03-12 RX ORDER — DOXYCYCLINE HYCLATE 100 MG
100 TABLET ORAL 2 TIMES DAILY
Qty: 56 TABLET | Refills: 0 | Status: SHIPPED | OUTPATIENT
Start: 2024-03-12 | End: 2024-04-09

## 2024-03-12 NOTE — TELEPHONE ENCOUNTER
----- Message from Julian Rudolph MD sent at 3/12/2024  4:59 AM EDT -----  Okay all your electrolytes look fine  Kidney function is stable  Liver function is fine  Sugar is fine  White and red blood cells look good  Vitamin D looks good also  Nothing to change at the moment

## 2024-03-12 NOTE — PROGRESS NOTES
Daily Note     Today's date: 3/12/2024  Patient name: Gabriel Gonzalez  : 1945  MRN: [id number]  Referring provider: Lawanda Joshi CRNP  Dx: No diagnosis found.               Subjective: ***      Objective: See treatment diary below      Assessment: Tolerated treatment {Tolerated treatment :}. Patient {assessment:[id number]}      Plan: {PLAN:}     Short Term Goal Expiration Date:(3/8/24)  Long Term Goal Expiration Date: (24)  POC Expiration Date: (24)        POC expires Unit limit Auth Expiration date PT/OT/ST + Visit Limit?   24 BOMN N/a BOMN                           Visit/Unit Tracking  AUTH Status:  Date IE 2/22 2/28 3/7 3/12         N/a Used 1/10 2/10 3/10 4/10 1/10          Remaining                     Precautions parkinson's, Lewy body dementia       Manuals  2/22 2/28 3/7 3/12                                   Neuro Re-Ed  Edu as described above HEP provided and reviewed w pt  HEP updated and reviewed    FGA  6mwt  10mwt  3m bwd walk test  5x STS    steps  SOLO    1 8'' 1 6'' 1 4'' step    Across x2 laps  X2 laps holding water cup    X2 laps lateral SOLO    1 8'' 2 6'' step    Across x3 laps fwd      X2 laps lateral SOLO    1 8'' 2 6'' step    5# AW    Across x3 laps fwd      X2 laps lateral    Compliant surface  SOLO    across 5 airex    X2 laos  X2 laps w water cup  SOLO    Lateral on 2 foam beams    X2 laps    Side step  SOLO    X2 laps  X2 laps w star arms SOLO    3# AW  X3 laps w star arms     Bwd walk  SOLO    X3 laps      HKM        hurdles   SOLO    3# AW    6''    X3 laps fed    X2 laps lateral     rotation   Standing w med ball  X10 ea     Ther Ex        nustep  Lvl 7 x10 min      TM   SOLO    0.8-1 mph    3 min x2     STS   W airex in chair    2x10                                             Ther Activity                        Gait Training                        Modalities                              Access Code: [email or web address]  URL:  [email or web address]  Date: 03/07/2024  Prepared by: Caterina Rust    Exercises  - Standing March with Counter Support  - 1 x daily - 6 x weekly - 3 sets - 10 reps  - Heel Toe Raises with Unilateral Counter Support  - 1 x daily - 6 x weekly - 3 sets - 10 reps  - Sit to Stand with Armchair  - 1 x daily - 6 x weekly - 3 sets - 8 reps  - Sideways Walking  - 1 x daily - 6 x weekly - 3 sets - 10 reps  - Seated Hip Abduction with Resistance  - 1 x daily - 6 x weekly - 3 sets - 10 reps

## 2024-03-12 NOTE — TELEPHONE ENCOUNTER
----- Message from Julian Rudolph MD sent at 3/12/2024  5:05 AM EDT -----  Please take a vitamin B12 1000 mcg daily sublingual

## 2024-03-13 ENCOUNTER — TELEPHONE (OUTPATIENT)
Dept: NEUROLOGY | Facility: CLINIC | Age: 79
End: 2024-03-13

## 2024-03-13 NOTE — TELEPHONE ENCOUNTER
Patient is scheduled for an office visit with Madelyn on 3/26.    Called patient to confirm appt, LMOM.

## 2024-03-15 ENCOUNTER — TELEPHONE (OUTPATIENT)
Dept: LAB | Facility: HOSPITAL | Age: 79
End: 2024-03-15

## 2024-03-15 ENCOUNTER — APPOINTMENT (OUTPATIENT)
Dept: PHYSICAL THERAPY | Facility: CLINIC | Age: 79
End: 2024-03-15
Payer: MEDICARE

## 2024-03-18 ENCOUNTER — TELEPHONE (OUTPATIENT)
Dept: FAMILY MEDICINE CLINIC | Facility: CLINIC | Age: 79
End: 2024-03-18

## 2024-03-18 NOTE — TELEPHONE ENCOUNTER
So hears my thoughts Taiwo probably had Lyme disease a long time ago and that is why this is showing positive I really do not think this is contributing to time state but I would say I think it is worth trying a course of the doxycycline which might have some improvement and I personally believe that the benefit would outweigh the risk     So I sent over the treatment is 4 weeks of doxycycline twice a day watch for diarrhea     Everything else so far is okay

## 2024-03-19 NOTE — TELEPHONE ENCOUNTER
Patient's wife advised.  She said he will be having some more labs on Friday and a urinalysis (with mobile lab) as well

## 2024-03-22 ENCOUNTER — APPOINTMENT (OUTPATIENT)
Dept: LAB | Facility: HOSPITAL | Age: 79
End: 2024-03-22
Payer: MEDICARE

## 2024-03-22 ENCOUNTER — OFFICE VISIT (OUTPATIENT)
Dept: PHYSICAL THERAPY | Facility: CLINIC | Age: 79
End: 2024-03-22
Payer: MEDICARE

## 2024-03-22 DIAGNOSIS — G20.A1 PARKINSON'S DISEASE WITHOUT DYSKINESIA, UNSPECIFIED WHETHER MANIFESTATIONS FLUCTUATE: Primary | ICD-10-CM

## 2024-03-22 DIAGNOSIS — F51.04 PSYCHOPHYSIOLOGICAL INSOMNIA: Primary | ICD-10-CM

## 2024-03-22 DIAGNOSIS — R26.9 GAIT ABNORMALITY: ICD-10-CM

## 2024-03-22 LAB
BILIRUB UR QL STRIP: NEGATIVE
CHOLEST SERPL-MCNC: 146 MG/DL
CLARITY UR: CLEAR
COLOR UR: NORMAL
GLUCOSE UR STRIP-MCNC: NEGATIVE MG/DL
HDLC SERPL-MCNC: 46 MG/DL
HGB UR QL STRIP.AUTO: NEGATIVE
KETONES UR STRIP-MCNC: NEGATIVE MG/DL
LDLC SERPL CALC-MCNC: 88 MG/DL (ref 0–100)
LEUKOCYTE ESTERASE UR QL STRIP: NEGATIVE
NITRITE UR QL STRIP: NEGATIVE
PH UR STRIP.AUTO: 6.5 [PH]
PROT UR STRIP-MCNC: NEGATIVE MG/DL
PSA SERPL-MCNC: 13.45 NG/ML (ref 0–4)
SP GR UR STRIP.AUTO: 1.01 (ref 1–1.03)
TRIGL SERPL-MCNC: 58 MG/DL
UROBILINOGEN UR STRIP-ACNC: <2 MG/DL

## 2024-03-22 PROCEDURE — 84153 ASSAY OF PSA TOTAL: CPT

## 2024-03-22 PROCEDURE — 84425 ASSAY OF VITAMIN B-1: CPT

## 2024-03-22 PROCEDURE — 97110 THERAPEUTIC EXERCISES: CPT

## 2024-03-22 PROCEDURE — 80061 LIPID PANEL: CPT

## 2024-03-22 PROCEDURE — 97112 NEUROMUSCULAR REEDUCATION: CPT

## 2024-03-22 NOTE — PROGRESS NOTES
Daily Note     Today's date: 3/22/2024  Patient name: Gabriel Gonzalez  : 1945  MRN: [id number]  Referring provider: Lawanda Joshi CRNP  Dx:   Encounter Diagnosis     ICD-10-CM    1. Parkinson's disease without dyskinesia, unspecified whether manifestations fluctuate  G20.A1       2. Gait abnormality  R26.9           Start Time: 1252  Stop Time: 1345  Total time in clinic (min): 53 minutes    Subjective: pt reports he is doing well today with continued HEP compliance      Objective: See treatment diary below      Assessment: Tolerated treatment well. Patient would benefit from continued PT pt demonstrate max difficulty with cone taps with nearly all cones knocked down. Exercise regressed to blaze pod taps for dec foot clearance required and more stability of surface. Pt demonstrates inc difficulty with SL stability on left leg especially with cone taps and hurdles in the lateral direction. Continue to strength lateral hip musculature in following visits. Good tolerance for steps today      Plan: Continue per plan of care.      Short Term Goal Expiration Date:(3/8/24)  Long Term Goal Expiration Date: (24)  POC Expiration Date: (24)        POC expires Unit limit Auth Expiration date PT/OT/ST + Visit Limit?   24 BOMN N/a BOMN                           Visit/Unit Tracking  AUTH Status:  Date IE 2/22 2/28 3/7 3/12 3/22        N/a Used 1/10 2/10 3/10 4/10 1/10 2/10         Remaining                     Precautions parkinson's, Lewy body dementia       Manuals 3/22 2/22 2/28 3/7 3/12                                   Neuro Re-Ed   HEP provided and reviewed w pt  HEP updated and reviewed    FGA  6mwt  10mwt  3m bwd walk test  5x STS    steps SOLO    1 8'' 2 6'' step    Across x3 laps fwd SOLO    1 8'' 1 6'' 1 4'' step    Across x2 laps  X2 laps holding water cup    X2 laps lateral SOLO    1 8'' 2 6'' step    Across x3 laps fwd      X2 laps lateral SOLO    1 8'' 2 6'' step    5# AW    Across x3 laps  fwd      X2 laps lateral    Compliant surface SOLO    Across 5 airec fwd  X3 laps    4 aires lateral x3 laps SOLO    across 5 airex    X2 laos  X2 laps w water cup  SOLO    Lateral on 2 foam beams    X2 laps    Side step SOLO    W blaze pods    1 min 30s x2    Trial 1 6  Trial 2 9    Cone taps one lap attempted SOLO    X2 laps  X2 laps w star arms SOLO    3# AW  X3 laps w star arms     Bwd walk  SOLO    X3 laps      HKM        hurdles SOLO    6''    X4 laps fwd    X2 laps lateral  SOLO    3# AW    6''    X3 laps fed    X2 laps lateral     rotation   Standing w med ball  X10 ea     Ther Ex        nustep Lvl 7 x10 min Lvl 7 x10 min      TM   SOLO    0.8-1 mph    3 min x2     STS   W airex in chair    2x10                                             Ther Activity                        Gait Training                        Modalities                              Access Code: [email or web address]  URL: [email or web address]  Date: 03/07/2024  Prepared by: Caterina Rust    Exercises  - Standing March with Counter Support  - 1 x daily - 6 x weekly - 3 sets - 10 reps  - Heel Toe Raises with Unilateral Counter Support  - 1 x daily - 6 x weekly - 3 sets - 10 reps  - Sit to Stand with Armchair  - 1 x daily - 6 x weekly - 3 sets - 8 reps  - Sideways Walking  - 1 x daily - 6 x weekly - 3 sets - 10 reps  - Seated Hip Abduction with Resistance  - 1 x daily - 6 x weekly - 3 sets - 10 reps

## 2024-03-25 ENCOUNTER — APPOINTMENT (OUTPATIENT)
Dept: PHYSICAL THERAPY | Facility: CLINIC | Age: 79
End: 2024-03-25
Payer: MEDICARE

## 2024-03-26 ENCOUNTER — OFFICE VISIT (OUTPATIENT)
Dept: NEUROLOGY | Facility: CLINIC | Age: 79
End: 2024-03-26
Payer: MEDICARE

## 2024-03-26 VITALS
HEART RATE: 61 BPM | SYSTOLIC BLOOD PRESSURE: 134 MMHG | DIASTOLIC BLOOD PRESSURE: 60 MMHG | WEIGHT: 202 LBS | BODY MASS INDEX: 27.36 KG/M2 | OXYGEN SATURATION: 98 % | TEMPERATURE: 97.7 F | HEIGHT: 72 IN

## 2024-03-26 DIAGNOSIS — R44.3 HALLUCINATIONS: ICD-10-CM

## 2024-03-26 DIAGNOSIS — G20.A1 PARKINSON'S DISEASE WITHOUT DYSKINESIA OR FLUCTUATING MANIFESTATIONS: Primary | ICD-10-CM

## 2024-03-26 DIAGNOSIS — F02.82: ICD-10-CM

## 2024-03-26 DIAGNOSIS — G20.A1: ICD-10-CM

## 2024-03-26 LAB
VIT B1 BLD-SCNC: 100.6 NMOL/L (ref 66.5–200)
VIT B6 SERPL-MCNC: 2.4 UG/L (ref 3.4–65.2)

## 2024-03-26 PROCEDURE — 99214 OFFICE O/P EST MOD 30 MIN: CPT | Performed by: NURSE PRACTITIONER

## 2024-03-26 NOTE — PROGRESS NOTES
Patient ID: Gabriel Gonzalez is a 78 y.o. male.    Assessment/Plan:    Parkinson's disease (HCC)  Patient returns for follow-up for Parkinson's disease.  Over the past several months this has been complicated by abrupt worsening of hallucinations, delusions, and paranoia.  His Sinemet has been reduced with no significant worsening of his motor symptoms other than slightly increased tremor.  In the past he did not note improvement of his behaviors with Seroquel or Nuplazid.  He is now following with psych who recently started olanzapine which seems to be somewhat assisting.  He has also found benefit with rivastigmine as he has been less anxious.  He continues to have hallucinations and delusions but appear less distressing than in the past.    Given his improvement/stability he will remain on his current dose of carbidopa levodopa 25/100 mg.  His wife did inquire about switching to extended release carbidopa levodopa can help that he would need medication less frequently however patient does not wish to make this change today, may consider in the future.  His psychiatrist is currently making adjustments to his olanzapine.  Further increases in his rivastigmine may be considered once his olanzapine dose is finalized.  His psychiatrist did recommend evaluation with neuropsychology which she will be scheduling in the near future.    Lab work up from PCP reviewed, recommend b12 injections. He will see if these can be performed through PCP office.     Plan for follow up in 3 months. To contact the office sooner with any concerns or worsening symptoms.       Diagnoses and all orders for this visit:    Parkinson's disease without dyskinesia or fluctuating manifestations    Dementia due to Parkinson's disease, with psychotic disturbance (HCC)    Hallucinations           Subjective:    HPI  Gabriel Gonzalez is a 78 y.o. male who presents for follow up for parkinsonism with concerns regarding recent development of  "hallucinations. To review, symptom onset in 2016 with slowness of gait with subsequent development of LH tremor and orthostatic hypotension. Initially seen by Dr Quinn and started on Sinemet, which was exacerbating his orthostatic intolerance. Sinemet discontinued and focus initially on treating orthostatic symptoms which improved on midodrine.   More recently has developed hallucinations-infectious work up was negative. Was started on seroquel with no significant improvement, xanax was weaned by PCP. Nuplazid caused increase in hallucinations was stopped after  3 days. Rivastigimine later added. Sinemet has been reduced.     Last office visit 1/2024 in which rivastigmine was increased. His seroquel was reduced. Was referred to PT and following with psych.       Current PD regimen:  Carbidopa/levodopa 25/100 1.5 tabs amt 7 am, 10:30am , 2:30pm and  1 tab at 630 pm and bed time , 1 tab overnight as needed   Rivastigmine 3mg bid  Olanzapine 5 mg at bedtime   midodrine once daily    Prior medications:  Quetiapine 25mg qam, noon, 4pm and night       Interval History:   He is following with [facility], started olanzapine and weaned off seroquel.   This change occurred about 4 weeks ago.  No obvious change in the amount of hallucination. He seems less distressed but unclear if this is due to \"getting used\" to the hallucinations or the medications.   Seems to be redirectable more easily with the hallucination.  Has noted with increases he would have more imbalance and dizziness so have been doing very slow increases.   Is getting testing with neuropsychology.     His PCP did order additional bloodwork all of which was normal. His PCP did start him on a course of doxycyline due to past lyme infection. No change with this treatment.     He has to have to be more mindful with eating and swallowing pills as he has had a few occasions things have felt stuck.     He is currently in PT, he has imbalance. Last fall was a month " ago. He does shuffle. Can have some difficulty initiating gait when he first stands at times.     Tremor is slightly work, noting in both hands now.     He feels he is sleeping well, if he does awaken he will take extra tab of sinemet. No acting out of dreams.  He is very fatigued.  B12 was low.      The following portions of the patient's history were reviewed and updated as appropriate: allergies, current medications, past family history, past medical history, past social history, past surgical history and problem list.       Objective:    Blood pressure 134/60, pulse 61, temperature 97.7 °F (36.5 °C), temperature source Temporal, height 6' (1.829 m), weight 91.6 kg (202 lb), SpO2 98%.    Physical Exam  Constitutional:       General: He is awake.   Eyes:      General: Lids are normal.      Extraocular Movements: Extraocular movements intact.      Pupils: Pupils are equal, round, and reactive to light.   Neurological:      Mental Status: He is alert.      Motor: Motor strength is normal.        Neurological Exam  Mental Status  Awake and alert. Oriented only to person, place and situation. Speech: hypophonia. Language is fluent with no aphasia. Attention and concentration are normal.    Cranial Nerves  CN III, IV, VI: Extraocular movements intact bilaterally. Normal lids and orbits bilaterally. Pupils equal round and reactive to light bilaterally.  CN V:  Right: Facial sensation is normal.  Left: Facial sensation is normal on the left.  CN VII: Full and symmetric facial movement.  CN VIII: Hearing is normal.  CN XI: Shoulder shrug strength is normal.  CN XII: Tongue midline without atrophy or fasciculations.    Motor   Increased muscle tone. Strength is 5/5 throughout all four extremities.    Sensory  Light touch is normal in upper and lower extremities.     Coordination  Right: Finger-to-nose normal. Rapid alternating movement abnormality:Left: Finger-to-nose normal. Rapid alternating movement abnormality:  See  MDS UPDRS III.    Gait  Casual gait: Able to rise from chair without using arms.  Reduced arm swing, mild striatal posturing of the hands, tremor in right hand antalgic gait at times. En block turn.      MDS UPDRS III                              10/24/23 3/26/24   Time since last dose:      Speech  2 2   Facial Expression  1 1   Rigidity - Neck       Rigidity - Upper Extremity (R)  2 2   Rigidity - Upper Extremity (L)   2 2   Rigidity - Lower Extremity (R)  0 1   Rigidity - Lower Extremity (L)   0 1   Finger Taps (R)   1 1   Finger Taps (L)   2 2   Hand Movement (R)  0 1   Hand Movement (L)   1 1   Pronation/Supination (R)  1 1   Pronation/Supination (L)   2 2   Toe Tapping (R) 0 1   Toe Tapping (L) 1 2   Leg Agility (R)  1 1   Leg Agility (L)   1 1   Arising from Chair   1 1   Gait   1 1   Freezing of Gait 0 0   Postural Stability        Posture 1 1   Global spontaneity of movement 1 1   Postural Tremor (Ri 1 1   Postural Tremor (L) 1 1   Kinetic Tremor (R)  0 1   Kinetic Tremor (L)  0 1   Rest tremor amplitude RUE 0 1   Rest tremor amplitude LUE 0 1   Rest tremor amplitude RLE 0 0   Reset tremor amplitude LLE 0 0   Lip/Jaw Tremor  0 0   Consistency of tremor 0 1   Motor Exam Total:          I have personally reviewed the ROS performed by the MA.      ROS:    Review of Systems   Constitutional:  Negative for appetite change, fatigue and fever.   HENT: Negative.  Negative for hearing loss, tinnitus, trouble swallowing and voice change.    Eyes: Negative.  Negative for photophobia, pain and visual disturbance.   Respiratory: Negative.  Negative for shortness of breath.    Cardiovascular: Negative.  Negative for palpitations.   Gastrointestinal: Negative.  Negative for nausea and vomiting.   Endocrine: Negative.  Negative for cold intolerance.   Genitourinary: Negative.  Negative for dysuria, frequency and urgency.   Musculoskeletal:  Positive for gait problem (balance issues). Negative for back pain, myalgias, neck  pain and neck stiffness.   Skin: Negative.  Negative for rash.   Allergic/Immunologic: Negative.    Neurological:  Positive for tremors (left hand- starting right hand). Negative for dizziness, seizures, syncope, facial asymmetry, speech difficulty, weakness, light-headedness, numbness and headaches.   Hematological: Negative.  Does not bruise/bleed easily.   Psychiatric/Behavioral: Negative.  Negative for confusion, hallucinations and sleep disturbance.         Hallucinations- daily     All other systems reviewed and are negative.

## 2024-03-27 ENCOUNTER — TELEPHONE (OUTPATIENT)
Dept: FAMILY MEDICINE CLINIC | Facility: CLINIC | Age: 79
End: 2024-03-27

## 2024-03-27 DIAGNOSIS — G63 POLYNEUROPATHY DUE TO VITAMIN B6 DEFICIENCY (HCC): Primary | ICD-10-CM

## 2024-03-27 DIAGNOSIS — E53.9 POLYNEUROPATHY DUE TO VITAMIN B6 DEFICIENCY (HCC): Primary | ICD-10-CM

## 2024-03-27 RX ORDER — PYRIDOXINE HCL (VITAMIN B6) 100 MG
100 TABLET ORAL DAILY
Qty: 90 TABLET | Refills: 1 | Status: SHIPPED | OUTPATIENT
Start: 2024-03-27

## 2024-03-27 NOTE — TELEPHONE ENCOUNTER
----- Message from Julian Rudolph MD sent at 3/27/2024  8:25 AM EDT -----  Ill send b6 over to take

## 2024-03-29 ENCOUNTER — OFFICE VISIT (OUTPATIENT)
Dept: PSYCHIATRY | Facility: CLINIC | Age: 79
End: 2024-03-29
Payer: MEDICARE

## 2024-03-29 ENCOUNTER — OFFICE VISIT (OUTPATIENT)
Dept: PHYSICAL THERAPY | Facility: CLINIC | Age: 79
End: 2024-03-29
Payer: MEDICARE

## 2024-03-29 DIAGNOSIS — F22 DELUSIONS (HCC): ICD-10-CM

## 2024-03-29 DIAGNOSIS — F06.8 PSYCHOSIS DUE TO PARKINSON'S DISEASE: Primary | ICD-10-CM

## 2024-03-29 DIAGNOSIS — G20.A1 PSYCHOSIS DUE TO PARKINSON'S DISEASE: Primary | ICD-10-CM

## 2024-03-29 DIAGNOSIS — R26.9 GAIT ABNORMALITY: ICD-10-CM

## 2024-03-29 DIAGNOSIS — G20.A1 PARKINSON'S DISEASE WITHOUT DYSKINESIA, UNSPECIFIED WHETHER MANIFESTATIONS FLUCTUATE: Primary | ICD-10-CM

## 2024-03-29 PROCEDURE — 99214 OFFICE O/P EST MOD 30 MIN: CPT | Performed by: PHYSICIAN ASSISTANT

## 2024-03-29 PROCEDURE — G2211 COMPLEX E/M VISIT ADD ON: HCPCS | Performed by: PHYSICIAN ASSISTANT

## 2024-03-29 PROCEDURE — 97112 NEUROMUSCULAR REEDUCATION: CPT

## 2024-03-29 PROCEDURE — 97110 THERAPEUTIC EXERCISES: CPT

## 2024-03-29 NOTE — PSYCH
"This note was not shared with the patient due to reasonable likelihood of causing patient harm    PROGRESS NOTE        [facility]      Name and Date of Birth:  Gabriel Gonzalez 78 y.o. 1945    Reason for visit:   Chief Complaint   Patient presents with    Follow-up    Medication Management       Date of Visit: 03/29/24    SUBJECTIVE:    Gabriel Gonzalez is a nuha 78 y.o. male with a history of dementia who presents today for follow-up and medication management. His wife, Dariana, also presents with him and helps with the history. His wife reports that they initially tried a daytime dose of 2.5 mg in addition to the olanzapine 2.5 mg qhs, but he was unable to tolerate any dose during the day. So, instead she increased the night dose slowly and he is now back on 5 mg qhs and tolerating it better this time around. However, there is still no benefit in terms of frequency of hallucinations or insight. Taiwo feels he is tolerating the medication better and that the sx are \"the same.     He denies any suicidal ideation, intent or plan at present, denies any homicidal ideation, intent or plan at present.  He denies any auditory hallucinations.  He denies any side effects from current psychiatric medications.    HPI ROS Appetite Changes and Sleep: normal appetite, normal sleep    Review Of Systems:    Mood Anxiety   Behavior Normal    Thought Content Normal   General Normal    Personality Normal   Other Psych Symptoms Normal   Constitutional as noted in HPI   ENT as noted in HPI   Cardiovascular as noted in HPI   Respiratory as noted in HPI   Gastrointestinal as noted in HPI   Genitourinary as noted in HPI   Musculoskeletal as noted in HPI   Integumentary as noted in HPI   Neurological as noted in HPI   Endocrine negative   Other Symptoms none, all other systems are negative       Laboratory Results: No results found for this or any previous visit.    Substance Abuse " History:    Social History     Substance and Sexual Activity   Drug Use No       Family Psychiatric History:     Family History   Problem Relation Age of Onset    Heart disease Mother     Heart disease Father        The following portions of the patient's history were reviewed and updated as appropriate: past family history, past medical history, past social history, past surgical history and problem list.    Social History     Socioeconomic History    Marital status: /Civil Union     Spouse name: Not on file    Number of children: 3    Years of education: Not on file    Highest education level: Not on file   Occupational History    Occupation:    still active    Occupation: retired    Tobacco Use    Smoking status: Never    Smokeless tobacco: Never   Vaping Use    Vaping status: Never Used   Substance and Sexual Activity    Alcohol use: Not Currently     Comment: social    Drug use: No    Sexual activity: Not Currently   Other Topics Concern    Not on file   Social History Narrative    Most recent tobacco use screenin-    Do you currently or have you served in the  [email or web address]: Yes    If Yes, What branch of service: [employer]    [employer]    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, into active duty, 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    Advance directive: Yes    Alcohol intake: Moderate    Caffeine intake: Moderate    Illicit drugs: none    Occupation: retired    Exercise level: Occasional    Single or multi-level home/work: single level home    Live alone or with others: with others    Chewing tobacco: none    Marital status:     Number of children: 3    Diet: Regular    Sexual orientation: Heterosexual    Smoke alarm in home: Yes    General stress level: Medium    Sunscreen used routinely: No    Education: Post Graduate    Guns present in home: No    Presence of domestic violence: No     Social Determinants of Health     Financial Resource Strain: Low Risk  (2023)     Overall Financial Resource Strain (CARDIA)     Difficulty of Paying Living Expenses: Not hard at all   Food Insecurity: Not on file   Transportation Needs: No Transportation Needs (2023)    PRAPARE - Transportation     Lack of Transportation (Medical): No     Lack of Transportation (Non-Medical): No   Physical Activity: Not on file   Stress: Not on file   Social Connections: Not on file   Intimate Partner Violence: Not on file   Housing Stability: Not on file     Social History     Social History Narrative    Most recent tobacco use screenin-    Do you currently or have you served in the  [employer]: Yes    If Yes, What branch of service: [employer]    [employer]    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, into active duty, 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    Advance directive: Yes    Alcohol intake: Moderate    Caffeine intake: Moderate    Illicit drugs: none    Occupation: retired    Exercise level: Occasional    Single or multi-level home/work: single level home    Live alone or with others: with others    Chewing tobacco: none    Marital status:     Number of children: 3    Diet: Regular    Sexual orientation: Heterosexual    Smoke alarm in home: Yes    General stress level: Medium    Sunscreen used routinely: No    Education: Post Graduate    Guns present in home: No    Presence of domestic violence: No        Social History       Tobacco History       Smoking Status  Never      Smokeless Tobacco Use  Never              Alcohol History       Alcohol Use Status  Yes Comment  social              Drug Use       Drug Use Status  No              Sexual Activity       Sexually Active  Not Currently              Activities of Daily Living    Not Asked                       OBJECTIVE:     Mental Status Evaluation:  Appearance:  age appropriate, dressed appropriately, adequate grooming, looks stated age   Behavior:  pleasant, cooperative, interacts appropriately with this writer   Speech:   "normal rate, normal volume, normal pitch   Mood:  anxious   Affect:  mood-congruent   Thought Process:  organized, logical, coherent   Associations: intact associations   Thought Content:  persecutory delusions, obsessive thoughts, ruminations     Perceptual Disturbances: no auditory hallucinations, visual hallucinations of \"people\"     Risk Potential: Suicidal ideation - None  Homicidal ideation - None  Potential for aggression - No   Sensorium:  oriented to person, place, and time/date   Memory:  recent and remote memory grossly intact   Consciousness:  alert and awake   Attention/Concentration: attention span and concentration are age appropriate   Insight:  limited   Judgment: fair   Gait/Station: Limp, imbalance   Motor Activity: Mild resting tremor in BUE     Assessment/Plan:     We discussed their current treatment plan in detail today. He is now back on olanzapine 5 mg qhs and seems to be tolerating it better this time around. However, he and his wife have seen no benefit yet. I have advised working with the neurologist to increase the rivastigmine and keep the olanzapine the same as he is unable to tolerate higher doses. Other options include a re-trial of Nuplazid or switching to Rexulti. He is not appropriate for therapy at this time. We have discussed their safety plan and pt agrees that if they experience unsafe thoughts that they will reach out to their supports including this office, the suicide hotline, and emergency services if necessary. Patient is aware of non-emergent and emergent mental health resources. He is able to contract for their own safety at this time.    Will follow up in 2-3 weeks. Patient is aware to call the office if questions or concerns arise sooner.       Diagnoses and all orders for this visit:    Psychosis due to Parkinson's disease    Delusions (HCC)            Treatment Recommendations/Precautions:    Continue current medications:     - olanzapine 5 mg qhs    Risks/Benefits  "     Risks, Benefits And Possible Side Effects Of Medications:    Risks, benefits, and possible side effects of medications explained to patient and patient verbalizes understanding and agreement for treatment.    Controlled Medication Discussion:     Not applicable    Psychotherapy Provided:     Individual psychotherapy provided: No    Visit Start Time:  1:30 PM  Visit End Time:  2:00 PM  Total Visit Duration: 30 minutes    Elzbieta Carreno PA-C

## 2024-03-29 NOTE — PROGRESS NOTES
Daily Note     Today's date: 3/29/2024  Patient name: Gabriel Gonzalez  : 1945  MRN: [id number]  Referring provider: Lawanda Joshi CRNP  Dx:   Encounter Diagnosis     ICD-10-CM    1. Parkinson's disease without dyskinesia, unspecified whether manifestations fluctuate  G20.A1       2. Gait abnormality  R26.9           Start Time: 0845  Stop Time: 0930  Total time in clinic (min): 45 minutes    Subjective: pts wife reports the pt has had inc apathy to activity recently.      Objective: See treatment diary below      Assessment: Tolerated treatment well. Patient would benefit from continued PT visual trget of chalk lines on the Tm added to inc pts step length and pt is able correct his dec step length. Occasional verbal cues required to maintain step length. Pt also able to walk for inc time in his bouts of walking. Marches performed in side stepping to incorporate hip flexion and abd/er of the hip for practice with navigating a car transfer.      Plan: Continue per plan of care.      Short Term Goal Expiration Date:(3/8/24)  Long Term Goal Expiration Date: (24)  POC Expiration Date: (24)        POC expires Unit limit Auth Expiration date PT/OT/ST + Visit Limit?   24 BOMN N/a BOMN                           Visit/Unit Tracking  AUTH Status:  Date IE 2/22 2/28 3/7 3/12 3/22 3/29       N/a Used 1/10 2/10 3/10 4/10 1/10 2/10 3/10        Remaining                     Precautions parkinson's, Lewy body dementia       Manuals 3/22 3/29 2/28 3/7 3/12                                   Neuro Re-Ed     HEP updated and reviewed    FGA  6mwt  10mwt  3m bwd walk test  5x STS    steps SOLO    1 8'' 2 6'' step    Across x3 laps fwd SOLO    1 8'' 2 6'' step    Across x3 laps fwd      X2 laps lateral SOLO    1 8'' 2 6'' step    Across x3 laps fwd      X2 laps lateral SOLO    1 8'' 2 6'' step    5# AW    Across x3 laps fwd      X2 laps lateral    Compliant surface SOLO    Across 5 airec fwd  X3 laps    4 aires  lateral x3 laps   SOLO    Lateral on 2 foam beams    X2 laps    Side step SOLO    W blaze pods    1 min 30s x2    Trial 1 6  Trial 2 9    Cone taps one lap attempted  SOLO    3# AW  X3 laps w star arms     Bwd walk        HKM  Marching side steps    SOLO    X2 laps 1/4 track      hurdles SOLO    6''    X4 laps fwd    X2 laps lateral  SOLO    3# AW    6''    X3 laps fed    X2 laps lateral     rotation   Standing w med ball  X10 ea     Ther Ex        nustep Lvl 7 x10 min       TM  SOLO    Chalk visual cue    0.7 mph    6 min  5 min SOLO    0.8-1 mph    3 min x2     STS  W airex in chair    2x10 W airex in chair    2x10                                             Ther Activity                        Gait Training                        Modalities                              Access Code: [email or web address]  URL: [email or web address]  Date: 03/07/2024  Prepared by: Caterina Rust    Exercises  - Standing March with Counter Support  - 1 x daily - 6 x weekly - 3 sets - 10 reps  - Heel Toe Raises with Unilateral Counter Support  - 1 x daily - 6 x weekly - 3 sets - 10 reps  - Sit to Stand with Armchair  - 1 x daily - 6 x weekly - 3 sets - 8 reps  - Sideways Walking  - 1 x daily - 6 x weekly - 3 sets - 10 reps  - Seated Hip Abduction with Resistance  - 1 x daily - 6 x weekly - 3 sets - 10 reps

## 2024-04-02 DIAGNOSIS — G20.A1 MODERATE DEMENTIA DUE TO PARKINSON'S DISEASE, WITH PSYCHOTIC DISTURBANCE (HCC): Primary | ICD-10-CM

## 2024-04-02 DIAGNOSIS — F02.B2 MODERATE DEMENTIA DUE TO PARKINSON'S DISEASE, WITH PSYCHOTIC DISTURBANCE (HCC): Primary | ICD-10-CM

## 2024-04-02 PROBLEM — F02.82 DEMENTIA DUE TO PARKINSON'S DISEASE, WITH PSYCHOTIC DISTURBANCE (HCC): Status: ACTIVE | Noted: 2024-04-02

## 2024-04-02 RX ORDER — RIVASTIGMINE TARTRATE 4.5 MG/1
4.5 CAPSULE ORAL 2 TIMES DAILY
Qty: 180 CAPSULE | Refills: 2 | Status: SHIPPED | OUTPATIENT
Start: 2024-04-02

## 2024-04-02 NOTE — ASSESSMENT & PLAN NOTE
Patient returns for follow-up for Parkinson's disease.  Over the past several months this has been complicated by abrupt worsening of hallucinations, delusions, and paranoia.  His Sinemet has been reduced with no significant worsening of his motor symptoms other than slightly increased tremor.  In the past he did not note improvement of his behaviors with Seroquel or Nuplazid.  He is now following with psych who recently started olanzapine which seems to be somewhat assisting.  He has also found benefit with rivastigmine as he has been less anxious.  He continues to have hallucinations and delusions but appear less distressing than in the past.    Given his improvement/stability he will remain on his current dose of carbidopa levodopa 25/100 mg.  His wife did inquire about switching to extended release carbidopa levodopa can help that he would need medication less frequently however patient does not wish to make this change today, may consider in the future.  His psychiatrist is currently making adjustments to his olanzapine.  Further increases in his rivastigmine may be considered once his olanzapine dose is finalized.  His psychiatrist did recommend evaluation with neuropsychology which she will be scheduling in the near future.    Lab work up from PCP reviewed, recommend b12 injections. He will see if these can be performed through PCP office.     Plan for follow up in 3 months. To contact the office sooner with any concerns or worsening symptoms.

## 2024-04-04 DIAGNOSIS — R35.0 URINARY FREQUENCY: ICD-10-CM

## 2024-04-04 RX ORDER — MIRABEGRON 25 MG/1
25 TABLET, FILM COATED, EXTENDED RELEASE ORAL DAILY
Qty: 90 TABLET | Refills: 1 | Status: CANCELLED | OUTPATIENT
Start: 2024-04-04

## 2024-04-04 NOTE — TELEPHONE ENCOUNTER
Call made to [facility]- spoke with Veronica FERRERA- They have a refill on file for patient- patient normally gets 30 day supply- Veronica ran refill and is coming up with $40 kalyn copay for 30 days or $120 kalyn copay for 90 day supply.     She will contact Taiwo and see what he would like to do.     Medication refill removed from encounter.

## 2024-04-04 NOTE — TELEPHONE ENCOUNTER
Reason for call:   [x] Refill   [] Prior Auth  [] Other:     Office:   [] PCP/Provider -   [x] Specialty/Provider - urology  Adair Jaimes PA-C      Medication:   Mirabegron ER (Myrbetriq) 25 MG TB24      Dose/Frequency: Dose: 25 mg    Quantity: 90    Pharmacy: [address], [place] [address]    Does the patient have enough for 3 days?   [] Yes   [x] No - Send as HP to POD

## 2024-04-05 ENCOUNTER — CLINICAL SUPPORT (OUTPATIENT)
Dept: FAMILY MEDICINE CLINIC | Facility: CLINIC | Age: 79
End: 2024-04-05
Payer: MEDICARE

## 2024-04-05 ENCOUNTER — EVALUATION (OUTPATIENT)
Dept: PHYSICAL THERAPY | Facility: CLINIC | Age: 79
End: 2024-04-05
Payer: MEDICARE

## 2024-04-05 DIAGNOSIS — E53.8 VITAMIN B12 DEFICIENCY: Primary | ICD-10-CM

## 2024-04-05 DIAGNOSIS — R26.9 GAIT ABNORMALITY: ICD-10-CM

## 2024-04-05 DIAGNOSIS — G20.A1 PARKINSON'S DISEASE WITHOUT DYSKINESIA, UNSPECIFIED WHETHER MANIFESTATIONS FLUCTUATE: Primary | ICD-10-CM

## 2024-04-05 PROCEDURE — 96372 THER/PROPH/DIAG INJ SC/IM: CPT

## 2024-04-05 PROCEDURE — 97164 PT RE-EVAL EST PLAN CARE: CPT

## 2024-04-05 PROCEDURE — 97110 THERAPEUTIC EXERCISES: CPT

## 2024-04-05 RX ORDER — CYANOCOBALAMIN 1000 UG/ML
1000 INJECTION, SOLUTION INTRAMUSCULAR; SUBCUTANEOUS
Status: SHIPPED | OUTPATIENT
Start: 2024-04-05

## 2024-04-05 RX ADMIN — CYANOCOBALAMIN 1000 MCG: 1000 INJECTION, SOLUTION INTRAMUSCULAR; SUBCUTANEOUS at 15:42

## 2024-04-05 NOTE — PROGRESS NOTES
Daily Note     Today's date: 2024  Patient name: Gabriel Gonzalez  : 1945  MRN: [id number]  Referring provider: Lawanda Joshi CRNP  Dx:   Encounter Diagnosis     ICD-10-CM    1. Parkinson's disease without dyskinesia, unspecified whether manifestations fluctuate  G20.A1       2. Gait abnormality  R26.9           Start Time: 1215  Stop Time: 1300  Total time in clinic (min): 45 minutes    Subjective: pt reports he is sore form exercising at home      Objective: See treatment diary below      Assessment: Tolerated treatment well. Patient would benefit from continued PT Pt has attended 8 physical therapy sessions including IE and is demonstrating progress towards goals.  Pt has met 3/3 STG and /4 LTG at this time Pt has improved their 6mwt distance from 1000 feet to 1100 ft indicating improved aerobic capacity and activity tolerance. Pts gait speed has improved from 1 m/s to 1.1 m/s indicating pt is a community Ambulator however is still not considered a safe speed to cross the street indep (1.2 m/s). pts FGA score has remained consistent from  to  reducing pts fall risk however still placing them at risk for falls as scores < 23 are considered at risk for falls. Pts 5x STS tests has declined from 14.38 s to 16.08 s pt does however report his knees are bothering him more today than typical. Pts ABC score has improved from 71.25 to 78.75 on todays re eval indication pt is no longer at risk for falls as scores < 67% are considered at risk for falls. Pts 3m bwd walking time has improved from 6.09 to 5.75 s  indicating pt is at inc risk for falls as scores > 4.5 s are at risk for falls. Additionally pts 4 square step test time of 27.06s  indicates pt is at inc risk for falls as times >10.4s are considered pts identified as fallers and scores > 15 s indicate pt is as risk for multiple falls.    Pt would continue to benefit from skilled physical therapy to improve overall QOL inclusive of, strength,  coordination, balance, endurance and functional mobility in the home and in the community as well as reduce their fall risk for improved safety.                 Goals    STG (4 weeks)     Improve FGA score of 18 by 2 indicating meaningful improvement in fall risk MET  Improve 5x STS score of 22.25 by 2.3 seconds indicating meaningful improvement in fall risk and power generation MET  Increase 6MWT of 858 by 82 ft indicating meaningful change in aerobic endurance MET      LTG (8 weeks)     Improve FGA score to > 22/30 indicating meaningful improvement in fall risk PROGRESSED  Improve/Reduce 5x STS score to < 18 seconds seconds indicating meaningful improvement in fall risk and power generation MET  Improve 6MWT score to 1022 better align with age related norms demonstrating improvement in aerobic capacity and endurance MET  Patient will improve gait speed to 1 m/s to improve functional community ambulation and reduce pts fall risk MET    New goals    STG (4 weeks)     Improve FGA score of 22 by 2 indicating meaningful improvement in fall risk MET  Improve 5x STS score of 16.08 by 2.3 seconds indicating meaningful improvement in fall risk and power generation MET  Increase 6MWT of 1100 by 82 ft indicating meaningful change in aerobic endurance MET      LTG (8 weeks)     Improve 3m bwd walk score to 4.5 indicating meaningful improvement in fall risk   Improve/Reduce 5x STS score to < 14 seconds seconds indicating meaningful improvement in fall risk and power generation   Improve 4 square step test score to 22.4 to demonstrate improvement in balance and functional obstacle navigation  Patient will improve gait speed to 1.2 m/s to improve functional community ambulation and reduce pts fall risk      Assessment: Tolerated treatment well. Patient would benefit from continued PT Pt has attended 4 physical therapy sessions including IE and is demonstrating progress towards goals.  Pt has met 3/3 STG and is  progressing  towards LTG at this time Pt has improved their 6mwt distance from 858 feet to 1000 ft indicating improved aerobic capacity and activity tolerance. Pts gait speed has improved from 0.91 m/s to 1 m/s indicating pt is a community Ambulator however is still at risk for falls based on her speed being < 1 m/s.  pts FGA score has improved from 18/30 to 22/30 reducing pts fall risk however still placing them at risk for falls as scores < 22 are considered at risk for falls. Pts 5x STS tests has improved from 22.25 s to 14.38 s indicating improved LE strength and power however is still less than age matched norms of 12.6s. Pts 3m bwd walking time has improved from 7.6 to 6.09 s  indicating pt is at inc risk for falls as scores > 4.5 s are at risk for falls.   Pt would continue to benefit from skilled physical therapy to improve overall QOL inclusive of, strength, coordination, balance, endurance and functional mobility in the home and in the community as well as reduce their fall risk for improved safety.      Eval Assessment  Assessment details: Pt is a 78 year old male presenting to [facility] physical therapy for an initial evaluation secondary to dec funcational capacity assosiciated with parkinsons disease. Pt presents to clinic with gait and balance impairments inclusive of right sided trendelenburg gait, dec trunk rotation, dec arm swing w gait, dec endurance and LE strength, and dec functional balance.     Additionally pt demonstrates strength deficits as seen with M 5x STS time of 22.25s demonstrating dec LE strength and power as compared to age matched norms. Pt demonstrates endurance deficits as seen with 6mwt distance of 858 ft as compared to age matched norms of 1729 ft. Pt is at inc risk for falls based on their FGA score of 18/30  with scores < 20/30 at inc risk for spontaneous falls and scores < 22/30 considered at risk for falls.  Pt ambulates at 0.91 m/s making them a community Ambulator.  Additionally this  "score places the pt at inc risk for falls as scores < 1 m/s are considered at risk for falls.     Pt provided edu on symptoms of parkinson's such as festination, dec rotation, rigidity etc and exercises to address. HEP to be provided on first follow up visit.  Pt would benefit from skilled physical therapy to improve  overall QOL inclusive of, strength, coordination, balance, endurance and functional mobility in the home and in the community as well as reduce their fall risk for improved safety.    Impairments: abnormal gait, impaired balance, impaired physical strength, lacks appropriate home exercise program and safety issue    Plan  Patient would benefit from: skilled physical therapy  Planned therapy interventions: balance, gait training, home exercise program, neuromuscular re-education, patient education, postural training, strengthening, stretching, therapeutic activities, therapeutic exercise, functional ROM exercises and flexibility  Frequency: 2x week  Duration in visits: 16  Duration in weeks: 8  Plan of Care beginning date: 4/5/24  Plan of Care expiration date: 5/31/24  Treatment plan discussed with: patient      Subjective Evaluation    History of Present Illness  Mechanism of injury: Pt reports that he was diagnosed with parkinson's in 2017 and did not take any medications until 2019 (sinemet). Pt reprots that he previously had a  coming to the house to remain stain active but has not rk able to have those as of recent for several reasons and is looking for continued skilled car out of the house. Since stopping training he and his wife reports a decline in activity and function. Pt reports he would like to work on strength in legs and reports his balance is \"pretty crummy\" . Pt reports that he notices taking small steps and occasional freezing. Pts wife reports they used to walk a mile together but now he is only able to walk about a quarter of a mile due to endurance.            Recurrent " probem    Patient Goals  Patient goals for therapy: improved balance and increased strength    Pain  Location: right hip occasional pain    Social Support  Steps to enter house: yes (w railing)    Treatments  Previous treatment: physical therapy        Objective           Balance Test IE 3/7 4/5      6 Minute Walk Test (ft): 858 ft 1000 ft 1100 ft      2 Minute Walk Test (ft):         Gait Speed (m/s): 0.91  1 m/s 1.1      5x Sit To Stand (s): 22.25 no UE use 14.38s no UE use 16.08 no UE use      TUG (s) 13.22        FGA  3m bwd walk test 18/30  7.6s 22/30  6.09s 22/30  5.75       4 square step test 27.06 (4/5)     Short Term Goal Expiration Date:(5/3/24)  Long Term Goal Expiration Date: (5/31/24)  POC Expiration Date: (5/31/24)        POC expires Unit limit Auth Expiration date PT/OT/ST + Visit Limit?   5/31/24 BOMN N/a BOMN                           Visit/Unit Tracking  AUTH Status:  Date IE 2/22 2/28 3/7 3/12 3/22 3/29 4/5      N/a Used 1/10 2/10 3/10 4/10 1/10 2/10 3/10 4/10       Remaining                     Precautions parkinson's, Lewy body dementia       Manuals 3/22 3/29 4/5 3/7 3/12                                   Neuro Re-Ed    FGA  6mwt  10mwt  3m bwd walk test  5x STS  4 square step test HEP updated and reviewed    FGA  6mwt  10mwt  3m bwd walk test  5x STS    steps SOLO    1 8'' 2 6'' step    Across x3 laps fwd SOLO    1 8'' 2 6'' step    Across x3 laps fwd      X2 laps lateral  SOLO    1 8'' 2 6'' step    5# AW    Across x3 laps fwd      X2 laps lateral    Compliant surface SOLO    Across 5 airec fwd  X3 laps    4 aires lateral x3 laps   SOLO    Lateral on 2 foam beams    X2 laps    Side step SOLO    W blaze pods    1 min 30s x2    Trial 1 6  Trial 2 9    Cone taps one lap attempted       Bwd walk        HKM  Marching side steps    SOLO    X2 laps 1/4 track      hurdles SOLO    6''    X4 laps fwd    X2 laps lateral       rotation        Ther Ex        nustep Lvl 7 x10 min  Lvl 7 x10 min     TM   JOSÉ LUIS Lockwood visual cue    0.7 mph    6 min  5 min      STS  W airex in chair    2x10                                              Ther Activity                        Gait Training                        Modalities                              Access Code: [email or web address]  URL: [email or web address]  Date: 03/07/2024  Prepared by: Caterina Rust    Exercises  - Standing March with Counter Support  - 1 x daily - 6 x weekly - 3 sets - 10 reps  - Heel Toe Raises with Unilateral Counter Support  - 1 x daily - 6 x weekly - 3 sets - 10 reps  - Sit to Stand with Armchair  - 1 x daily - 6 x weekly - 3 sets - 8 reps  - Sideways Walking  - 1 x daily - 6 x weekly - 3 sets - 10 reps  - Seated Hip Abduction with Resistance  - 1 x daily - 6 x weekly - 3 sets - 10 reps

## 2024-04-08 NOTE — PROGRESS NOTES
Assessment and plan:     BPH with LUTS  -Cysto with Dr. Ji on 5/11/2023 demonstrated significant bilateral lobar hypertrophy without a median lobe but significant prostatic extrusion into the bladder, TRUS 100 g  -HOLEP/prostatectomy discussed at last visit but patient and wife remain hesitant due to underlying Parkinson's disease   -Increase Myrbetriq to 50 mg, continue to take in the morning  - mL in office today   -Prostate artery embolization discussed and referral placed for interventional radiology  -Follow up in four months to discuss urinary symptoms and plan of care moving forward    A discussion was had with the patient and his wife today regarding his extreme urinary symptoms with urgency and frequency that have continued to get worse over the last year.  He has tried multiple medications including Flomax, finasteride, and Myrbetriq.  He stopped taking Flomax due to orthostatic hypotension which is already exacerbated by his underlying Parkinson's.  He also discontinued the finasteride due to being on it for an extended period of time and not seeing any urologic benefits.  A HoLEP and prostatectomy were both discussed at his last office visit with Dr. Ji but the patient and his wife are hesitant to proceed with any surgical intervention due to his underlying comorbidities.  We did discuss a referral to interventional radiology for a prostate artery embolization which may in turn be beneficial for the patient and his urinary symptoms.  They are willing to have a consult with them and will follow-up with me in approximately 4 months.    History of Present Illness     Gabriel Gonzalez is a 78 y.o. male who presents today for follow up of BPH with LUTS. He was last seen in the office in May 2023 by Dr. Ji.  He states that continues with urinary urgency and frequency.  He states that in the morning specifically he uses the bathroom every hour for the first 4 to 5 hours of being awake.  He has  "nocturia once per night.  He declines any hesitancy or difficulty starting a stream.  He denies dysuria or hematuria.  He was started on Myrbetriq 25 mg at his last visit in which he did see mild improvement in his symptoms but states that his symptoms are still super bothersome.  He trialed off of the Myrbetriq for around 2 weeks and noticed his symptoms did get worse.  He is here in the office with his wife today.  They both state that this is very prohibitive for their lifestyle and he is afraid to leave the house due to not being able to find a bathroom.    Laboratory     Lab Results   Component Value Date    BUN 19 03/08/2024    CREATININE 1.18 03/08/2024       No components found for: \"GFR\"    Lab Results   Component Value Date    CALCIUM 8.9 03/08/2024    K 4.2 03/08/2024    CO2 28 03/08/2024     03/08/2024       Lab Results   Component Value Date    WBC 6.87 03/08/2024    HGB 13.3 03/08/2024    HCT 42.8 03/08/2024     (H) 03/08/2024     03/08/2024       Lab Results   Component Value Date    PSA 13.45 (H) 03/22/2024    PSA 11.9 (H) 11/16/2022    PSA 7.6 (H) 07/27/2021         Review of Systems     Review of Systems   Constitutional:  Negative for chills and fever.   Respiratory: Negative.  Negative for cough and shortness of breath.    Cardiovascular: Negative.  Negative for chest pain.   Gastrointestinal: Negative.  Negative for abdominal distention, abdominal pain, nausea and vomiting.   Genitourinary:  Positive for frequency and urgency. Negative for decreased urine volume, difficulty urinating, dysuria, flank pain, hematuria, penile discharge, penile pain, penile swelling, scrotal swelling and testicular pain.   Skin: Negative.  Negative for rash.   Neurological: Negative.    Hematological:  Negative for adenopathy. Does not bruise/bleed easily.         Allergies     No Known Allergies    Physical Exam     Physical Exam  Vitals reviewed.   Constitutional:       Appearance: Normal " appearance.   HENT:      Head: Normocephalic and atraumatic.   Eyes:      Pupils: Pupils are equal, round, and reactive to light.   Cardiovascular:      Rate and Rhythm: Normal rate.   Pulmonary:      Effort: Pulmonary effort is normal.   Musculoskeletal:      Cervical back: Normal range of motion.   Skin:     General: Skin is warm and dry.   Neurological:      General: No focal deficit present.      Mental Status: He is alert and oriented to person, place, and time.   Psychiatric:         Mood and Affect: Mood normal.         Behavior: Behavior normal.         Thought Content: Thought content normal.         Judgment: Judgment normal.         Vital Signs     Vitals:    04/09/24 1101   BP: 122/70   BP Location: Left arm   Patient Position: Sitting   Cuff Size: Standard   Pulse: 74   SpO2: 97%   Weight: 90.7 kg (200 lb)   Height: 6' (1.829 m)       Current Medications       Current Outpatient Medications:     atorvastatin (LIPITOR) 20 mg tablet, TAKE ONE TABLET BY MOUTH EVERY DAY, Disp: 90 tablet, Rfl: 1    carbidopa-levodopa (SINEMET)  mg per tablet, TAKE 1 TABLET BY MOUTH AT  7AM, TAKE 2 TABLETS AT 10:30AM, TAKE 1 TABLET AT 2:30PM, TAKE 1 TABLET AT 6:30PM, AND TAKE 1 TABLET AT 10:30PM. (Patient taking differently: TAKE 11/2 TABLET BY MOUTH AT  7AM, TAKE 1 1/2 TABLETS AT 10:30AM, TAKE 1 1/2 TABLET AT 2:30PM, TAKE 1 TABLET AT 6:30PM, AND TAKE 1 TABLET AT 10:30PM.), Disp: 540 tablet, Rfl: 1    doxycycline hyclate (VIBRA-TABS) 100 mg tablet, Take 1 tablet (100 mg total) by mouth 2 (two) times a day for 28 days, Disp: 56 tablet, Rfl: 0    ketoconazole (NIZORAL) 2 % shampoo, APPLY TO SCALP AND FACE 2 TO 3 TIMES A WEEK, LATHER AND LEAVE ON FOR 5 MINUTES, THEN RINSE, Disp: , Rfl:     midodrine (PROAMATINE) 2.5 mg tablet, Take 1 tab twice daily as needed (Patient taking differently: in the morning Take 1 tab twice daily as needed), Disp: 60 tablet, Rfl: 0    Mirabegron ER (Myrbetriq) 50 MG TB24, Take 1 tablet (50 mg  total) by mouth in the morning, Disp: 30 tablet, Rfl: 11    OLANZapine (ZyPREXA) 2.5 mg tablet, Take 2 tablets (5 mg total) by mouth daily at bedtime (Patient taking differently: Take 2.5 mg by mouth daily at bedtime Takes 1 1/2 tablets), Disp: 60 tablet, Rfl: 1    Pyridoxine HCl (vitamin B-6) 100 MG TABS, Take 1 tablet (100 mg total) by mouth daily, Disp: 90 tablet, Rfl: 1    rivastigmine (EXELON) 4.5 MG capsule, Take 1 capsule (4.5 mg total) by mouth 2 (two) times a day, Disp: 180 capsule, Rfl: 2    Current Facility-Administered Medications:     cyanocobalamin injection 1,000 mcg, 1,000 mcg, Intramuscular, Q30 Days, , 1,000 mcg at 04/05/24 1542    Active Problems     Patient Active Problem List   Diagnosis    REM behavioral disorder    Parkinson's disease    Orthostatic hypotension    Benign prostatic hyperplasia with urinary frequency    Anxiety    Psychophysiological insomnia    Hip pain, chronic, right    Mixed hyperlipidemia    Unsteady gait    Right leg weakness    Elevated PSA    Stage 3a chronic kidney disease (HCC)    Hallucinations    Delusions (HCC)    Dementia due to Parkinson's disease, with psychotic disturbance (HCC)       Past Medical History     History reviewed. No pertinent past medical history.    Surgical History     Past Surgical History:   Procedure Laterality Date    CYST REMOVAL      Right wrist    PROSTATE SURGERY      Biopsy    TONSILLECTOMY         Family History     Family History   Problem Relation Age of Onset    Heart disease Mother     Heart disease Father        Social History     Social History     Social History     Tobacco Use   Smoking Status Never   Smokeless Tobacco Never       Past Surgical History:   Procedure Laterality Date    CYST REMOVAL      Right wrist    PROSTATE SURGERY      Biopsy    TONSILLECTOMY           The following portions of the patient's history were reviewed and updated as appropriate: allergies, current medications, past family history, past medical  history, past social history, past surgical history and problem list    Please note :  Voice dictation software has been used to create this document.  There may be inadvertent transcription errors.    OFELIA Reeves

## 2024-04-09 ENCOUNTER — OFFICE VISIT (OUTPATIENT)
Dept: UROLOGY | Facility: AMBULATORY SURGERY CENTER | Age: 79
End: 2024-04-09
Payer: MEDICARE

## 2024-04-09 VITALS
HEART RATE: 74 BPM | WEIGHT: 200 LBS | HEIGHT: 72 IN | DIASTOLIC BLOOD PRESSURE: 70 MMHG | SYSTOLIC BLOOD PRESSURE: 122 MMHG | BODY MASS INDEX: 27.09 KG/M2 | OXYGEN SATURATION: 97 %

## 2024-04-09 DIAGNOSIS — R35.0 BENIGN PROSTATIC HYPERPLASIA WITH URINARY FREQUENCY: Primary | ICD-10-CM

## 2024-04-09 DIAGNOSIS — N40.1 BENIGN PROSTATIC HYPERPLASIA WITH URINARY FREQUENCY: Primary | ICD-10-CM

## 2024-04-09 LAB — POST-VOID RESIDUAL VOLUME, ML POC: 156 ML

## 2024-04-09 PROCEDURE — 99213 OFFICE O/P EST LOW 20 MIN: CPT

## 2024-04-09 PROCEDURE — 51798 US URINE CAPACITY MEASURE: CPT

## 2024-04-09 RX ORDER — KETOCONAZOLE 20 MG/ML
SHAMPOO TOPICAL
COMMUNITY
Start: 2024-04-02

## 2024-04-09 RX ORDER — MIRABEGRON 50 MG/1
50 TABLET, FILM COATED, EXTENDED RELEASE ORAL DAILY
Qty: 30 TABLET | Refills: 11 | Status: SHIPPED | OUTPATIENT
Start: 2024-04-09

## 2024-04-12 ENCOUNTER — OFFICE VISIT (OUTPATIENT)
Dept: PHYSICAL THERAPY | Facility: CLINIC | Age: 79
End: 2024-04-12
Payer: MEDICARE

## 2024-04-12 ENCOUNTER — CLINICAL SUPPORT (OUTPATIENT)
Dept: FAMILY MEDICINE CLINIC | Facility: CLINIC | Age: 79
End: 2024-04-12

## 2024-04-12 DIAGNOSIS — R26.9 GAIT ABNORMALITY: ICD-10-CM

## 2024-04-12 DIAGNOSIS — G20.A1 PARKINSON'S DISEASE WITHOUT DYSKINESIA, UNSPECIFIED WHETHER MANIFESTATIONS FLUCTUATE: Primary | ICD-10-CM

## 2024-04-12 DIAGNOSIS — E53.8 VITAMIN B12 DEFICIENCY: Primary | ICD-10-CM

## 2024-04-12 PROCEDURE — 97112 NEUROMUSCULAR REEDUCATION: CPT

## 2024-04-12 PROCEDURE — 97110 THERAPEUTIC EXERCISES: CPT

## 2024-04-12 RX ADMIN — CYANOCOBALAMIN 1000 MCG: 1000 INJECTION, SOLUTION INTRAMUSCULAR; SUBCUTANEOUS at 14:36

## 2024-04-12 NOTE — PROGRESS NOTES
Daily Note     Today's date: 2024  Patient name: Gabriel Gonzalez  : 1945  MRN: [id number]  Referring provider: Lawanda Joshi CRNP  Dx:   Encounter Diagnosis     ICD-10-CM    1. Parkinson's disease without dyskinesia, unspecified whether manifestations fluctuate  G20.A1       2. Gait abnormality  R26.9           Start Time: 1215  Stop Time: 1300  Total time in clinic (min): 45 minutes    Subjective: pt reports he is doing well today      Objective: See treatment diary below      Assessment: Tolerated treatment well. Patient would benefit from continued PT pt performed very well with chalk lines on TM today to inc step length with external visual cue. Pt had close to 100% accuracy with chalk lines for inc step length. Dec step length noted with side steps to the left as compared to the right. Shuffle steps with chalk lines added to practice breaking the shuffle and breaking the pattern of festination. Standing ball toss for inc trunk and cervical rotation and dec rigidity.      Plan: Continue per plan of care.      Short Term Goal Expiration Date:(5/3/24)  Long Term Goal Expiration Date: (24)  POC Expiration Date: (24)        POC expires Unit limit Auth Expiration date PT/OT/ST + Visit Limit?   24 BOMN N/a BOMN                           Visit/Unit Tracking  AUTH Status:  Date IE 2/22 2/28 3/7 3/12 3/22 3/29 4/5 4/12     N/a Used 1/10 2/10 3/10 4/10 1/10 2/10 3/10 4/10 5/10      Remaining                     Precautions parkinson's, Lewy body dementia       Manuals 3/22 3/29 4/5 4/12 3/12                                   Neuro Re-Ed    FGA  6mwt  10mwt  3m bwd walk test  5x STS  4 square step test     steps SOLO    1 8'' 2 6'' step    Across x3 laps fwd SOLO    1 8'' 2 6'' step    Across x3 laps fwd      X2 laps lateral      Compliant surface SOLO    Across 5 airec fwd  X3 laps    4 aires lateral x3 laps       Side step SOLO    W blaze pods    1 min 30s x2    Trial 1 6  Trial 2 9    Cone  taps one lap attempted   Solo w blaze pods x10 taps     Trial 1: 2 min 21s  Trial 2:        Bwd walk        HKM  Marching side steps    SOLO    X2 laps 1/4 track  Marching side steps    SOLO    X3 laps 1/4 track    hurdles SOLO    6''    X4 laps fwd    X2 laps lateral       Shuffle steps    X4 laps  SOLO    Chalk likes for inc step length and shuffel steps    rotation    SOLO    Standing lateral ball toss w rotation x10 ea    Ther Ex        nustep Lvl 7 x10 min  Lvl 7 x10 min     TM  SOLO    Chalk visual cue    0.7 mph    6 min  5 min  SOLO    Chalk visual cue    0.7 mph    6 min  5 min    STS  W airex in chair    2x10                                              Ther Activity                        Gait Training                        Modalities                              Access Code: [email or web address]  URL: [email or web address]  Date: 03/07/2024  Prepared by: Caterina Rust    Exercises  - Standing March with Counter Support  - 1 x daily - 6 x weekly - 3 sets - 10 reps  - Heel Toe Raises with Unilateral Counter Support  - 1 x daily - 6 x weekly - 3 sets - 10 reps  - Sit to Stand with Armchair  - 1 x daily - 6 x weekly - 3 sets - 8 reps  - Sideways Walking  - 1 x daily - 6 x weekly - 3 sets - 10 reps  - Seated Hip Abduction with Resistance  - 1 x daily - 6 x weekly - 3 sets - 10 reps

## 2024-04-18 NOTE — PSYCH
This note was not shared with the patient due to reasonable likelihood of causing patient harm    PROGRESS NOTE        [facility]      Name and Date of Birth:  Gabriel Gonzalez 78 y.o. 1945    Reason for visit:   Chief Complaint   Patient presents with    Follow-up    Medication Management       Date of Visit: 04/19/24    SUBJECTIVE:    Gabriel Gonzalez is a nuha 78 y.o. male with a history of dementia who presents today for follow-up and medication management. His wife, Dariana, also presents with him and helps with the history. He states he has not noticed much of a difference since increasing the olanzapine, either negative or positive. He still reports people coming into his house and bothering him and seeing them at work as well. His wife does feel that there may be a mild benefit from the olanzapine and rivastigmine increase and that he is tolerating them well. He reports being very fatigued (not more so since the olanzapine increase) and sleeping around 7 hours per night but not feeling like he slept at all. His sleep is mildly restless per Dariana but not acting out his dreams like he has in the past. He also reports being less productive at work and needing to leave early from being too tired.     He denies any suicidal ideation, intent or plan at present, denies any homicidal ideation, intent or plan at present.  He denies any auditory hallucinations.  He denies any side effects from current psychiatric medications.    HPI ROS Appetite Changes and Sleep: normal appetite, normal sleep    Review Of Systems:    Mood Anxiety and Depression   Behavior Normal    Thought Content Normal   General Normal    Personality Normal   Other Psych Symptoms Normal   Constitutional as noted in HPI   ENT as noted in HPI   Cardiovascular as noted in HPI   Respiratory as noted in HPI   Gastrointestinal as noted in HPI   Genitourinary as noted in HPI   Musculoskeletal as  noted in HPI   Integumentary as noted in HPI   Neurological as noted in HPI   Endocrine negative   Other Symptoms none, all other systems are negative       Laboratory Results: No results found for this or any previous visit.    Substance Abuse History:    Social History     Substance and Sexual Activity   Drug Use No       Family Psychiatric History:     Family History   Problem Relation Age of Onset    Heart disease Mother     Heart disease Father        The following portions of the patient's history were reviewed and updated as appropriate: past family history, past medical history, past social history, past surgical history and problem list.    Social History     Socioeconomic History    Marital status: /Civil Union     Spouse name: Not on file    Number of children: 3    Years of education: Not on file    Highest education level: Not on file   Occupational History    Occupation:    still active    Occupation: retired    Tobacco Use    Smoking status: Never    Smokeless tobacco: Never   Vaping Use    Vaping status: Never Used   Substance and Sexual Activity    Alcohol use: Not Currently     Comment: social    Drug use: No    Sexual activity: Not Currently   Other Topics Concern    Not on file   Social History Narrative    Most recent tobacco use screenin-    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    If Yes, What branch of service: [employer]    [employer]    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, into active duty, 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    Advance directive: Yes    Alcohol intake: Moderate    Caffeine intake: Moderate    Illicit drugs: none    Occupation: retired    Exercise level: Occasional    Single or multi-level home/work: single level home    Live alone or with others: with others    Chewing tobacco: none    Marital status:     Number of children: 3    Diet: Regular    Sexual orientation: Heterosexual    Smoke alarm in home: Yes     General stress level: Medium    Sunscreen used routinely: No    Education: Post Graduate    Guns present in home: No    Presence of domestic violence: No     Social Determinants of Health     Financial Resource Strain: Low Risk  (2023)    Overall Financial Resource Strain (CARDIA)     Difficulty of Paying Living Expenses: Not hard at all   Food Insecurity: Not on file   Transportation Needs: No Transportation Needs (2023)    PRAPARE - Transportation     Lack of Transportation (Medical): No     Lack of Transportation (Non-Medical): No   Physical Activity: Not on file   Stress: Not on file   Social Connections: Not on file   Intimate Partner Violence: Not on file   Housing Stability: Not on file     Social History     Social History Narrative    Most recent tobacco use screenin-    Do you currently or have you served in the  [email or web address]: Yes    If Yes, What branch of service: [employer]    [employer]    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, into active duty, 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    Advance directive: Yes    Alcohol intake: Moderate    Caffeine intake: Moderate    Illicit drugs: none    Occupation: retired    Exercise level: Occasional    Single or multi-level home/work: single level home    Live alone or with others: with others    Chewing tobacco: none    Marital status:     Number of children: 3    Diet: Regular    Sexual orientation: Heterosexual    Smoke alarm in home: Yes    General stress level: Medium    Sunscreen used routinely: No    Education: Post Graduate    Guns present in home: No    Presence of domestic violence: No        Social History       Tobacco History       Smoking Status  Never      Smokeless Tobacco Use  Never              Alcohol History       Alcohol Use Status  Yes Comment  social              Drug Use       Drug Use Status  No              Sexual Activity       Sexually Active  Not Currently              Activities of Daily Living    Not  "Asked                       OBJECTIVE:     Mental Status Evaluation:  Appearance:  age appropriate, dressed appropriately, adequate grooming, looks stated age   Behavior:  pleasant, cooperative, interacts appropriately with this writer   Speech:  normal rate, normal volume, normal pitch   Mood:  depressed, anxious   Affect:  constricted   Thought Process:  organized, logical, coherent   Associations: intact associations   Thought Content:  persecutory delusions, obsessive thoughts, ruminations     Perceptual Disturbances: no auditory hallucinations, visual hallucinations of \"people\"     Risk Potential: Suicidal ideation - None  Homicidal ideation - None  Potential for aggression - No   Sensorium:  oriented to person, place, and time/date   Memory:  recent and remote memory grossly intact   Consciousness:  alert and awake   Attention/Concentration: attention span and concentration are age appropriate   Insight:  limited   Judgment: fair   Gait/Station: Limp, imbalance   Motor Activity: Mild resting tremor in BUE     Assessment/Plan:     We discussed their current treatment plan in detail today. He has tolerated the increase in olanzapine and rivastigmine (by neuro) well, so I have advised giving this a little more time since they feel there may be some mild benefit. Instead of a medication change I have advised some behavioral adjustments like trying to get more sleep, limiting his hours at work, and trying to go more places besides home and work. He has his neuropsych testing on Tuesday. Other options if the olanzapine doesn't work out are aripiprazole and clozapine. He is not appropriate for therapy at this time. We have discussed their safety plan and pt agrees that if they experience unsafe thoughts that they will reach out to their supports including this office, the suicide hotline, and emergency services if necessary. Patient is aware of non-emergent and emergent mental health resources. He is able to contract " for their own safety at this time.    Will follow up in 3 weeks. Patient is aware to call the office if questions or concerns arise sooner.       Diagnoses and all orders for this visit:    Visual hallucinations    Delusions (HCC)  -     OLANZapine (ZyPREXA) 2.5 mg tablet; Take 2 tablets (5 mg total) by mouth daily at bedtime    Psychosis due to Parkinson's disease              Treatment Recommendations/Precautions:    Continue current medications:     - olanzapine 5 mg qhs    Risks/Benefits      Risks, Benefits And Possible Side Effects Of Medications:    Risks, benefits, and possible side effects of medications explained to patient and patient verbalizes understanding and agreement for treatment.    Controlled Medication Discussion:     Not applicable    Psychotherapy Provided:     Individual psychotherapy provided: No    Visit Start Time:  3:30 PM  Visit End Time:  4:00 PM  Total Visit Duration: 30 minutes    Elzbieta Carreno PA-C

## 2024-04-19 ENCOUNTER — OFFICE VISIT (OUTPATIENT)
Dept: PSYCHIATRY | Facility: CLINIC | Age: 79
End: 2024-04-19

## 2024-04-19 ENCOUNTER — CLINICAL SUPPORT (OUTPATIENT)
Dept: FAMILY MEDICINE CLINIC | Facility: CLINIC | Age: 79
End: 2024-04-19
Payer: MEDICARE

## 2024-04-19 ENCOUNTER — APPOINTMENT (OUTPATIENT)
Dept: PHYSICAL THERAPY | Facility: CLINIC | Age: 79
End: 2024-04-19
Payer: MEDICARE

## 2024-04-19 DIAGNOSIS — F06.8 PSYCHOSIS DUE TO PARKINSON'S DISEASE: ICD-10-CM

## 2024-04-19 DIAGNOSIS — R44.1 VISUAL HALLUCINATIONS: Primary | ICD-10-CM

## 2024-04-19 DIAGNOSIS — G20.A1 PSYCHOSIS DUE TO PARKINSON'S DISEASE: ICD-10-CM

## 2024-04-19 DIAGNOSIS — E53.8 VITAMIN B12 DEFICIENCY: Primary | ICD-10-CM

## 2024-04-19 DIAGNOSIS — F22 DELUSIONS (HCC): ICD-10-CM

## 2024-04-19 PROCEDURE — 96372 THER/PROPH/DIAG INJ SC/IM: CPT

## 2024-04-19 RX ORDER — OLANZAPINE 2.5 MG/1
5 TABLET, FILM COATED ORAL
Qty: 60 TABLET | Refills: 1 | Status: SHIPPED | OUTPATIENT
Start: 2024-04-19

## 2024-04-19 RX ADMIN — CYANOCOBALAMIN 1000 MCG: 1000 INJECTION, SOLUTION INTRAMUSCULAR; SUBCUTANEOUS at 14:32

## 2024-04-26 ENCOUNTER — CLINICAL SUPPORT (OUTPATIENT)
Dept: FAMILY MEDICINE CLINIC | Facility: CLINIC | Age: 79
End: 2024-04-26
Payer: MEDICARE

## 2024-04-26 ENCOUNTER — OFFICE VISIT (OUTPATIENT)
Dept: PHYSICAL THERAPY | Facility: CLINIC | Age: 79
End: 2024-04-26
Payer: MEDICARE

## 2024-04-26 DIAGNOSIS — E53.8 VITAMIN B12 DEFICIENCY: Primary | ICD-10-CM

## 2024-04-26 DIAGNOSIS — R26.9 GAIT ABNORMALITY: ICD-10-CM

## 2024-04-26 DIAGNOSIS — G20.A1 PARKINSON'S DISEASE WITHOUT DYSKINESIA, UNSPECIFIED WHETHER MANIFESTATIONS FLUCTUATE: Primary | ICD-10-CM

## 2024-04-26 PROCEDURE — 97110 THERAPEUTIC EXERCISES: CPT

## 2024-04-26 PROCEDURE — 96372 THER/PROPH/DIAG INJ SC/IM: CPT

## 2024-04-26 PROCEDURE — 97112 NEUROMUSCULAR REEDUCATION: CPT

## 2024-04-26 RX ADMIN — CYANOCOBALAMIN 1000 MCG: 1000 INJECTION, SOLUTION INTRAMUSCULAR; SUBCUTANEOUS at 14:41

## 2024-04-26 NOTE — PROGRESS NOTES
"Daily Note     Today's date: 2024  Patient name: Gabriel Gonzalez  : 1945  MRN: [id number]  Referring provider: Lawanda Joshi CRNP  Dx:   Encounter Diagnosis     ICD-10-CM    1. Parkinson's disease without dyskinesia, unspecified whether manifestations fluctuate  G20.A1       2. Gait abnormality  R26.9           Start Time: 1215  Stop Time: 1300  Total time in clinic (min): 45 minutes    Subjective: pt reports he is doing \" fair to average\" today      Objective: See treatment diary below      Assessment: Tolerated treatment well. Patient would benefit from continued PT slight inc in spee on TM today with visual chalk line cue for inc step length and dec festination while walking. Pt demonstrates a shortened step length w the LLE (SLS on RLE) as well as a trendelenburg gait with L hip drop in R stance. AW added to exercise today for added challenge and inc proprioceptive input for improved clearance of LE. Pt struggled with lateral hurdles to the right direction with challenge w RLE stance and keith of LLE over aimee.  Step taps in the alteral direction for inc glut med activation of stance leg.      Plan: Continue per plan of care.      Short Term Goal Expiration Date:(5/3/24)  Long Term Goal Expiration Date: (24)  POC Expiration Date: (24)        POC expires Unit limit Auth Expiration date PT/OT/ST + Visit Limit?   24 BOMN N/a BOMN                           Visit/Unit Tracking  AUTH Status:  Date IE 2/22 2/28 3/7 3/12 3/22 3/29 4/5 4/12 4/26    N/a Used 1/10 2/10 3/10 4/10 1/10 2/10 3/10 4/10 5/10 6/0     Remaining                     Precautions parkinson's, Lewy body dementia       Manuals 3/22 3/29 4/5 4/12 4/26                                   Neuro Re-Ed    FGA  6mwt  10mwt  3m bwd walk test  5x STS  4 square step test     steps SOLO    1 8'' 2 6'' step    Across x3 laps fwd SOLO    1 8'' 2 6'' step    Across x3 laps fwd      X2 laps lateral   SOLO    8'' step lateral " taps    3# AW    2x10 ea   Compliant surface SOLO    Across 5 airec fwd  X3 laps    4 aires lateral x3 laps       Side step SOLO    W blaze pods    1 min 30s x2    Trial 1 6  Trial 2 9    Cone taps one lap attempted   Solo w blaze pods x10 taps     Trial 1: 2 min 21s  Trial 2:        Bwd walk     SOLO    Fwd bwd changing directions w blaze pods    3# AW    X10 taps   HKM  Marching side steps    SOLO    X2 laps 1/4 track  Marching side steps    SOLO    X3 laps 1/4 track    hurdles SOLO    6''    X4 laps fwd    X2 laps lateral    Solo    3# AW    6'' (6)    X3 laps fwd  X2 lateral   Shuffle steps    X4 laps  SOLO    Chalk likes for inc step length and shuffel steps    rotation    SOLO    Standing lateral ball toss w rotation x10 ea    Ther Ex        nustep Lvl 7 x10 min  Lvl 7 x10 min     TM  SOLO    Chalk visual cue    0.7 mph    6 min  5 min  SOLO    Chalk visual cue    0.7 mph    6 min  5 min SOLO    Chalk visual cue    0.9 mph    6 min  5 min   STS  W airex in chair    2x10                                              Ther Activity                        Gait Training                        Modalities                              Access Code: [email or web address]  URL: [email or web address]  Date: 03/07/2024  Prepared by: Caterina Rust    Exercises  - Standing March with Counter Support  - 1 x daily - 6 x weekly - 3 sets - 10 reps  - Heel Toe Raises with Unilateral Counter Support  - 1 x daily - 6 x weekly - 3 sets - 10 reps  - Sit to Stand with Armchair  - 1 x daily - 6 x weekly - 3 sets - 8 reps  - Sideways Walking  - 1 x daily - 6 x weekly - 3 sets - 10 reps  - Seated Hip Abduction with Resistance  - 1 x daily - 6 x weekly - 3 sets - 10 reps

## 2024-04-30 ENCOUNTER — TELEPHONE (OUTPATIENT)
Dept: NEUROLOGY | Facility: CLINIC | Age: 79
End: 2024-04-30

## 2024-05-01 ENCOUNTER — OFFICE VISIT (OUTPATIENT)
Dept: FAMILY MEDICINE CLINIC | Facility: CLINIC | Age: 79
End: 2024-05-01
Payer: MEDICARE

## 2024-05-01 VITALS
HEART RATE: 68 BPM | HEIGHT: 72 IN | SYSTOLIC BLOOD PRESSURE: 120 MMHG | BODY MASS INDEX: 26.95 KG/M2 | RESPIRATION RATE: 16 BRPM | OXYGEN SATURATION: 96 % | WEIGHT: 199 LBS | DIASTOLIC BLOOD PRESSURE: 82 MMHG

## 2024-05-01 DIAGNOSIS — R41.0 DISORIENTATION: Primary | ICD-10-CM

## 2024-05-01 DIAGNOSIS — N30.00 ACUTE CYSTITIS WITHOUT HEMATURIA: ICD-10-CM

## 2024-05-01 PROCEDURE — 99213 OFFICE O/P EST LOW 20 MIN: CPT | Performed by: FAMILY MEDICINE

## 2024-05-01 PROCEDURE — G2211 COMPLEX E/M VISIT ADD ON: HCPCS | Performed by: FAMILY MEDICINE

## 2024-05-01 RX ORDER — CEPHALEXIN 500 MG/1
500 CAPSULE ORAL 2 TIMES DAILY
Qty: 14 CAPSULE | Refills: 0 | Status: SHIPPED | OUTPATIENT
Start: 2024-05-01 | End: 2024-05-06 | Stop reason: ALTCHOICE

## 2024-05-01 NOTE — PROGRESS NOTES
Gabriel Gonzalez is a 78 y.o. male who presents for follow up for parkinsonism with concerns regarding recent development of hallucinations. To review, symptom onset in 2016 with slowness of gait with subsequent development of LH tremor and orthostatic hypotension. Initially seen by Dr Quinn and started on Sinemet, which was exacerbating his orthostatic intolerance. Sinemet discontinued and focus initially on treating orthostatic symptoms which improved on midodrine.   More recently has developed hallucinations-infectious work up was negative. Was started on seroquel with no significant improvement, xanax was weaned by PCP. Nuplazid caused increase in hallucinations was stopped after  3 days. Rivastigimine later added. Sinemet has been reduced.      Last office visit 3/2024 in which increases in rivastigmine were to be considered.        Current PD regimen:  Carbidopa/levodopa 25/100 1.5 tabs amt 7 am, 10:30am , 2:30pm and  1 tab at 630 pm and bed time , 1 tab overnight as needed   Rivastigmine 4.5 mg bid  Olanzapine 5 mg at bedtime   midodrine once daily     Prior medications:  Quetiapine 25mg qam, noon, 4pm and night         Interval History:   He presents today to review neuropsych testing-full results are located in media.     He was found to have average cognitive functioning in verbal and visual reasoning skills, processing speed, and attentional functioning.  He did score below average and verbal generative capacity, word finding difficulties, naming items, significant visual spatial dysfunction.  There is also evidence of memory decline across measures of memory given, encoding and subsequent retention were adversely impacted.  Overall, evaluation was consistent with mild dementia secondary to Parkinson's disease.  He also has psychosis related to this dementia given his hallucinations and delusions.  Was also felt that anxiety could be contributing to some but not all of his cognitive complaints.  Was also  recommended to discuss his ability to continue working as a  and driving.    He feels with the increase in rivastigimine there has been less hallucinations. He continues to have delusions, however less anxiety provoking. Psych hasn't made any recent med changes.   He is currently in PT.

## 2024-05-01 NOTE — PROGRESS NOTES
Name: Gabriel Gonzalez      : 1945      MRN: [id number]  Encounter Provider: Julian Rudolph MD  Encounter Date: 2024   Encounter department: [facility]    Assessment & Plan     Assessment & Plan  1. Potential Urinary Tract Infection (UTI).  The patient's most recent urinalysis, conducted on 2024, yielded normal results. A urine sample will be collected for further analysis. Additionally, an antibiotic prescription will be issued. The patient is advised to commence the antibiotic regimen after  the urine sample is collected.     Orders Placed This Encounter   Procedures    Urine culture    UA w Reflex to Microscopic w Reflex to Culture       Subjective      History of Present Illness  The patient presents for evaluation of dizziness. He is accompanied by an adult female.    The patient experienced severe dizziness and insomnia, leading to a fall. Despite being unable to provide a urine sample, the accompanying adult female has expressed concern about a urinary tract infection (UTI). The patient did not experience dysuria or confusion during his previous UTI. He has previously used vancomycin. He completed a course of doxycycline approximately 2 weeks ago. A previous cystoscopy resulted in a bladder infection.    Supplemental Information  They saw the urologist 2 weeks ago and he is going to get a consultation for radiograph of the arterial where they cut off the blood supply to the prostate.  Review of Systems   All other systems reviewed and are negative.        Objective     /82 (BP Location: Left arm, Patient Position: Sitting, Cuff Size: Standard)   Pulse 68   Resp 16   Ht 6' (1.829 m)   Wt 90.3 kg (199 lb)   SpO2 96%   BMI 26.99 kg/m²     Physical Exam    Physical Exam  Constitutional:       Appearance: He is well-developed.   HENT:      Head: Normocephalic and atraumatic.   Pulmonary:      Effort: Pulmonary effort is normal.   Musculoskeletal:      Cervical  back: Normal range of motion.   Neurological:      Mental Status: He is alert.   Psychiatric:         Behavior: Behavior normal.         Thought Content: Thought content normal.         Judgment: Judgment normal.

## 2024-05-02 ENCOUNTER — TELEPHONE (OUTPATIENT)
Dept: NEUROLOGY | Facility: CLINIC | Age: 79
End: 2024-05-02

## 2024-05-02 ENCOUNTER — TELEMEDICINE (OUTPATIENT)
Dept: NEUROLOGY | Facility: CLINIC | Age: 79
End: 2024-05-02
Payer: MEDICARE

## 2024-05-02 DIAGNOSIS — G20.A1 MODERATE DEMENTIA DUE TO PARKINSON'S DISEASE, WITH PSYCHOTIC DISTURBANCE (HCC): Primary | ICD-10-CM

## 2024-05-02 DIAGNOSIS — G20.A1 PARKINSON'S DISEASE WITHOUT DYSKINESIA OR FLUCTUATING MANIFESTATIONS: ICD-10-CM

## 2024-05-02 DIAGNOSIS — F02.B2 MODERATE DEMENTIA DUE TO PARKINSON'S DISEASE, WITH PSYCHOTIC DISTURBANCE (HCC): Primary | ICD-10-CM

## 2024-05-02 PROCEDURE — 99215 OFFICE O/P EST HI 40 MIN: CPT | Performed by: NURSE PRACTITIONER

## 2024-05-02 RX ORDER — LANOLIN ALCOHOL/MO/W.PET/CERES
5000 CREAM (GRAM) TOPICAL DAILY
COMMUNITY

## 2024-05-02 NOTE — PROGRESS NOTES
Patient ID: Gabriel Gonzalez is a 78 y.o. male.    Assessment/Plan:    No problem-specific Assessment & Plan notes found for this encounter.       {Assess/PlanSmarinks:24121}       Subjective:    HPI    Gabriel Gonzalez is a 78 y.o. male who presents for follow up for parkinsonism with concerns regarding recent development of hallucinations. To review, symptom onset in 2016 with slowness of gait with subsequent development of LH tremor and orthostatic hypotension. Initially seen by Dr Quinn and started on Sinemet, which was exacerbating his orthostatic intolerance. Sinemet discontinued and focus initially on treating orthostatic symptoms which improved on midodrine.   More recently has developed hallucinations-infectious work up was negative. Was started on seroquel with no significant improvement, xanax was weaned by PCP. Nuplazid caused increase in hallucinations was stopped after  3 days. Rivastigimine later added. Sinemet has been reduced.      Last office visit 3/2024 in which increases in rivastigmine were to be considered.        Current PD regimen:  Carbidopa/levodopa 25/100 1.5 tabs amt 7 am, 10:30am , 2:30pm and  1 tab at 630 pm and bed time , 1 tab overnight as needed   Rivastigmine 4.5 mg bid  Olanzapine 5 mg at bedtime   midodrine once daily     Prior medications:  Quetiapine 25mg qam, noon, 4pm and night         Interval History:   He presents today to review neuropsych testing-full results are located in media.     He was found to have average cognitive functioning in verbal and visual reasoning skills, processing speed, and attentional functioning.  He did score below average and verbal generative capacity, word finding difficulties, naming items, significant visual spatial dysfunction.  There is also evidence of memory decline across measures of memory given, encoding and subsequent retention were adversely impacted.  Overall, evaluation was consistent with mild dementia secondary to Parkinson's  disease.  He also has psychosis related to this dementia given his hallucinations and delusions.  Was also felt that anxiety could be contributing to some but not all of his cognitive complaints.  Was also recommended to discuss his ability to continue working as a  and driving.    He feels with the increase in rivastigimine there has been less hallucinations. He continues to have delusions, however less anxiety provoking. Psych hasn't made any recent med changes.   He is currently in PT.        The following portions of the patient's history were reviewed and updated as appropriate: allergies, current medications, past family history, past medical history, past social history, past surgical history and problem list.            Objective:    There were no vitals taken for this visit.    Physical Exam    Neurological Exam    I have personally reviewed the ROS performed by the MA.    ROS:    Review of Systems   Constitutional:  Negative for appetite change, fatigue and fever.   HENT: Negative.  Negative for hearing loss, tinnitus, trouble swallowing and voice change.    Eyes: Negative.  Negative for photophobia, pain and visual disturbance.   Respiratory: Negative.  Negative for shortness of breath.    Cardiovascular: Negative.  Negative for palpitations.   Gastrointestinal: Negative.  Negative for nausea and vomiting.   Endocrine: Negative.  Negative for cold intolerance.   Genitourinary: Negative.  Negative for dysuria, frequency and urgency.   Musculoskeletal:  Negative for back pain, gait problem, myalgias, neck pain and neck stiffness.   Skin: Negative.  Negative for rash.   Allergic/Immunologic: Negative.    Neurological: Negative.  Negative for dizziness, tremors, seizures, syncope, facial asymmetry, speech difficulty, weakness, light-headedness, numbness and headaches.   Hematological: Negative.  Does not bruise/bleed easily.   Psychiatric/Behavioral: Negative.  Negative for confusion, hallucinations and  sleep disturbance.    All other systems reviewed and are negative.

## 2024-05-05 DIAGNOSIS — F51.04 PSYCHOPHYSIOLOGICAL INSOMNIA: ICD-10-CM

## 2024-05-05 RX ORDER — ATORVASTATIN CALCIUM 20 MG/1
TABLET, FILM COATED ORAL
Qty: 90 TABLET | Refills: 1 | Status: SHIPPED | OUTPATIENT
Start: 2024-05-05

## 2024-05-06 ENCOUNTER — TELEPHONE (OUTPATIENT)
Dept: FAMILY MEDICINE CLINIC | Facility: CLINIC | Age: 79
End: 2024-05-06

## 2024-05-06 DIAGNOSIS — N30.00 ACUTE CYSTITIS WITHOUT HEMATURIA: Primary | ICD-10-CM

## 2024-05-06 RX ORDER — AMOXICILLIN AND CLAVULANATE POTASSIUM 875; 125 MG/1; MG/1
1 TABLET, FILM COATED ORAL EVERY 12 HOURS SCHEDULED
Qty: 20 TABLET | Refills: 0 | Status: SHIPPED | OUTPATIENT
Start: 2024-05-06 | End: 2024-05-16

## 2024-05-06 NOTE — TELEPHONE ENCOUNTER
----- Message from Julian Rudolph MD sent at 5/6/2024  9:32 AM EDT -----  Please stop the keflex and start the new one

## 2024-05-10 ENCOUNTER — OFFICE VISIT (OUTPATIENT)
Dept: PHYSICAL THERAPY | Facility: CLINIC | Age: 79
End: 2024-05-10
Payer: MEDICARE

## 2024-05-10 DIAGNOSIS — R26.9 GAIT ABNORMALITY: ICD-10-CM

## 2024-05-10 DIAGNOSIS — G20.A1 PARKINSON'S DISEASE WITHOUT DYSKINESIA, UNSPECIFIED WHETHER MANIFESTATIONS FLUCTUATE: Primary | ICD-10-CM

## 2024-05-10 PROCEDURE — 97110 THERAPEUTIC EXERCISES: CPT

## 2024-05-10 NOTE — PROGRESS NOTES
Virtual Regular Visit    Verification of patient location:    Patient is located at Home in the following state in which I hold an active license [place]      Assessment/Plan:    Problem List Items Addressed This Visit       Parkinson's disease    Relevant Orders    Ambulatory Referral to Occupational Therapy    Dementia due to Parkinson's disease, with psychotic disturbance (HCC) - Primary     Patient returns for follow-up for Parkinson's disease with dementia, he is here today to review results of neuropsychology testing. He was found to have below average score in verbal generative capacity, word findings difficulties, naming items, and significant visual spatial dysfunction. It was felt that his evaluation was consistent with mild dementia secondary to Parkinson's disease with psychosis related to this dementia given his hallucinations and delusions.     He has felt rivastigmine to be helpful for anxiety and has lessened his delusions and hallucinations. He is also on olanzapine from psych. He did not have any improvement with seroquel, worsening hallucinations with nuplazid. Given his improvement, will plan to further increase rivastigmine to 4.5 mg BID. He will continue to follow up with psych. If olanzapine is increased should watch for any worsening of his PD symptoms. He will remain on his current dose of sinemet as he has noted worsening motor symptoms with reductions with no improvement in hallucinations. He is currently in PT.     Given his cognitive testing we did discuss driving-will refer to fitness to drive testing, he should refrain from driving til completed. 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 We did discuss given his condition and cognitive testing he should consider retiring at the present time. Right now his wife is monitoring his work and will work toward him stopping work in the near future.     Plan for follow up in  3 months. To contact the office sooner with any concerns or worsening symptoms.         Relevant Orders    Ambulatory Referral to Occupational Therapy            Reason for visit is   Chief Complaint   Patient presents with    Virtual Regular Visit          Encounter provider OFELIA Chu      Recent Visits  No visits were found meeting these conditions.  Showing recent visits within past 7 days and meeting all other requirements  Future Appointments  No visits were found meeting these conditions.  Showing future appointments within next 150 days and meeting all other requirements       The patient was identified by name and date of birth. Gabriel Gonzalez was informed that this is a telemedicine visit and that the visit is being conducted through the Epic Embedded platform. He agrees to proceed..  My office door was closed. No one else was in the room.  He acknowledged consent and understanding of privacy and security of the video platform. The patient has agreed to participate and understands they can discontinue the visit at any time.    Patient is aware this is a billable service.     Subjective  Gabriel Gonzalez is a 78 y.o. male who presents for follow up .      HPI   Gabriel Gonzalez is a 78 y.o. male who presents for follow up for parkinsonism with concerns regarding recent development of hallucinations. To review, symptom onset in 2016 with slowness of gait with subsequent development of LH tremor and orthostatic hypotension. Initially seen by Dr Quinn and started on Sinemet, which was exacerbating his orthostatic intolerance. Sinemet discontinued and focus initially on treating orthostatic symptoms which improved on midodrine.   More recently has developed hallucinations-infectious work up was negative. Was started on seroquel with no significant improvement, xanax was weaned by PCP. Nuplazid caused increase in hallucinations was stopped after  3 days. Rivastigimine later added. Sinemet has been  reduced.      Last office visit 3/2024 in which increases in rivastigmine were to be considered.        Current PD regimen:  Carbidopa/levodopa 25/100 1.5 tabs amt 7 am, 10:30am , 2:30pm and  1 tab at 630 pm and bed time , 1 tab overnight as needed   Rivastigmine 4.5 mg bid  Olanzapine 5 mg at bedtime   midodrine once daily     Prior medications:  Quetiapine 25mg qam, noon, 4pm and night         Interval History:   He presents today to review neuropsych testing-full results are located in media.      He was found to have average cognitive functioning in verbal and visual reasoning skills, processing speed, and attentional functioning.  He did score below average and verbal generative capacity, word finding difficulties, naming items, significant visual spatial dysfunction.  There is also evidence of memory decline across measures of memory given, encoding and subsequent retention were adversely impacted.  Overall, evaluation was consistent with mild dementia secondary to Parkinson's disease.  He also has psychosis related to this dementia given his hallucinations and delusions.  Was also felt that anxiety could be contributing to some but not all of his cognitive complaints.  Was also recommended to discuss his ability to continue working as a  and driving.     He feels with the increase in rivastigimine there has been less hallucinations. He continues to have delusions, however less anxiety provoking. Psych hasn't made any recent med changes.   He is currently in PT.         The following portions of the patient's history were reviewed and updated as appropriate: allergies, current medications, past family history, past medical history, past social history, past surgical history and problem list.     History reviewed. No pertinent past medical history.    Past Surgical History:   Procedure Laterality Date    CYST REMOVAL      Right wrist    PROSTATE SURGERY      Biopsy    TONSILLECTOMY         Current  Outpatient Medications   Medication Sig Dispense Refill    carbidopa-levodopa (SINEMET)  mg per tablet TAKE 1 TABLET BY MOUTH AT  7AM, TAKE 2 TABLETS AT 10:30AM, TAKE 1 TABLET AT 2:30PM, TAKE 1 TABLET AT 6:30PM, AND TAKE 1 TABLET AT 10:30PM. (Patient taking differently: TAKE 11/2 TABLET BY MOUTH AT  7AM, TAKE 1 1/2 TABLETS AT 10:30AM, TAKE 1 1/2 TABLET AT 2:30PM, TAKE 1 TABLET AT 6:30PM, AND TAKE 1 TABLET AT 10:30PM.) 540 tablet 1    ketoconazole (NIZORAL) 2 % shampoo APPLY TO SCALP AND FACE 2 TO 3 TIMES A WEEK, LATHER AND LEAVE ON FOR 5 MINUTES, THEN RINSE      midodrine (PROAMATINE) 2.5 mg tablet Take 1 tab twice daily as needed (Patient taking differently: in the morning Take 1 tab twice daily as needed) 60 tablet 0    Mirabegron ER (Myrbetriq) 50 MG TB24 Take 1 tablet (50 mg total) by mouth in the morning 30 tablet 11    OLANZapine (ZyPREXA) 2.5 mg tablet Take 2 tablets (5 mg total) by mouth daily at bedtime 60 tablet 1    Pyridoxine HCl (vitamin B-6) 100 MG TABS Take 1 tablet (100 mg total) by mouth daily 90 tablet 1    rivastigmine (EXELON) 4.5 MG capsule Take 1 capsule (4.5 mg total) by mouth 2 (two) times a day 180 capsule 2    vitamin B-12 (VITAMIN B-12) 1,000 mcg tablet Take 5,000 mcg by mouth daily      amoxicillin-clavulanate (AUGMENTIN) 875-125 mg per tablet Take 1 tablet by mouth every 12 (twelve) hours for 10 days 20 tablet 0    atorvastatin (LIPITOR) 20 mg tablet TAKE ONE TABLET BY MOUTH EVERY DAY 90 tablet 1     Current Facility-Administered Medications   Medication Dose Route Frequency Provider Last Rate Last Admin    cyanocobalamin injection 1,000 mcg  1,000 mcg Intramuscular Q30 Days    1,000 mcg at 04/26/24 1441        No Known Allergies    Review of Systems  Review of Systems   Constitutional:  Negative for appetite change, fatigue and fever.   HENT: Negative.  Negative for hearing loss, tinnitus, trouble swallowing and voice change.    Eyes: Negative.  Negative for photophobia, pain and  visual disturbance.   Respiratory: Negative.  Negative for shortness of breath.    Cardiovascular: Negative.  Negative for palpitations.   Gastrointestinal: Negative.  Negative for nausea and vomiting.   Endocrine: Negative.  Negative for cold intolerance.   Genitourinary: Negative.  Negative for dysuria, frequency and urgency.   Musculoskeletal:  Negative for back pain, gait problem, myalgias, neck pain and neck stiffness.   Skin: Negative.  Negative for rash.   Allergic/Immunologic: Negative.    Neurological: Negative.  Negative for dizziness, tremors, seizures, syncope, facial asymmetry, speech difficulty, weakness, light-headedness, numbness and headaches.   Hematological: Negative.  Does not bruise/bleed easily.   Psychiatric/Behavioral: Negative.  Negative for confusion, hallucinations and sleep disturbance.    All other systems reviewed and are negative    Video Exam    There were no vitals filed for this visit.    Physical Exam   Awake and alert. Speech normal. Oriented to person, place, situation. Face symmetric, moves all extremities antigravity. Hypomimia. NO resting tremor noted.     Visit Time  Total Visit Duration: 46 minutes

## 2024-05-10 NOTE — PROGRESS NOTES
Daily Note     Today's date: 5/10/2024  Patient name: Gabriel Gonzalez  : 1945  MRN: [id number]  Referring provider: Lawanda Joshi CRNP  Dx:   Encounter Diagnosis     ICD-10-CM    1. Parkinson's disease without dyskinesia, unspecified whether manifestations fluctuate  G20.A1       2. Gait abnormality  R26.9           Start Time: 1100  Stop Time: 1140  Total time in clinic (min): 40 minutes    Subjective: Presents to session with his wife, ambulatory to session. Wife reports that he is having a lot of trouble of the past week, week and a half with his dizziness. Notes that he takes aminidrine for his orthostatic hypotension. Notes that he has been trouble doing his exercises at home because they are primarily standing exercises.       Objective: See treatment diary below      Assessment: Tolerated treatment well. Initially thought about trying treadmill, but once stepping up on the treadmill patient reported that he needed to sit down. After this, more emphasis on HEP development for rigidity and weakness that he can perform in both supine and seated positions. HEP delivered to patient and wife in paper form, patient and wife verbalized good understanding of exercises. Educated regarding contacting physician for further evaluation or medication consultation to further evaluate potential side effects. Continued to have bouts of lightheadedness during sit > stand transitions throughout session. Patient demonstrated fatigue post treatment, exhibited good technique with therapeutic exercises, and would benefit from continued PT      Plan: Continue per plan of care.  Progress treatment as tolerated.       Short Term Goal Expiration Date:(5/3/24)  Long Term Goal Expiration Date: (24)  POC Expiration Date: (24)        POC expires Unit limit Auth Expiration date PT/OT/ST + Visit Limit?   24 BOMN N/a BOMN                           Visit/Unit Tracking  AUTH Status:  Date IE 2/22 2/28 3/7 3/12 3/22 3/29  "4/5 4/12 4/26 5/10    N/a Used 1/10 2/10 3/10 4/10 1/10 2/10 3/10 4/10 5/10 6/0 7/10    Remaining                     Precautions parkinson's, Lewy body dementia       Manuals 5/10  3/29 4/5 4/12 4/26                                   Neuro Re-Ed    FGA  6mwt  10mwt  3m bwd walk test  5x STS  4 square step test     steps  SOLO    1 8'' 2 6'' step    Across x3 laps fwd      X2 laps lateral   SOLO    8'' step lateral taps    3# AW    2x10 ea   Compliant surface        Side step    Solo w blaze pods x10 taps     Trial 1: 2 min 21s  Trial 2:        Bwd walk     SOLO    Fwd bwd changing directions w blaze pods    3# AW    X10 taps   HKM  Marching side steps    SOLO    X2 laps 1/4 track  Marching side steps    SOLO    X3 laps 1/4 track    hurdles     Solo    3# AW    6'' (6)    X3 laps fwd  X2 lateral   Shuffle steps    X4 laps  SOLO    Chalk likes for inc step length and shuffel steps    rotation    SOLO    Standing lateral ball toss w rotation x10 ea    Ther Ex        nustep L4 x10 min  Lvl 7 x10 min     TM  SOLO    Chalk visual cue    0.7 mph    6 min  5 min  SOLO    Chalk visual cue    0.7 mph    6 min  5 min SOLO    Chalk visual cue    0.9 mph    6 min  5 min   STS  W airex in chair    2x10      Postural strength  Hor abd, no money both pink TB 2x10        T/S ext  W/ towel 2x10 posture over correct        T/S rot  W/ towel 2x10 seated        Open book  Demo        Bridge  2x10        Supine figure 4 stretch  3x30\" ea        LTR  2x10  ea                Ther Activity                        Gait Training                        Modalities                              Access Code: [email or web address]  URL: [email or web address]  Date: 03/07/2024  Prepared by: Caterina Rust    Exercises  - Standing March with Counter Support  - 1 x daily - 6 x weekly - 3 sets - 10 reps  - Heel Toe Raises with Unilateral Counter Support  - 1 x daily - 6 x weekly - 3 sets - 10 reps  - Sit to Stand with Armchair  - 1 x daily - 6 x weekly " - 3 sets - 8 reps  - Sideways Walking  - 1 x daily - 6 x weekly - 3 sets - 10 reps  - Seated Hip Abduction with Resistance  - 1 x daily - 6 x weekly - 3 sets - 10 reps      Access Code: [email or web address]  URL: [email or web address]  Date: 05/10/2024  Prepared by: Presley Hodgson    Exercises  - Supine Lower Trunk Rotation  - 1 x daily - 7 x weekly - 3 sets - 10 reps  - Supine Figure 4 Piriformis Stretch  - 1 x daily - 7 x weekly - 3 sets - 1 reps - 30 seconds hold  - Supine Bridge  - 1 x daily - 7 x weekly - 3 sets - 10 reps  - Seated Shoulder Row with Anchored Resistance  - 1 x daily - 7 x weekly - 3 sets - 10 reps - 3 seconds hold  - Hooklying Clamshell with Resistance  - 1 x daily - 7 x weekly - 3 sets - 10 reps  - Seated Shoulder Horizontal Abduction with Resistance  - 1 x daily - 7 x weekly - 3 sets - 10 reps  - Shoulder External Rotation with Resistance  - 1 x daily - 7 x weekly - 3 sets - 10 reps  - Seated Thoracic Self-Mobilization  - 1 x daily - 7 x weekly - 3 sets - 10 reps  - Sidelying Open Book Thoracic Lumbar Rotation and Extension  - 1 x daily - 7 x weekly - 3 sets - 10 reps

## 2024-05-10 NOTE — ASSESSMENT & PLAN NOTE
Patient returns for follow-up for Parkinson's disease with dementia, he is here today to review results of neuropsychology testing. He was found to have below average score in verbal generative capacity, word findings difficulties, naming items, and significant visual spatial dysfunction. It was felt that his evaluation was consistent with mild dementia secondary to Parkinson's disease with psychosis related to this dementia given his hallucinations and delusions.     He has felt rivastigmine to be helpful for anxiety and has lessened his delusions and hallucinations. He is also on olanzapine from psych. He did not have any improvement with seroquel, worsening hallucinations with nuplazid. Given his improvement, will plan to further increase rivastigmine to 4.5 mg BID. He will continue to follow up with psych. If olanzapine is increased should watch for any worsening of his PD symptoms. He will remain on his current dose of sinemet as he has noted worsening motor symptoms with reductions with no improvement in hallucinations. He is currently in PT.     Given his cognitive testing we did discuss driving-will refer to fitness to drive testing, he should refrain from driving til completed. 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 We did discuss given his condition and cognitive testing he should consider retiring at the present time. Right now his wife is monitoring his work and will work toward him stopping work in the near future.     Plan for follow up in 3 months. To contact the office sooner with any concerns or worsening symptoms.

## 2024-05-16 ENCOUNTER — OFFICE VISIT (OUTPATIENT)
Dept: PSYCHIATRY | Facility: CLINIC | Age: 79
End: 2024-05-16
Payer: MEDICARE

## 2024-05-16 DIAGNOSIS — F22 DELUSIONS (HCC): ICD-10-CM

## 2024-05-16 DIAGNOSIS — R44.1 VISUAL HALLUCINATIONS: ICD-10-CM

## 2024-05-16 DIAGNOSIS — F06.8 PSYCHOSIS DUE TO PARKINSON'S DISEASE: Primary | ICD-10-CM

## 2024-05-16 DIAGNOSIS — G20.A1 PSYCHOSIS DUE TO PARKINSON'S DISEASE: Primary | ICD-10-CM

## 2024-05-16 PROCEDURE — G2211 COMPLEX E/M VISIT ADD ON: HCPCS | Performed by: PHYSICIAN ASSISTANT

## 2024-05-16 PROCEDURE — 99214 OFFICE O/P EST MOD 30 MIN: CPT | Performed by: PHYSICIAN ASSISTANT

## 2024-05-16 RX ORDER — OLANZAPINE 2.5 MG/1
5 TABLET, FILM COATED ORAL
Qty: 60 TABLET | Refills: 1 | Status: SHIPPED | OUTPATIENT
Start: 2024-05-16

## 2024-05-17 ENCOUNTER — OFFICE VISIT (OUTPATIENT)
Dept: PHYSICAL THERAPY | Facility: CLINIC | Age: 79
End: 2024-05-17
Payer: MEDICARE

## 2024-05-17 DIAGNOSIS — R26.9 GAIT ABNORMALITY: ICD-10-CM

## 2024-05-17 DIAGNOSIS — G20.A1 PARKINSON'S DISEASE WITHOUT DYSKINESIA, UNSPECIFIED WHETHER MANIFESTATIONS FLUCTUATE: Primary | ICD-10-CM

## 2024-05-17 PROCEDURE — 97112 NEUROMUSCULAR REEDUCATION: CPT

## 2024-05-17 PROCEDURE — 97110 THERAPEUTIC EXERCISES: CPT

## 2024-05-17 NOTE — PROGRESS NOTES
Daily Note     Today's date: 2024  Patient name: Gabriel Gonzalez  : 1945  MRN: [id number]  Referring provider: Lawanda Joshi CRNP  Dx:   Encounter Diagnosis     ICD-10-CM    1. Parkinson's disease without dyskinesia, unspecified whether manifestations fluctuate  G20.A1       2. Gait abnormality  R26.9           Start Time: 1055  Stop Time: 1148  Total time in clinic (min): 53 minutes    Subjective: pts wife reports that the pt is more willing to perform new HEP exercises as they are seated      Objective: See treatment diary below      Assessment: Tolerated treatment well. Patient would benefit from continued PT inc consecutive duration on TM to 7 min today. Lateral bal toss included for inc trunk mobility and rotation and a reduction in rigidity. Ball bounce included for inc power generation w UE. Pt experienced some dizziness with standing exercises, seated exercise in between to give pt a rest. Weight shifting on foam included for improved ankle strategies to maintain balance      Plan: Continue per plan of care.      Short Term Goal Expiration Date:(5/3/24)  Long Term Goal Expiration Date: (24)  POC Expiration Date: (24)        POC expires Unit limit Auth Expiration date PT/OT/ST + Visit Limit?   24 BOMN N/a BOMN                           Visit/Unit Tracking  AUTH Status:  Date 5/17 2/22 2/28 3/7 3/12 3/22 3/29 4/5 4/12 4/26 5/10    N/a Used 4/10 2/10 3/10 4/10 1/10 2/10 3/10 4/10 1/10 2/0 3/10    Remaining                     Precautions parkinson's, Lewy body dementia       Manuals 5/10  5/17 4/5 4/12 4/26                                   Neuro Re-Ed    FGA  6mwt  10mwt  3m bwd walk test  5x STS  4 square step test     steps     SOLO    8'' step lateral taps    3# AW    2x10 ea   Compliant surface  SOLO    Weight shifting   X20 fwd bwd      Side step  SOLO    W basketball bounce for inc power generation    X3 laps  Solo w blaze pods x10 taps     Trial 1: 2 min 21s  Trial 2:       "  Bwd walk  SOLO    Fwd bwd shuttle    4 cones   X2 rounds  2.5# AW   SOLO    Fwd bwd changing directions w blaze pods    3# AW    X10 taps   HKM  SOLO    X3 laps 1/4 track    2.5# AW  Marching side steps    SOLO    X3 laps 1/4 track    hurdles     Solo    3# AW    6'' (6)    X3 laps fwd  X2 lateral   Shuffle steps    X4 laps  SOLO    Chalk likes for inc step length and shuffel steps    rotation  SOLO    Standing lateral ball toss w rotation x10 ea  SOLO    Standing lateral ball toss w rotation x10 ea    Multi directional stepping  SOLO    Hex ladder in hexagon shape    X3 clockwise steps  X3 counterclockwise steps    2.5# AW      Ther Ex        nustep L4 x10 min  Lvl 7 x10 min     TM  SOLO    Chalk visual cue    0.9 mph    7 min  SOLO    Chalk visual cue    0.7 mph    6 min  5 min SOLO    Chalk visual cue    0.9 mph    6 min  5 min   STS        Postural strength  Hor abd, no money both pink TB 2x10        T/S ext  W/ towel 2x10 posture over correct        T/S rot  W/ towel 2x10 seated        Open book  Demo        Bridge  2x10        Supine figure 4 stretch  3x30\" ea        LTR  2x10  ea        Seated hip abd  BTB  3x10      Ther Activity                        Gait Training                        Modalities                              Access Code: [email or web address]  URL: [email or web address]  Date: 03/07/2024  Prepared by: Caterina Rust    Exercises  - Standing March with Counter Support  - 1 x daily - 6 x weekly - 3 sets - 10 reps  - Heel Toe Raises with Unilateral Counter Support  - 1 x daily - 6 x weekly - 3 sets - 10 reps  - Sit to Stand with Armchair  - 1 x daily - 6 x weekly - 3 sets - 8 reps  - Sideways Walking  - 1 x daily - 6 x weekly - 3 sets - 10 reps  - Seated Hip Abduction with Resistance  - 1 x daily - 6 x weekly - 3 sets - 10 reps      Access Code: [email or web address]  URL: [email or web address]  Date: 05/10/2024  Prepared by: Presley Hodgson    Exercises  - Supine Lower Trunk Rotation  - 1 " x daily - 7 x weekly - 3 sets - 10 reps  - Supine Figure 4 Piriformis Stretch  - 1 x daily - 7 x weekly - 3 sets - 1 reps - 30 seconds hold  - Supine Bridge  - 1 x daily - 7 x weekly - 3 sets - 10 reps  - Seated Shoulder Row with Anchored Resistance  - 1 x daily - 7 x weekly - 3 sets - 10 reps - 3 seconds hold  - Hooklying Clamshell with Resistance  - 1 x daily - 7 x weekly - 3 sets - 10 reps  - Seated Shoulder Horizontal Abduction with Resistance  - 1 x daily - 7 x weekly - 3 sets - 10 reps  - Shoulder External Rotation with Resistance  - 1 x daily - 7 x weekly - 3 sets - 10 reps  - Seated Thoracic Self-Mobilization  - 1 x daily - 7 x weekly - 3 sets - 10 reps  - Sidelying Open Book Thoracic Lumbar Rotation and Extension  - 1 x daily - 7 x weekly - 3 sets - 10 reps

## 2024-05-20 NOTE — PSYCH
This note was not shared with the patient due to reasonable likelihood of causing patient harm    PROGRESS NOTE        [facility]      Name and Date of Birth:  Gabriel Gonzalez 79 y.o. 1945 MRN: [id number]    Insurance: Payor: MEDICARE / Plan: MEDICARE A AND B / Product Type: Medicare A & B Fee for Service /     Date of Visit: May 20, 2024    Reason for Visit:   Chief Complaint   Patient presents with    Follow-up    Medication Management       SUBJECTIVE:    Gabriel Gonzalez is a nuha 79 y.o. male with a history of dementia and cognitive disorder who presents today for follow-up and medication management. His wife, Dariana, presents with him today and helps with the history. He feels that the hallucinations are still present but he is less upset and bothered by them recently. He still does not feel he is sleeping well though Dariana says he sleeps throughout the night. He did cut back on his work hours but despite this as well as tx for B12 deficiency and UTI he is still feeling very fatigued throughout the day. His neuropsych results came in and they had a short discussion with the neurology AP regarding this.     He denies any suicidal ideation, intent or plan at present; denies any homicidal ideation, intent or plan at present.    He denies any auditory hallucinations, denies any, continues to experience visual hallucinations, continues to experience delusions.    He still reports sedation and tiredness.    HPI ROS Appetite Changes and Sleep:     He reports adequate number of sleep hours, adequate appetite, low energy    Current Rating Scores:     None completed today.    Review Of Systems:    Mood euthymic   Behavior appropriate, cooperative, and calm   Thought Content delusional thoughts   General decreased functioning   Personality no change in personality   Other Psych Symptoms decreased memory, decreased concentration, and decreased attention    Constitutional as noted in HPI   ENT as noted in HPI   Cardiovascular as noted in HPI   Respiratory as noted in HPI   Gastrointestinal as noted in HPI   Genitourinary as noted in HPI   Musculoskeletal as noted in HPI   Integumentary as noted in HPI   Neurological as noted in HPI   Endocrine negative   Other Symptoms none, all other systems are negative     Family Psychiatric History:     Family History   Problem Relation Age of Onset    Heart disease Mother     Heart disease Father        Social/Substance Abuse History:    Social History     Socioeconomic History    Marital status: /Civil Union     Spouse name: Not on file    Number of children: 3    Years of education: Not on file    Highest education level: Not on file   Occupational History    Occupation:    still active    Occupation: retired    Tobacco Use    Smoking status: Never    Smokeless tobacco: Never   Vaping Use    Vaping status: Never Used   Substance and Sexual Activity    Alcohol use: Not Currently     Comment: social    Drug use: No    Sexual activity: Not Currently   Other Topics Concern    Not on file   Social History Narrative    Most recent tobacco use screenin-    Do you currently or have you served in the  [employer]: Yes    If Yes, What branch of service: [employer]    [employer]    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, into active duty, 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    Advance directive: Yes    Alcohol intake: Moderate    Caffeine intake: Moderate    Illicit drugs: none    Occupation: retired    Exercise level: Occasional    Single or multi-level home/work: single level home    Live alone or with others: with others    Chewing tobacco: none    Marital status:     Number of children: 3    Diet: Regular    Sexual orientation: Heterosexual    Smoke alarm in home: Yes    General stress level: Medium    Sunscreen used routinely: No    Education: Post Graduate    Guns present in home: No    Presence  "of domestic violence: No     Social Determinants of Health     Financial Resource Strain: Low Risk  (6/23/2023)    Overall Financial Resource Strain (CARDIA)     Difficulty of Paying Living Expenses: Not hard at all   Food Insecurity: Not on file   Transportation Needs: No Transportation Needs (6/23/2023)    PRAPARE - Transportation     Lack of Transportation (Medical): No     Lack of Transportation (Non-Medical): No   Physical Activity: Not on file   Stress: Not on file   Social Connections: Not on file   Intimate Partner Violence: Not on file   Housing Stability: Not on file       The following portions of the patient's history were reviewed and updated as appropriate: past family history, past medical history, past social history, past surgical history and problem list.    OBJECTIVE:     Mental Status Evaluation:  Appearance:  casually dressed, adequate grooming, looks stated age   Behavior:  pleasant, cooperative, calm, interacts appropriately with this writer   Speech:  normal rate, normal volume, normal pitch   Mood:  improved   Affect:  slightly brighter   Thought Process:  organized, logical, coherent   Associations: intact associations   Thought Content:  fixed delusions, no paranoia noted on exam   Perceptual Disturbances: no auditory hallucinations, visual hallucinations of \"people\" still present, but less frequent   Risk Potential: Suicidal ideation - None  Homicidal ideation - None  Potential for aggression - No   Sensorium:  oriented to person, place, and time/date   Memory:  recent memory impaired, remote memory impaired   Consciousness:  alert and awake   Attention/Concentration: attention span and concentration appear shorter than expected for age   Insight:  partial   Judgment: partial   Gait/Station: unstable gait   Motor Activity: no abnormal movements     Laboratory Results: I have personally reviewed all pertinent laboratory/tests results    Suicide/Homicide Risk Assessment:    Risk of Harm to " Self:  The following ratings are based on assessment at the time of the interview  Based on today's assessment, Gabriel presents the following risk of harm to self: none    Risk of Harm to Others:  The following ratings are based on assessment at the time of the interview  Based on today's assessment, Gabriel presents the following risk of harm to others: none    The following interventions are recommended: no intervention changes needed    Assessment/Plan:      His neuropsych results were discussed in detail at today's visit. 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 That leaves us to continue to treat the hallucinations and the anxiety associated with them. Thankfully that has generally improved. He still has the hallucinations but they are not as bothersome or upsetting as previously. He still does not feel he is sleeping through the night though his wife states he is. He is still very fatigued despite cutting back on his work hours. He is still being treated for B12 deficiency and just finished tx for a UTI. If the fatigue doesn't improve by next week he can cut back the olanzapine to 2.5 mg qhs again as this could be part of the fatigue. He was advised by his neurologist to not drive or pursue a driving test if he wishes to challenge the neuropsych recommendation. He is not appropriate for therapy at this time. We have discussed his safety plan and he agrees that if he experience unsafe thoughts that he will reach out to his supports including this office, the suicide hotline, and emergency services if necessary. Gabriel is aware of non-emergent and emergent mental health resources. They are able to contract for their own safety at this time.    Will follow up in 1 months. Patient is aware to call the office if questions or concerns arise sooner.      Diagnoses and all orders for this visit:    Psychosis due to Parkinson's disease    Visual hallucinations    Delusions (HCC)  -     OLANZapine  (ZyPREXA) 2.5 mg tablet; Take 2 tablets (5 mg total) by mouth daily at bedtime          Treatment Recommendations/Precautions:    Continue current medications:     - olanzapine 5 mg qhs --> if still fatigued in 1 week reduce to 2.5 mg qhs    Medication management every 1 months  Aware of 24 hour and weekend coverage for urgent situations accessed by calling [facility] main practice number  I am scheduling this patient out for greater than 3 months: No    Medications Risks/Benefits      Risks, Benefits And Possible Side Effects Of Medications:    Risks, benefits, and possible side effects of medications explained to Gabriel and he verbalizes understanding and agreement for treatment.    Controlled Medication Discussion:     Not applicable    Psychotherapy Provided:     Individual psychotherapy provided: No    Treatment Plan:    Completed and signed during the session: Not applicable - Treatment Plan not due at this session    Visit Time    Visit Start Time:  4:30 PM  Visit End Time:  5:00 PM  Total Visit Duration:  30 minutes    Elzbieta Carreno 05/20/24

## 2024-05-24 ENCOUNTER — OFFICE VISIT (OUTPATIENT)
Dept: PHYSICAL THERAPY | Facility: CLINIC | Age: 79
End: 2024-05-24
Payer: MEDICARE

## 2024-05-24 DIAGNOSIS — G20.A1 PARKINSON'S DISEASE WITHOUT DYSKINESIA, UNSPECIFIED WHETHER MANIFESTATIONS FLUCTUATE: Primary | ICD-10-CM

## 2024-05-24 PROCEDURE — 97110 THERAPEUTIC EXERCISES: CPT

## 2024-05-24 PROCEDURE — 97112 NEUROMUSCULAR REEDUCATION: CPT

## 2024-05-24 NOTE — PROGRESS NOTES
Daily Note     Today's date: 2024  Patient name: Gabriel Gonzalez  : 1945  MRN: [id number]  Referring provider: Lawanda Joshi CRNP  Dx:   Encounter Diagnosis     ICD-10-CM    1. Parkinson's disease without dyskinesia, unspecified whether manifestations fluctuate  G20.A1           Start Time: 1100  Stop Time: 1145  Total time in clinic (min): 45 minutes    Subjective: pt reports his legs have been stiff this week.      Objective: See treatment diary below      Assessment: Tolerated treatment well. Patient would benefit from continued PT pt able to inc consecutive time ambulation on TM from 7 to 10 min in today's visit w no lightheaded feeling. pT does report his legs feeling slightly wobbly at the end. Basketball bounce added to lila step for motor dual task as well as power required from UP. Min power seen on first lap leading to inc fwd lean to reach low bounce of ball. PT encouraged inc power on bounce on second lap resulting in improved posture w steps.      Plan: Continue per plan of care.      Short Term Goal Expiration Date:(5/3/24)  Long Term Goal Expiration Date: (24)  POC Expiration Date: (24)        POC expires Unit limit Auth Expiration date PT/OT/ST + Visit Limit?   24 BOMN N/a BOMN                           Visit/Unit Tracking  AUTH Status:  Date 5/17 5/31 2/28 3/7 3/12 3/22 3/29 4/5 4/12 4/26 5/10    N/a Used 4/10 5/10 3/10 4/10 1/10 2/10 3/10 4/10 1/10 2/0 3/10    Remaining                     Precautions parkinson's, Lewy body dementia       Manuals 5/10  5/17 5/24 4/12 4/26                                   Neuro Re-Ed         steps     SOLO    8'' step lateral taps    3# AW    2x10 ea   Compliant surface  SOLO    Weight shifting   X20 fwd bwd      Side step  SOLO    W basketball bounce for inc power generation    X3 laps SOLO    W basketball bounce for inc power generation    X3 laps Solo w blaze pods x10 taps     Trial 1: 2 min 21s  Trial 2:        Bwd walk   "SOLO    Fwd bwd shuttle    4 cones   X2 rounds  2.5# AW   SOLO    Fwd bwd changing directions w blaze pods    3# AW    X10 taps   HKM  SOLO    X3 laps 1/4 track    2.5# AW  Marching side steps    SOLO    X3 laps 1/4 track    hurdles   Solo    3# AW    6'' (6)    X3 laps fwd  X2 lateral  Solo    3# AW    6'' (6)    X3 laps fwd  X2 lateral   Shuffle steps    X4 laps  SOLO    Chalk likes for inc step length and shuffel steps    rotation  SOLO    Standing lateral ball toss w rotation x10 ea  SOLO    Standing lateral ball toss w rotation x10 ea    Dual task   Walking ball toss   SOLO    X3 laps w VC for for big steps     Multi directional stepping  SOLO    Hex ladder in hexagon shape    X3 clockwise steps  X3 counterclockwise steps    2.5# AW      Ther Ex        nustep L4 x10 min       TM  SOLO    Chalk visual cue    0.9 mph    7 min SOLO    Chalk visual cue    0.9 mph    10 min SOLO    Chalk visual cue    0.7 mph    6 min  5 min SOLO    Chalk visual cue    0.9 mph    6 min  5 min   STS        Postural strength  Hor abd, no money both pink TB 2x10        T/S ext  W/ towel 2x10 posture over correct        T/S rot  W/ towel 2x10 seated        Open book  Demo        Bridge  2x10        Supine figure 4 stretch  3x30\" ea        LTR  2x10  ea        Seated hip abd  BTB  3x10      Ther Activity                        Gait Training                        Modalities                              Access Code: [email or web address]  URL: [email or web address]  Date: 03/07/2024  Prepared by: Caterina Rust    Exercises  - Standing March with Counter Support  - 1 x daily - 6 x weekly - 3 sets - 10 reps  - Heel Toe Raises with Unilateral Counter Support  - 1 x daily - 6 x weekly - 3 sets - 10 reps  - Sit to Stand with Armchair  - 1 x daily - 6 x weekly - 3 sets - 8 reps  - Sideways Walking  - 1 x daily - 6 x weekly - 3 sets - 10 reps  - Seated Hip Abduction with Resistance  - 1 x daily - 6 x weekly - 3 sets - 10 reps      Access Code: " [email or web address]  URL: [email or web address]  Date: 05/10/2024  Prepared by: Presley Hodgson    Exercises  - Supine Lower Trunk Rotation  - 1 x daily - 7 x weekly - 3 sets - 10 reps  - Supine Figure 4 Piriformis Stretch  - 1 x daily - 7 x weekly - 3 sets - 1 reps - 30 seconds hold  - Supine Bridge  - 1 x daily - 7 x weekly - 3 sets - 10 reps  - Seated Shoulder Row with Anchored Resistance  - 1 x daily - 7 x weekly - 3 sets - 10 reps - 3 seconds hold  - Hooklying Clamshell with Resistance  - 1 x daily - 7 x weekly - 3 sets - 10 reps  - Seated Shoulder Horizontal Abduction with Resistance  - 1 x daily - 7 x weekly - 3 sets - 10 reps  - Shoulder External Rotation with Resistance  - 1 x daily - 7 x weekly - 3 sets - 10 reps  - Seated Thoracic Self-Mobilization  - 1 x daily - 7 x weekly - 3 sets - 10 reps  - Sidelying Open Book Thoracic Lumbar Rotation and Extension  - 1 x daily - 7 x weekly - 3 sets - 10 reps

## 2024-05-31 ENCOUNTER — EVALUATION (OUTPATIENT)
Dept: PHYSICAL THERAPY | Facility: CLINIC | Age: 79
End: 2024-05-31
Payer: MEDICARE

## 2024-05-31 DIAGNOSIS — G20.A1 PARKINSON'S DISEASE WITHOUT DYSKINESIA, UNSPECIFIED WHETHER MANIFESTATIONS FLUCTUATE: Primary | ICD-10-CM

## 2024-05-31 DIAGNOSIS — R26.9 GAIT ABNORMALITY: ICD-10-CM

## 2024-05-31 PROCEDURE — 97110 THERAPEUTIC EXERCISES: CPT

## 2024-05-31 PROCEDURE — 97164 PT RE-EVAL EST PLAN CARE: CPT

## 2024-05-31 NOTE — PROGRESS NOTES
Daily Note     Today's date: 2024  Patient name: Gabriel Gonzalez  : 1945  MRN: [id number]  Referring provider: Lawanda Joshi CRNP  Dx:   Encounter Diagnosis     ICD-10-CM    1. Parkinson's disease without dyskinesia, unspecified whether manifestations fluctuate  G20.A1       2. Gait abnormality  R26.9           Start Time: 1100  Stop Time: 1145  Total time in clinic (min): 45 minutes    Subjective: pt reports he is feeling good today and has been doing his exercises at home      Objective: See treatment diary below      Assessment: Tolerated treatment well. Patient would benefit from continued PT  Pt has met 3/3 STG and 2/4 LTG at this time Pt has improved their 6mwt distance from 1100 feet to 1208 ft indicating improved aerobic capacity and activity tolerance. Pts gait speed has improved from 1.1 m/s to 1.25 m/s indicating pt is a community Ambulator. pts FGA score has maintained from  to  reducing still placing them at risk for falls as scores < 23 are considered at risk for falls. Pts 5x STS tests has improved from 16.08 s to 14.5 s indicating improved LE strength and power however is still less than age matched norms of 12.6. . Pts 3m bwd walking time has improved from 5.75 to 5.09 s  indicating pt is at inc risk for falls as scores > 4.5 s are at risk for falls and scores <3 are considered not at risk for falls. Additionally pts 4 square step test time has improved from 27.06 s to 18.04 indicating pt is at inc risk for falls as times >10.4s are considered pts identified as fallers and scores > 15 s indicate pt is as risk for multiple falls.   Pt would continue to benefit from skilled physical therapy to improve overall QOL inclusive of, strength, coordination, balance, endurance and functional mobility in the home and in the community as well as reduce their fall risk for improved safety.                Goals    New goals    STG (4 weeks)     Improve FGA score of 22 by 2 indicating  meaningful improvement in fall risk MET  Improve 5x STS score of 16.08 by 2.3 seconds indicating meaningful improvement in fall risk and power generation MET  Increase 6MWT of 1100 by 82 ft indicating meaningful change in aerobic endurance MET      LTG (8 weeks)     Improve 3m bwd walk score to 4.5 indicating meaningful improvement in fall risk PROGRESSED  Improve/Reduce 5x STS score to < 14 seconds seconds indicating meaningful improvement in fall risk and power generation PROGRESSED  Improve 4 square step test score to 22.4 to demonstrate improvement in balance and functional obstacle navigation MET  Patient will improve gait speed to 1.2 m/s to improve functional community ambulation and reduce pts fall risk MET      New goals  STG (4 weeks)   Improve 4 square step test score of 18.04 by to 14 seconds indicating meaningful improvement in fall risk    Improve 5x STS score of 14.5 by 2.3 seconds indicating meaningful improvement in fall risk and power generation   Increase 6MWT of 1208 to 1290 indicating meaningful change in aerobic endurance      LTG (8 weeks)   Improve FGA score to >23  harness indicating meaningful improvement in fall risk   Pt will improve 3m bws walk time of 5.09 to 4 for an indication of reduction in fall risk  Improve 4 square step test score of 18.04 by to 10.4 seconds indicating meaningful improvement in fall risk      Assessment: Tolerated treatment well. Patient would benefit from continued PT Pt has attended 8 physical therapy sessions including IE and is demonstrating progress towards goals.  Pt has met 3/3 STG and 4/4 LTG at this time Pt has improved their 6mwt distance from 1000 feet to 1100 ft indicating improved aerobic capacity and activity tolerance. Pts gait speed has improved from 1 m/s to 1.1 m/s indicating pt is a community Ambulator however is still not considered a safe speed to cross the street indep (1.2 m/s). pts FGA score has remained consistent from 22/30 to 22/30  reducing pts fall risk however still placing them at risk for falls as scores < 23 are considered at risk for falls. Pts 5x STS tests has declined from 14.38 s to 16.08 s pt does however report his knees are bothering him more today than typical. Pts ABC score has improved from 71.25 to 78.75 on todays re eval indication pt is no longer at risk for falls as scores < 67% are considered at risk for falls. Pts 3m bwd walking time has improved from 6.09 to 5.75 s  indicating pt is at inc risk for falls as scores > 4.5 s are at risk for falls. Additionally pts 4 square step test time of 27.06s  indicates pt is at inc risk for falls as times >10.4s are considered pts identified as fallers and scores > 15 s indicate pt is as risk for multiple falls.    Pt would continue to benefit from skilled physical therapy to improve overall QOL inclusive of, strength, coordination, balance, endurance and functional mobility in the home and in the community as well as reduce their fall risk for improved safety.     Assessment: Tolerated treatment well. Patient would benefit from continued PT Pt has attended 4 physical therapy sessions including IE and is demonstrating progress towards goals.  Pt has met 3/3 STG and is  progressing towards LTG at this time Pt has improved their 6mwt distance from 858 feet to 1000 ft indicating improved aerobic capacity and activity tolerance. Pts gait speed has improved from 0.91 m/s to 1 m/s indicating pt is a community Ambulator however is still at risk for falls based on her speed being < 1 m/s.  pts FGA score has improved from 18/30 to 22/30 reducing pts fall risk however still placing them at risk for falls as scores < 22 are considered at risk for falls. Pts 5x STS tests has improved from 22.25 s to 14.38 s indicating improved LE strength and power however is still less than age matched norms of 12.6s. Pts 3m bwd walking time has improved from 7.6 to 6.09 s  indicating pt is at inc risk for falls  as scores > 4.5 s are at risk for falls.   Pt would continue to benefit from skilled physical therapy to improve overall QOL inclusive of, strength, coordination, balance, endurance and functional mobility in the home and in the community as well as reduce their fall risk for improved safety.      Eval Assessment  Assessment details: Pt is a 78 year old male presenting to [facility] physical therapy for an initial evaluation secondary to dec funcational capacity assosiciated with parkinsons disease. Pt presents to clinic with gait and balance impairments inclusive of right sided trendelenburg gait, dec trunk rotation, dec arm swing w gait, dec endurance and LE strength, and dec functional balance.     Additionally pt demonstrates strength deficits as seen with M 5x STS time of 22.25s demonstrating dec LE strength and power as compared to age matched norms. Pt demonstrates endurance deficits as seen with 6mwt distance of 858 ft as compared to age matched norms of 1729 ft. Pt is at inc risk for falls based on their FGA score of 18/30  with scores < 20/30 at inc risk for spontaneous falls and scores < 22/30 considered at risk for falls.  Pt ambulates at 0.91 m/s making them a community Ambulator.  Additionally this score places the pt at inc risk for falls as scores < 1 m/s are considered at risk for falls.     Pt provided edu on symptoms of parkinson's such as festination, dec rotation, rigidity etc and exercises to address. HEP to be provided on first follow up visit.  Pt would benefit from skilled physical therapy to improve  overall QOL inclusive of, strength, coordination, balance, endurance and functional mobility in the home and in the community as well as reduce their fall risk for improved safety.    Impairments: abnormal gait, impaired balance, impaired physical strength, lacks appropriate home exercise program and safety issue    Plan  Patient would benefit from: skilled physical therapy  Planned therapy  "interventions: balance, gait training, home exercise program, neuromuscular re-education, patient education, postural training, strengthening, stretching, therapeutic activities, therapeutic exercise, functional ROM exercises and flexibility  Frequency: 2x week  Duration in visits: 16  Duration in weeks: 8  Plan of Care beginning date: 5/31/24  Plan of Care expiration date: 7/26/24  Treatment plan discussed with: patient      Subjective Evaluation    History of Present Illness  Mechanism of injury: Pt reports that he was diagnosed with parkinson's in 2017 and did not take any medications until 2019 (sinemet). Pt reprots that he previously had a  coming to the house to remain stain active but has not rk able to have those as of recent for several reasons and is looking for continued skilled car out of the house. Since stopping training he and his wife reports a decline in activity and function. Pt reports he would like to work on strength in legs and reports his balance is \"pretty crummy\" . Pt reports that he notices taking small steps and occasional freezing. Pts wife reports they used to walk a mile together but now he is only able to walk about a quarter of a mile due to endurance.            Recurrent probem    Patient Goals  Patient goals for therapy: improved balance and increased strength    Pain  Location: right hip occasional pain    Social Support  Steps to enter house: yes (w railing)    Treatments  Previous treatment: physical therapy        Objective           Balance Test IE 3/7 4/5 5/31     6 Minute Walk Test (ft): 858 ft 1000 ft 1100 ft 1208 ft     2 Minute Walk Test (ft):         Gait Speed (m/s): 0.91  1 m/s 1.1 1.25     5x Sit To Stand (s): 22.25 no UE use 14.38s no UE use 16.08 no UE use 14.5     TUG (s) 13.22   11.78     FGA  3m bwd walk test 18/30  7.6s 22/30  6.09s 22/30  5.75 22/30  5.09      4 square step test 27.06 (4/5) 18.04 (5/31)     Short Term Goal Expiration " "Date:(6/28/24)  Long Term Goal Expiration Date: (7/26/24)  POC Expiration Date: (7/26/24)        POC expires Unit limit Auth Expiration date PT/OT/ST + Visit Limit?   7/26/24 BOMN N/a BOMN                           Visit/Unit Tracking  AUTH Status:  Date 5/17 5/31 2/28 3/7 3/12 3/22 3/29 4/5 4/12 4/26 5/10    N/a Used 4/10 5/10 3/10 4/10 1/10 2/10 3/10 4/10 1/10 2/0 3/10    Remaining                     Precautions parkinson's, Lewy body dementia       Manuals 5/10  5/17 5/24 5/31 4/26                                   Neuro Re-Ed     Fga  Abc  3 m bwd walk  4 square step  10 mwt    steps     SOLO    8'' step lateral taps    3# AW    2x10 ea   Compliant surface  SOLO    Weight shifting   X20 fwd bwd      Side step  SOLO    W basketball bounce for inc power generation    X3 laps SOLO    W basketball bounce for inc power generation    X3 laps     Bwd walk  SOLO    Fwd bwd shuttle    4 cones   X2 rounds  2.5# AW   SOLO    Fwd bwd changing directions w blaze pods    3# AW    X10 taps   HKM  SOLO    X3 laps 1/4 track    2.5# AW      hurdles   Solo    3# AW    6'' (6)    X3 laps fwd  X2 lateral  Solo    3# AW    6'' (6)    X3 laps fwd  X2 lateral   Shuffle steps        rotation  SOLO    Standing lateral ball toss w rotation x10 ea      Dual task   Walking ball toss   SOLO    X3 laps w VC for for big steps     Multi directional stepping  SOLO    Hex ladder in hexagon shape    X3 clockwise steps  X3 counterclockwise steps    2.5# AW      Ther Ex    6 mwt  5x sts    nustep L4 x10 min   Lvl 6 x10 min    TM  SOLO    Chalk visual cue    0.9 mph    7 min SOLO    Chalk visual cue    0.9 mph    10 min  SOLO    Chalk visual cue    0.9 mph    6 min  5 min   STS        Postural strength  Hor abd, no money both pink TB 2x10        T/S ext  W/ towel 2x10 posture over correct        T/S rot  W/ towel 2x10 seated        Open book  Demo        Bridge  2x10        Supine figure 4 stretch  3x30\" ea        LTR  2x10  ea        Seated hip abd "  BTB  3x10      Ther Activity                        Gait Training                        Modalities                              Access Code: [email or web address]  URL: [email or web address]  Date: 03/07/2024  Prepared by: Caterina Rust    Exercises  - Standing March with Counter Support  - 1 x daily - 6 x weekly - 3 sets - 10 reps  - Heel Toe Raises with Unilateral Counter Support  - 1 x daily - 6 x weekly - 3 sets - 10 reps  - Sit to Stand with Armchair  - 1 x daily - 6 x weekly - 3 sets - 8 reps  - Sideways Walking  - 1 x daily - 6 x weekly - 3 sets - 10 reps  - Seated Hip Abduction with Resistance  - 1 x daily - 6 x weekly - 3 sets - 10 reps      Access Code: [email or web address]  URL: [email or web address]  Date: 05/10/2024  Prepared by: Presley Hodgson    Exercises  - Supine Lower Trunk Rotation  - 1 x daily - 7 x weekly - 3 sets - 10 reps  - Supine Figure 4 Piriformis Stretch  - 1 x daily - 7 x weekly - 3 sets - 1 reps - 30 seconds hold  - Supine Bridge  - 1 x daily - 7 x weekly - 3 sets - 10 reps  - Seated Shoulder Row with Anchored Resistance  - 1 x daily - 7 x weekly - 3 sets - 10 reps - 3 seconds hold  - Hooklying Clamshell with Resistance  - 1 x daily - 7 x weekly - 3 sets - 10 reps  - Seated Shoulder Horizontal Abduction with Resistance  - 1 x daily - 7 x weekly - 3 sets - 10 reps  - Shoulder External Rotation with Resistance  - 1 x daily - 7 x weekly - 3 sets - 10 reps  - Seated Thoracic Self-Mobilization  - 1 x daily - 7 x weekly - 3 sets - 10 reps  - Sidelying Open Book Thoracic Lumbar Rotation and Extension  - 1 x daily - 7 x weekly - 3 sets - 10 reps

## 2024-06-05 NOTE — PATIENT INSTRUCTIONS
Parkinson disease with good motor control, more recent development of mild hallucinations over the past year which appear to have abruptly worsened with delusions and early of November.  Currently having psychosis during the day and night.  No clear improvement with addition of quetiapine other than perhaps improved sleep overnight.  Previously unable to tolerate a.m. dose but it is unclear if he gave this a good try.  Unable to tolerate Nuplazid.    -We will try further lowering carbidopa/levodopa to 1 tablet at 7, 1030, 230, 630 and 1 tablet as needed overnight if he is unable to go back to sleep.    -If tolerating this reduction after 3 days he will then retry adding an a.m. dose of Seroquel by taking half a tablet for couple days and then increasing to a full 25 mg tablet.  Will see if daytime psychosis improves.  If no improvement we may further increase Seroquel by adding an extra 25 mg in the afternoon.  -I have asked that he take a break from working at this point while we are making medication adjustments.  -We spoke of the likelihood that this is progression of his Parkinson disease with the development of dementia.  May consider adding donepezil in the near future.  MoCA to be perf   Detail Level: Simple Size Of Lesion In Cm (Optional): 1 X Size Of Lesion In Cm (Optional): 0 Procedure To Be Performed At Next Visit: Excision Introduction Text (Please End With A Colon): The following procedure was deferred:

## 2024-06-07 ENCOUNTER — OFFICE VISIT (OUTPATIENT)
Dept: PHYSICAL THERAPY | Facility: CLINIC | Age: 79
End: 2024-06-07
Payer: MEDICARE

## 2024-06-07 ENCOUNTER — TELEMEDICINE (OUTPATIENT)
Dept: INTERVENTIONAL RADIOLOGY/VASCULAR | Facility: CLINIC | Age: 79
End: 2024-06-07
Payer: MEDICARE

## 2024-06-07 DIAGNOSIS — G20.A1 PARKINSON'S DISEASE WITHOUT DYSKINESIA, UNSPECIFIED WHETHER MANIFESTATIONS FLUCTUATE: Primary | ICD-10-CM

## 2024-06-07 DIAGNOSIS — R35.0 URINARY FREQUENCY: Primary | ICD-10-CM

## 2024-06-07 DIAGNOSIS — N40.1 BENIGN PROSTATIC HYPERPLASIA WITH URINARY FREQUENCY: ICD-10-CM

## 2024-06-07 DIAGNOSIS — R35.0 BENIGN PROSTATIC HYPERPLASIA WITH URINARY FREQUENCY: ICD-10-CM

## 2024-06-07 DIAGNOSIS — F02.82: Primary | ICD-10-CM

## 2024-06-07 DIAGNOSIS — G20.A1: Primary | ICD-10-CM

## 2024-06-07 DIAGNOSIS — R26.9 GAIT ABNORMALITY: ICD-10-CM

## 2024-06-07 PROCEDURE — 99442 PR PHYS/QHP TELEPHONE EVALUATION 11-20 MIN: CPT | Performed by: STUDENT IN AN ORGANIZED HEALTH CARE EDUCATION/TRAINING PROGRAM

## 2024-06-07 PROCEDURE — 97110 THERAPEUTIC EXERCISES: CPT

## 2024-06-07 PROCEDURE — 97112 NEUROMUSCULAR REEDUCATION: CPT

## 2024-06-07 RX ORDER — MIRABEGRON 25 MG/1
25 TABLET, FILM COATED, EXTENDED RELEASE ORAL DAILY
Qty: 90 TABLET | Refills: 3 | Status: SHIPPED | OUTPATIENT
Start: 2024-06-07

## 2024-06-07 RX ORDER — RIVASTIGMINE TARTRATE 3 MG/1
3 CAPSULE ORAL 2 TIMES DAILY
Qty: 180 CAPSULE | Refills: 3 | Status: SHIPPED | OUTPATIENT
Start: 2024-06-07

## 2024-06-07 RX ORDER — MIRABEGRON 50 MG/1
50 TABLET, EXTENDED RELEASE ORAL DAILY
Qty: 30 TABLET | Refills: 0 | Status: CANCELLED | OUTPATIENT
Start: 2024-06-07

## 2024-06-07 NOTE — PROGRESS NOTES
Daily Note     Today's date: 2024  Patient name: Gabriel Gonzalez  : 1945  MRN: [id number]  Referring provider: Lawanda Joshi CRNP  Dx:   Encounter Diagnosis     ICD-10-CM    1. Parkinson's disease without dyskinesia, unspecified whether manifestations fluctuate  G20.A1       2. Gait abnormality  R26.9           Start Time: 1230  Stop Time: 1315  Total time in clinic (min): 45 minutes    Subjective: pt reports no sig changes since previous visit      Objective: See treatment diary below      Assessment: Tolerated treatment well. Patient would benefit from continued PT pt required visual cue on TM for inc step length to avoid episodes of festination steps. Rest break required due to fatigue at 6 min taryn on TM. Inc difficulty w lateral foam navigation w small steps limitn pts ability to step down from foam surface. Edu on a visual target to set a goal for steps when freezing occurs       Plan: Continue per plan of care.      Short Term Goal Expiration Date:(24)  Long Term Goal Expiration Date: (24)  POC Expiration Date: (24)        POC expires Unit limit Auth Expiration date PT/OT/ST + Visit Limit?   24 BOMN N/a BOMN                           Visit/Unit Tracking  AUTH Status:  Date 5/17 5/31 6/7 3/7 3/12 3/22 3/29 4/5 4/12 4/26 5/10    N/a Used 4/10 5/10 1/10 4/10 1/10 2/10 3/10 4/10 1/10 2/0 3/10    Remaining                     Precautions parkinson's, Lewy body dementia       Manuals 5/10  5/17 5/24 5/31 6/7                                   Neuro Re-Ed     Fga  Abc  3 m bwd walk  4 square step  10 mwt    steps        Compliant surface  SOLO    Weight shifting   X20 fwd bwd   SOLO    Up and over 5 airex    X2 laps fwd  X2 laps lateral   Side step  SOLO    W basketball bounce for inc power generation    X3 laps SOLO    W basketball bounce for inc power generation    X3 laps     Bwd walk  SOLO    Fwd bwd shuttle    4 cones   X2 rounds  2.5# AW      HKM  SOLO    X3 laps   "track    2.5# AW      hurdles   Solo    3# AW    6'' (6)    X3 laps fwd  X2 lateral  SOLO    6'' (6)    X3 laps fwd  X2 lateral   Shuffle steps        rotation  SOLO    Standing lateral ball toss w rotation x10 ea      Dual task   Walking ball toss   SOLO    X3 laps w VC for for big steps     Multi directional stepping  SOLO    Hex ladder in hexagon shape    X3 clockwise steps  X3 counterclockwise steps    2.5# AW      Ther Ex    6 mwt  5x sts    nustep L4 x10 min   Lvl 6 x10 min    TM  SOLO    Chalk visual cue    0.9 mph    7 min SOLO    Chalk visual cue    0.9 mph    10 min  SOLO    Chalk visual cue    0.9 mph    6 min  Rest  4 min   STS        Postural strength  Hor abd, no money both pink TB 2x10        T/S ext  W/ towel 2x10 posture over correct        T/S rot  W/ towel 2x10 seated        Open book  Demo        Bridge  2x10        Supine figure 4 stretch  3x30\" ea        LTR  2x10  ea        Seated hip abd  BTB  3x10      Ther Activity                        Gait Training                        Modalities                              Access Code: [email or web address]  URL: [email or web address]  Date: 03/07/2024  Prepared by: Caterina Rust    Exercises  - Standing March with Counter Support  - 1 x daily - 6 x weekly - 3 sets - 10 reps  - Heel Toe Raises with Unilateral Counter Support  - 1 x daily - 6 x weekly - 3 sets - 10 reps  - Sit to Stand with Armchair  - 1 x daily - 6 x weekly - 3 sets - 8 reps  - Sideways Walking  - 1 x daily - 6 x weekly - 3 sets - 10 reps  - Seated Hip Abduction with Resistance  - 1 x daily - 6 x weekly - 3 sets - 10 reps      Access Code: [email or web address]  URL: [email or web address]  Date: 05/10/2024  Prepared by: Presley Hodgson    Exercises  - Supine Lower Trunk Rotation  - 1 x daily - 7 x weekly - 3 sets - 10 reps  - Supine Figure 4 Piriformis Stretch  - 1 x daily - 7 x weekly - 3 sets - 1 reps - 30 seconds hold  - Supine Bridge  - 1 x daily - 7 x weekly - 3 sets - 10 " reps  - Seated Shoulder Row with Anchored Resistance  - 1 x daily - 7 x weekly - 3 sets - 10 reps - 3 seconds hold  - Hooklying Clamshell with Resistance  - 1 x daily - 7 x weekly - 3 sets - 10 reps  - Seated Shoulder Horizontal Abduction with Resistance  - 1 x daily - 7 x weekly - 3 sets - 10 reps  - Shoulder External Rotation with Resistance  - 1 x daily - 7 x weekly - 3 sets - 10 reps  - Seated Thoracic Self-Mobilization  - 1 x daily - 7 x weekly - 3 sets - 10 reps  - Sidelying Open Book Thoracic Lumbar Rotation and Extension  - 1 x daily - 7 x weekly - 3 sets - 10 reps

## 2024-06-07 NOTE — PROGRESS NOTES
Virtual Brief Visit    This Visit is being completed by telephone. The Patient is located at Home and in the following state in which I hold an active license [place]    The patient was identified by name and date of birth. Gabriel Gonzalez was informed that this is a telemedicine visit and that the visit is being conducted through Telephone.  My office door was closed. No one else was in the room.  He acknowledged consent and understanding of privacy and security of the video platform. The patient has agreed to participate and understands they can discontinue the visit at any time.    Patient is aware this is a billable service.       Assessment/Plan:    Problem List Items Addressed This Visit          Other    Benign prostatic hyperplasia with urinary frequency     79-year-old male with a past medical history of benign prostatic hyperplasia resulting in lower urinary tract symptoms as well as Parkinson's disease and mild dementia who presents as a new patient visit to discuss prostate artery embolization.    I did explain that prostate arterial embolization specifically is indicated for patients who are poor surgical candidates such as himself, those who have a propensity to bleed, and have large prostates.  It has also been proven to be effective in multiple recent trials.  A propensity score match non-randomized registry study in the [place] ([place]-ROPE study, Musa et. Al., BJU Int 2018) of close to 300 patients showed prostate artery embolization to have an IPSS average reduction of approximately 11, and an overall better safety profile and quicker return to activity than TURP.  In a [place] open label, single center, randomized controlled trial of 103 patients (Kyra et. Al., BMJ 2018), mean reduction in IPSS was 9 at 12 weeks with PAE and 10 at 12 weeks with TURP.  There was no statistically significant difference in quality of life, with less adverse events in the PAE subgroup.  Finally, a recent randomized control trial of  45 patients comparing PAE to TURP (Melody et. Al., JVIR 2020) showed no difference in IPSS scores at 12 months between TURP and PAE, with a higher quality of life reduction in PAE than the TURP group.  A recent meta analysis (Neida et. Al., CVIR 2021) of 6 studies in 598 patient showed no overall difference in IPSS between PAE and TURP, no difference in quality of life, and no difference in Qmax between the 2 therapies.    We spoke at length about the procedure in detail, including the need for probable monitored anesthesia care.  Him and his wife would like to think more about the procedure before moving forward.  I have given them my cell phone number to reach out to me at their convenience regarding steps moving forward.    Thank you for allowing Interventional Radiology to participate in the care of Gabriel Gonzalez. Please don't hesitate to call, text, email, or TigerText with any questions.     Rickie Woods MD  Interventional Radiologist  [facility]  Personal Cell: [phone]  [email or web address]    HISTORY OF PRESENT ILLNESS:  79-year-old male with a past medical history of benign prostatic hyperplasia resulting in lower urinary tract symptoms as well as Parkinson's disease and mild dementia who presents as a new patient visit to discuss prostate artery embolization.    The patient has a long history of lower urinary tract symptoms.  This is mostly manifested by urinary frequency, urgency, weak stream, and nocturia.  The symptoms have gotten worse over the past year or so.  It has gotten to the point where it has significantly impacted his quality of life and his ability to travel.  He denies any history of hematuria.    He has been maintained on multiple medications with minimal benefit.    He does have a history of progressive Parkinson's disease.  Given concerns of anesthesia and his ability to tolerate a more invasive procedure with respect to his Parkinson's disease,  both the urology service and the patient are hoping to pursue the most minimally invasive procedure possible to achieve a benefit.    Recent Visits  No visits were found meeting these conditions.  Showing recent visits within past 7 days and meeting all other requirements  Today's Visits  Date Type Provider Dept   06/07/24 Telemedicine MD Per Marrero   Showing today's visits and meeting all other requirements  Future Appointments  No visits were found meeting these conditions.  Showing future appointments within next 150 days and meeting all other requirements         Visit Time  Total Visit Duration: 20 minutes.

## 2024-06-10 ENCOUNTER — TELEPHONE (OUTPATIENT)
Dept: PSYCHIATRY | Facility: CLINIC | Age: 79
End: 2024-06-10

## 2024-06-10 ENCOUNTER — PATIENT MESSAGE (OUTPATIENT)
Dept: NEUROLOGY | Facility: CLINIC | Age: 79
End: 2024-06-10

## 2024-06-10 NOTE — PATIENT COMMUNICATION
"Follow up call on [employer] message received regarding patient's hallucinations.      Spoke with Dariana (spouse listed on CC). Patient is having increased hallucinations beginning about 7-10 days ago. Patient is looking out the window, and seeing people from past hallucinations from last November.  Lasts about 5-10 minutes. They cause patient concern but has not spoken with them. Patient looks for these people. Believes they enter the family home.  Happens 2-3 times a day but not everyday. Dariana explains to patient these are visions. Patient responds,\"Well, that's what you believe.\"    Dariana stated her message to Lawanda was more to update Harry and does believe this is an emergency situation.     Current medications: All meds taken as below.    Carbidopa-Levodopa 1 1.5 tabs PO @ 7 am, 1 1.5 @ 10:30 am,  1.15 2:30 pm 1 1.5 @ 6:30 pm, 1 tab HS  Mirabegron ER 25 mg  1 cap am  Rivastigmine 3 mg BID  Olanzapine 2.5 mg 1 HS titrated from 2 tabs 5-16-24  Lipitor 20 mg QD  Pyridoxine 100 mg QD    Accompanying symptoms:  Dizziness  Unsteady on feet.    Last OV Lawanda 5-2-24  Next OV Not scheduled      Lawanda, please advise. Thank you!            "

## 2024-06-10 NOTE — TELEPHONE ENCOUNTER
Taiwo's wife called to let Elzbieta know that hei experiencing hallucinations again, still a little anxious and wanted to update his status to see if there was any advise.

## 2024-06-14 ENCOUNTER — APPOINTMENT (OUTPATIENT)
Dept: PHYSICAL THERAPY | Facility: CLINIC | Age: 79
End: 2024-06-14
Payer: MEDICARE

## 2024-06-19 ENCOUNTER — OFFICE VISIT (OUTPATIENT)
Dept: PHYSICAL THERAPY | Facility: CLINIC | Age: 79
End: 2024-06-19
Payer: MEDICARE

## 2024-06-19 DIAGNOSIS — G20.A1 PARKINSON'S DISEASE WITHOUT DYSKINESIA, UNSPECIFIED WHETHER MANIFESTATIONS FLUCTUATE: Primary | ICD-10-CM

## 2024-06-19 DIAGNOSIS — R26.9 GAIT ABNORMALITY: ICD-10-CM

## 2024-06-19 PROCEDURE — 97112 NEUROMUSCULAR REEDUCATION: CPT

## 2024-06-19 PROCEDURE — 97110 THERAPEUTIC EXERCISES: CPT

## 2024-06-20 NOTE — PSYCH
"This note was not shared with the patient due to reasonable likelihood of causing patient harm    PROGRESS NOTE        [facility]      Name and Date of Birth:  Gabriel Gonzalez 79 y.o. 1945 MRN: [id number]    Insurance: Payor: MEDICARE / Plan: MEDICARE A AND B / Product Type: Medicare A & B Fee for Service /     Date of Visit: June 21, 2024    Reason for Visit:   Chief Complaint   Patient presents with    Follow-up    Medication Management     SUBJECTIVE:    Gabriel Gonzalez is a nuha 79 y.o. male with a history of dementia who presents today for follow-up and medication management. His wife, Dariana, also presents with him and helps with the history. He denies any recent hallucinations (AH or VH) and states that he is not anxious or down. He states the only thing that's bothering him is that they are moving out of their house of 39 years. He states he feels \"forced\" into this. Dariana states he has mentioned seeing people around but he states, \"I don't remember that\". She reports it was a joint decision to move in part because of the hallucinations causing such distress being in their home and partly due to his mobility issues. He has been more forgetful per Dariana. They did reduce olanzapine to 1.25 mg qhs a week ago with no change in dizziness or fatigue.     He denies any suicidal ideation, intent or plan at present; denies any homicidal ideation, intent or plan at present.    He denies any auditory hallucinations, denies any visual hallucinations, denies any delusions.    He still reports dizziness and sedation.    HPI ROS Appetite Changes and Sleep:     He reports adequate number of sleep hours, adequate appetite, low energy    Current Rating Scores:     None completed today.    Review Of Systems:    Mood depression   Behavior appropriate and cooperative   Thought Content negative thoughts   General emotional problems and decreased functioning   Personality " no change in personality   Other Psych Symptoms decreased memory and decreased concentration   Constitutional as noted in HPI   ENT as noted in HPI   Cardiovascular as noted in HPI   Respiratory as noted in HPI   Gastrointestinal as noted in HPI   Genitourinary as noted in HPI   Musculoskeletal as noted in HPI   Integumentary as noted in HPI   Neurological as noted in HPI   Endocrine negative   Other Symptoms none, all other systems are negative     Family Psychiatric History:     Family History   Problem Relation Age of Onset    Heart disease Mother     Heart disease Father        Social/Substance Abuse History:    Social History     Socioeconomic History    Marital status: /Civil Union     Spouse name: Not on file    Number of children: 3    Years of education: Not on file    Highest education level: Not on file   Occupational History    Occupation:    still active    Occupation: retired    Tobacco Use    Smoking status: Never    Smokeless tobacco: Never   Vaping Use    Vaping status: Never Used   Substance and Sexual Activity    Alcohol use: Not Currently     Comment: social    Drug use: No    Sexual activity: Not Currently   Other Topics Concern    Not on file   Social History Narrative    Most recent tobacco use screenin-    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    If Yes, What branch of service: [employer]    [employer]    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, into active duty, 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    Advance directive: Yes    Alcohol intake: Moderate    Caffeine intake: Moderate    Illicit drugs: none    Occupation: retired    Exercise level: Occasional    Single or multi-level home/work: single level home    Live alone or with others: with others    Chewing tobacco: none    Marital status:     Number of children: 3    Diet: Regular    Sexual orientation: Heterosexual    Smoke alarm in home: Yes    General stress level: Medium     Sunscreen used routinely: No    Education: Post Graduate    Guns present in home: No    Presence of domestic violence: No     Social Determinants of Health     Financial Resource Strain: Low Risk  (6/23/2023)    Overall Financial Resource Strain (CARDIA)     Difficulty of Paying Living Expenses: Not hard at all   Food Insecurity: Not on file   Transportation Needs: No Transportation Needs (6/23/2023)    PRAPARE - Transportation     Lack of Transportation (Medical): No     Lack of Transportation (Non-Medical): No   Physical Activity: Not on file   Stress: Not on file   Social Connections: Not on file   Intimate Partner Violence: Not on file   Housing Stability: Not on file       The following portions of the patient's history were reviewed and updated as appropriate: past family history, past medical history, past social history, past surgical history and problem list.    OBJECTIVE:     Mental Status Evaluation:  Appearance:  dressed appropriately, adequate grooming, looks older than stated age   Behavior:  pleasant, cooperative, interacts appropriately with this writer   Speech:  normal rate, normal volume, normal pitch   Mood:  depressed   Affect:  constricted   Thought Process:  organized, logical, coherent   Associations: intact associations   Thought Content:  no overt delusions, no paranoia noted on exam   Perceptual Disturbances: no auditory hallucinations, no visual hallucinations   Risk Potential: Suicidal ideation - None  Homicidal ideation - None  Potential for aggression - No   Sensorium:  oriented to person, place, and time/date   Memory:  short term memory moderately impaired, long term memory mildly impaired   Consciousness:  alert and awake   Attention/Concentration: attention span and concentration are age appropriate   Insight:  limited   Judgment: limited   Gait/Station: unstable gait   Motor Activity: abnormal movement noted: mild tremor present     Laboratory Results: I have personally reviewed  "all pertinent laboratory/tests results    Suicide/Homicide Risk Assessment:    Risk of Harm to Self:  The following ratings are based on assessment at the time of the interview  Based on today's assessment, Gabriel presents the following risk of harm to self: none    Risk of Harm to Others:  The following ratings are based on assessment at the time of the interview  Based on today's assessment, Gabriel presents the following risk of harm to others: none    The following interventions are recommended: no intervention changes needed    Assessment/Plan:      He appears for irritable and depressed today than previous visits. He insists he has not had any hallucinations and when his wife mentioned that he told her about them, he said that it wasn't true. He is very upset about moving to a new home and feels he was \"pressured\" into it but his wife reports they made the decision together and was visibly upset at his accusation. Decreasing the olanzapine to 1.25 mg qhs has not made a difference in fatigue or dizziness. I have recommended continuing this for another week and if there is no change stopping it completely for 2 weeks. We will re-evaluate at that time. He is not appropriate for therapy at this time. We have discussed his safety plan and he agrees that if he experience unsafe thoughts that he will reach out to his supports including this office, the suicide hotline, and emergency services if necessary. Gabriel is aware of non-emergent and emergent mental health resources. They are able to contract for their own safety at this time.    Will follow up in 3 weeks. Patient is aware to call the office if questions or concerns arise sooner.      Diagnoses and all orders for this visit:    Psychosis due to Parkinson's disease    Visual hallucinations    Delusions (HCC)  -     OLANZapine (ZyPREXA) 2.5 mg tablet; Take 0.5 tablets (1.25 mg total) by mouth daily at bedtime        Treatment " Recommendations/Precautions:    Continue current medications:     - olanzapine 1.25 mg qhs x 1 week, then stop x 2 weeks    Medication management every 3 weeks  Aware of 24 hour and weekend coverage for urgent situations accessed by calling [facility] main practice number  I am scheduling this patient out for greater than 3 months: No    Medications Risks/Benefits      Risks, Benefits And Possible Side Effects Of Medications:    Risks, benefits, and possible side effects of medications explained to Gabriel and he verbalizes understanding and agreement for treatment.    Controlled Medication Discussion:     Not applicable    Psychotherapy Provided:     Individual psychotherapy provided: No    Treatment Plan:    Completed and signed during the session: Not applicable - Treatment Plan not due at this session    Visit Time    Visit Start Time:  1:30 PM  Visit End Time:  2:00 PM  Total Visit Duration:  30 minutes    Elzbieta Carreno 06/21/24

## 2024-06-21 ENCOUNTER — OFFICE VISIT (OUTPATIENT)
Dept: PSYCHIATRY | Facility: CLINIC | Age: 79
End: 2024-06-21
Payer: MEDICARE

## 2024-06-21 ENCOUNTER — APPOINTMENT (OUTPATIENT)
Dept: PHYSICAL THERAPY | Facility: CLINIC | Age: 79
End: 2024-06-21
Payer: MEDICARE

## 2024-06-21 DIAGNOSIS — F22 DELUSIONS (HCC): ICD-10-CM

## 2024-06-21 DIAGNOSIS — G20.A1 PSYCHOSIS DUE TO PARKINSON'S DISEASE: Primary | ICD-10-CM

## 2024-06-21 DIAGNOSIS — F06.8 PSYCHOSIS DUE TO PARKINSON'S DISEASE: Primary | ICD-10-CM

## 2024-06-21 DIAGNOSIS — R44.1 VISUAL HALLUCINATIONS: ICD-10-CM

## 2024-06-21 PROCEDURE — 99214 OFFICE O/P EST MOD 30 MIN: CPT | Performed by: PHYSICIAN ASSISTANT

## 2024-06-21 PROCEDURE — G2211 COMPLEX E/M VISIT ADD ON: HCPCS | Performed by: PHYSICIAN ASSISTANT

## 2024-06-21 RX ORDER — OLANZAPINE 2.5 MG/1
1.25 TABLET, FILM COATED ORAL
Qty: 15 TABLET | Refills: 0 | Status: SHIPPED | OUTPATIENT
Start: 2024-06-21

## 2024-06-24 RX ORDER — KETOCONAZOLE 20 MG/G
CREAM TOPICAL
COMMUNITY
Start: 2024-05-30

## 2024-06-25 ENCOUNTER — OFFICE VISIT (OUTPATIENT)
Dept: FAMILY MEDICINE CLINIC | Facility: CLINIC | Age: 79
End: 2024-06-25
Payer: MEDICARE

## 2024-06-25 VITALS
SYSTOLIC BLOOD PRESSURE: 138 MMHG | RESPIRATION RATE: 16 BRPM | OXYGEN SATURATION: 97 % | BODY MASS INDEX: 26.14 KG/M2 | WEIGHT: 193 LBS | HEIGHT: 72 IN | HEART RATE: 57 BPM | DIASTOLIC BLOOD PRESSURE: 78 MMHG

## 2024-06-25 DIAGNOSIS — R06.02 SOB (SHORTNESS OF BREATH): ICD-10-CM

## 2024-06-25 DIAGNOSIS — G20.A1 PARKINSON'S DISEASE WITHOUT DYSKINESIA OR FLUCTUATING MANIFESTATIONS: ICD-10-CM

## 2024-06-25 DIAGNOSIS — E53.9 POLYNEUROPATHY DUE TO VITAMIN B6 DEFICIENCY (HCC): ICD-10-CM

## 2024-06-25 DIAGNOSIS — E53.9 VITAMIN B DEFICIENCY: Primary | ICD-10-CM

## 2024-06-25 DIAGNOSIS — E55.9 VITAMIN D DEFICIENCY: ICD-10-CM

## 2024-06-25 DIAGNOSIS — R74.8 ABNORMAL LEVELS OF OTHER SERUM ENZYMES: ICD-10-CM

## 2024-06-25 DIAGNOSIS — E78.2 MIXED HYPERLIPIDEMIA: ICD-10-CM

## 2024-06-25 DIAGNOSIS — G63 POLYNEUROPATHY DUE TO VITAMIN B6 DEFICIENCY (HCC): ICD-10-CM

## 2024-06-25 DIAGNOSIS — E53.8 B12 DEFICIENCY: ICD-10-CM

## 2024-06-25 DIAGNOSIS — I95.1 ORTHOSTATIC HYPOTENSION: ICD-10-CM

## 2024-06-25 PROCEDURE — G0439 PPPS, SUBSEQ VISIT: HCPCS | Performed by: FAMILY MEDICINE

## 2024-06-25 PROCEDURE — 99214 OFFICE O/P EST MOD 30 MIN: CPT | Performed by: FAMILY MEDICINE

## 2024-06-25 NOTE — PROGRESS NOTES
Ambulatory Visit  Name: Gabriel Gonzalez      : 1945      MRN: [id number]  Encounter Provider: Julian Rudolph MD  Encounter Date: 2024   Encounter department: [facility]    Assessment & Plan   1. Vitamin B deficiency  -     Vitamin B1 (Thiamine), Serum/Plasma, LC/MS/MS  -     Vitamin B6  2. B12 deficiency  -     Vitamin B12  3. Vitamin D deficiency  -     Vitamin D 25 hydroxy  4. Mixed hyperlipidemia  -     Lipid Panel with Direct LDL reflex  5. 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 Parkinson's disease without dyskinesia or fluctuating manifestations  -     Vitamin B1 (Thiamine), Serum/Plasma, LC/MS/MS  -     Vitamin B12  -     Vitamin B6  -     Vitamin D 25 hydroxy  -     CBC and differential  -     Comprehensive metabolic panel  -     Lipid Panel with Direct LDL reflex  7. Orthostatic hypotension  8. Abnormal levels of other serum enzymes  -     Vitamin B6  9. SOB (shortness of breath)  -     Complete PFT with post bronchodilator; Future       Assessment & Plan  1. Dementia.  The patient's B6 levels are low, while B1 levels are within the normal range. The lipid panel while on atorvastatin is satisfactory. However, the B12 levels are low. The patient's magnesium levels are satisfactory, and urine analysis is also normal. A reevaluation of the patient's vitamin D and B6 levels is planned. A consultation with neurology is planned.    2. Shortness of breath.  Pulmonary Function Test (PFT) will be ordered.    Follow-up  A follow-up appointment is scheduled for 3 months from now.    1. Vitamin B deficiency  -     Vitamin B1 (Thiamine), Serum/Plasma, LC/MS/MS  -     Vitamin B6  2. B12 deficiency  -     Vitamin B12  3. Vitamin D deficiency  -     Vitamin D 25 hydroxy  4. Mixed hyperlipidemia  -     Lipid Panel with Direct LDL reflex  5. 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 Parkinson's disease without dyskinesia or  fluctuating manifestations  -     Vitamin B1 (Thiamine), Serum/Plasma, LC/MS/MS  -     Vitamin B12  -     Vitamin B6  -     Vitamin D 25 hydroxy  -     CBC and differential  -     Comprehensive metabolic panel  -     Lipid Panel with Direct LDL reflex  7. Orthostatic hypotension  8. Abnormal levels of other serum enzymes  -     Vitamin B6  9. SOB (shortness of breath)  -     Complete PFT with post bronchodilator; Future      Preventive health issues were discussed with patient, and age appropriate screening tests were ordered as noted in patient's After Visit Summary. Personalized health advice and appropriate referrals for health education or preventive services given if needed, as noted in patient's After Visit Summary.    History of Present Illness     HPI   History of Present Illness  The patient presents for evaluation of multiple medical concerns.    The patient was last evaluated on 05/01/2023, during which he has been engaged in physical therapy. A neuropsychological evaluation was conducted, along with a virtual consultation with Lawanda, a neurologist, and a follow-up appointment with Dr. Toussaint. He has been on a regimen of olanzapine, taking half a tablet for the past 5 to 6 days, with the possibility of discontinuing the medication by the end of 2 weeks. Despite this, he reports no significant changes. He experiences morning dizziness and dyspnea, which typically subsides after approximately 2 hours. He continues to experience fatigue, imbalance, and dizziness. The most effective dose of olanzapine was 2 tablets at bedtime and 1 tablet during the day, but he experienced daytime sleepiness after a week of discontinuation. His hallucinations have lessened, but they appear to be unrelated to the medication he was taking. He requires rest after walking across the room. He is making one physical therapy session a week, but uses a harness for support. He engages in stretch band exercises at home once a week.  Dr. Raya suggested changing his rivastigmine medication after discontinuing olanzapine. His midodrine dosage was adjusted to 1 tablet in the morning, but he experienced dizziness, prompting the administration of the second tablet in the afternoon at 2 PM. He was previously taking midodrine for orthostatic hypotension for 2 years before starting levodopa. He has discontinued Myrbetriq. His current medications include 2 B vitamins, B12, B6, levodopa during the day, midodrine twice a day, rivastigmine, and atorvastatin. He has reduced his coffee intake to half a cup daily and uses Pepsi once or twice a day. He has lost approximately 30 pounds. His fatigue and imbalance have been ongoing for an extended period. Despite attempts to increase his fluid intake, he struggles. He has a scheduled appointment with his psychiatrist in 3 to 4 weeks to monitor his response to the olanzapine.      Patient Care Team:  Julian Rudolph MD as PCP - General  MD Binu Hung MD    Review of Systems   Constitutional:  Positive for appetite change (low appetite). Negative for fatigue and fever.   HENT:  Positive for voice change (only when tired). Negative for hearing loss, tinnitus and trouble swallowing.    Eyes: Negative.  Negative for photophobia, pain and visual disturbance.   Respiratory: Negative.  Negative for shortness of breath.    Cardiovascular: Negative.  Negative for palpitations.   Gastrointestinal: Negative.  Negative for nausea and vomiting.   Endocrine: Negative.  Negative for cold intolerance.   Genitourinary: Negative.  Negative for dysuria, frequency and urgency.   Musculoskeletal:  Positive for gait problem (due to leg weakness) and myalgias. Negative for back pain and neck pain.        Balance Issues     Skin: Negative.  Negative for rash.   Allergic/Immunologic: Negative.    Neurological:  Positive for dizziness (a couple times a day, has to sit to help go away), tremors (Mild in hands,  Left worse than Right, has stayed the same) and weakness (Right Leg which causes issues with gait). Negative for seizures, syncope, facial asymmetry, speech difficulty, light-headedness, numbness and headaches.   Hematological: Negative.  Does not bruise/bleed easily.   Psychiatric/Behavioral:  Positive for confusion and hallucinations. Negative for sleep disturbance.         Delusions  Paranoid   All other systems reviewed and are negative.    Medical History Reviewed by provider this encounter:  Tobacco  Allergies  Meds  Problems  Med Hx  Surg Hx  Fam Hx       Annual Wellness Visit Questionnaire   Gabriel is here for his Subsequent Wellness visit. Last Medicare Wellness visit information reviewed, patient interviewed and updates made to the record today.      Health Risk Assessment:   Patient rates overall health as fair. Patient feels that their physical health rating is slightly worse. Patient is very satisfied with their life. Eyesight was rated as slightly worse. Hearing was rated as slightly worse. Patient feels that their emotional and mental health rating is same. Patients states they are never, rarely angry. Patient states they are sometimes unusually tired/fatigued. Pain experienced in the last 7 days has been none. Patient states that he has experienced no weight loss or gain in last 6 months.     Depression Screening:   PHQ-2 Score: 0      Fall Risk Screening:   In the past year, patient has experienced: no history of falling in past year      Home Safety:  Patient has trouble with stairs inside or outside of their home. Patient has working smoke alarms and has working carbon monoxide detector. Home safety hazards include: none.     Nutrition:   Current diet is Regular.     Medications:   Patient is currently taking over-the-counter supplements. OTC medications include: see medication list. Patient is able to manage medications.     Activities of Daily Living (ADLs)/Instrumental Activities of  Daily Living (IADLs):   Walk and transfer into and out of bed and chair?: Yes  Dress and groom yourself?: Yes    Bathe or shower yourself?: Yes    Feed yourself? Yes  Do your laundry/housekeeping?: No  Manage your money, pay your bills and track your expenses?: No  Make your own meals?: No    Do your own shopping?: No    Previous Hospitalizations:   Any hospitalizations or ED visits within the last 12 months?: Yes    How many hospitalizations have you had in the last year?: 1-2    Advance Care Planning:   Living will: Yes    Durable POA for healthcare: Yes    Advanced directive: Yes    Five wishes given: No      Cognitive Screening:   Provider or family/friend/caregiver concerned regarding cognition?: Yes    PREVENTIVE SCREENINGS      Cardiovascular Screening:    General: Screening Not Indicated and History Lipid Disorder    Due for: Lipid Panel      Diabetes Screening:     General: Screening Current    Due for: Blood Glucose      Colorectal Cancer Screening:     General: Screening Current    Due for: Cologuard      Prostate Cancer Screening:    General: Screening Not Indicated      Osteoporosis Screening:    General: Screening Not Indicated      Abdominal Aortic Aneurysm (AAA) Screening:    Risk factors include: age between 65-74 yo        General: Screening Not Indicated      Lung Cancer Screening:     General: Screening Not Indicated      Hepatitis C Screening:    General: Screening Current    Screening, Brief Intervention, and Referral to Treatment (SBIRT)    Screening  Typical number of drinks in a day: 1  Typical number of drinks in a week: 1  Interpretation: Low risk drinking behavior.    Single Item Drug Screening:  How often have you used an illegal drug (including marijuana) or a prescription medication for non-medical reasons in the past year? never    Single Item Drug Screen Score: 0  Interpretation: Negative screen for possible drug use disorder    Brief Intervention  Alcohol & drug use screenings were  reviewed. No concerns regarding substance use disorder identified.     Social Determinants of Health     Financial Resource Strain: Low Risk  (6/23/2023)    Overall Financial Resource Strain (CARDIA)     Difficulty of Paying Living Expenses: Not hard at all   Food Insecurity: No Food Insecurity (6/25/2024)    Hunger Vital Sign     Worried About Running Out of Food in the Last Year: Never true     Ran Out of Food in the Last Year: Never true   Transportation Needs: No Transportation Needs (6/25/2024)    PRAPARE - Transportation     Lack of Transportation (Medical): No     Lack of Transportation (Non-Medical): No   Housing Stability: Low Risk  (6/25/2024)    Housing Stability Vital Sign     Unable to Pay for Housing in the Last Year: No     Number of Times Moved in the Last Year: 1     Homeless in the Last Year: No   Utilities: Not At Risk (6/25/2024)    [facility] Utilities     Threatened with loss of utilities: No     No results found.    Objective     /78 (BP Location: Right arm, Patient Position: Sitting, Cuff Size: Standard)   Pulse 57   Resp 16   Ht 6' (1.829 m)   Wt 87.5 kg (193 lb)   SpO2 97%   BMI 26.18 kg/m²     Physical Exam  Vitals and nursing note reviewed.   Constitutional:       Appearance: He is well-developed.   HENT:      Head: Normocephalic and atraumatic.   Eyes:      Conjunctiva/sclera: Conjunctivae normal.      Pupils: Pupils are equal, round, and reactive to light.   Cardiovascular:      Rate and Rhythm: Normal rate and regular rhythm.      Heart sounds: Normal heart sounds.   Pulmonary:      Effort: Pulmonary effort is normal.      Breath sounds: Normal breath sounds. No wheezing or rales.   Abdominal:      General: Bowel sounds are normal. There is no distension.      Palpations: Abdomen is soft.      Tenderness: There is no abdominal tenderness.   Musculoskeletal:         General: No tenderness. Normal range of motion.      Cervical back: Normal range of motion and neck supple.   Skin:      General: Skin is warm and dry.      Findings: No rash.   Neurological:      Mental Status: He is alert.      Cranial Nerves: No cranial nerve deficit.      Sensory: No sensory deficit.      Coordination: Coordination normal.      Gait: Gait abnormal.       Administrative Statements

## 2024-06-25 NOTE — PATIENT INSTRUCTIONS
Medicare Preventive Visit Patient Instructions  Thank you for completing your Welcome to Medicare Visit or Medicare Annual Wellness Visit today. Your next wellness visit will be due in one year (6/26/2025).  The screening/preventive services that you may require over the next 5-10 years are detailed below. Some tests may not apply to you based off risk factors and/or age. Screening tests ordered at today's visit but not completed yet may show as past due. Also, please note that scanned in results may not display below.  Preventive Screenings:  Service Recommendations Previous Testing/Comments   Colorectal Cancer Screening  Colonoscopy    Fecal Occult Blood Test (FOBT)/Fecal Immunochemical Test (FIT)  Fecal DNA/Cologuard Test  Flexible Sigmoidoscopy Age: 45-75 years old   Colonoscopy: every 10 years (May be performed more frequently if at higher risk)  OR  FOBT/FIT: every 1 year  OR  Cologuard: every 3 years  OR  Sigmoidoscopy: every 5 years  Screening may be recommended earlier than age 45 if at higher risk for colorectal cancer. Also, an individualized decision between you and your healthcare provider will decide whether screening between the ages of 76-85 would be appropriate. Colonoscopy: Not on file  FOBT/FIT: Not on file  Cologuard: Not on file  Sigmoidoscopy: Not on file    Screening Current  Due for Cologuard     Prostate Cancer Screening Individualized decision between patient and health care provider in men between ages of 55-69   Medicare will cover every 12 months beginning on the day after your 50th birthday PSA: 13.45 ng/mL     Screening Not Indicated     Hepatitis C Screening Once for adults born between 1945 and 1965  More frequently in patients at high risk for Hepatitis C Hep C Antibody: 11/09/2021    Screening Current   Diabetes Screening 1-2 times per year if you're at risk for diabetes or have pre-diabetes Fasting glucose: 106 mg/dL (11/16/2022)  A1C: 5.4 % (11/16/2022)  Screening Current  Due  for Blood Glucose   Cholesterol Screening Once every 5 years if you don't have a lipid disorder. May order more often based on risk factors. Lipid panel: 03/22/2024  Screening Not Indicated  History Lipid Disorder  Due for Lipid Panel      Other Preventive Screenings Covered by Medicare:  Abdominal Aortic Aneurysm (AAA) Screening: covered once if your at risk. You're considered to be at risk if you have a family history of AAA or a male between the age of 65-75 who smoking at least 100 cigarettes in your lifetime.  Lung Cancer Screening: covers low dose CT scan once per year if you meet all of the following conditions: (1) Age 55-77; (2) No signs or symptoms of lung cancer; (3) Current smoker or have quit smoking within the last 15 years; (4) You have a tobacco smoking history of at least 20 pack years (packs per day x number of years you smoked); (5) You get a written order from a healthcare provider.  Glaucoma Screening: covered annually if you're considered high risk: (1) You have diabetes OR (2) Family history of glaucoma OR (3)  aged 50 and older OR (4)  American aged 65 and older  Osteoporosis Screening: covered every 2 years if you meet one of the following conditions: (1) Have a vertebral abnormality; (2) On glucocorticoid therapy for more than 3 months; (3) Have primary hyperparathyroidism; (4) On osteoporosis medications and need to assess response to drug therapy.  HIV Screening: covered annually if you're between the age of 15-65. Also covered annually if you are younger than 15 and older than 65 with risk factors for HIV infection. For pregnant patients, it is covered up to 3 times per pregnancy.    Immunizations:  Immunization Recommendations   Influenza Vaccine Annual influenza vaccination during flu season is recommended for all persons aged >= 6 months who do not have contraindications   Pneumococcal Vaccine   * Pneumococcal conjugate vaccine = PCV13 (Prevnar 13), PCV15  (Vaxneuvance), PCV20 (Prevnar 20)  * Pneumococcal polysaccharide vaccine = PPSV23 (Pneumovax) Adults 19-65 yo with certain risk factors or if 65+ yo  If never received any pneumonia vaccine: recommend Prevnar 20 (PCV20)  Give PCV20 if previously received 1 dose of PCV13 or PPSV23   Hepatitis B Vaccine 3 dose series if at intermediate or high risk (ex: diabetes, end stage renal disease, liver disease)   Respiratory syncytial virus (RSV) Vaccine - COVERED BY MEDICARE PART D  * RSVPreF3 (Arexvy) CDC recommends that adults 60 years of age and older may receive a single dose of RSV vaccine using shared clinical decision-making (SCDM)   Tetanus (Td) Vaccine - COST NOT COVERED BY MEDICARE PART B Following completion of primary series, a booster dose should be given every 10 years to maintain immunity against tetanus. Td may also be given as tetanus wound prophylaxis.   Tdap Vaccine - COST NOT COVERED BY MEDICARE PART B Recommended at least once for all adults. For pregnant patients, recommended with each pregnancy.   Shingles Vaccine (Shingrix) - COST NOT COVERED BY MEDICARE PART B  2 shot series recommended in those 19 years and older who have or will have weakened immune systems or those 50 years and older     Health Maintenance Due:      Topic Date Due   • Hepatitis C Screening  Completed   • Colorectal Cancer Screening  Discontinued     Immunizations Due:  There are no preventive care reminders to display for this patient.  Advance Directives   What are advance directives?  Advance directives are legal documents that state your wishes and plans for medical care. These plans are made ahead of time in case you lose your ability to make decisions for yourself. Advance directives can apply to any medical decision, such as the treatments you want, and if you want to donate organs.   What are the types of advance directives?  There are many types of advance directives, and each state has rules about how to use them. You may  choose a combination of any of the following:  Living will:  This is a written record of the treatment you want. You can also choose which treatments you do not want, which to limit, and which to stop at a certain time. This includes surgery, medicine, IV fluid, and tube feedings.   Durable power of  for healthcare (DPAHC):  This is a written record that states who you want to make healthcare choices for you when you are unable to make them for yourself. This person, called a proxy, is usually a family member or a friend. You may choose more than 1 proxy.  Do not resuscitate (DNR) order:  A DNR order is used in case your heart stops beating or you stop breathing. It is a request not to have certain forms of treatment, such as CPR. A DNR order may be included in other types of advance directives.  Medical directive:  This covers the care that you want if you are in a coma, near death, or unable to make decisions for yourself. You can list the treatments you want for each condition. Treatment may include pain medicine, surgery, blood transfusions, dialysis, IV or tube feedings, and a ventilator (breathing machine).  Values history:  This document has questions about your views, beliefs, and how you feel and think about life. This information can help others choose the care that you would choose.  Why are advance directives important?  An advance directive helps you control your care. Although spoken wishes may be used, it is better to have your wishes written down. Spoken wishes can be misunderstood, or not followed. Treatments may be given even if you do not want them. An advance directive may make it easier for your family to make difficult choices about your care.   Weight Management   Why it is important to manage your weight:  Being overweight increases your risk of health conditions such as heart disease, high blood pressure, type 2 diabetes, and certain types of cancer. It can also increase your risk  for osteoarthritis, sleep apnea, and other respiratory problems. Aim for a slow, steady weight loss. Even a small amount of weight loss can lower your risk of health problems.  How to lose weight safely:  A safe and healthy way to lose weight is to eat fewer calories and get regular exercise. You can lose up about 1 pound a week by decreasing the number of calories you eat by 500 calories each day.   Healthy meal plan for weight management:  A healthy meal plan includes a variety of foods, contains fewer calories, and helps you stay healthy. A healthy meal plan includes the following:  Eat whole-grain foods more often.  A healthy meal plan should contain fiber. Fiber is the part of grains, fruits, and vegetables that is not broken down by your body. Whole-grain foods are healthy and provide extra fiber in your diet. Some examples of whole-grain foods are whole-wheat breads and pastas, oatmeal, brown rice, and bulgur.  Eat a variety of vegetables every day.  Include dark, leafy greens such as spinach, kale, nga greens, and mustard greens. Eat yellow and orange vegetables such as carrots, sweet potatoes, and winter squash.   Eat a variety of fruits every day.  Choose fresh or canned fruit (canned in its own juice or light syrup) instead of juice. Fruit juice has very little or no fiber.  Eat low-fat dairy foods.  Drink fat-free (skim) milk or 1% milk. Eat fat-free yogurt and low-fat cottage cheese. Try low-fat cheeses such as mozzarella and other reduced-fat cheeses.  Choose meat and other protein foods that are low in fat.  Choose beans or other legumes such as split peas or lentils. Choose fish, skinless poultry (chicken or turkey), or lean cuts of red meat (beef or pork). Before you cook meat or poultry, cut off any visible fat.   Use less fat and oil.  Try baking foods instead of frying them. Add less fat, such as margarine, sour cream, regular salad dressing and mayonnaise to foods. Eat fewer high-fat foods.  Some examples of high-fat foods include french fries, doughnuts, ice cream, and cakes.  Eat fewer sweets.  Limit foods and drinks that are high in sugar. This includes candy, cookies, regular soda, and sweetened drinks.  Exercise:  Exercise at least 30 minutes per day on most days of the week. Some examples of exercise include walking, biking, dancing, and swimming. You can also fit in more physical activity by taking the stairs instead of the elevator or parking farther away from stores. Ask your healthcare provider about the best exercise plan for you.      © Copyright [employer] 2018 Information is for End User's use only and may not be sold, redistributed or otherwise used for commercial purposes. All illustrations and images included in CareNotes® are the copyrighted property of [employer] or [employer]

## 2024-06-27 NOTE — PROGRESS NOTES
Daily Note     Today's date: 2024  Patient name: Gabriel Gonzalez  : 1945  MRN: [id number]  Referring provider: Lawanda Joshi CRNP  Dx:   Encounter Diagnosis     ICD-10-CM    1. Parkinson's disease without dyskinesia, unspecified whether manifestations fluctuate  G20.A1       2. Gait abnormality  R26.9           Start Time: 1100  Stop Time: 1145  Total time in clinic (min): 45 minutes    Subjective: pt reports some fo his medications were dec in dosage to address dizziness and light headed feeling. After 6mwt pt denies any dizziness present or light headed feeling.      Objective: See treatment diary below      Assessment: Tolerated treatment well. Patient would benefit from continued PT Pt has met 2/3 STG and is progressing towards LTG at this time Pt has improved their 6mwt distance from 1208 feet to 1250 ft indicating improved aerobic capacity and activity tolerance. pts FGA score has sustained from 22/30 to 22/30 reducing pts fall risk however still placing them at risk for falls as scores < 23 are considered at risk for falls. Pts 5x STS tests has improved from 14.5 s to 12.6 s indicating improved LE strength and power and  is exactly at age matched norms of 12.6.  Pts 3m bwd walking time has sustained from 5.09 to 5.22 s  indicating pt is at inc risk for falls as scores > 4.5 s are at risk for falls. Additionally pts 4 square step test time has improved from 18.04 to 14.04 indicating pt is at inc risk for falls as times >10.4s are considered pts identified as fallers and scores > 15 s indicate pt is as risk for multiple falls.   Pt would continue to benefit from skilled physical therapy to improve overall QOL inclusive of, strength, coordination, balance, endurance and functional mobility in the home and in the community as well as reduce their fall risk for improved safety.               Assessment: Tolerated treatment well. Patient would benefit from continued PT  Pt has met 3/3 STG and 2/4  LTG at this time Pt has improved their 6mwt distance from 1100 feet to 1208 ft indicating improved aerobic capacity and activity tolerance. Pts gait speed has improved from 1.1 m/s to 1.25 m/s indicating pt is a community Ambulator. pts FGA score has maintained from 22/30 to 22/30 reducing still placing them at risk for falls as scores < 23 are considered at risk for falls. Pts 5x STS tests has improved from 16.08 s to 14.5 s indicating improved LE strength and power however is still less than age matched norms of 12.6. . Pts 3m bwd walking time has improved from 5.75 to 5.09 s  indicating pt is at inc risk for falls as scores > 4.5 s are at risk for falls and scores <3 are considered not at risk for falls. Additionally pts 4 square step test time has improved from 27.06 s to 18.04 indicating pt is at inc risk for falls as times >10.4s are considered pts identified as fallers and scores > 15 s indicate pt is as risk for multiple falls.   Pt would continue to benefit from skilled physical therapy to improve overall QOL inclusive of, strength, coordination, balance, endurance and functional mobility in the home and in the community as well as reduce their fall risk for improved safety.                Goals      New goals  STG (4 weeks)   Improve 4 square step test score of 18.04 by to 14 seconds indicating meaningful improvement in fall risk  MET  Improve 5x STS score of 14.5 by 2.3 seconds indicating meaningful improvement in fall risk and power generation nearly MET (missed by 0.4)  Increase 6MWT of 1208 to 1290 indicating meaningful change in aerobic endurance  Progressed     LTG (8 weeks)   Improve FGA score to >23  harness indicating meaningful improvement in fall risk   Pt will improve 3m bws walk time of 5.09 to 4 for an indication of reduction in fall risk  Improve 4 square step test score of 18.04 by to 10.4 seconds indicating meaningful improvement in fall risk      Assessment: Tolerated treatment well.  Patient would benefit from continued PT Pt has attended 8 physical therapy sessions including IE and is demonstrating progress towards goals.  Pt has met 3/3 STG and 4/4 LTG at this time Pt has improved their 6mwt distance from 1000 feet to 1100 ft indicating improved aerobic capacity and activity tolerance. Pts gait speed has improved from 1 m/s to 1.1 m/s indicating pt is a community Ambulator however is still not considered a safe speed to cross the street indep (1.2 m/s). pts FGA score has remained consistent from 22/30 to 22/30 reducing pts fall risk however still placing them at risk for falls as scores < 23 are considered at risk for falls. Pts 5x STS tests has declined from 14.38 s to 16.08 s pt does however report his knees are bothering him more today than typical. Pts ABC score has improved from 71.25 to 78.75 on todays re eval indication pt is no longer at risk for falls as scores < 67% are considered at risk for falls. Pts 3m bwd walking time has improved from 6.09 to 5.75 s  indicating pt is at inc risk for falls as scores > 4.5 s are at risk for falls. Additionally pts 4 square step test time of 27.06s  indicates pt is at inc risk for falls as times >10.4s are considered pts identified as fallers and scores > 15 s indicate pt is as risk for multiple falls.    Pt would continue to benefit from skilled physical therapy to improve overall QOL inclusive of, strength, coordination, balance, endurance and functional mobility in the home and in the community as well as reduce their fall risk for improved safety.     Assessment: Tolerated treatment well. Patient would benefit from continued PT Pt has attended 4 physical therapy sessions including IE and is demonstrating progress towards goals.  Pt has met 3/3 STG and is  progressing towards LTG at this time Pt has improved their 6mwt distance from 858 feet to 1000 ft indicating improved aerobic capacity and activity tolerance. Pts gait speed has improved from  0.91 m/s to 1 m/s indicating pt is a community Ambulator however is still at risk for falls based on her speed being < 1 m/s.  pts FGA score has improved from 18/30 to 22/30 reducing pts fall risk however still placing them at risk for falls as scores < 22 are considered at risk for falls. Pts 5x STS tests has improved from 22.25 s to 14.38 s indicating improved LE strength and power however is still less than age matched norms of 12.6s. Pts 3m bwd walking time has improved from 7.6 to 6.09 s  indicating pt is at inc risk for falls as scores > 4.5 s are at risk for falls.   Pt would continue to benefit from skilled physical therapy to improve overall QOL inclusive of, strength, coordination, balance, endurance and functional mobility in the home and in the community as well as reduce their fall risk for improved safety.      Eval Assessment  Assessment details: Pt is a 78 year old male presenting to [facility] physical therapy for an initial evaluation secondary to dec funcational capacity assosiciated with parkinsons disease. Pt presents to clinic with gait and balance impairments inclusive of right sided trendelenburg gait, dec trunk rotation, dec arm swing w gait, dec endurance and LE strength, and dec functional balance.     Additionally pt demonstrates strength deficits as seen with M 5x STS time of 22.25s demonstrating dec LE strength and power as compared to age matched norms. Pt demonstrates endurance deficits as seen with 6mwt distance of 858 ft as compared to age matched norms of 1729 ft. Pt is at inc risk for falls based on their FGA score of 18/30  with scores < 20/30 at inc risk for spontaneous falls and scores < 22/30 considered at risk for falls.  Pt ambulates at 0.91 m/s making them a community Ambulator.  Additionally this score places the pt at inc risk for falls as scores < 1 m/s are considered at risk for falls.     Pt provided edu on symptoms of parkinson's such as festination, dec rotation, rigidity  "etc and exercises to address. HEP to be provided on first follow up visit.  Pt would benefit from skilled physical therapy to improve  overall QOL inclusive of, strength, coordination, balance, endurance and functional mobility in the home and in the community as well as reduce their fall risk for improved safety.    Impairments: abnormal gait, impaired balance, impaired physical strength, lacks appropriate home exercise program and safety issue    Plan  Patient would benefit from: skilled physical therapy  Planned therapy interventions: balance, gait training, home exercise program, neuromuscular re-education, patient education, postural training, strengthening, stretching, therapeutic activities, therapeutic exercise, functional ROM exercises and flexibility  Frequency: 2x week  Duration in visits: 16  Duration in weeks: 8  Plan of Care beginning date: 5/31/24  Plan of Care expiration date: 7/26/24  Treatment plan discussed with: patient      Subjective Evaluation    History of Present Illness  Mechanism of injury: Pt reports that he was diagnosed with parkinson's in 2017 and did not take any medications until 2019 (sinemet). Pt reprots that he previously had a  coming to the house to remain stain active but has not rk able to have those as of recent for several reasons and is looking for continued skilled car out of the house. Since stopping training he and his wife reports a decline in activity and function. Pt reports he would like to work on strength in legs and reports his balance is \"pretty crummy\" . Pt reports that he notices taking small steps and occasional freezing. Pts wife reports they used to walk a mile together but now he is only able to walk about a quarter of a mile due to endurance.            Recurrent probem    Patient Goals  Patient goals for therapy: improved balance and increased strength    Pain  Location: right hip occasional pain    Social Support  Steps to enter house: yes (w " railing)    Treatments  Previous treatment: physical therapy        Objective           Balance Test IE 3/7 4/5 5/31 6/28    6 Minute Walk Test (ft): 858 ft 1000 ft 1100 ft 1208 ft 1250    2 Minute Walk Test (ft):         Gait Speed (m/s): 0.91  1 m/s 1.1 1.25     5x Sit To Stand (s): 22.25 no UE use 14.38s no UE use 16.08 no UE use 14.5 12.63 no UE    TUG (s) 13.22   11.78     FGA  3m bwd walk test 18/30  7.6s 22/30  6.09s 22/30  5.75 22/30  5.09 22/30  5.22     4 square step test 27.06 (4/5) 18.04 (5/31) 14.04 (6/28)     Short Term Goal Expiration Date:(6/28/24)  Long Term Goal Expiration Date: (7/26/24)  POC Expiration Date: (7/26/24)        POC expires Unit limit Auth Expiration date PT/OT/ST + Visit Limit?   7/26/24 BOMN N/a BOMN                           Visit/Unit Tracking  AUTH Status:  Date 5/17 5/31 6/7 6/19 6/28     3/22 3/29 4/5 4/12 4/26 5/10    N/a Used 4/10 5/10 1/10 2/10 3/10 2/10 3/10 4/10 1/10 2/0 3/10    Remaining                     Precautions parkinson's, Lewy body dementia       Manuals 6/19 6/28 5/24 5/31 6/7                                   Neuro Re-Ed   4 square step test  10mwt  3m bwd walk test  Fga  edu on inc amplitude movements and looking into the BIG program   Fga  Abc  3 m bwd walk  4 square step  10 mwt    steps        Compliant surface     SOLO    Up and over 5 airex    X2 laps fwd  X2 laps lateral   Side step SOLO    W 6# MB bounce for inc power generation    X1 laps    Basketball bounce  X1 lap    3# AW  SOLO    W basketball bounce for inc power generation    X3 laps     Bwd walk        HKM        hurdles Solo  3# AW    6'' (6)    X3 laps fwd  X2 lateral  Solo    3# AW    6'' (6)    X3 laps fwd  X2 lateral  SOLO    6'' (6)    X3 laps fwd  X2 lateral   Shuffle steps        rotation        Dual task   Walking ball toss   SOLO    X3 laps w VC for for big steps     Multi directional stepping SOLO    Hex ladder in hexagon shape    X3 clockwise steps  X3 counterclockwise steps    3#  AW       Ther Ex Vitals assessment 5x sts  6mwt  6 mwt  5x sts    nustep Lvl 7 x10 min    Lvl 6 x10 min    TM   SOLO    Chalk visual cue    0.9 mph    10 min  SOLO    Chalk visual cue    0.9 mph    6 min  Rest  4 min   STS        Postural strength         T/S ext         T/S rot         Open book         Bridge         Supine figure 4 stretch         LTR         Seated hip abd        Ther Activity                        Gait Training                        Modalities                              Access Code: [email or web address]  URL: [email or web address]  Date: 03/07/2024  Prepared by: Caterina Rust    Exercises  - Standing March with Counter Support  - 1 x daily - 6 x weekly - 3 sets - 10 reps  - Heel Toe Raises with Unilateral Counter Support  - 1 x daily - 6 x weekly - 3 sets - 10 reps  - Sit to Stand with Armchair  - 1 x daily - 6 x weekly - 3 sets - 8 reps  - Sideways Walking  - 1 x daily - 6 x weekly - 3 sets - 10 reps  - Seated Hip Abduction with Resistance  - 1 x daily - 6 x weekly - 3 sets - 10 reps      Access Code: [email or web address]  URL: [email or web address]  Date: 05/10/2024  Prepared by: Presley Hodgson    Exercises  - Supine Lower Trunk Rotation  - 1 x daily - 7 x weekly - 3 sets - 10 reps  - Supine Figure 4 Piriformis Stretch  - 1 x daily - 7 x weekly - 3 sets - 1 reps - 30 seconds hold  - Supine Bridge  - 1 x daily - 7 x weekly - 3 sets - 10 reps  - Seated Shoulder Row with Anchored Resistance  - 1 x daily - 7 x weekly - 3 sets - 10 reps - 3 seconds hold  - Hooklying Clamshell with Resistance  - 1 x daily - 7 x weekly - 3 sets - 10 reps  - Seated Shoulder Horizontal Abduction with Resistance  - 1 x daily - 7 x weekly - 3 sets - 10 reps  - Shoulder External Rotation with Resistance  - 1 x daily - 7 x weekly - 3 sets - 10 reps  - Seated Thoracic Self-Mobilization  - 1 x daily - 7 x weekly - 3 sets - 10 reps  - Sidelying Open Book Thoracic Lumbar Rotation and Extension  - 1 x daily - 7 x  weekly - 3 sets - 10 reps

## 2024-06-28 ENCOUNTER — EVALUATION (OUTPATIENT)
Dept: PHYSICAL THERAPY | Facility: CLINIC | Age: 79
End: 2024-06-28
Payer: MEDICARE

## 2024-06-28 DIAGNOSIS — R26.9 GAIT ABNORMALITY: ICD-10-CM

## 2024-06-28 DIAGNOSIS — G20.A1 PARKINSON'S DISEASE WITHOUT DYSKINESIA, UNSPECIFIED WHETHER MANIFESTATIONS FLUCTUATE: Primary | ICD-10-CM

## 2024-06-28 PROCEDURE — 97110 THERAPEUTIC EXERCISES: CPT

## 2024-06-28 PROCEDURE — 97112 NEUROMUSCULAR REEDUCATION: CPT

## 2024-07-01 ENCOUNTER — OFFICE VISIT (OUTPATIENT)
Dept: PHYSICAL THERAPY | Facility: CLINIC | Age: 79
End: 2024-07-01
Payer: MEDICARE

## 2024-07-01 DIAGNOSIS — G20.A1 PARKINSON'S DISEASE: ICD-10-CM

## 2024-07-01 DIAGNOSIS — R26.9 GAIT ABNORMALITY: ICD-10-CM

## 2024-07-01 DIAGNOSIS — G20.A1 PARKINSON'S DISEASE WITHOUT DYSKINESIA, UNSPECIFIED WHETHER MANIFESTATIONS FLUCTUATE: Primary | ICD-10-CM

## 2024-07-01 PROCEDURE — 97150 GROUP THERAPEUTIC PROCEDURES: CPT

## 2024-07-01 PROCEDURE — 97112 NEUROMUSCULAR REEDUCATION: CPT

## 2024-07-01 NOTE — PROGRESS NOTES
Daily Note     Today's date: 2024  Patient name: Gabriel Gonzalez  : 1945  MRN: [id number]  Referring provider: Lawanda Joshi CRNP  Dx:   Encounter Diagnosis     ICD-10-CM    1. Parkinson's disease without dyskinesia, unspecified whether manifestations fluctuate  G20.A1       2. Gait abnormality  R26.9           Start Time: 1330  Stop Time: 1415  Total time in clinic (min): 45 minutes    Subjective: no sig changes since previous visit      Objective: See treatment diary below  1:1 130-200  Group 200-215    Assessment: Tolerated treatment well. Patient would benefit from continued PT TM ambulation performed w chalk for visual external cue of inc step length. Rotation and power incorporated together with twisting 6# MB slams. Pt challenged with achieving enough rotation, visual cue added for ball target. Pt also challenged w power as ball bounced back to about knee height. Pt able to complete fwd hurdles reciprocally today.      Plan: Continue per plan of care.      Short Term Goal Expiration Date:(24)  Long Term Goal Expiration Date: (24)  POC Expiration Date: (24)        POC expires Unit limit Auth Expiration date PT/OT/ST + Visit Limit?   24 BOMN N/a BOMN                           Visit/Unit Tracking  AUTH Status:  Date 5/17 5/31 6/7 6/19 6/28     7/1 3/29 4/5 4/12 4/26 5/10    N/a Used 4/10 5/10 1/10 2/10 3/10 1/10 3/10 4/10 1/10 2/0 3/10    Remaining                     Precautions parkinson's, Lewy body dementia       Manuals                                    Neuro Re-Ed   4 square step test  10mwt  3m bwd walk test  Fga  edu on inc amplitude movements and looking into the BIG program   Fga  Abc  3 m bwd walk  4 square step  10 mwt    steps        Compliant surface   SOLO  2 airex  1 6'' step    X4 laps fwd  SOLO    Up and over 5 airex    X2 laps fwd  X2 laps lateral   Side step SOLO    W 6# MB bounce for inc power generation    X1 laps    Basketball  bounce  X1 lap    3# AW       Bwd walk        HKM   SOLO    W TT    X3 laps      hurdles Solo  3# AW    6'' (6)    X3 laps fwd  X2 lateral  SOLO    6'' (6)    X3 laps fwd  X2 lateral  SOLO    6'' (6)    X3 laps fwd  X2 lateral   Shuffle steps        rotation   Turn to side w 6# MB slam    X10 to visual target     Dual task        Multi directional stepping SOLO    Hex ladder in hexagon shape    X3 clockwise steps  X3 counterclockwise steps    3# AW       Ther Ex Vitals assessment 5x sts  6mwt  6 mwt  5x sts    nustep Lvl 7 x10 min    Lvl 6 x10 min    TM   SOLO  1 mph w chalk for visual external cue of inc step length    X10 min  SOLO    Chalk visual cue    0.9 mph    6 min  Rest  4 min   STS        Postural strength         T/S ext         T/S rot         Open book         Bridge         Supine figure 4 stretch         LTR         Seated hip abd        Ther Activity                        Gait Training                        Modalities                              Access Code: [email or web address]  URL: [email or web address]  Date: 03/07/2024  Prepared by: Caterina Rust    Exercises  - Standing March with Counter Support  - 1 x daily - 6 x weekly - 3 sets - 10 reps  - Heel Toe Raises with Unilateral Counter Support  - 1 x daily - 6 x weekly - 3 sets - 10 reps  - Sit to Stand with Armchair  - 1 x daily - 6 x weekly - 3 sets - 8 reps  - Sideways Walking  - 1 x daily - 6 x weekly - 3 sets - 10 reps  - Seated Hip Abduction with Resistance  - 1 x daily - 6 x weekly - 3 sets - 10 reps      Access Code: [email or web address]  URL: [email or web address]  Date: 05/10/2024  Prepared by: Presley Hodgson    Exercises  - Supine Lower Trunk Rotation  - 1 x daily - 7 x weekly - 3 sets - 10 reps  - Supine Figure 4 Piriformis Stretch  - 1 x daily - 7 x weekly - 3 sets - 1 reps - 30 seconds hold  - Supine Bridge  - 1 x daily - 7 x weekly - 3 sets - 10 reps  - Seated Shoulder Row with Anchored Resistance  - 1 x daily - 7 x weekly -  3 sets - 10 reps - 3 seconds hold  - Hooklying Clamshell with Resistance  - 1 x daily - 7 x weekly - 3 sets - 10 reps  - Seated Shoulder Horizontal Abduction with Resistance  - 1 x daily - 7 x weekly - 3 sets - 10 reps  - Shoulder External Rotation with Resistance  - 1 x daily - 7 x weekly - 3 sets - 10 reps  - Seated Thoracic Self-Mobilization  - 1 x daily - 7 x weekly - 3 sets - 10 reps  - Sidelying Open Book Thoracic Lumbar Rotation and Extension  - 1 x daily - 7 x weekly - 3 sets - 10 reps

## 2024-07-02 NOTE — TELEPHONE ENCOUNTER
Refill must be reviewed and completed by the office or provider. The refill is unable to be approved or denied by the medication management team.      Patient taking differently - Please review to see if the refill is appropriate.

## 2024-07-10 DIAGNOSIS — G20.A1 PARKINSON'S DISEASE: ICD-10-CM

## 2024-07-12 ENCOUNTER — OFFICE VISIT (OUTPATIENT)
Dept: PHYSICAL THERAPY | Facility: CLINIC | Age: 79
End: 2024-07-12
Payer: MEDICARE

## 2024-07-12 DIAGNOSIS — G20.A1 PARKINSON'S DISEASE WITHOUT DYSKINESIA, UNSPECIFIED WHETHER MANIFESTATIONS FLUCTUATE: Primary | ICD-10-CM

## 2024-07-12 DIAGNOSIS — R26.9 GAIT ABNORMALITY: ICD-10-CM

## 2024-07-12 PROCEDURE — 97112 NEUROMUSCULAR REEDUCATION: CPT

## 2024-07-12 PROCEDURE — 97110 THERAPEUTIC EXERCISES: CPT

## 2024-07-12 NOTE — PROGRESS NOTES
Daily Note     Today's date: 2024  Patient name: Gabriel Gonzalez  : 1945  MRN: [id number]  Referring provider: Lawanda Joshi CRNP  Dx:   Encounter Diagnosis     ICD-10-CM    1. Parkinson's disease without dyskinesia, unspecified whether manifestations fluctuate  G20.A1       2. Gait abnormality  R26.9           Start Time: 1345  Stop Time: 1430  Total time in clinic (min): 45 minutes    Subjective: pts daughter reports she has noticed improvements in her dad lifting his leg in and out of the car      Objective: See treatment diary below      Assessment: Tolerated treatment well. Patient would benefit from continued PT visual cues on TM for external cue for inc step length, verbal cues for inc step length on LLE. Pt not challenged to a high degree with unilateral UE use, no UE us on stairs performed w good form.  Initially pt struggled to clear 12'' aimee w LLE as the second limb to clear the aimee. On second lap pt demonstrated improvement in LLE aimee navigation. Ball bounce for inc power generation and large amplitude motions. Slight dizziness following ball bounce. Bolster utilized to encourage fwd gaze w walking      Plan: Continue per plan of care.      Short Term Goal Expiration Date:(24)  Long Term Goal Expiration Date: (24)  POC Expiration Date: (24)        POC expires Unit limit Auth Expiration date PT/OT/ST + Visit Limit?   24 BOMN N/a BOMN                           Visit/Unit Tracking  AUTH Status:  Date 5/17 5/31 6/7 6/19 6/28     7/1 7/12 4/5 4/12 4/26 5/10    N/a Used 4/10 5/10 1/10 2/10 3/10 1/10 2/10 4/10 1/10 2/0 3/10    Remaining                     Precautions parkinson's, Lewy body dementia       Manuals                                    Neuro Re-Ed   4 square step test  10mwt  3m bwd walk test  Fga  edu on inc amplitude movements and looking into the BIG program       steps    SOLO    Large  steps    X1 lap unilateral UE  use    X3 laps no UE use    Compliant surface   SOLO  2 airex  1 6'' step    X4 laps fwd  SOLO    Up and over 5 airex    X2 laps fwd  X2 laps lateral   Side step SOLO    W 6# MB bounce for inc power generation    X1 laps    Basketball bounce  X1 lap    3# AW   SOLO    Basketball bounce  X2 laps    Bwd walk        HKM   SOLO    W TT    X3 laps  SOLO    W bolster    X3 laps    hurdles Solo  3# AW    6'' (6)    X3 laps fwd  X2 lateral  SOLO    6'' (6)    X3 laps fwd  X2 lateral SOLO    3 6''    3 12''    X2 laps lateral SOLO    6'' (6)    X3 laps fwd  X2 lateral   Shuffle steps        rotation   Turn to side w 6# MB slam    X10 to visual target     Dual task        Multi directional stepping SOLO    Hex ladder in hexagon shape    X3 clockwise steps  X3 counterclockwise steps    3# AW       Ther Ex Vitals assessment 5x sts  6mwt      nustep Lvl 7 x10 min        TM   SOLO  1 mph w chalk for visual external cue of inc step length    X10 min SOLO  1 mph w chalk for visual external cue of inc step length    X10 min SOLO    Chalk visual cue    0.9 mph    6 min  Rest  4 min   STS        Postural strength         T/S ext         T/S rot         Open book         Bridge         Supine figure 4 stretch         LTR         Seated hip abd        Ther Activity                        Gait Training                        Modalities                              Access Code: [email or web address]  URL: [email or web address]  Date: 03/07/2024  Prepared by: aCterina Rust    Exercises  - Standing March with Counter Support  - 1 x daily - 6 x weekly - 3 sets - 10 reps  - Heel Toe Raises with Unilateral Counter Support  - 1 x daily - 6 x weekly - 3 sets - 10 reps  - Sit to Stand with Armchair  - 1 x daily - 6 x weekly - 3 sets - 8 reps  - Sideways Walking  - 1 x daily - 6 x weekly - 3 sets - 10 reps  - Seated Hip Abduction with Resistance  - 1 x daily - 6 x weekly - 3 sets - 10 reps      Access Code: [email or web address]  URL:  [email or web address]  Date: 05/10/2024  Prepared by: Presley Hodgson    Exercises  - Supine Lower Trunk Rotation  - 1 x daily - 7 x weekly - 3 sets - 10 reps  - Supine Figure 4 Piriformis Stretch  - 1 x daily - 7 x weekly - 3 sets - 1 reps - 30 seconds hold  - Supine Bridge  - 1 x daily - 7 x weekly - 3 sets - 10 reps  - Seated Shoulder Row with Anchored Resistance  - 1 x daily - 7 x weekly - 3 sets - 10 reps - 3 seconds hold  - Hooklying Clamshell with Resistance  - 1 x daily - 7 x weekly - 3 sets - 10 reps  - Seated Shoulder Horizontal Abduction with Resistance  - 1 x daily - 7 x weekly - 3 sets - 10 reps  - Shoulder External Rotation with Resistance  - 1 x daily - 7 x weekly - 3 sets - 10 reps  - Seated Thoracic Self-Mobilization  - 1 x daily - 7 x weekly - 3 sets - 10 reps  - Sidelying Open Book Thoracic Lumbar Rotation and Extension  - 1 x daily - 7 x weekly - 3 sets - 10 reps

## 2024-07-15 ENCOUNTER — OFFICE VISIT (OUTPATIENT)
Dept: PSYCHIATRY | Facility: CLINIC | Age: 79
End: 2024-07-15
Payer: MEDICARE

## 2024-07-15 DIAGNOSIS — F22 DELUSIONS (HCC): Primary | ICD-10-CM

## 2024-07-15 DIAGNOSIS — R44.1 VISUAL HALLUCINATIONS: ICD-10-CM

## 2024-07-15 DIAGNOSIS — F06.8 PSYCHOSIS DUE TO PARKINSON'S DISEASE: ICD-10-CM

## 2024-07-15 DIAGNOSIS — G20.A1 PSYCHOSIS DUE TO PARKINSON'S DISEASE: ICD-10-CM

## 2024-07-15 PROCEDURE — 99214 OFFICE O/P EST MOD 30 MIN: CPT | Performed by: PHYSICIAN ASSISTANT

## 2024-07-15 PROCEDURE — G2211 COMPLEX E/M VISIT ADD ON: HCPCS | Performed by: PHYSICIAN ASSISTANT

## 2024-07-16 RX ORDER — MIDODRINE HYDROCHLORIDE 2.5 MG/1
TABLET ORAL
Qty: 60 TABLET | Refills: 3 | Status: SHIPPED | OUTPATIENT
Start: 2024-07-16

## 2024-07-16 NOTE — PSYCH
This note was not shared with the patient due to reasonable likelihood of causing patient harm    PROGRESS NOTE        [facility]      Name and Date of Birth:  Gabriel Gonzalez 79 y.o. 1945 MRN: [id number]    Insurance: Payor: MEDICARE / Plan: MEDICARE A AND B / Product Type: Medicare A & B Fee for Service /     Date of Visit: July 16, 2024    Reason for Visit:   Chief Complaint   Patient presents with    Follow-up    Medication Management     SUBJECTIVE:    Gabriel Gonzalez is a nuha 79 y.o. male with a history of dementia who presents today for follow-up and medication management. His wife, Dariana, also presents with him and helps with the history. They did officially move to the new place and the hallucinations reportedly have abated some. He feels less anxious and depressed which his wife also endorses. The lower dose of olanzapine did not help with the fatigue.      He denies any suicidal ideation, intent or plan at present; denies any homicidal ideation, intent or plan at present.    He denies any auditory hallucinations, denies any visual hallucinations, denies any delusions.    He still reports dizziness and sedation.    HPI ROS Appetite Changes and Sleep:     He reports adequate number of sleep hours, adequate appetite, low energy    Current Rating Scores:     None completed today.    Review Of Systems:    Mood euthymic   Behavior appropriate and cooperative   Thought Content normal   General normal    Personality no change in personality   Other Psych Symptoms decreased memory and decreased concentration   Constitutional as noted in HPI   ENT as noted in HPI   Cardiovascular as noted in HPI   Respiratory as noted in HPI   Gastrointestinal as noted in HPI   Genitourinary as noted in HPI   Musculoskeletal as noted in HPI   Integumentary as noted in HPI   Neurological as noted in HPI   Endocrine negative   Other Symptoms none, all other systems are  negative     Family Psychiatric History:     Family History   Problem Relation Age of Onset    Heart disease Mother     Heart disease Father        Social/Substance Abuse History:    Social History     Socioeconomic History    Marital status: /Civil Union     Spouse name: Not on file    Number of children: 3    Years of education: Not on file    Highest education level: Not on file   Occupational History    Occupation:    still active    Occupation: retired    Tobacco Use    Smoking status: Never    Smokeless tobacco: Never   Vaping Use    Vaping status: Never Used   Substance and Sexual Activity    Alcohol use: Not Currently     Comment: social    Drug use: No    Sexual activity: Not Currently   Other Topics Concern    Not on file   Social History Narrative    Most recent tobacco use screenin-    Do you currently or have you served in the  [employer]: Yes    If Yes, What branch of service: [employer]    [employer]    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, into active duty, 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    Advance directive: Yes    Alcohol intake: Moderate    Caffeine intake: Moderate    Illicit drugs: none    Occupation: retired    Exercise level: Occasional    Single or multi-level home/work: single level home    Live alone or with others: with others    Chewing tobacco: none    Marital status:     Number of children: 3    Diet: Regular    Sexual orientation: Heterosexual    Smoke alarm in home: Yes    General stress level: Medium    Sunscreen used routinely: No    Education: Post Graduate    Guns present in home: No    Presence of domestic violence: No     Social Determinants of Health     Financial Resource Strain: Low Risk  (2023)    Overall Financial Resource Strain (CARDIA)     Difficulty of Paying Living Expenses: Not hard at all   Food Insecurity: No Food Insecurity (2024)    Hunger Vital Sign     Worried About Running Out of Food in the Last Year: Never  true     Ran Out of Food in the Last Year: Never true   Transportation Needs: No Transportation Needs (6/25/2024)    PRAPARE - Transportation     Lack of Transportation (Medical): No     Lack of Transportation (Non-Medical): No   Physical Activity: Not on file   Stress: Not on file   Social Connections: Not on file   Intimate Partner Violence: Not on file   Housing Stability: Low Risk  (6/25/2024)    Housing Stability Vital Sign     Unable to Pay for Housing in the Last Year: No     Number of Times Moved in the Last Year: 1     Homeless in the Last Year: No       The following portions of the patient's history were reviewed and updated as appropriate: past family history, past medical history, past social history, past surgical history and problem list.    OBJECTIVE:     Mental Status Evaluation:  Appearance:  dressed appropriately, adequate grooming, looks older than stated age   Behavior:  pleasant, cooperative, interacts appropriately with this writer   Speech:  normal rate, normal volume, normal pitch   Mood:  improved   Affect:  constricted, slightly brighter   Thought Process:  organized, logical, coherent   Associations: intact associations   Thought Content:  no overt delusions, no paranoia noted on exam   Perceptual Disturbances: no auditory hallucinations, no visual hallucinations   Risk Potential: Suicidal ideation - None  Homicidal ideation - None  Potential for aggression - No   Sensorium:  oriented to person, place, and time/date   Memory:  short term memory moderately impaired, long term memory mildly impaired   Consciousness:  alert and awake   Attention/Concentration: attention span and concentration are age appropriate   Insight:  limited   Judgment: limited   Gait/Station: unstable gait   Motor Activity: abnormal movement noted: mild tremor present     Laboratory Results: I have personally reviewed all pertinent laboratory/tests results    Suicide/Homicide Risk Assessment:    Risk of Harm to  Self:  The following ratings are based on assessment at the time of the interview  Based on today's assessment, Gabriel presents the following risk of harm to self: none    Risk of Harm to Others:  The following ratings are based on assessment at the time of the interview  Based on today's assessment, Gabriel presents the following risk of harm to others: none    The following interventions are recommended: no intervention changes needed    Assessment/Plan:      He is down to olanzapine 1.25 mg qhs with no difference in his fatigue or dizziness. I have advised stopping this entirely as he is reporting less hallucinations and does not feel that they are as upsetting as previously. He will continue working with neurology on the other medications. If the hallucinations return or become upsetting again we will re-evaluate at that time. Options would include Abilify, Rexulti, and Clozaril. He is not appropriate for therapy at this time. We have discussed his safety plan and he agrees that if he experience unsafe thoughts that he will reach out to his supports including this office, the suicide hotline, and emergency services if necessary. Gabriel is aware of non-emergent and emergent mental health resources. They are able to contract for their own safety at this time.    Will follow up in 3-4 weeks. Patient is aware to call the office if questions or concerns arise sooner.      Diagnoses and all orders for this visit:    Delusions (HCC)    Visual hallucinations    Psychosis due to Parkinson's disease        Treatment Recommendations/Precautions:    Stop medications:     - olanzapine 1.25 mg qhs     *rivastigmine managed by neuro    Medication management every 4 weeks  Aware of 24 hour and weekend coverage for urgent situations accessed by calling [facility] main practice number  I am scheduling this patient out for greater than 3 months: No    Medications Risks/Benefits      Risks, Benefits And  Possible Side Effects Of Medications:    Risks, benefits, and possible side effects of medications explained to Gabriel and he verbalizes understanding and agreement for treatment.    Controlled Medication Discussion:     Not applicable    Psychotherapy Provided:     Individual psychotherapy provided: No    Treatment Plan:    Completed and signed during the session: Not applicable - Treatment Plan not due at this session    Visit Time    Visit Start Time:  2:30 PM  Visit End Time:  3:00 PM  Total Visit Duration:  30 minutes    Elzbieta Carreno 07/16/24

## 2024-07-16 NOTE — TELEPHONE ENCOUNTER
Medication: Midodrine    Dose/Frequency: 2.5 mg tablets, take 1 tablet twice daily    Quantity: 60 tablet    Pharmacy: [facility] in [place]    Office:   [] PCP/Provider -   [x] Speciality/Provider - Neurology, Lawanda Joshi    Does the patient have enough for 3 days?   [x] Yes   [] No - Send as HP to POD       Lawanda: Medication pended, please sign if agreeable

## 2024-07-19 ENCOUNTER — OFFICE VISIT (OUTPATIENT)
Dept: PHYSICAL THERAPY | Facility: CLINIC | Age: 79
End: 2024-07-19
Payer: MEDICARE

## 2024-07-19 DIAGNOSIS — R26.9 GAIT ABNORMALITY: ICD-10-CM

## 2024-07-19 DIAGNOSIS — G20.A1 PARKINSON'S DISEASE WITHOUT DYSKINESIA, UNSPECIFIED WHETHER MANIFESTATIONS FLUCTUATE: Primary | ICD-10-CM

## 2024-07-19 PROCEDURE — 97110 THERAPEUTIC EXERCISES: CPT

## 2024-07-19 PROCEDURE — 97112 NEUROMUSCULAR REEDUCATION: CPT

## 2024-07-19 NOTE — PROGRESS NOTES
Daily Note     Today's date: 2024  Patient name: Gabriel oGnzalez  : 1945  MRN: [id number]  Referring provider: Lawanda Joshi CRNP  Dx:   Encounter Diagnosis     ICD-10-CM    1. Parkinson's disease without dyskinesia, unspecified whether manifestations fluctuate  G20.A1       2. Gait abnormality  R26.9           Start Time: 1300  Stop Time: 1345  Total time in clinic (min): 45 minutes    Subjective: pt reports he has been busy with company in town but is doing well today.      Objective: See treatment diary below      Assessment: Tolerated treatment well. Patient would benefit from continued PT verbal cues for full fot on airex pad and inc step length to avoid LOB and navigation of following limb with side steps on foam. Inc aimee height to 8'' for all hurdles. Pt navigates inc height well w no episodes of festination and no circumduction of any hurdles. Most errors observed w lateral hurdles to the right as compared to the right e LLE stepping on the hurdles. Very minimal dizziness reported today      Plan: Continue per plan of care.      Short Term Goal Expiration Date:(24)  Long Term Goal Expiration Date: (24)  POC Expiration Date: (24)        POC expires Unit limit Auth Expiration date PT/OT/ST + Visit Limit?   24 BOMN N/a BOMN                           Visit/Unit Tracking  AUTH Status:  Date 5/17 5/31 6/7 6/19 6/28     7/1 7/12 7/19 4/12 4/26 5/10    N/a Used 4/10 5/10 1/10 2/10 3/10 1/10 2/10 3/10 1/10 2/0 3/10    Remaining                     Precautions parkinson's, Lewy body dementia       Manuals                                    Neuro Re-Ed   4 square step test  10mwt  3m bwd walk test  Fga  edu on inc amplitude movements and looking into the BIG program       steps    SOLO    Large  steps    X1 lap unilateral UE use    X3 laps no UE use SOLO    2 6''  1 8'' steps    X3 laps   Compliant surface   SOLO  2 airex  1 6'' step    X4 laps fwd   SOLO    Up and over 3 airex x3 laps   Side step SOLO    W 6# MB bounce for inc power generation    X1 laps    Basketball bounce  X1 lap    3# AW   SOLO    Basketball bounce  X2 laps    Bwd walk        HKM   SOLO    W TT    X3 laps  SOLO    W bolster    X3 laps    hurdles Solo  3# AW    6'' (6)    X3 laps fwd  X2 lateral  SOLO    6'' (6)    X3 laps fwd  X2 lateral SOLO    3 6''    3 12''    X2 laps lateral SOLO    8'' x2 laps fwd   X2 lateral   Shuffle steps        rotation   Turn to side w 6# MB slam    X10 to visual target     Dual task        Multi directional stepping SOLO    Hex ladder in hexagon shape    X3 clockwise steps  X3 counterclockwise steps    3# AW       Ther Ex Vitals assessment 5x sts  6mwt      nustep Lvl 7 x10 min        TM   SOLO  1 mph w chalk for visual external cue of inc step length    X10 min SOLO  1 mph w chalk for visual external cue of inc step length    X10 min SOLO  1 mph w chalk for visual external cue of inc step length    X10 min   STS        Postural strength         T/S ext         T/S rot         Open book         Bridge         Supine figure 4 stretch         LTR         Seated hip abd        Ther Activity                        Gait Training                        Modalities                              Access Code: [email or web address]  URL: [email or web address]  Date: 03/07/2024  Prepared by: Caterina Rust    Exercises  - Standing March with Counter Support  - 1 x daily - 6 x weekly - 3 sets - 10 reps  - Heel Toe Raises with Unilateral Counter Support  - 1 x daily - 6 x weekly - 3 sets - 10 reps  - Sit to Stand with Armchair  - 1 x daily - 6 x weekly - 3 sets - 8 reps  - Sideways Walking  - 1 x daily - 6 x weekly - 3 sets - 10 reps  - Seated Hip Abduction with Resistance  - 1 x daily - 6 x weekly - 3 sets - 10 reps      Access Code: [email or web address]  URL: [email or web address]  Date: 05/10/2024  Prepared by: Presley Hodgson    Exercises  - Supine Lower Trunk Rotation   - 1 x daily - 7 x weekly - 3 sets - 10 reps  - Supine Figure 4 Piriformis Stretch  - 1 x daily - 7 x weekly - 3 sets - 1 reps - 30 seconds hold  - Supine Bridge  - 1 x daily - 7 x weekly - 3 sets - 10 reps  - Seated Shoulder Row with Anchored Resistance  - 1 x daily - 7 x weekly - 3 sets - 10 reps - 3 seconds hold  - Hooklying Clamshell with Resistance  - 1 x daily - 7 x weekly - 3 sets - 10 reps  - Seated Shoulder Horizontal Abduction with Resistance  - 1 x daily - 7 x weekly - 3 sets - 10 reps  - Shoulder External Rotation with Resistance  - 1 x daily - 7 x weekly - 3 sets - 10 reps  - Seated Thoracic Self-Mobilization  - 1 x daily - 7 x weekly - 3 sets - 10 reps  - Sidelying Open Book Thoracic Lumbar Rotation and Extension  - 1 x daily - 7 x weekly - 3 sets - 10 reps

## 2024-07-23 ENCOUNTER — TELEPHONE (OUTPATIENT)
Dept: FAMILY MEDICINE CLINIC | Facility: CLINIC | Age: 79
End: 2024-07-23

## 2024-07-23 ENCOUNTER — HOSPITAL ENCOUNTER (OUTPATIENT)
Dept: PULMONOLOGY | Facility: HOSPITAL | Age: 79
Discharge: HOME | End: 2024-07-23
Payer: MEDICARE

## 2024-07-23 DIAGNOSIS — R06.02 SOB (SHORTNESS OF BREATH): ICD-10-CM

## 2024-07-23 PROCEDURE — 94726 PLETHYSMOGRAPHY LUNG VOLUMES: CPT

## 2024-07-23 PROCEDURE — 94729 DIFFUSING CAPACITY: CPT | Performed by: INTERNAL MEDICINE

## 2024-07-23 PROCEDURE — 94060 EVALUATION OF WHEEZING: CPT | Performed by: INTERNAL MEDICINE

## 2024-07-23 PROCEDURE — 94726 PLETHYSMOGRAPHY LUNG VOLUMES: CPT | Performed by: INTERNAL MEDICINE

## 2024-07-23 PROCEDURE — 94760 N-INVAS EAR/PLS OXIMETRY 1: CPT

## 2024-07-23 PROCEDURE — 94729 DIFFUSING CAPACITY: CPT

## 2024-07-23 PROCEDURE — 94060 EVALUATION OF WHEEZING: CPT

## 2024-07-23 RX ORDER — ALBUTEROL SULFATE 2.5 MG/3ML
2.5 SOLUTION RESPIRATORY (INHALATION) ONCE
Status: COMPLETED | OUTPATIENT
Start: 2024-07-23 | End: 2024-07-23

## 2024-07-23 RX ADMIN — ALBUTEROL SULFATE 2.5 MG: 2.5 SOLUTION RESPIRATORY (INHALATION) at 14:59

## 2024-07-23 NOTE — TELEPHONE ENCOUNTER
----- Message from Julian Rudolph MD sent at 7/22/2024  7:57 PM EDT -----  Regarding: finally got the answers back  Call them let them know that the stimulant idea was not safe   It might worsen the hallucinations.    JN

## 2024-07-26 ENCOUNTER — EVALUATION (OUTPATIENT)
Dept: PHYSICAL THERAPY | Facility: CLINIC | Age: 79
End: 2024-07-26
Payer: MEDICARE

## 2024-07-26 DIAGNOSIS — R26.9 GAIT ABNORMALITY: ICD-10-CM

## 2024-07-26 DIAGNOSIS — G20.A1 PARKINSON'S DISEASE WITHOUT DYSKINESIA, UNSPECIFIED WHETHER MANIFESTATIONS FLUCTUATE: Primary | ICD-10-CM

## 2024-07-26 PROCEDURE — 97112 NEUROMUSCULAR REEDUCATION: CPT

## 2024-07-26 PROCEDURE — 97110 THERAPEUTIC EXERCISES: CPT

## 2024-07-26 NOTE — PROGRESS NOTES
Daily Note     Today's date: 2024  Patient name: Gabriel Gonzalez  : 1945  MRN: [id number]  Referring provider: Lawanda Joshi CRNP  Dx:   Encounter Diagnosis     ICD-10-CM    1. Parkinson's disease without dyskinesia, unspecified whether manifestations fluctuate  G20.A1       2. Gait abnormality  R26.9           Start Time: 1430  Stop Time: 1515  Total time in clinic (min): 45 minutes    Subjective: pt reports he has been having some pulmonary problems this week and he feels a bit queezy when he is standing or walking for too long. Pt had pulmonary testing done on Tuesday and is waiting for results. Re-evaluation deferred to next week due to status. Pt reports he is not sure if he took midodrine today.       Objective: See treatment diary below      Assessment: Tolerated treatment well. Patient would benefit from continued PT   deferred re-evaluation today due to pt having inc pulmonary concerns this week with testing performed. Re-evaluation to be performed NV. TM deferred today w substitution for nustep for cardiovascular endurance and LE strengthening. Rest break required on nustep due to fatigue, pt able to complete 10 min w rest. Vitals remain stable. Inc time required for vitals monitoring today       Short Term Goal Expiration Date:(24)  Long Term Goal Expiration Date: (24)  POC Expiration Date: (24)        POC expires Unit limit Auth Expiration date PT/OT/ST + Visit Limit?   24 BOMN N/a BOMN                           Visit/Unit Tracking  AUTH Status:  Date 5/17 5/31 6/7 6/19 6/28     7/1 7/12 7/19 7/26 4/26 5/10    N/a Used 4/10 5/10 1/10 2/10 3/10 1/10 2/10 3/10 4/10 2/0 3/10    Remaining                     Precautions parkinson's, Lewy body dementia       Manuals                                    Neuro Re-Ed  114/72 4 square step test  10mwt  3m bwd walk test  Fga  edu on inc amplitude movements and looking into the BIG program       steps     SOLO    Large  steps    X1 lap unilateral UE use    X3 laps no UE use SOLO    2 6''  1 8'' steps    X3 laps   Compliant surface In // bars    Up and over airex (4)    2 laps fwd  2 laps lateral  SOLO  2 airex  1 6'' step    X4 laps fwd  SOLO    Up and over 3 airex x3 laps   Side step    SOLO    Basketball bounce  X2 laps    Bwd walk        HKM   SOLO    W TT    X3 laps  SOLO    W bolster    X3 laps    hurdles In // bars    8''    X3 laps spaced far for inc step length encouragement     X1 lap lateral  SOLO    6'' (6)    X3 laps fwd  X2 lateral SOLO    3 6''    3 12''    X2 laps lateral SOLO    8'' x2 laps fwd   X2 lateral   Shuffle steps        rotation   Turn to side w 6# MB slam    X10 to visual target     Dual task        Multi directional stepping        Ther Ex Vitals assessment 5x sts  6mwt      nustep Lvl 5   All ext    Rest break at 9 min due to fatigue  Spo2 96%  HR 73 bpm    Completed 10 min after rest       TM   SOLO  1 mph w chalk for visual external cue of inc step length    X10 min SOLO  1 mph w chalk for visual external cue of inc step length    X10 min SOLO  1 mph w chalk for visual external cue of inc step length    X10 min   STS        Postural strength         T/S ext         T/S rot         Open book         Bridge         Supine figure 4 stretch         LTR         Seated hip abd        Ther Activity                        Gait Training                        Modalities                              Access Code: [email or web address]  URL: [email or web address]  Date: 03/07/2024  Prepared by: Caterina Rust    Exercises  - Standing March with Counter Support  - 1 x daily - 6 x weekly - 3 sets - 10 reps  - Heel Toe Raises with Unilateral Counter Support  - 1 x daily - 6 x weekly - 3 sets - 10 reps  - Sit to Stand with Armchair  - 1 x daily - 6 x weekly - 3 sets - 8 reps  - Sideways Walking  - 1 x daily - 6 x weekly - 3 sets - 10 reps  - Seated Hip Abduction with Resistance  - 1 x daily - 6 x  weekly - 3 sets - 10 reps      Access Code: [email or web address]  URL: [email or web address]  Date: 05/10/2024  Prepared by: Presley Hodgosn    Exercises  - Supine Lower Trunk Rotation  - 1 x daily - 7 x weekly - 3 sets - 10 reps  - Supine Figure 4 Piriformis Stretch  - 1 x daily - 7 x weekly - 3 sets - 1 reps - 30 seconds hold  - Supine Bridge  - 1 x daily - 7 x weekly - 3 sets - 10 reps  - Seated Shoulder Row with Anchored Resistance  - 1 x daily - 7 x weekly - 3 sets - 10 reps - 3 seconds hold  - Hooklying Clamshell with Resistance  - 1 x daily - 7 x weekly - 3 sets - 10 reps  - Seated Shoulder Horizontal Abduction with Resistance  - 1 x daily - 7 x weekly - 3 sets - 10 reps  - Shoulder External Rotation with Resistance  - 1 x daily - 7 x weekly - 3 sets - 10 reps  - Seated Thoracic Self-Mobilization  - 1 x daily - 7 x weekly - 3 sets - 10 reps  - Sidelying Open Book Thoracic Lumbar Rotation and Extension  - 1 x daily - 7 x weekly - 3 sets - 10 reps

## 2024-07-29 ENCOUNTER — TELEPHONE (OUTPATIENT)
Dept: FAMILY MEDICINE CLINIC | Facility: CLINIC | Age: 79
End: 2024-07-29

## 2024-07-29 DIAGNOSIS — R06.02 SOB (SHORTNESS OF BREATH): Primary | ICD-10-CM

## 2024-07-29 DIAGNOSIS — R94.2 ABNORMAL PFT: ICD-10-CM

## 2024-07-29 NOTE — TELEPHONE ENCOUNTER
----- Message from Julian Rudolph MD sent at 7/29/2024  8:36 AM EDT -----  This does show that there is some external breathing issue not a internal lung related issue  I am going to put in for pulmonology lets see what they have to say

## 2024-08-02 ENCOUNTER — APPOINTMENT (OUTPATIENT)
Dept: LAB | Facility: HOSPITAL | Age: 79
End: 2024-08-02
Payer: MEDICARE

## 2024-08-02 ENCOUNTER — EVALUATION (OUTPATIENT)
Dept: PHYSICAL THERAPY | Facility: CLINIC | Age: 79
End: 2024-08-02
Payer: MEDICARE

## 2024-08-02 DIAGNOSIS — G20.A1 PARKINSON'S DISEASE WITHOUT DYSKINESIA OR FLUCTUATING MANIFESTATIONS: ICD-10-CM

## 2024-08-02 DIAGNOSIS — G20.A1 PARKINSON'S DISEASE WITHOUT DYSKINESIA, UNSPECIFIED WHETHER MANIFESTATIONS FLUCTUATE: Primary | ICD-10-CM

## 2024-08-02 DIAGNOSIS — R26.9 GAIT ABNORMALITY: ICD-10-CM

## 2024-08-02 DIAGNOSIS — R41.0 DISORIENTATION: ICD-10-CM

## 2024-08-02 DIAGNOSIS — E53.9 VITAMIN B DEFICIENCY: ICD-10-CM

## 2024-08-02 LAB
25(OH)D3 SERPL-MCNC: 34.7 NG/ML (ref 30–100)
ALBUMIN SERPL BCG-MCNC: 4.3 G/DL (ref 3.5–5)
ALP SERPL-CCNC: 66 U/L (ref 34–104)
ALT SERPL W P-5'-P-CCNC: 7 U/L (ref 7–52)
ANION GAP SERPL CALCULATED.3IONS-SCNC: 5 MMOL/L (ref 4–13)
AST SERPL W P-5'-P-CCNC: 14 U/L (ref 13–39)
BASOPHILS # BLD AUTO: 0.04 THOUSANDS/ÂΜL (ref 0–0.1)
BASOPHILS NFR BLD AUTO: 1 % (ref 0–1)
BILIRUB SERPL-MCNC: 0.7 MG/DL (ref 0.2–1)
BILIRUB UR QL STRIP: NEGATIVE
BUN SERPL-MCNC: 18 MG/DL (ref 5–25)
CALCIUM SERPL-MCNC: 9.5 MG/DL (ref 8.4–10.2)
CHLORIDE SERPL-SCNC: 106 MMOL/L (ref 96–108)
CHOLEST SERPL-MCNC: 133 MG/DL
CLARITY UR: CLEAR
CO2 SERPL-SCNC: 30 MMOL/L (ref 21–32)
COLOR UR: YELLOW
CREAT SERPL-MCNC: 1.02 MG/DL (ref 0.6–1.3)
EOSINOPHIL # BLD AUTO: 0.11 THOUSAND/ÂΜL (ref 0–0.61)
EOSINOPHIL NFR BLD AUTO: 2 % (ref 0–6)
ERYTHROCYTE [DISTWIDTH] IN BLOOD BY AUTOMATED COUNT: 13.9 % (ref 11.6–15.1)
GFR SERPL CREATININE-BSD FRML MDRD: 70 ML/MIN/1.73SQ M
GLUCOSE P FAST SERPL-MCNC: 95 MG/DL (ref 65–99)
GLUCOSE UR STRIP-MCNC: NEGATIVE MG/DL
HCT VFR BLD AUTO: 43.3 % (ref 36.5–49.3)
HDLC SERPL-MCNC: 54 MG/DL
HGB BLD-MCNC: 14 G/DL (ref 12–17)
HGB UR QL STRIP.AUTO: NEGATIVE
IMM GRANULOCYTES # BLD AUTO: 0.02 THOUSAND/UL (ref 0–0.2)
IMM GRANULOCYTES NFR BLD AUTO: 0 % (ref 0–2)
KETONES UR STRIP-MCNC: NEGATIVE MG/DL
LDLC SERPL CALC-MCNC: 67 MG/DL (ref 0–100)
LEUKOCYTE ESTERASE UR QL STRIP: NEGATIVE
LYMPHOCYTES # BLD AUTO: 0.95 THOUSANDS/ÂΜL (ref 0.6–4.47)
LYMPHOCYTES NFR BLD AUTO: 16 % (ref 14–44)
MCH RBC QN AUTO: 31.3 PG (ref 26.8–34.3)
MCHC RBC AUTO-ENTMCNC: 32.3 G/DL (ref 31.4–37.4)
MCV RBC AUTO: 97 FL (ref 82–98)
MONOCYTES # BLD AUTO: 0.52 THOUSAND/ÂΜL (ref 0.17–1.22)
MONOCYTES NFR BLD AUTO: 9 % (ref 4–12)
NEUTROPHILS # BLD AUTO: 4.27 THOUSANDS/ÂΜL (ref 1.85–7.62)
NEUTS SEG NFR BLD AUTO: 72 % (ref 43–75)
NITRITE UR QL STRIP: NEGATIVE
NRBC BLD AUTO-RTO: 0 /100 WBCS
PH UR STRIP.AUTO: 6 [PH]
PLATELET # BLD AUTO: 154 THOUSANDS/UL (ref 149–390)
PMV BLD AUTO: 11.7 FL (ref 8.9–12.7)
POTASSIUM SERPL-SCNC: 3.9 MMOL/L (ref 3.5–5.3)
PROT SERPL-MCNC: 6.7 G/DL (ref 6.4–8.4)
PROT UR STRIP-MCNC: NEGATIVE MG/DL
RBC # BLD AUTO: 4.47 MILLION/UL (ref 3.88–5.62)
SODIUM SERPL-SCNC: 141 MMOL/L (ref 135–147)
SP GR UR STRIP.AUTO: 1.02 (ref 1–1.03)
TRIGL SERPL-MCNC: 58 MG/DL
UROBILINOGEN UR STRIP-ACNC: <2 MG/DL
VIT B12 SERPL-MCNC: 1131 PG/ML (ref 180–914)
WBC # BLD AUTO: 5.91 THOUSAND/UL (ref 4.31–10.16)

## 2024-08-02 PROCEDURE — 97110 THERAPEUTIC EXERCISES: CPT

## 2024-08-02 PROCEDURE — 81003 URINALYSIS AUTO W/O SCOPE: CPT

## 2024-08-02 PROCEDURE — 84425 ASSAY OF VITAMIN B-1: CPT

## 2024-08-02 PROCEDURE — 87086 URINE CULTURE/COLONY COUNT: CPT

## 2024-08-02 PROCEDURE — 97112 NEUROMUSCULAR REEDUCATION: CPT

## 2024-08-02 NOTE — PROGRESS NOTES
Daily Note     Today's date: 2024  Patient name: Gabriel Gonzalez  : 1945  MRN: [id number]  Referring provider: Lawanda Joshi CRNP  Dx:   Encounter Diagnosis     ICD-10-CM    1. Parkinson's disease without dyskinesia, unspecified whether manifestations fluctuate  G20.A1       2. Gait abnormality  R26.9           Start Time: 1445  Stop Time: 1530  Total time in clinic (min): 45 minutes    Subjective: pt reports he is feeling better this week as compared to last week and is continuing to follow up w pulmonary      Objective: See treatment diary below      Assessment: Tolerated treatment well.  Pt has met 2/3 STG and 0/3 LTG at this time Pt has maintained their 6mwt distance from 1250 feet to 1252 ft indicating improved aerobic capacity and activity tolerance.pts FGA score has improved from  to  reducing pts fall risk however still placing them at risk for falls as scores < 23 are considered at risk for falls. Pts 5x STS tests has maintained from 12.63 s to 13.84 s indicating sustained LE strength and power however is still less than age matched norms of 12.6. Pts ABC score has improved from 71.88 to 82.5 on todays re eval indication pt is no longer at risk for falls as scores < 67% are considered at risk for falls. Pts 3m bwd walking time has improved from 5.22 to 4.63 s  indicating pt is at slight inc risk for falls as scores > 4.5 s are at risk for falls. Additionally pts 4 square step test time has improved from 14.04 to 13.91 indicating pt is at inc risk for falls as times >10.4s are considered pts identified as fallers and scores > 15 s indicate pt is as risk for multiple falls.   Pt would continue to benefit from supervised group exercise class. Information provided on [address] Wednesday group class as well as importance of remaining active outside of therapy so a decline in function does not occur          Assessment: Tolerated treatment well. Patient would benefit from continued PT Pt  has met 2/3 STG and is progressing towards LTG at this time Pt has improved their 6mwt distance from 1208 feet to 1250 ft indicating improved aerobic capacity and activity tolerance. pts FGA score has sustained from 22/30 to 22/30 reducing pts fall risk however still placing them at risk for falls as scores < 23 are considered at risk for falls. Pts 5x STS tests has improved from 14.5 s to 12.6 s indicating improved LE strength and power and  is exactly at age matched norms of 12.6.  Pts 3m bwd walking time has sustained from 5.09 to 5.22 s  indicating pt is at inc risk for falls as scores > 4.5 s are at risk for falls. Additionally pts 4 square step test time has improved from 18.04 to 14.04 indicating pt is at inc risk for falls as times >10.4s are considered pts identified as fallers and scores > 15 s indicate pt is as risk for multiple falls.   Pt would continue to benefit from skilled physical therapy to improve overall QOL inclusive of, strength, coordination, balance, endurance and functional mobility in the home and in the community as well as reduce their fall risk for improved safety.         Goals      New goals  STG (4 weeks)   Improve 4 square step test score of 18.04 by to 14 seconds indicating meaningful improvement in fall risk  MET  Improve 5x STS score of 14.5 by 2.3 seconds indicating meaningful improvement in fall risk and power generation nearly MET (missed by 0.4)  Increase 6MWT of 1208 to 1290 indicating meaningful change in aerobic endurance  Progressed     LTG (8 weeks)   Improve FGA score to >23  harness indicating meaningful improvement in fall risk Progressed  Pt will improve 3m bws walk time of 5.09 to 4 for an indication of reduction in fall risk Progressed  Improve 4 square step test score of 18.04 by to 10.4 seconds indicating meaningful improvement in fall risk  Progressed          Assessment: Tolerated treatment well. Patient would benefit from continued PT Pt has attended 8  physical therapy sessions including IE and is demonstrating progress towards goals.  Pt has met 3/3 STG and 4/4 LTG at this time Pt has improved their 6mwt distance from 1000 feet to 1100 ft indicating improved aerobic capacity and activity tolerance. Pts gait speed has improved from 1 m/s to 1.1 m/s indicating pt is a community Ambulator however is still not considered a safe speed to cross the street indep (1.2 m/s). pts FGA score has remained consistent from 22/30 to 22/30 reducing pts fall risk however still placing them at risk for falls as scores < 23 are considered at risk for falls. Pts 5x STS tests has declined from 14.38 s to 16.08 s pt does however report his knees are bothering him more today than typical. Pts ABC score has improved from 71.25 to 78.75 on todays re eval indication pt is no longer at risk for falls as scores < 67% are considered at risk for falls. Pts 3m bwd walking time has improved from 6.09 to 5.75 s  indicating pt is at inc risk for falls as scores > 4.5 s are at risk for falls. Additionally pts 4 square step test time of 27.06s  indicates pt is at inc risk for falls as times >10.4s are considered pts identified as fallers and scores > 15 s indicate pt is as risk for multiple falls.    Pt would continue to benefit from skilled physical therapy to improve overall QOL inclusive of, strength, coordination, balance, endurance and functional mobility in the home and in the community as well as reduce their fall risk for improved safety.     Assessment: Tolerated treatment well. Patient would benefit from continued PT Pt has attended 4 physical therapy sessions including IE and is demonstrating progress towards goals.  Pt has met 3/3 STG and is  progressing towards LTG at this time Pt has improved their 6mwt distance from 858 feet to 1000 ft indicating improved aerobic capacity and activity tolerance. Pts gait speed has improved from 0.91 m/s to 1 m/s indicating pt is a community Ambulator  however is still at risk for falls based on her speed being < 1 m/s.  pts FGA score has improved from 18/30 to 22/30 reducing pts fall risk however still placing them at risk for falls as scores < 22 are considered at risk for falls. Pts 5x STS tests has improved from 22.25 s to 14.38 s indicating improved LE strength and power however is still less than age matched norms of 12.6s. Pts 3m bwd walking time has improved from 7.6 to 6.09 s  indicating pt is at inc risk for falls as scores > 4.5 s are at risk for falls.   Pt would continue to benefit from skilled physical therapy to improve overall QOL inclusive of, strength, coordination, balance, endurance and functional mobility in the home and in the community as well as reduce their fall risk for improved safety.      Eval Assessment  Assessment details: Pt is a 78 year old male presenting to [facility] physical therapy for an initial evaluation secondary to dec funcational capacity assosiciated with parkinsons disease. Pt presents to clinic with gait and balance impairments inclusive of right sided trendelenburg gait, dec trunk rotation, dec arm swing w gait, dec endurance and LE strength, and dec functional balance.     Additionally pt demonstrates strength deficits as seen with M 5x STS time of 22.25s demonstrating dec LE strength and power as compared to age matched norms. Pt demonstrates endurance deficits as seen with 6mwt distance of 858 ft as compared to age matched norms of 1729 ft. Pt is at inc risk for falls based on their FGA score of 18/30  with scores < 20/30 at inc risk for spontaneous falls and scores < 22/30 considered at risk for falls.  Pt ambulates at 0.91 m/s making them a community Ambulator.  Additionally this score places the pt at inc risk for falls as scores < 1 m/s are considered at risk for falls.     Pt provided edu on symptoms of parkinson's such as festination, dec rotation, rigidity etc and exercises to address. HEP to be provided on  "first follow up visit.  Pt would benefit from skilled physical therapy to improve  overall QOL inclusive of, strength, coordination, balance, endurance and functional mobility in the home and in the community as well as reduce their fall risk for improved safety.    Impairments: abnormal gait, impaired balance, impaired physical strength, lacks appropriate home exercise program and safety issue    Plan  3 month follow up w continued exercise in group classes    Subjective Evaluation    History of Present Illness  Mechanism of injury: Pt reports that he was diagnosed with parkinson's in 2017 and did not take any medications until 2019 (sinemet). Pt reprots that he previously had a  coming to the house to remain stain active but has not rk able to have those as of recent for several reasons and is looking for continued skilled car out of the house. Since stopping training he and his wife reports a decline in activity and function. Pt reports he would like to work on strength in legs and reports his balance is \"pretty crummy\" . Pt reports that he notices taking small steps and occasional freezing. Pts wife reports they used to walk a mile together but now he is only able to walk about a quarter of a mile due to endurance.            Recurrent probem    Patient Goals  Patient goals for therapy: improved balance and increased strength    Pain  Location: right hip occasional pain    Social Support  Steps to enter house: yes (w railing)    Treatments  Previous treatment: physical therapy        Objective           Balance Test IE 3/7 4/5 5/31 6/28 8/2   6 Minute Walk Test (ft): 858 ft 1000 ft 1100 ft 1208 ft 1250 1252   2 Minute Walk Test (ft):         Gait Speed (m/s): 0.91  1 m/s 1.1 1.25     5x Sit To Stand (s): 22.25 no UE use 14.38s no UE use 16.08 no UE use 14.5 12.63 no UE 13.84   TUG (s) 13.22   11.78     FGA  3m bwd walk test 18/30  7.6s 22/30  6.09s 22/30  5.75 22/30  5.09 22/30  5.22 23  4.63    4 square " step test 27.06 (4/5) 18.04 (5/31) 14.04 (6/28); 13.91 (8/2)     Short Term Goal Expiration Date:(6/28/24)   Long Term Goal Expiration Date: (7/26/24)   POC Expiration Date: (7/26/24)         POC expires Unit limit Auth Expiration date PT/OT/ST + Visit Limit?   7/26/24   BOMN N/a BOMN                           Visit/Unit Tracking  AUTH Status:  Date 5/17 5/31 6/7 6/19 6/28     7/1 7/12 7/19 7/26 8/2 5/10    N/a Used 4/10 5/10 1/10 2/10 3/10 1/10 2/10 3/10 4/10 5/10 3/10    Remaining                     Precautions parkinson's, Lewy body dementia       Manuals 7/26 8/2 7/1 7/12 7/19                                   Neuro Re-Ed  114/72 Fga  10mwt  Abc  4 square step test  3 m bwd walk      steps    SOLO    Large  steps    X1 lap unilateral UE use    X3 laps no UE use SOLO    2 6''  1 8'' steps    X3 laps   Compliant surface In // bars    Up and over airex (4)    2 laps fwd  2 laps lateral  SOLO  2 airex  1 6'' step    X4 laps fwd  SOLO    Up and over 3 airex x3 laps   Side step  SOLO    In // bars  X5 laps  SOLO    Basketball bounce  X2 laps    Bwd walk        HKM   SOLO    W TT    X3 laps  SOLO    W bolster    X3 laps    hurdles In // bars    8''    X3 laps spaced far for inc step length encouragement     X1 lap lateral  SOLO    6'' (6)    X3 laps fwd  X2 lateral SOLO    3 6''    3 12''    X2 laps lateral SOLO    8'' x2 laps fwd   X2 lateral   Shuffle steps        rotation   Turn to side w 6# MB slam    X10 to visual target     Dual task        Multi directional stepping        Ther Ex Vitals assessment 6mwt  5xsts    Edu as described      nustep Lvl 5   All ext    Rest break at 9 min due to fatigue  Spo2 96%  HR 73 bpm    Completed 10 min after rest       TM   SOLO  1 mph w chalk for visual external cue of inc step length    X10 min SOLO  1 mph w chalk for visual external cue of inc step length    X10 min SOLO  1 mph w chalk for visual external cue of inc step length    X10 min   STS        Postural strength          T/S ext         T/S rot         Open book         Bridge         Supine figure 4 stretch         LTR         Seated hip abd        Ther Activity                        Gait Training                        Modalities                              Access Code: [email or web address]  URL: [email or web address]  Date: 03/07/2024  Prepared by: Caterina Rust    Exercises  - Standing March with Counter Support  - 1 x daily - 6 x weekly - 3 sets - 10 reps  - Heel Toe Raises with Unilateral Counter Support  - 1 x daily - 6 x weekly - 3 sets - 10 reps  - Sit to Stand with Armchair  - 1 x daily - 6 x weekly - 3 sets - 8 reps  - Sideways Walking  - 1 x daily - 6 x weekly - 3 sets - 10 reps  - Seated Hip Abduction with Resistance  - 1 x daily - 6 x weekly - 3 sets - 10 reps      Access Code: [email or web address]  URL: [email or web address]  Date: 05/10/2024  Prepared by: Presley Hodgson    Exercises  - Supine Lower Trunk Rotation  - 1 x daily - 7 x weekly - 3 sets - 10 reps  - Supine Figure 4 Piriformis Stretch  - 1 x daily - 7 x weekly - 3 sets - 1 reps - 30 seconds hold  - Supine Bridge  - 1 x daily - 7 x weekly - 3 sets - 10 reps  - Seated Shoulder Row with Anchored Resistance  - 1 x daily - 7 x weekly - 3 sets - 10 reps - 3 seconds hold  - Hooklying Clamshell with Resistance  - 1 x daily - 7 x weekly - 3 sets - 10 reps  - Seated Shoulder Horizontal Abduction with Resistance  - 1 x daily - 7 x weekly - 3 sets - 10 reps  - Shoulder External Rotation with Resistance  - 1 x daily - 7 x weekly - 3 sets - 10 reps  - Seated Thoracic Self-Mobilization  - 1 x daily - 7 x weekly - 3 sets - 10 reps  - Sidelying Open Book Thoracic Lumbar Rotation and Extension  - 1 x daily - 7 x weekly - 3 sets - 10 reps

## 2024-08-03 LAB — BACTERIA UR CULT: NORMAL

## 2024-08-06 LAB
VIT B1 BLD-SCNC: 92.3 NMOL/L (ref 66.5–200)
VIT B6 SERPL-MCNC: 8.9 UG/L (ref 3.4–65.2)

## 2024-08-09 ENCOUNTER — APPOINTMENT (OUTPATIENT)
Dept: PHYSICAL THERAPY | Facility: CLINIC | Age: 79
End: 2024-08-09
Payer: MEDICARE

## 2024-08-13 ENCOUNTER — CONSULT (OUTPATIENT)
Dept: PULMONOLOGY | Facility: CLINIC | Age: 79
End: 2024-08-13
Payer: MEDICARE

## 2024-08-13 VITALS
OXYGEN SATURATION: 98 % | HEART RATE: 68 BPM | WEIGHT: 186 LBS | DIASTOLIC BLOOD PRESSURE: 72 MMHG | TEMPERATURE: 96.6 F | BODY MASS INDEX: 25.23 KG/M2 | SYSTOLIC BLOOD PRESSURE: 130 MMHG

## 2024-08-13 DIAGNOSIS — R06.02 SOB (SHORTNESS OF BREATH): ICD-10-CM

## 2024-08-13 DIAGNOSIS — R94.2 ABNORMAL PFT: ICD-10-CM

## 2024-08-13 PROCEDURE — G2211 COMPLEX E/M VISIT ADD ON: HCPCS | Performed by: STUDENT IN AN ORGANIZED HEALTH CARE EDUCATION/TRAINING PROGRAM

## 2024-08-13 PROCEDURE — 99204 OFFICE O/P NEW MOD 45 MIN: CPT | Performed by: STUDENT IN AN ORGANIZED HEALTH CARE EDUCATION/TRAINING PROGRAM

## 2024-08-13 NOTE — PROGRESS NOTES
"Ambulatory Visit  Name: Gabriel Gonzalez      : 1945      MRN: [id number]  Encounter Provider: Josh Barker MD  Encounter Date: 2024   Encounter department: [facility]    Assessment & Plan   1. SOB (shortness of breath)  -     Ambulatory Referral to Pulmonology  -     Echo complete w/ contrast if indicated; Future; Expected date: 2024  -     CT chest without contrast; Future; Expected date: 2024  2. Abnormal PFT  -     Ambulatory Referral to Pulmonology  Plan: 78 y/o M with pmhx of Parkinson's disease reports dyspnea on exertion. Physical exam today unremarkable. After discussion with attending physician, patient's dyspnea most likely due to deconditioning and progression of Parkinson's disease. Patient counseled on appropriate maintenance of physical activity given his current comfort and abilities. Since PFT notable for decreased DLCO, working plan to evaluate cardiac vs. pulmonary etiology of dyspnea with baseline ECHO and chest CT. Will follow-up with Dr. Barker.    History of Present Illness     Gabriel Gonzalez is a 79 y.o. male with a past medical history of hyperlipidemia, BPH, Parkinson's disease, and anxiety who presents to the clinic for evaluation of dyspnea. The patient reports episodes of \"labored breathing\" with exertion for the last 6 months but states they have been \"getting worse two weeks ago.\" He recalls that the episodes occur while he is carrying groceries, going up steps, or crossing the room but denies experiencing any SOB at rest.He reports associated \"heart-racing\", dizziness, and new swelling in his legs but denies any wheezing, cough, or chest pain. He also notes ongoing runny nose and post-nasal drip.  Per the patient's wife at bedside, the patient saw a cardiologist regularly in 30s and 40s because of a significant family history of cardiac disease but stopped going after several unremarkable workups. The patient's wife also " "inquires on the results of PFTs that the patient had done in late July.    Social History: Patient lives with his wife and 2 dogs at home. He reports working as an ; his wife states that he still sometimes works. He denies any smoking history.    PFT Interpretation:  Per EMR, PFTs revealed \"Normal spirometry. No post bronchodilator response. Air trapping as indicated by the lung volumes. Moderate decrease in corrected diffusion capacity. Flow-volume loop suggestive of a variable extrathoracic obstruction.\"    Review of Systems   Constitutional:  Positive for fatigue. Negative for chills and fever.   HENT:  Positive for postnasal drip and rhinorrhea. Negative for congestion, sinus pressure, sinus pain and sore throat.    Respiratory:  Positive for shortness of breath. Negative for chest tightness.    Cardiovascular:  Negative for chest pain and palpitations.   Gastrointestinal:  Negative for abdominal pain, constipation, diarrhea, nausea and vomiting.   Genitourinary:  Positive for difficulty urinating.   Neurological:  Positive for light-headedness. Negative for dizziness and weakness.       Objective     /72 (BP Location: Left arm, Patient Position: Sitting, Cuff Size: Standard)   Pulse 68   Temp (!) 96.6 °F (35.9 °C) (Tympanic)   Wt 84.4 kg (186 lb)   SpO2 98%   BMI 25.23 kg/m²     Physical Exam  Vitals reviewed.   Constitutional:       General: He is not in acute distress.     Appearance: Normal appearance.      Comments: Body mass index is 25.23 kg/m².   HENT:      Head: Normocephalic and atraumatic.   Eyes:      Extraocular Movements: Extraocular movements intact.      Conjunctiva/sclera: Conjunctivae normal.   Cardiovascular:      Rate and Rhythm: Normal rate and regular rhythm.      Pulses: Normal pulses.      Comments: Soft heart sounds.  Pulmonary:      Effort: Pulmonary effort is normal. No respiratory distress.      Breath sounds: Normal breath sounds. No wheezing.   Abdominal:      " Palpations: Abdomen is soft.      Tenderness: There is no abdominal tenderness.   Musculoskeletal:      Right lower leg: No edema.      Left lower leg: No edema.   Skin:     General: Skin is warm and dry.   Neurological:      Mental Status: He is alert and oriented to person, place, and time.      Comments: Mild tremor of hands bilaterally.   Psychiatric:         Mood and Affect: Mood normal.         Behavior: Behavior normal.       Administrative Statements

## 2024-08-13 NOTE — PROGRESS NOTES
Attending Statement    I was the supervising physician and personally saw/examined the patient on 8/13/2024.  I agree with the Resident/Fellow's note with the following additions/exceptions:    Imaging Studies were reviewed personally on the PAC system:   PFTs  FVC:   3.53L  89% predicted        Z-score: -0.66   FEV1: 2.81L  95% predicted       Z-score: -0.27   FEV1/FVC Ratio: 80                   Z-score: 1      After administration of bronchodilator:       FVC:     3.68L   4 % change   FEV1:   2.64L   -6 % change      Lung volumes by body plethysmography:       Total Lung Capacity:    6.68L    Z-score: -0.98   Residual volume:          3.19L    Z-Score 1.30   RV/TLC Ratio:               48 %                        DLCO corrected/not corrected for patients hemoglobin level: 55%predicted         CXR  Cardiomediastinal silhouette appears unremarkable.     The lungs are clear.  No pneumothorax. Small left pleural effusion.     Osseous structures appear within normal limits for patient age.      Physical Exam:  General: NAD  Neuro: AxO 3  Heart: RRR  Lungs: Dogs  Abdomen: Soft NT   Extremities: Trace    Assessment:  79M with a PMH of HLD, BPH, Parkinsons and anxiety who presents for evaluation of dyspnea.    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 He has minimal exposure history and has had a slow decline that has mirrored the progression of his Parkinson's disease.  He does have a decreased DLCO but no obvious lung or cardiac pathology. This may be measurement error but plan to evaluate with baseline Echo and CT of the Chest.   - CT chest without contrast  - Echocardiography      Josh Barker MD  [facility]

## 2024-08-15 ENCOUNTER — OFFICE VISIT (OUTPATIENT)
Dept: PSYCHIATRY | Facility: CLINIC | Age: 79
End: 2024-08-15
Payer: MEDICARE

## 2024-08-15 DIAGNOSIS — G20.A1 PSYCHOSIS DUE TO PARKINSON'S DISEASE: ICD-10-CM

## 2024-08-15 DIAGNOSIS — F06.8 PSYCHOSIS DUE TO PARKINSON'S DISEASE: ICD-10-CM

## 2024-08-15 DIAGNOSIS — F22 DELUSIONS (HCC): Primary | ICD-10-CM

## 2024-08-15 DIAGNOSIS — R44.1 VISUAL HALLUCINATIONS: ICD-10-CM

## 2024-08-15 PROCEDURE — 99214 OFFICE O/P EST MOD 30 MIN: CPT | Performed by: PHYSICIAN ASSISTANT

## 2024-08-15 PROCEDURE — G2211 COMPLEX E/M VISIT ADD ON: HCPCS | Performed by: PHYSICIAN ASSISTANT

## 2024-08-15 NOTE — PSYCH
"This note was not shared with the patient due to reasonable likelihood of causing patient harm    PROGRESS NOTE        [facility]      Name and Date of Birth:  Gabriel Gonzalez 79 y.o. 1945 MRN: [id number]    Insurance: Payor: MEDICARE / Plan: MEDICARE A AND B / Product Type: Medicare A & B Fee for Service /     Date of Visit: August 15, 2024    Reason for Visit:   Chief Complaint   Patient presents with    Follow-up    Medication Management     SUBJECTIVE:    Gabriel Gonzalez is a nuha 79 y.o. male with a history of dementia who presents today for follow-up and medication management. His wife, Dariana, also presents with him and helps with the history. He has been off of the olanzapine for 6-8 weeks now with little to no benefit in fatigue. His B12 is now in the normal range. He still states his sleep is \"good\" but then says it's only good \"in the early stages\" but that it's \"good enough\". His wife worries about his fatigue and how it is affecting his ability to work and alteratively enjoy life outside of work. He is a bit more active this week. The dizziness remains a problem as well. The hallucinations have returned some but are still not upsetting and he denies any today. As before, they are almost exclusively visual hallucinations.     He denies any suicidal ideation, intent or plan at present; denies any homicidal ideation, intent or plan at present.    He denies any auditory hallucinations, denies any visual hallucinations, denies any delusions.    He still reports dizziness and sedation.    HPI ROS Appetite Changes and Sleep:     He reports adequate number of sleep hours, adequate appetite, low energy    Current Rating Scores:     Current PHQ-9   PHQ-2/9 Depression Screening    Little interest or pleasure in doing things: 0 - not at all  Feeling down, depressed, or hopeless: 0 - not at all  Trouble falling or staying asleep, or sleeping too much: 0 - not " at all  Feeling tired or having little energy: 1 - several days  Poor appetite or overeatin - not at all  Feeling bad about yourself - or that you are a failure or have let yourself or your family down: 0 - not at all  Trouble concentrating on things, such as reading the newspaper or watching television: 1 - several days  Moving or speaking so slowly that other people could have noticed. Or the opposite - being so fidgety or restless that you have been moving around a lot more than usual: 0 - not at all  Thoughts that you would be better off dead, or of hurting yourself in some way: 0 - not at all  PHQ-9 Score: 2  PHQ-9 Interpretation: No or Minimal depression         Review Of Systems:    Mood anxiety   Behavior appropriate and cooperative   Thought Content normal   General normal    Personality no change in personality   Other Psych Symptoms decreased memory and decreased concentration   Constitutional as noted in HPI   ENT as noted in HPI   Cardiovascular as noted in HPI   Respiratory as noted in HPI   Gastrointestinal as noted in HPI   Genitourinary as noted in HPI   Musculoskeletal as noted in HPI   Integumentary as noted in HPI   Neurological as noted in HPI   Endocrine negative   Other Symptoms none, all other systems are negative     Family Psychiatric History:     Family History   Problem Relation Age of Onset    Heart disease Mother     Heart disease Father        Social/Substance Abuse History:    Social History     Socioeconomic History    Marital status: /Civil Union     Spouse name: Not on file    Number of children: 3    Years of education: Not on file    Highest education level: Not on file   Occupational History    Occupation:    still active    Occupation: retired    Tobacco Use    Smoking status: Never    Smokeless tobacco: Never   Vaping Use    Vaping status: Never Used   Substance and Sexual Activity    Alcohol use: Not Currently     Comment: social    Drug use: No    Sexual  activity: Not Currently   Other Topics Concern    Not on file   Social History Narrative    Most recent tobacco use screenin-    Do you currently or have you served in the [place] Armed [employer]: Yes    If Yes, What branch of service: [employer]    [employer]    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, into active duty, 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    Advance directive: Yes    Alcohol intake: Moderate    Caffeine intake: Moderate    Illicit drugs: none    Occupation: retired    Exercise level: Occasional    Single or multi-level home/work: single level home    Live alone or with others: with others    Chewing tobacco: none    Marital status:     Number of children: 3    Diet: Regular    Sexual orientation: Heterosexual    Smoke alarm in home: Yes    General stress level: Medium    Sunscreen used routinely: No    Education: Post Graduate    Guns present in home: No    Presence of domestic violence: No     Social Determinants of Health     Financial Resource Strain: Low Risk  (2023)    Overall Financial Resource Strain (CARDIA)     Difficulty of Paying Living Expenses: Not hard at all   Food Insecurity: No Food Insecurity (2024)    Hunger Vital Sign     Worried About Running Out of Food in the Last Year: Never true     Ran Out of Food in the Last Year: Never true   Transportation Needs: No Transportation Needs (2024)    PRAPARE - Transportation     Lack of Transportation (Medical): No     Lack of Transportation (Non-Medical): No   Physical Activity: Not on file   Stress: Not on file   Social Connections: Not on file   Intimate Partner Violence: Not on file   Housing Stability: Low Risk  (2024)    Housing Stability Vital Sign     Unable to Pay for Housing in the Last Year: No     Number of Times Moved in the Last Year: 1     Homeless in the Last Year: No       The following portions of the patient's history were reviewed and updated as appropriate: past family history, past medical  history, past social history, past surgical history and problem list.    OBJECTIVE:     Mental Status Evaluation:  Appearance:  dressed appropriately, adequate grooming, looks older than stated age   Behavior:  pleasant, cooperative, interacts appropriately with this writer   Speech:  normal rate, normal volume, normal pitch   Mood:  anxious   Affect:  constricted   Thought Process:  organized, logical, coherent   Associations: intact associations   Thought Content:  no overt delusions, no paranoia noted on exam   Perceptual Disturbances: no auditory hallucinations, no visual hallucinations   Risk Potential: Suicidal ideation - None  Homicidal ideation - None  Potential for aggression - No   Sensorium:  oriented to person, place, and time/date   Memory:  short term memory moderately impaired, long term memory mildly impaired   Consciousness:  alert and awake   Attention/Concentration: attention span and concentration are age appropriate   Insight:  partial   Judgment: partial   Gait/Station: unstable gait   Motor Activity: abnormal movement noted: mild tremor present     Laboratory Results: I have personally reviewed all pertinent laboratory/tests results    Suicide/Homicide Risk Assessment:    Risk of Harm to Self:  The following ratings are based on assessment at the time of the interview  Based on today's assessment, Gabriel presents the following risk of harm to self: none    Risk of Harm to Others:  The following ratings are based on assessment at the time of the interview  Based on today's assessment, Gabriel presents the following risk of harm to others: none    The following interventions are recommended: no intervention changes needed    Assessment/Plan:      The hallucinations are more frequent than previously but he still does not feel they are upsetting. I have recommended following with neuro next month to see if they stop or reduce the rivastigmine as that is the last med that can be adjusted that may be  contributing to the fatigue. I do not recommend a stimulant at this time since the hallucinations have returned. If the hallucinations increase or become upsetting again we will re-evaluate at that time. Options would include Abilify, Rexulti, and Clozaril. Though his insight is not perfect, he may benefit from therapy to discuss some of the stressors he is struggling with. He will think about this. I still recommend cutting back on work but he remains resistant. We have discussed his safety plan and he agrees that if he experience unsafe thoughts that he will reach out to his supports including this office, the suicide hotline, and emergency services if necessary. Gabriel is aware of non-emergent and emergent mental health resources. They are able to contract for their own safety at this time.    Will follow up in 6-8 weeks. Patient is aware to call the office if questions or concerns arise sooner.      Diagnoses and all orders for this visit:    Delusions (HCC)    Visual hallucinations    Psychosis due to Parkinson's disease        Treatment Recommendations/Precautions:    Wait on neuro recommendation before starting new med    *rivastigmine managed by neuro    Medication management every 6 weeks  Aware of 24 hour and weekend coverage for urgent situations accessed by calling [facility] main practice number  I am scheduling this patient out for greater than 3 months: No    Medications Risks/Benefits      Risks, Benefits And Possible Side Effects Of Medications:    Risks, benefits, and possible side effects of medications explained to Gabriel and he verbalizes understanding and agreement for treatment.    Controlled Medication Discussion:     Not applicable    Psychotherapy Provided:     Individual psychotherapy provided: No    Treatment Plan:    Completed and signed during the session: Not applicable - Treatment Plan not due at this session    Visit Time    Visit Start Time:  2:05 PM  Visit  End Time:  2:30 PM  Total Visit Duration:  25 minutes    Elzbieta Carreno 08/15/24

## 2024-08-16 ENCOUNTER — APPOINTMENT (OUTPATIENT)
Dept: PHYSICAL THERAPY | Facility: CLINIC | Age: 79
End: 2024-08-16
Payer: MEDICARE

## 2024-08-20 ENCOUNTER — TELEPHONE (OUTPATIENT)
Age: 79
End: 2024-08-20

## 2024-08-20 NOTE — TELEPHONE ENCOUNTER
"Gabriel Gonzalez and/or patient's wife, Dariana, requested a call back to discuss patient's current status. Wife stated that patient has not been sleeping since last appt and is at a \"break with reality\". Wife would like to know if there is any sleep medication that can be prescribed or further recommendations for patient.      They can be reached at P# [phone].       Thank you.  "

## 2024-08-20 NOTE — TELEPHONE ENCOUNTER
Absolutely - I expect not to hear back from other providers until tomorrow but will ensure if I hear back that I let her know. Thanks.

## 2024-08-20 NOTE — TELEPHONE ENCOUNTER
Called Dariana back and informed her that Elzbieta is going to consult with Taiwo's Neurologist and PCP regarding medication that will help Taiwo sleep.  Informed her that the only medication she may feel comfortable with prescribing would be Olanzapine but she would first like to consult with other providers.  Informed her that we would call her back with providers recommendation.

## 2024-08-23 ENCOUNTER — APPOINTMENT (OUTPATIENT)
Dept: PHYSICAL THERAPY | Facility: CLINIC | Age: 79
End: 2024-08-23
Payer: MEDICARE

## 2024-08-26 ENCOUNTER — TELEPHONE (OUTPATIENT)
Age: 79
End: 2024-08-26

## 2024-08-26 NOTE — TELEPHONE ENCOUNTER
Pt's wife contacted intake to follow-up on a call received last week pertaining to pt's medication. Writer was able to get nurse on the line to further assist.

## 2024-08-26 NOTE — TELEPHONE ENCOUNTER
Dariana called back requesting an update about this. She said that Taiwo's hallucinations, delusions, and confusion are getting worse, specifically at night. She said he isn't sleeping well which is making his symptoms increase. She said he can become upset and belligerent at times and they would like something to help him sleep. I informed her that Elzbieta is out of office today but I would forward to a covering provider to review this thread. Clinical will follow up with Dariana as soon as advised.     Dariana said if she does not answer it is OK to leave a VM with the information.

## 2024-08-28 ENCOUNTER — TELEPHONE (OUTPATIENT)
Dept: PSYCHIATRY | Facility: CLINIC | Age: 79
End: 2024-08-28

## 2024-08-28 ENCOUNTER — NURSE TRIAGE (OUTPATIENT)
Age: 79
End: 2024-08-28

## 2024-08-28 NOTE — TELEPHONE ENCOUNTER
" the cup wont do it in the lab    Appetite is lwoer now     Confusion is constant and woke up allnight every hour where am I , why are we here  Reason for Disposition  • Longstanding confusion (e.g., dementia, stroke) and worsening    Answer Assessment - Initial Assessment Questions  1. LEVEL OF CONSCIOUSNESS: \"How is he (she, the patient) acting right now?\" (e.g., alert-oriented, confused, lethargic, stuporous, comatose)           Significant increase in confusion in past week    Requesting urine check      2. ONSET: \"When did the confusion start?\"  (minutes, hours, days)          This past week    3. PATTERN \"Does this come and go, or has it been constant since it started?\"  \"Is it present now?\"        Constant     Constant      4. ALCOHOL or DRUGS: \"Has he been drinking alcohol or taking any drugs?\"           NA      5. NARCOTIC MEDICATIONS: \"Has he been receiving any narcotic medications?\" (e.g., morphine, Vicodin)            6. CAUSE: \"What do you think is causing the confusion?\"          Unsure possible uti    7. OTHER SYMPTOMS: \"Are there any other symptoms?\" (e.g., difficulty breathing, headache, fever, weakness)        Constipation this week    Protocols used: Confusion - Delirium-ADULT-OH    "

## 2024-08-28 NOTE — TELEPHONE ENCOUNTER
Patient wife calls in stating the patient had sudden increase in confusion this week. Patient normally has baseline confusion but this week it has gotten so severe and the confusion is a constant . It is not coming and going like normally.  Patient wife calls requesting a Urinalysis and culture.  She asks if this could be entered asap. States she will need to  the cup and bring it home as the patient refuses to give the sample in a lab.    Denies fever, respiratory symptoms, any recent falls or new stressors.      Pleas e call patients wife back when the order is entered or if there is a problem entering the order.

## 2024-08-28 NOTE — TELEPHONE ENCOUNTER
Pt's wife, Dariana called regarding an follow up request from [facility] for pt's medication.  Dariana was explaining that pt is not doing well and she is not sure about medication and how to properly care for . Dariana is requesting a callback from [facility] for next steps.    Writer attempted to transfer Dariana to Nurse's line, however lost connection.    # [phone]

## 2024-08-29 DIAGNOSIS — R41.0 CONFUSION: Primary | ICD-10-CM

## 2024-08-29 NOTE — TELEPHONE ENCOUNTER
Sorry I was out of the office yesterday for meeting and didn't get back till 2pm today   I'm just seeing this   Order is placed now

## 2024-08-30 ENCOUNTER — APPOINTMENT (OUTPATIENT)
Dept: PHYSICAL THERAPY | Facility: CLINIC | Age: 79
End: 2024-08-30
Payer: MEDICARE

## 2024-08-30 ENCOUNTER — APPOINTMENT (OUTPATIENT)
Dept: LAB | Facility: CLINIC | Age: 79
End: 2024-08-30
Payer: MEDICARE

## 2024-08-30 DIAGNOSIS — R41.0 CONFUSION: ICD-10-CM

## 2024-08-30 LAB
BILIRUB UR QL STRIP: NEGATIVE
CLARITY UR: CLEAR
COLOR UR: NORMAL
GLUCOSE UR STRIP-MCNC: NEGATIVE MG/DL
HGB UR QL STRIP.AUTO: NEGATIVE
KETONES UR STRIP-MCNC: NEGATIVE MG/DL
LEUKOCYTE ESTERASE UR QL STRIP: NEGATIVE
NITRITE UR QL STRIP: NEGATIVE
PH UR STRIP.AUTO: 7 [PH]
PROT UR STRIP-MCNC: NEGATIVE MG/DL
SP GR UR STRIP.AUTO: 1.02 (ref 1–1.03)
UROBILINOGEN UR STRIP-ACNC: <2 MG/DL

## 2024-08-30 PROCEDURE — 87086 URINE CULTURE/COLONY COUNT: CPT

## 2024-08-30 PROCEDURE — 81003 URINALYSIS AUTO W/O SCOPE: CPT

## 2024-08-31 LAB — BACTERIA UR CULT: NORMAL

## 2024-09-03 ENCOUNTER — TELEPHONE (OUTPATIENT)
Dept: PSYCHIATRY | Facility: CLINIC | Age: 79
End: 2024-09-03

## 2024-09-03 ENCOUNTER — TELEPHONE (OUTPATIENT)
Age: 79
End: 2024-09-03

## 2024-09-03 NOTE — TELEPHONE ENCOUNTER
This note was not shared with the patient due to reasonable likelihood of causing patient harm     Spoke to Dariana, she reports that he stopped olanzapine. This is fine. Unfortunately since he is having more confusion and dizziness I feel it is unwise to try new medications to try to calm him/help with sleep as SGAs, benzos, and hypnotics can all worsen confusion and dizziness. She already has had him to check for UTI and states work up was negative. Working with pulm/cardio to ensure no other physical issues. Okay to take OTC sleep aid occasionally but watch for increased confusion because most are anticholinergic meds like diphenhydramine (tolerated well in past). Keep neuro appt in 2 weeks. Keep f/u here in 1 month.

## 2024-09-03 NOTE — TELEPHONE ENCOUNTER
Patient's wife contacted intake regarding new medication provider had started him on stated pt is miserable and she was going to stop giving it to him. Writer was able to get a nurse on the phone to further assist.

## 2024-09-03 NOTE — TELEPHONE ENCOUNTER
Dariana called the office to let provider know that Taiwo will not take the Olanzapine anymore.  States that although it has helped a lot with sleep, he is feeling miserable. States his confusion is worse, his ability to think clearly is worse and then he had a fall last night going to the bathroom.  He has only been on 1 tablet daily for the last 5 days and refuses to take it anymore.  Dariana wanted to know if Tianna has any other recommendations.    Will refer to Terrence Carreno for review.

## 2024-09-06 ENCOUNTER — HOSPITAL ENCOUNTER (OUTPATIENT)
Dept: NON INVASIVE DIAGNOSTICS | Facility: HOSPITAL | Age: 79
Discharge: HOME | End: 2024-09-06
Attending: STUDENT IN AN ORGANIZED HEALTH CARE EDUCATION/TRAINING PROGRAM
Payer: MEDICARE

## 2024-09-06 ENCOUNTER — HOSPITAL ENCOUNTER (OUTPATIENT)
Dept: CT IMAGING | Facility: HOSPITAL | Age: 79
End: 2024-09-06
Attending: STUDENT IN AN ORGANIZED HEALTH CARE EDUCATION/TRAINING PROGRAM
Payer: MEDICARE

## 2024-09-06 VITALS
SYSTOLIC BLOOD PRESSURE: 130 MMHG | DIASTOLIC BLOOD PRESSURE: 72 MMHG | WEIGHT: 186 LBS | BODY MASS INDEX: 25.19 KG/M2 | HEART RATE: 68 BPM | HEIGHT: 72 IN

## 2024-09-06 DIAGNOSIS — R06.02 SOB (SHORTNESS OF BREATH): ICD-10-CM

## 2024-09-06 LAB
AORTIC ROOT: 2.9 CM
AV LVOT MEAN GRADIENT: 2 MMHG
AV LVOT PEAK GRADIENT: 4 MMHG
BSA FOR ECHO PROCEDURE: 2.07 M2
DOP CALC LVOT PEAK VEL VTI: 22.82 CM
DOP CALC LVOT PEAK VEL: 0.98 M/S
E WAVE DECELERATION TIME: 240 MS
E/A RATIO: 0.59
FRACTIONAL SHORTENING: 33 (ref 28–44)
INTERVENTRICULAR SEPTUM IN DIASTOLE (PARASTERNAL SHORT AXIS VIEW): 1 CM
INTERVENTRICULAR SEPTUM: 1 CM (ref 0.6–1.1)
LAAS-AP2: 19.6 CM2
LAAS-AP4: 17.6 CM2
LEFT ATRIUM SIZE: 3.9 CM
LEFT ATRIUM VOLUME (MOD BIPLANE): 63 ML
LEFT ATRIUM VOLUME INDEX (MOD BIPLANE): 30.4 ML/M2
LEFT INTERNAL DIMENSION IN SYSTOLE: 3 CM (ref 2.1–4)
LEFT VENTRICULAR INTERNAL DIMENSION IN DIASTOLE: 4.5 CM (ref 3.5–6)
LEFT VENTRICULAR POSTERIOR WALL IN END DIASTOLE: 1.3 CM
LEFT VENTRICULAR STROKE VOLUME: 58 ML
LVSV (TEICH): 58 ML
MV E'TISSUE VEL-SEP: 5 CM/S
MV PEAK A VEL: 1.08 M/S
MV PEAK E VEL: 64 CM/S
MV STENOSIS PRESSURE HALF TIME: 70 MS
MV VALVE AREA P 1/2 METHOD: 3.14
RIGHT ATRIUM AREA SYSTOLE A4C: 12.2 CM2
RIGHT VENTRICLE ID DIMENSION: 3.5 CM
SL CV LEFT ATRIUM LENGTH A2C: 5.1 CM
SL CV LV EF: 55
SL CV PED ECHO LEFT VENTRICLE DIASTOLIC VOLUME (MOD BIPLANE) 2D: 92 ML
SL CV PED ECHO LEFT VENTRICLE SYSTOLIC VOLUME (MOD BIPLANE) 2D: 34 ML
TRICUSPID ANNULAR PLANE SYSTOLIC EXCURSION: 2.5 CM
TRICUSPID VALVE PEAK REGURGITATION VELOCITY: 2.28 M/S

## 2024-09-06 PROCEDURE — 93306 TTE W/DOPPLER COMPLETE: CPT | Performed by: INTERNAL MEDICINE

## 2024-09-06 PROCEDURE — 71250 CT THORAX DX C-: CPT

## 2024-09-06 PROCEDURE — 93306 TTE W/DOPPLER COMPLETE: CPT

## 2024-09-09 ENCOUNTER — RA CDI HCC (OUTPATIENT)
Dept: OTHER | Facility: HOSPITAL | Age: 79
End: 2024-09-09

## 2024-09-12 ENCOUNTER — TELEPHONE (OUTPATIENT)
Age: 79
End: 2024-09-12

## 2024-09-12 NOTE — TELEPHONE ENCOUNTER
Pt's wife calls back and states that she is calling back in regards to results informed her of  result note of chest CT. She verbalize understanding and asked about pt's echo cardiogram and was wondering if  can review these results as well. They are wondering if they should keep Oct appt. Please advise

## 2024-09-12 NOTE — TELEPHONE ENCOUNTER
The spouse has been notified of TAO Medina's 09/12/24 1:13PM notation and all questions answered.     Spouse moved appt to after pt goes to Cardiology, new [facility] appt is 11/12/24 [facility].    Spouse had no further questions and/or concerns at this time.

## 2024-09-16 ENCOUNTER — OFFICE VISIT (OUTPATIENT)
Dept: NEUROLOGY | Facility: CLINIC | Age: 79
End: 2024-09-16
Payer: MEDICARE

## 2024-09-16 VITALS
TEMPERATURE: 98.1 F | OXYGEN SATURATION: 98 % | HEART RATE: 60 BPM | WEIGHT: 186.8 LBS | BODY MASS INDEX: 25.3 KG/M2 | HEIGHT: 72 IN | SYSTOLIC BLOOD PRESSURE: 128 MMHG | DIASTOLIC BLOOD PRESSURE: 84 MMHG

## 2024-09-16 DIAGNOSIS — I95.1 ORTHOSTATIC HYPOTENSION: ICD-10-CM

## 2024-09-16 DIAGNOSIS — G20.A1 PARKINSON'S DISEASE WITHOUT DYSKINESIA OR FLUCTUATING MANIFESTATIONS (HCC): Primary | ICD-10-CM
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
## 2024-09-16 DIAGNOSIS — R44.3 HALLUCINATIONS: ICD-10-CM

## 2024-09-16 DIAGNOSIS — R29.3 POSTURAL INSTABILITY: ICD-10-CM

## 2024-09-16 PROCEDURE — 99214 OFFICE O/P EST MOD 30 MIN: CPT | Performed by: NURSE PRACTITIONER

## 2024-09-16 NOTE — PATIENT INSTRUCTIONS
[email or web address]      Can wean of rivastigimine take 3 mg every other day for 1 week then stop, if anything worsening contact office to restart meds.

## 2024-09-16 NOTE — PROGRESS NOTES
Neurology Ambulatory Visit  Name: Gabriel Gonzalez       : 1945       MRN: [id number]   Encounter Provider: OFELIA Chu   Encounter Date: 2024  Encounter department: [facility] [place]    Assessment and Plan  1. Parkinson's disease without dyskinesia or fluctuating manifestations  Assessment & Plan:  Patient returns for follow-up for Parkinson's disease complicated by dementia and hallucinations.  His motor symptoms are currently stable.  He denies any significant freezing, does have mild breakthrough tremor.  He does continue with hallucinations and delusions that time however are less distressing than in the past.  At this time would recommend he continue his current dose of 1.5 tabs at 7 AM, 10:30 AM, 2:30 PM, 1 tab at 6:30 PM and bedtime, 1 tab overnight as needed. He does not worsening imbalance and increased falls since stopping PT 7 weeks ago, would like to restart, referral provided today.       He does follow with psych for his hallucinations and delusions.  Olanzapine was weaned off with no change in symptoms as benefit was questionable.  They have also question the benefit of rivastigmine and given patient was having a lot of dizziness they reduce the medication to daily dosing.  There has been an improvement of dizziness and no worsening of his memory or hallucinations.  His wife does question if he can come off of medication completely as there are other medications that psych is considering for treatment of his delusions and hallucinations however would like him off of rivastigmine before starting these.  He will take rivastigmine every other day for 1 week then stop.  Will continue follow-up with psych.  We did discuss supportive options for the wife.    Orders:  -     Ambulatory Referral to Physical Therapy; Future  2. Hallucinations  3. Orthostatic hypotension  Assessment & Plan:  Currently asymptomatic, continue midodrine, continue to hydrate well.  4.  Falls  -     Ambulatory Referral to Physical Therapy; Future  5. Postural instability  -     Ambulatory Referral to Physical Therapy; Future      He will Return in about 4 months (around 1/16/2025). To contact the office sooner with any concerns or worsening symptoms.      History of Present Illness     HPI   Gabriel Gonzalez is a 79 y.o. male  who presents for follow up for parkinsonism with concerns regarding recent development of hallucinations. To review, symptom onset in 2016 with slowness of gait with subsequent development of LH tremor and orthostatic hypotension. Initially seen by Dr Quinn and started on Sinemet, which was exacerbating his orthostatic intolerance. Sinemet discontinued and focus initially on treating orthostatic symptoms which improved on midodrine.   More recently has developed hallucinations-infectious work up was negative. Was started on seroquel with no significant improvement, xanax was weaned by PCP. Nuplazid caused increase in hallucinations was stopped after  3 days. Rivastigimine later added. Sinemet has been reduced. He follows with psych as well.      Last office visit 5/2024 in which his rivastigimine was increased.        Interval History:   He presents today with his wife who assists with history.     Since he stopped PT about 6-7 weeks ago he has fallen a few times. He is in a balance class currently once a week. When he was in PT he did not have any falls. He tried to do some exercises at home but is limited.   With the falls he had imbalance, another one he was try to sit on the toilet and lost his strength.   He has been walking outside. No shuffling, occasional freezing. Shuffles overnight.    Breakthrough tremor at times-very mild.     Can have occasional trouble swallowing large pills, no choking episodes. No drooling.  Soft speech towards the end of the day.     He continues to have difficulty falling asleep, causes fatigue during the day. He will take an extra dose of  levodopa if he awakens which helps. He is now off olanzapine which did help him sleep thought it was causing day time fatigue but this has not helped. Naps during the day.     He continues to have hallucinations. These tend to fluctuate. He will mention something daily. There have been a few days where this is distressing but overall are not as distressing as they have been in the past. No worsening behaviors or hallucinations with coming off olanzapine.   Benefit of rivastigmine is questionable, he was having a lot of dizziness and therefore psych reduced to once a day.    He continues to have some dizziness but is less.  He is interested in dropping meds if able. Psych does have some meds they want to try but patient's wife tell me that they would like him off rivastigimine first.     No wearing off unless he is late with meds.       Current PD regimen:  Carbidopa/levodopa 25/100 1.5 tabs amt 7 am, 10:30am , 2:30pm and  1 tab at 630 pm and bed time , 1 tab overnight as needed   Rivastigmine  3 mg daily.   midodrine once daily     Prior medications:  Quetiapine 25mg qam, noon, 4pm and night   Olanzapine  nuplazid    The following portions of the patient's history were reviewed and updated as appropriate: allergies, current medications, past family history, past medical history, past social history, past surgical history and problem list.         Review of Systems    Review of Systems   Constitutional:  Negative for appetite change, fatigue and fever.   HENT:  Positive for trouble swallowing (occasional). Negative for hearing loss, tinnitus and voice change.    Eyes: Negative.  Negative for photophobia, pain and visual disturbance.   Respiratory: Negative.  Negative for shortness of breath.    Cardiovascular: Negative.  Negative for palpitations.   Gastrointestinal: Negative.  Negative for nausea and vomiting.   Endocrine: Negative.  Negative for cold intolerance.   Genitourinary: Negative.  Negative for dysuria,  frequency and urgency.   Musculoskeletal:  Positive for gait problem (balance issues- last fall 3 weeks ago). Negative for back pain, myalgias, neck pain and neck stiffness.   Skin: Negative.  Negative for rash.   Allergic/Immunologic: Negative.    Neurological:  Positive for dizziness (first thing in the morning, or before exercising). Negative for tremors, seizures, syncope, facial asymmetry, speech difficulty, weakness, light-headedness, numbness and headaches.   Hematological: Negative.  Does not bruise/bleed easily.   Psychiatric/Behavioral: Negative.  Negative for confusion, hallucinations and sleep disturbance.    All other systems reviewed and are negative.        I have personally reviewed the ROS performed by the MA.    Objective     /84 (BP Location: Left arm, Patient Position: Sitting, Cuff Size: Adult)   Pulse 60   Temp 98.1 °F (36.7 °C) (Temporal)   Ht 6' (1.829 m)   Wt 84.7 kg (186 lb 12.8 oz)   SpO2 98%   BMI 25.33 kg/m²    Physical Exam  Constitutional:       General: He is awake.   Eyes:      General: Lids are normal.      Extraocular Movements: Extraocular movements intact.      Pupils: Pupils are equal, round, and reactive to light.   Neurological:      Mental Status: He is alert.      Motor: Motor strength is normal.      Neurological Exam  Mental Status  Awake and alert. Oriented only to person, place and situation. Speech: hypophonia. Language is fluent with no aphasia. Attention and concentration are normal.    Cranial Nerves  CN III, IV, VI: Extraocular movements intact bilaterally. Normal lids and orbits bilaterally. Pupils equal round and reactive to light bilaterally.  CN V:  Right: Facial sensation is normal.  Left: Facial sensation is normal on the left.  CN VII: Full and symmetric facial movement.  CN VIII: Hearing is normal.  CN XI: Shoulder shrug strength is normal.  CN XII: Tongue midline without atrophy or fasciculations.    Motor   Increased muscle tone. Strength is  5/5 throughout all four extremities.    Sensory  Light touch is normal in upper and lower extremities.     Coordination  Right: Finger-to-nose normal. Rapid alternating movement abnormality:Left: Finger-to-nose normal. Rapid alternating movement abnormality:  See MDS UPDRS III.    Gait  Casual gait: Able to rise from chair without using arms.  Reduced arm swing, mild striatal posturing of the hands, , tremor in right hand antalgic gait at times. En block turn with imbalance, stooped posture.    MDS UPDRS III                              9/16/24 3/26/24   Time since last dose:      Speech  2 2   Facial Expression  1 1   Rigidity - Neck       Rigidity - Upper Extremity (R)  2 2   Rigidity - Upper Extremity (L)   2 2   Rigidity - Lower Extremity (R)  1 1   Rigidity - Lower Extremity (L)   1 1   Finger Taps (R)   1 1   Finger Taps (L)   2 2   Hand Movement (R)  1 1   Hand Movement (L)   1 1   Pronation/Supination (R)  1 1   Pronation/Supination (L)   2 2   Toe Tapping (R) 1 1   Toe Tapping (L) 2 2   Leg Agility (R)  1 1   Leg Agility (L)   1 1   Arising from Chair   1 1   Gait   1 1   Freezing of Gait 0 0   Postural Stability        Posture 1 1   Global spontaneity of movement 1 1   Postural Tremor (Ri 0 1   Postural Tremor (L) 0 1   Kinetic Tremor (R)  1 1   Kinetic Tremor (L)  1 1   Rest tremor amplitude RUE 0 1   Rest tremor amplitude LUE 1 1   Rest tremor amplitude RLE 0 0   Reset tremor amplitude LLE 0 0   Lip/Jaw Tremor  0 0   Consistency of tremor 1 1   Motor Exam Total:           Administrative Statements

## 2024-09-16 NOTE — PROGRESS NOTES
Review of Systems   Constitutional:  Negative for appetite change, fatigue and fever.   HENT:  Positive for trouble swallowing (occasional). Negative for hearing loss, tinnitus and voice change.    Eyes: Negative.  Negative for photophobia, pain and visual disturbance.   Respiratory: Negative.  Negative for shortness of breath.    Cardiovascular: Negative.  Negative for palpitations.   Gastrointestinal: Negative.  Negative for nausea and vomiting.   Endocrine: Negative.  Negative for cold intolerance.   Genitourinary: Negative.  Negative for dysuria, frequency and urgency.   Musculoskeletal:  Positive for gait problem (balance issues- last fall 3 weeks ago). Negative for back pain, myalgias, neck pain and neck stiffness.   Skin: Negative.  Negative for rash.   Allergic/Immunologic: Negative.    Neurological:  Positive for dizziness (first thing in the morning, or before exercising). Negative for tremors, seizures, syncope, facial asymmetry, speech difficulty, weakness, light-headedness, numbness and headaches.   Hematological: Negative.  Does not bruise/bleed easily.   Psychiatric/Behavioral: Negative.  Negative for confusion, hallucinations and sleep disturbance.    All other systems reviewed and are negative.

## 2024-09-18 NOTE — ASSESSMENT & PLAN NOTE
Patient returns for follow-up for Parkinson's disease complicated by dementia and hallucinations.  His motor symptoms are currently stable.  He denies any significant freezing, does have mild breakthrough tremor.  He does continue with hallucinations and delusions that time however are less distressing than in the past.  At this time would recommend he continue his current dose of 1.5 tabs at 7 AM, 10:30 AM, 2:30 PM, 1 tab at 6:30 PM and bedtime, 1 tab overnight as needed. He does not worsening imbalance and increased falls since stopping PT 7 weeks ago, would like to restart, referral provided today.       He does follow with psych for his hallucinations and delusions.  Olanzapine was weaned off with no change in symptoms as benefit was questionable.  They have also question the benefit of rivastigmine and given patient was having a lot of dizziness they reduce the medication to daily dosing.  There has been an improvement of dizziness and no worsening of his memory or hallucinations.  His wife does question if he can come off of medication completely as there are other medications that psych is considering for treatment of his delusions and hallucinations however would like him off of rivastigmine before starting these.  He will take rivastigmine every other day for 1 week then stop.  Will continue follow-up with psych.  We did discuss supportive options for the wife.

## 2024-09-24 ENCOUNTER — OFFICE VISIT (OUTPATIENT)
Dept: FAMILY MEDICINE CLINIC | Facility: CLINIC | Age: 79
End: 2024-09-24

## 2024-09-24 VITALS
DIASTOLIC BLOOD PRESSURE: 82 MMHG | HEIGHT: 72 IN | HEART RATE: 56 BPM | WEIGHT: 185 LBS | SYSTOLIC BLOOD PRESSURE: 118 MMHG | BODY MASS INDEX: 25.06 KG/M2 | OXYGEN SATURATION: 96 % | RESPIRATION RATE: 16 BRPM

## 2024-09-24 DIAGNOSIS — F22 DELUSIONS (HCC): ICD-10-CM

## 2024-09-24 DIAGNOSIS — R06.09 DOE (DYSPNEA ON EXERTION): ICD-10-CM

## 2024-09-24 DIAGNOSIS — G20.A2 PARKINSON'S DISEASE WITHOUT DYSKINESIA, WITH FLUCTUATING MANIFESTATIONS (HCC): ICD-10-CM

## 2024-09-24 DIAGNOSIS — R44.3 HALLUCINATIONS: ICD-10-CM

## 2024-09-24 DIAGNOSIS — I95.1 ORTHOSTATIC HYPOTENSION: ICD-10-CM

## 2024-09-24 DIAGNOSIS — Z23 ENCOUNTER FOR IMMUNIZATION: Primary | ICD-10-CM

## 2024-09-24 DIAGNOSIS — M62.81 MUSCLE WEAKNESS: ICD-10-CM

## 2024-09-24 NOTE — PROGRESS NOTES
Ambulatory Visit  Name: Gabriel Gonzalez      : 1945      MRN: [id number]  Encounter Provider: Julian Rudolph MD  Encounter Date: 2024   Encounter department: [facility]    Assessment & Plan  Encounter for immunization    Orders:    influenza vaccine, high-dose, PF 0.5 mL (Fluzone High Dose)    Muscle weakness         Parkinson's disease without dyskinesia, with fluctuating manifestations         Orthostatic hypotension         Delusions (HCC)         Hallucinations         AQUINO (dyspnea on exertion)            Assessment & Plan  1. Parkinson's Disease.  His condition is characterized by rigidity and musculoskeletal degeneration, with occasional freezing and difficulty moving his left leg. He sometimes has to lift his left leg to get in and out of the car. He was advised to engage in exercises to strengthen his legs, such as leg lifts and kicks, and to use ankle weights. Additionally, he was encouraged to practice proper breathing techniques during these exercises. He will continue his current dosage of Sinemet.    2. Dementia.  His dementia is stable, and he has been weaned off olanzapine and rivastigmine without any change in symptoms or benefit. He will discuss the potential use of Abilify with his neurologist in a couple of weeks. He is also in contact with [facility] for a home assessment program to improve his quality of life.    3. Shortness of Breath.  This symptom is likely related to deconditioning rather than his Parkinson's disease. He was advised to continue his current regimen until his cardiology appointment. He was encouraged to push through the shortness of breath during exercises to build stamina.    4. Orthostatic Hypotension.  This condition is likely contributing to his dizziness. He was advised to continue taking midodrine twice a day. He will follow up with the cardiologist to rule out any cardiac issues.    5. Leg Weakness.  He experiences leg weakness,  particularly in the mornings and occasionally at night, leading to falls. He was advised to engage in more physical therapy focused on leg strength. A raised toilet seat and bars will be installed to prevent falls. He will continue with one day a week PT for balance and seek additional PT for leg strength.    6. Health Maintenance.  His cholesterol, kidney function, liver function, electrolytes, white blood cell counts, and vitamin D levels are all within normal ranges. His B6 and B12 levels, which were previously low, have returned to normal. He was advised to take a B complex supplement to maintain his B6 and B12 levels. He will receive his influenza vaccine today and his COVID-19 vaccine next week.          History of Present Illness     History of Present Illness  The patient is a 79-year-old male who presents for evaluation of multiple medical concerns. He is accompanied by an adult female.    He recently consulted with his neurologist, Dr. Joshi, regarding his Parkinson's disease, which is complicated by dementia and hallucinations. His motor symptoms are currently stable, with no freezing episodes and only mild breakthrough tremors. He experiences rigidity and occasional freezing, particularly in his calves, and has difficulty moving his left leg, a problem he has had for years. He often needs assistance to lift his left leg when getting in and out of the car. His medication regimen includes Sinemet, which has remained unchanged and provides stability. He was previously on olanzapine and rivastigmine, but these were discontinued due to lack of benefit. He is scheduled to see Dr. Raya in a few weeks to discuss alternative medications such as Abilify. He has also contacted [facility] for a home assessment program recommended by Dr. Joshi. He has been receiving physical therapy once a week for balance issues and has been advised to increase this to improve leg strength. He experiences unsteadiness and  lightheadedness upon waking, which improves after sitting down. He often feels too tired to exercise and has difficulty distinguishing between feelings of lightheadedness, dizziness, and nausea. He has experienced falls due to his legs giving way, including two falls in the middle of the night in the past month. Despite these challenges, he has started walking almost a mile again and continues to go to the office.    He was referred to a pulmonologist for shortness of breath, who suggested it might be a conditioning issue. The pulmonologist recommended a follow-up with a cardiologist to rule out any cardiac issues. He has a cardiology appointment scheduled in a couple of weeks. He has noticed that his shortness of breath is worse in the mornings and can worsen again in the evenings. He has been advised not to take hot showers or shave immediately after showering. He has also been told that he may need pulmonary rehabilitation at [facility] after his cardiology follow-up.    He has been taking midodrine twice a day for orthostatic hypotension. He has experienced episodes where his legs give out, causing him to fall. He has also noticed that his legs seem to be swelling.    He has been unable to swallow B6 pills due to their size, but has been advised by Dr. Joshi that it is okay not to take them. He has been advised to take a B complex supplement instead.    IMMUNIZATIONS  He got COVID-19 shots at the pharmacy.     Review of Systems   Constitutional:  Negative for activity change, appetite change, fatigue and fever.   HENT:  Positive for voice change (only when tired). Negative for congestion, hearing loss, nosebleeds, tinnitus and trouble swallowing.    Eyes: Negative.  Negative for photophobia, pain, itching and visual disturbance.   Respiratory: Negative.  Negative for cough, chest tightness and shortness of breath.    Cardiovascular: Negative.  Negative for chest pain and palpitations.    Gastrointestinal: Negative.  Negative for abdominal pain, constipation, diarrhea, nausea and vomiting.   Endocrine: Negative.  Negative for cold intolerance.   Genitourinary: Negative.  Negative for dysuria, frequency and urgency.   Musculoskeletal:  Positive for myalgias. Negative for back pain, gait problem, joint swelling and neck pain.        Balance Issues     Skin: Negative.  Negative for rash.   Allergic/Immunologic: Negative.  Negative for immunocompromised state.   Neurological:  Positive for weakness (Right Leg which causes issues with gait). Negative for dizziness, tremors, seizures, syncope, facial asymmetry, speech difficulty, light-headedness, numbness and headaches.   Hematological: Negative.  Does not bruise/bleed easily.   Psychiatric/Behavioral:  Positive for confusion. Negative for hallucinations, sleep disturbance and suicidal ideas.         Delusions  Paranoid   All other systems reviewed and are negative.    Objective     /82   Pulse 56   Resp 16   Ht 6' (1.829 m)   Wt 83.9 kg (185 lb)   SpO2 96%   BMI 25.09 kg/m²     Physical Exam    Physical Exam  Vitals and nursing note reviewed.   Constitutional:       Appearance: He is well-developed.   HENT:      Head: Normocephalic and atraumatic.   Eyes:      Conjunctiva/sclera: Conjunctivae normal.      Pupils: Pupils are equal, round, and reactive to light.   Cardiovascular:      Rate and Rhythm: Normal rate and regular rhythm.      Heart sounds: Normal heart sounds.   Pulmonary:      Effort: Pulmonary effort is normal.      Breath sounds: Normal breath sounds. No wheezing or rales.   Abdominal:      General: Bowel sounds are normal. There is no distension.      Palpations: Abdomen is soft.      Tenderness: There is no abdominal tenderness.   Musculoskeletal:         General: No tenderness. Normal range of motion.      Cervical back: Normal range of motion and neck supple.   Skin:     General: Skin is warm and dry.      Findings: No  rash.   Neurological:      Mental Status: He is alert and oriented to person, place, and time.      Cranial Nerves: No cranial nerve deficit.      Sensory: No sensory deficit.      Coordination: Coordination normal.      Gait: Gait abnormal.   Psychiatric:         Behavior: Behavior normal.         Thought Content: Thought content normal.         Judgment: Judgment normal.

## 2024-09-27 ENCOUNTER — TELEPHONE (OUTPATIENT)
Dept: PSYCHIATRY | Facility: CLINIC | Age: 79
End: 2024-09-27

## 2024-09-27 NOTE — TELEPHONE ENCOUNTER
Pts. Wife returned phone call requesting to speak with Elzbieta Carreno, pt would like a call back.    Wife spoke to Neurology and the situation is complicated and has changed over night!

## 2024-10-01 NOTE — TELEPHONE ENCOUNTER
VM left on wife's number informing her of [facility] recommendation and asking for a call back if she wants to discuss.

## 2024-10-01 NOTE — TELEPHONE ENCOUNTER
Dariana called back to follow up on previous call.  Today is day 4 of the Olanzapine.  Taiwo slept good the first 2 nights but was very agitated yesterday.  States he didn't know how to get into bed, is putting his shirt on upside down and couldn't find the bathroom.  She doesn't think he remembers what happened because his short term memory is bad.  She wanted to know if he is going to have ups and downs and if she should continue giving him the medication. He is currently on 1.25 MG of Olanzapine.  Instructed her not to make any changes until she discusses with provider at upcoming appointment.  Discussed allowing medication time to work.  Also instructed her that if Taiwo becomes agitated and she cannot manage it, she will need to call 911. Although she is hesitant to do that, she is aware that she will need to if she needs help.  Her son will be over tonight and will be able to help her. She will continue giving Taiwo the Olanzapine and will review further during upcoming appointment.

## 2024-10-01 NOTE — TELEPHONE ENCOUNTER
Pt wife contacted the office in regards to having questions/concerns and wished to discuss. Pt wife was transferred to nursing line.

## 2024-10-01 NOTE — TELEPHONE ENCOUNTER
Pt's wife called in regards to Zyprexa to give provider an update. Wife stated that she gave pt the correct dose at bedtime and had the dosages for 4 nights.    Friday and Saturday: pt took at night and slept and was getting thru the day fairly ok.  Saturday, pt inquired about the drug, pt was very agitated that wife spoke with provider without him.  Pt was not physical with wife, but verbally very agitated.  Wife was able to calm down pt later in the afternoon, and at night wife gave pt the dosage and pt was able to sleep.    Sunday: pt stated that he did not sleep Friday and Saturday and complained that the same thing that happened last year is happening to him this year.  Pt was blaming wife and arguing stating that wife is part of the problem and conspiracy.  Pt believes that Parkinson's and psychosis are separate things and believes wife is the reason for his psychosis.    Wife feels that pt is in crisis.  Pt wanted to go to the office and wife is inquiring if there are other avenues to take for him and his medication.  Their son is coming up to help with pt.    Wife is very tentative to taking  to the ER due to his dementia and cognitive health.

## 2024-10-02 ENCOUNTER — TELEPHONE (OUTPATIENT)
Dept: PSYCHIATRY | Facility: CLINIC | Age: 79
End: 2024-10-02

## 2024-10-10 ENCOUNTER — OFFICE VISIT (OUTPATIENT)
Dept: PSYCHIATRY | Facility: CLINIC | Age: 79
End: 2024-10-10
Payer: MEDICARE

## 2024-10-10 DIAGNOSIS — F06.8 PSYCHOSIS DUE TO PARKINSON'S DISEASE (HCC): Primary | ICD-10-CM

## 2024-10-10 DIAGNOSIS — R44.1 VISUAL HALLUCINATIONS: ICD-10-CM

## 2024-10-10 DIAGNOSIS — G20.A1 PSYCHOSIS DUE TO PARKINSON'S DISEASE (HCC): Primary | ICD-10-CM

## 2024-10-10 DIAGNOSIS — F22 DELUSIONS (HCC): ICD-10-CM

## 2024-10-10 PROCEDURE — G2211 COMPLEX E/M VISIT ADD ON: HCPCS | Performed by: PHYSICIAN ASSISTANT

## 2024-10-10 PROCEDURE — 99214 OFFICE O/P EST MOD 30 MIN: CPT | Performed by: PHYSICIAN ASSISTANT

## 2024-10-10 RX ORDER — OLANZAPINE 2.5 MG/1
1.25 TABLET, FILM COATED ORAL
Qty: 15 TABLET | Refills: 1 | Status: SHIPPED | OUTPATIENT
Start: 2024-10-10

## 2024-10-10 NOTE — PSYCH
"This note was not shared with the patient due to reasonable likelihood of causing patient harm    PROGRESS NOTE        [facility]      Name and Date of Birth:  Gabriel Gonzalez 79 y.o. 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 male with a history of dementia who presents today for follow-up and medication management. His wife, Dariana, also presents with him and helps with the history. His wife had called in between visits to report a significant worsening of hallucinations, confusion, and insomnia a couple of weeks ago. He was restarted on olanzapine 1.25 mg qhs about a week ago. His wife reports the sleep is improved, though pt disagrees. Pt feels he is more anxious since restarting \"the pill\". Wife reports that he is much more confused, argumentative, irritable, and has increased hallucinations. Pt disagrees and blames wife and living situation for everything. Multiple times during visit he insisted they live in a building that has multiple units with other people and dogs living there. Wife states they live in a single family home. He is still fatigued during day. About a month ago rivastigmine was stopped completely " as well.     He denies any suicidal ideation, intent or plan at present; denies any homicidal ideation, intent or plan at present.    He denies any auditory hallucinations, denies any visual hallucinations, denies any delusions.    He still reports dizziness and sedation.    HPI ROS Appetite Changes and Sleep:     He reports adequate number of sleep hours, adequate appetite, low energy    Current Rating Scores:     None completed today.     Review Of Systems:    Mood anxiety and irritability   Behavior appropriate and cooperative   Thought Content delusional thoughts and ruminating thoughts   General marital problems, emotional problems, and decreased functioning   Personality no change in personality   Other Psych Symptoms decreased memory and decreased concentration   Constitutional as noted in HPI   ENT as noted in HPI   Cardiovascular as noted in HPI   Respiratory as noted in HPI   Gastrointestinal as noted in HPI   Genitourinary as noted in HPI   Musculoskeletal as noted in HPI   Integumentary as noted in HPI   Neurological as noted in HPI   Endocrine negative   Other Symptoms none, all other systems are negative     Family Psychiatric History:     Family History   Problem Relation Age of Onset    Heart disease Mother     Heart disease Father      Social/Substance Abuse History:    Social History     Socioeconomic History    Marital status: /Civil Union     Spouse name: Not on file    Number of children: 3    Years of education: Not on file    Highest education level: Not on file   Occupational History    Occupation:    still active    Occupation: retired    Tobacco Use    Smoking status: Never    Smokeless tobacco: Never   Vaping Use    Vaping status: Never Used   Substance and Sexual Activity    Alcohol use: Not Currently     Comment: social    Drug use: No    Sexual activity: Not Currently   Other Topics Concern    Not on file   Social History Narrative    Most recent tobacco use screening:  03-    Do you currently or have you served in the [place] Armed [employer]: Yes    If Yes, What branch of service: [employer]    [employer]    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, into active duty, 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    Advance directive: Yes    Alcohol intake: Moderate    Caffeine intake: Moderate    Illicit drugs: none    Occupation: retired    Exercise level: Occasional    Single or multi-level home/work: single level home    Live alone or with others: with others    Chewing tobacco: none    Marital status:     Number of children: 3    Diet: Regular    Sexual orientation: Heterosexual    Smoke alarm in home: Yes    General stress level: Medium    Sunscreen used routinely: No    Education: Post Graduate    Guns present in home: No    Presence of domestic violence: No     Social Determinants of Health     Financial Resource Strain: Low Risk  (6/23/2023)    Overall Financial Resource Strain (CARDIA)     Difficulty of Paying Living Expenses: Not hard at all   Food Insecurity: No Food Insecurity (6/25/2024)    Hunger Vital Sign     Worried About Running Out of Food in the Last Year: Never true     Ran Out of Food in the Last Year: Never true   Transportation Needs: No Transportation Needs (6/25/2024)    PRAPARE - Transportation     Lack of Transportation (Medical): No     Lack of Transportation (Non-Medical): No   Physical Activity: Not on file   Stress: Not on file   Social Connections: Not on file   Intimate Partner Violence: Not on file   Housing Stability: Low Risk  (6/25/2024)    Housing Stability Vital Sign     Unable to Pay for Housing in the Last Year: No     Number of Times Moved in the Last Year: 1     Homeless in the Last Year: No     The following portions of the patient's history were reviewed and updated as appropriate: past family history, past medical history, past social history, past surgical history and problem list.    OBJECTIVE:     Mental Status Evaluation:  Appearance:   dressed appropriately, adequate grooming, looks older than stated age   Behavior:  pleasant, cooperative, interacts appropriately with this writer   Speech:  normal rate, normal volume, normal pitch   Mood:  anxious, irritable   Affect:  constricted, mood-congruent   Thought Process:  linear, perseverative   Associations: intact associations   Thought Content:  no overt delusions, no paranoia noted on exam   Perceptual Disturbances: denies auditory or visual hallucinations when asked, does not appear responding to internal stimuli   Risk Potential: Suicidal ideation - None  Homicidal ideation - None  Potential for aggression - No   Sensorium:  oriented to person, place, and time/date   Memory:  short term memory moderately impaired, long term memory moderately impaired   Consciousness:  alert and awake   Attention/Concentration: attention span and concentration appear shorter than expected for age   Insight:  impaired   Judgment: partial   Gait/Station: unstable gait   Motor Activity: abnormal movement noted: mild tremor present     Laboratory Results: I have personally reviewed all pertinent laboratory/tests results    Suicide/Homicide Risk Assessment:    Risk of Harm to Self:  The following ratings are based on assessment at the time of the interview  Based on today's assessment, Gabriel presents the following risk of harm to self: none    Risk of Harm to Others:  The following ratings are based on assessment at the time of the interview  Based on today's assessment, Gabriel presents the following risk of harm to others: none    The following interventions are recommended: no intervention changes needed    Assessment/Plan:      Pt had significant increase in hallucinations and confusion a couple of weeks ago and was restarted on olanzapine 1.25 mg qhs. Dariana reports this has helped with sleep and anxiety related to hallucinations but pt states it makes his anxiety worse. He has very limited insight today and is more  irritable than usual. He is off of rivastigmine completely. His wife isn't sure if it should be restarted so I will reach out to neuro to get their opinion. Pt opposed to med increase so no changes at this time but I have a low threshold to increase it or switch to Rexulti, Abilify, or Clozaril. I still recommend cutting back on work but he remains resistant. We have discussed his safety plan and he agrees that if he experience unsafe thoughts that he will reach out to his supports including this office, the suicide hotline, and emergency services if necessary. Gabriel is aware of non-emergent and emergent mental health resources. They are able to contract for their own safety at this time.    Will follow up in 3 weeks. Patient is aware to call the office if questions or concerns arise sooner.      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)  -     OLANZapine (ZyPREXA) 2.5 mg tablet; Take 0.5 tablets (1.25 mg total) by mouth daily at bedtime    Visual hallucinations  -     OLANZapine (ZyPREXA) 2.5 mg tablet; Take 0.5 tablets (1.25 mg total) by mouth daily at bedtime        Treatment Recommendations/Precautions:    Continue current medication:    - olanzapine 1.25 mg qhs    Does not want any medication changes  Aware of 24 hour and weekend coverage for urgent situations accessed by calling [facility] main practice number  Medication management every 3 weeks  I am scheduling this patient out for greater than 3 months: No    Medications Risks/Benefits      Risks, Benefits And Possible Side Effects Of Medications:    Risks, benefits, and possible side effects of medications explained to Gabriel and he verbalizes understanding and agreement for treatment.    Controlled Medication Discussion:     Not applicable    Psychotherapy Provided:      Individual psychotherapy provided: No    Treatment Plan:    Completed and signed during the session: Not applicable - Treatment Plan not due at this session    Visit Time    Visit Start Time:  2:30 PM  Visit End Time:  2:55 PM  Total Visit Duration:  25 minutes    Elzbieta Carreno 10/10/24

## 2024-10-11 ENCOUNTER — TELEPHONE (OUTPATIENT)
Dept: NEUROLOGY | Facility: CLINIC | Age: 79
End: 2024-10-11

## 2024-10-11 PROBLEM — F06.8 PSYCHOSIS DUE TO PARKINSON'S DISEASE (HCC): Status: ACTIVE | Noted: 2024-10-11

## 2024-10-11 PROBLEM — G20.A1 PSYCHOSIS DUE TO PARKINSON'S DISEASE (HCC): Status: ACTIVE | Noted: 2024-10-11

## 2024-10-11 PROBLEM — R44.1 VISUAL HALLUCINATIONS: Status: ACTIVE | Noted: 2023-11-09

## 2024-10-11 PROBLEM — F41.9 ANXIETY: Status: RESOLVED | Noted: 2020-10-07 | Resolved: 2024-10-11

## 2024-10-11 PROBLEM — F51.04 PSYCHOPHYSIOLOGICAL INSOMNIA: Status: RESOLVED | Noted: 2020-10-07 | Resolved: 2024-10-11

## 2024-10-11 NOTE — ASSESSMENT & PLAN NOTE
Recent worsening of hallucinations, confusion - restarted on olanzapine 1.25 mg qhs, continue this unchanged for time being

## 2024-10-11 NOTE — TELEPHONE ENCOUNTER
Please reach out to patient's wife Dariana and let her know that I did receive the message below from psych. Since his confusion/hallucinations/mood has changed with stopping rivastigmine I would recommend to restart medication.   I believe he was doing well on 3 mg BID, to restart I would have him take 3 mg daily for 2 weeks then increase to 3 mg BID. If they need an updated script please let me know and I can send in.     I am not sure if he is working right now or not with his current condition. But if he is I would advise to stop until we get his meds situated.       ----- Message from Elzbieta Carreno sent at 10/10/2024  6:52 PM EDT -----  Regarding: Cole Webber,    I saw Taiwo today and Dariana is wondering if you would advise restarting on Exelon. Since stopping he had a significant worsening of confusion, memory issues, and hallucinations. I restarted him on olanzapine 1.25 mg qhs (which he's not too happy about) which has at least helped with sleep and Dariana things some anxiety. Taiwo was very confused and argumentative today. If possible could your office reach out to them about starting if you think it is appropriate? I didn't feel comfortable with the dosing so defer to you.    Thanks!    Jessica

## 2024-10-11 NOTE — TELEPHONE ENCOUNTER
Outbound call made to Patient Spouse and a detailed message was left on voicemail. Psych note was not read on voicemail but she was made aware we can further discuss if she calls back in.

## 2024-10-14 ENCOUNTER — TELEPHONE (OUTPATIENT)
Age: 79
End: 2024-10-14

## 2024-10-14 NOTE — TELEPHONE ENCOUNTER
Patients spouse called to send a message to provider. Pts spouse shared got the message from Neuro and patient is back on the Rivastigmine medication. Pt had agitation during the weekend but patients spouse shared it wasn't anything that couldn't be handled. Pts spouse thanks provider for sending message over to Neuro so fast so pt can get the medication.

## 2024-10-21 NOTE — PROGRESS NOTES
Consultation - Cardiology Office  [facility].    Gabriel Gonzalez 79 y.o. male MRN: [id number]  : 1945  Unit/Bed#:  Encounter: [id number]      ASSESSMENT:  Mixed hyperlipidemia  2024: LDL 67, TG 58, HDL 54,  On atorvastatin 20 mg    High ASCVD 10-year risk score of 21.8%    Parkinson's disease, dementia, psychosis, visual hallucinations    TTE, 2024:  EF 55%, trace MR and TR      RECOMMENDATIONS:  Avoid dehydration.  Drink about 1500 to 2000 cc of total liquids every day  Regular/daily cardiovascular exercise as tolerated aiming for at least 30 minutes daily  Monitor blood pressure at home and if the systolic/upper number is less than 100, drink some fluid and increase midodrine to 2.5 mg 3 times a day  20-30 mm compression stockings, knee-high to be worn regularly during the day and off at night        Thank you for your consultation.  If you have any question please call me at [phone]      Primary Care Physician Requesting Consult: Julian Rudolph MD      Reason for Consult / Principal Problem: Cardiac evaluation        HPI :     Gabriel Gonzalez is a 79 y.o. year old male who was referred by primary care doctor for cardiac evaluation.  Patient does not have any specific known cardiac pathology.  He has Parkinson's disease, dementia and history of psychosis and visual hallucinations.  He intermittently gets brief episodes of dyspnea and weakness which are probably related to deconditioning and dehydration rather than any underlying cardiac pathology.  His echocardiogram on 2024 showed an EF of 55% with trace MR and TR    Review of Systems   Constitutional:  Positive for activity change and fatigue.   Respiratory:  Positive for shortness of breath (Episodic brief dyspnea which resolves with ambulation).    Cardiovascular:  Positive for leg swelling.   Musculoskeletal:  Positive for gait problem.   All other systems reviewed and are negative.      Historical  Information   Past Medical History:   Diagnosis Date    Anxiety 10/07/2020    Psychophysiological insomnia 10/07/2020     Past Surgical History:   Procedure Laterality Date    CYST REMOVAL      Right wrist    PROSTATE SURGERY      Biopsy    TONSILLECTOMY       Social History     Substance and Sexual Activity   Alcohol Use Not Currently    Comment: social     Social History     Substance and Sexual Activity   Drug Use No     Social History     Tobacco Use   Smoking Status Never   Smokeless Tobacco Never     Family History:   Family History   Problem Relation Age of Onset    Heart disease Mother     Heart disease Father        Meds/Allergies     No Known Allergies    Current Outpatient Medications:     atorvastatin (LIPITOR) 20 mg tablet, TAKE ONE TABLET BY MOUTH EVERY DAY, Disp: 90 tablet, Rfl: 1    carbidopa-levodopa (SINEMET)  mg per tablet, TAKE 11/2 TABLET BY MOUTH AT  7AM, TAKE 1 1/2 TABLETS AT 10:30AM, TAKE 1 1/2 TABLET AT 2:30PM, TAKE 1 TABLET AT 6:30PM, AND TAKE 1 TABLET AT 10:30PM., Disp: 540 tablet, Rfl: 1    ketoconazole (NIZORAL) 2 % cream, APPLY TWICE DAILY TO RASH FOR 7 DAYS AS NEEDED FOR FLARES, Disp: , Rfl:     ketoconazole (NIZORAL) 2 % shampoo, APPLY TO SCALP AND FACE 2 TO 3 TIMES A WEEK, LATHER AND LEAVE ON FOR 5 MINUTES, THEN RINSE, Disp: , Rfl:     midodrine (PROAMATINE) 2.5 mg tablet, Take 1 tab twice daily as needed (Patient taking differently: 2 (two) times a day Take 1 tab twice daily), Disp: 60 tablet, Rfl: 3    OLANZapine (ZyPREXA) 2.5 mg tablet, Take 0.5 tablets (1.25 mg total) by mouth daily at bedtime, Disp: 15 tablet, Rfl: 1    Vitals: Blood pressure 104/60, pulse 70, height 6' (1.829 m), weight 83.5 kg (184 lb), SpO2 98%.    Body mass index is 24.95 kg/m².  Vitals:    10/22/24 1325   Weight: 83.5 kg (184 lb)     BP Readings from Last 3 Encounters:   10/22/24 104/60   09/24/24 118/82   09/16/24 128/84       Physical Exam  PHYSICAL EXAMINATION:  Neurologic:  Alert & oriented x 3,  "no new focal deficits, Not in any acute distress,  Constitutional:  Well developed, well nourished, non-toxic appearance   Eyes:  Pupil equal and reacting to light, conjunctiva normal, No JVP, No LNP   HENT:  Atraumatic, oropharynx moist, Neck- normal range of motion, no tenderness,  Neck supple   Respiratory:  Bilateral air entry, mostly clear to auscultation  Cardiovascular: S1-S2 regular with a I/VI systolic murmur   GI:  Soft, nondistended, normal bowel sounds, nontender, no hepatosplenomegaly appreciated.  Musculoskeletal: no tenderness, no deformities.   Skin:  Well hydrated, no rash   Lymphatic:  No lymphadenopathy noted   Extremities: mild bilateral lower extremity edema    Diagnostic Studies Review Cardio:      EKG: Sinus rhythm with isolated PVC, heart rate 64/min.  Possible inferior infarct of undetermined age    Cardiac testing:     TTE, 09/06/2024:  EF 55%, trace MR and TR        Imaging:  Chest X-Ray:   XR chest 2 views    Result Date: 11/9/2023  Impression Small left pleural effusion. Workstation performed: [id number]       CT-scan of the chest:     No CTA results available for this patient.  Lab Review   Lab Results   Component Value Date    WBC 5.91 08/02/2024    HGB 14.0 08/02/2024    HCT 43.3 08/02/2024    MCV 97 08/02/2024    RDW 13.9 08/02/2024     08/02/2024     BMP:  Lab Results   Component Value Date    SODIUM 141 08/02/2024    K 3.9 08/02/2024     08/02/2024    CO2 30 08/02/2024    BUN 18 08/02/2024    CREATININE 1.02 08/02/2024    GLUC 85 03/08/2024    GLUF 95 08/02/2024    CALCIUM 9.5 08/02/2024    EGFR 70 08/02/2024    MG 2.1 03/08/2024     LFT:  Lab Results   Component Value Date    AST 14 08/02/2024    ALT 7 08/02/2024    ALKPHOS 66 08/02/2024    TP 6.7 08/02/2024    ALB 4.3 08/02/2024      Lab Results   Component Value Date    XYC4BBRNIRVL 1.041 03/08/2024     No components found for: \"TSH3\"  Lab Results   Component Value Date    HGBA1C 5.4 11/16/2022     Lipid Profile: " "  Lab Results   Component Value Date    CHOLESTEROL 133 08/02/2024    HDL 54 08/02/2024    LDLCALC 67 08/02/2024    TRIG 58 08/02/2024     Lab Results   Component Value Date    CHOLESTEROL 133 08/02/2024    CHOLESTEROL 146 03/22/2024     Lab Results   Component Value Date    CKTOTAL 63 03/08/2024     No results found for: \"NTBNP\"   No results found for this or any previous visit (from the past 672 hour(s)).        Dr. Fouzia Oakley MD, FACC      \"This note has been constructed using a voice recognition system.Therefore there may be syntax, spelling, and/or grammatical errors. Please call if you have any questions. \"  "

## 2024-10-22 ENCOUNTER — OFFICE VISIT (OUTPATIENT)
Dept: CARDIOLOGY CLINIC | Facility: CLINIC | Age: 79
End: 2024-10-22
Payer: MEDICARE

## 2024-10-22 VITALS
WEIGHT: 184 LBS | HEART RATE: 70 BPM | OXYGEN SATURATION: 98 % | HEIGHT: 72 IN | SYSTOLIC BLOOD PRESSURE: 104 MMHG | DIASTOLIC BLOOD PRESSURE: 60 MMHG | BODY MASS INDEX: 24.92 KG/M2

## 2024-10-22 DIAGNOSIS — E78.2 MIXED HYPERLIPIDEMIA: Primary | ICD-10-CM

## 2024-10-22 PROCEDURE — 99213 OFFICE O/P EST LOW 20 MIN: CPT | Performed by: INTERNAL MEDICINE

## 2024-10-22 PROCEDURE — 93000 ELECTROCARDIOGRAM COMPLETE: CPT | Performed by: INTERNAL MEDICINE

## 2024-10-24 ENCOUNTER — OFFICE VISIT (OUTPATIENT)
Dept: PSYCHIATRY | Facility: CLINIC | Age: 79
End: 2024-10-24
Payer: MEDICARE

## 2024-10-24 DIAGNOSIS — F06.8 PSYCHOSIS DUE TO PARKINSON'S DISEASE (HCC): Primary | ICD-10-CM

## 2024-10-24 DIAGNOSIS — G20.A1 PSYCHOSIS DUE TO PARKINSON'S DISEASE (HCC): Primary | ICD-10-CM

## 2024-10-24 DIAGNOSIS — R44.1 VISUAL HALLUCINATIONS: ICD-10-CM

## 2024-10-24 DIAGNOSIS — F22 DELUSIONS (HCC): ICD-10-CM

## 2024-10-24 PROCEDURE — G2211 COMPLEX E/M VISIT ADD ON: HCPCS | Performed by: PHYSICIAN ASSISTANT

## 2024-10-24 PROCEDURE — 99214 OFFICE O/P EST MOD 30 MIN: CPT | Performed by: PHYSICIAN ASSISTANT

## 2024-10-24 RX ORDER — RIVASTIGMINE TARTRATE 3 MG/1
3 CAPSULE ORAL 2 TIMES DAILY
COMMUNITY

## 2024-10-24 NOTE — PSYCH
"This note was not shared with the patient due to reasonable likelihood of causing patient harm    PROGRESS NOTE        [facility]      Name and Date of Birth:  Gabriel Gonzalez 79 y.o. 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 male with a history of dementia who presents today for follow-up and medication management. His wife, Dariana, also presents with him and helps with the history. Since his last visit he reports he doesn't feel much different. He feels his sleep \"depends\" on whether he is required to volunteer for a study where they \"watch\" the extramarital beds for 8 hours \"after work\". He states he has to sleep in a \"communal\" bed on these nights. He does not know who \"they are\". Dariana reports his sleep seems improved from her perspective and he seems less bothered by the hallucinations and less anxious. They both report work has been somewhat stressful of late but he is still reluctant to pull back further. They did re-start rivastigmine and Taiwo feels there's no change but Dariana feels there is some benefit. They both report that the dizziness, imbalance, and fatigue have " not worsened at all with the re-introduction of the medications. They are participating in a home health program where once a week for 6 weeks someone will come in to work with Taiwo on mental and physical exercises and do a small health assessment.     He denies any suicidal ideation, intent or plan at present; denies any homicidal ideation, intent or plan at present.    He denies any auditory hallucinations, denies any visual hallucinations, denies any delusions.    He still reports dizziness and sedation.    HPI ROS Appetite Changes and Sleep:     He reports adequate number of sleep hours, adequate appetite, low energy    Current Rating Scores:     None completed today.     Review Of Systems:    Mood anxiety   Behavior appropriate, cooperative, and calm   Thought Content delusional thoughts   General normal    Personality no change in personality   Other Psych Symptoms decreased memory and decreased concentration   Constitutional as noted in HPI   ENT as noted in HPI   Cardiovascular as noted in HPI   Respiratory as noted in HPI   Gastrointestinal as noted in HPI   Genitourinary as noted in HPI   Musculoskeletal as noted in HPI   Integumentary as noted in HPI   Neurological as noted in HPI   Endocrine negative   Other Symptoms none, all other systems are negative     Family Psychiatric History:     Family History   Problem Relation Age of Onset    Heart disease Mother     Heart disease Father      Social/Substance Abuse History:    Social History     Socioeconomic History    Marital status: /Civil Union     Spouse name: Not on file    Number of children: 3    Years of education: Not on file    Highest education level: Not on file   Occupational History    Occupation:    still active    Occupation: retired    Tobacco Use    Smoking status: Never    Smokeless tobacco: Never   Vaping Use    Vaping status: Never Used   Substance and Sexual Activity    Alcohol use: Not Currently     Comment: social     Drug use: No    Sexual activity: Not Currently   Other Topics Concern    Not on file   Social History Narrative    Most recent tobacco use screenin-    Do you currently or have you served in the [place] Armed Forces: Yes    If Yes, What branch of service: [employer]    [employer]    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, into active duty, 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    Advance directive: Yes    Alcohol intake: Moderate    Caffeine intake: Moderate    Illicit drugs: none    Occupation: retired    Exercise level: Occasional    Single or multi-level home/work: single level home    Live alone or with others: with others    Chewing tobacco: none    Marital status:     Number of children: 3    Diet: Regular    Sexual orientation: Heterosexual    Smoke alarm in home: Yes    General stress level: Medium    Sunscreen used routinely: No    Education: Post Graduate    Guns present in home: No    Presence of domestic violence: No     Social Determinants of Health     Financial Resource Strain: Low Risk  (2023)    Overall Financial Resource Strain (CARDIA)     Difficulty of Paying Living Expenses: Not hard at all   Food Insecurity: No Food Insecurity (2024)    Nursing - Inadequate Food Risk Classification     Worried About Running Out of Food in the Last Year: Never true     Ran Out of Food in the Last Year: Never true     Ran Out of Food in the Last Year: Not on file   Transportation Needs: No Transportation Needs (2024)    PRAPARE - Transportation     Lack of Transportation (Medical): No     Lack of Transportation (Non-Medical): No   Physical Activity: Not on file   Stress: Not on file   Social Connections: Not on file   Intimate Partner Violence: Not on file   Housing Stability: Low Risk  (2024)    Housing Stability Vital Sign     Unable to Pay for Housing in the Last Year: No     Number of Times Moved in the Last Year: 1     Homeless in the Last Year: No     The following portions of  the patient's history were reviewed and updated as appropriate: past family history, past medical history, past social history, past surgical history and problem list.    OBJECTIVE:     Mental Status Evaluation:  Appearance:  dressed appropriately, adequate grooming, looks older than stated age   Behavior:  pleasant, cooperative, calm, interacts appropriately with this writer   Speech:  normal rate, normal volume, normal pitch   Mood:  less anxious, less irritable   Affect:  constricted, slightly brighter   Thought Process:  goal directed, linear   Associations: intact associations   Thought Content:  no overt delusions, no paranoia noted on exam   Perceptual Disturbances: denies auditory or visual hallucinations when asked, does not appear responding to internal stimuli   Risk Potential: Suicidal ideation - None  Homicidal ideation - None  Potential for aggression - No   Sensorium:  oriented to person, place, and time/date   Memory:  short term memory moderately impaired, long term memory moderately impaired   Consciousness:  alert and awake   Attention/Concentration: attention span and concentration appear shorter than expected for age   Insight:  impaired   Judgment: partial   Gait/Station: unstable gait   Motor Activity: abnormal movement noted: mild tremor present     Laboratory Results: I have personally reviewed all pertinent laboratory/tests results    Suicide/Homicide Risk Assessment:    Risk of Harm to Self:  The following ratings are based on assessment at the time of the interview  Based on today's assessment, Gabriel presents the following risk of harm to self: none    Risk of Harm to Others:  The following ratings are based on assessment at the time of the interview  Based on today's assessment, Gabriel presents the following risk of harm to others: none    The following interventions are recommended: no intervention changes needed    Assessment/Plan:      He is less anxious and irritable today and Dariana  "feels he is overall less \"bothered\" by the hallucinations and delusions. He did restart rivastigmine from neuro recently and Dariana feels this went well. Taiwo doesn't think it did anything. They can continue to follow neuro recs for this and continue on olanzapine 1.25 mg qhs unchanged. He is not appropriate for therapy as this time. We have discussed his safety plan and he agrees that if he experience unsafe thoughts that he will reach out to his supports including this office, the suicide hotline, and emergency services if necessary. Gabriel is aware of non-emergent and emergent mental health resources. They are able to contract for their own safety at this time.    Will follow up in 3 weeks. Patient is aware to call the office if questions or concerns arise sooner.      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)    Visual hallucinations    Delusions (HCC)    Other orders  -     rivastigmine (EXELON) 3 mg capsule; Take 3 mg by mouth 2 (two) times a day        Treatment Recommendations/Precautions:    Continue current medication:    - olanzapine 1.25 mg qhs    Does not want any medication changes  Aware of 24 hour and weekend coverage for urgent situations accessed by calling [facility] main practice number  Medication management every 3 weeks  I am scheduling this patient out for greater than 3 months: No    Medications Risks/Benefits      Risks, Benefits And Possible Side Effects Of Medications:    Risks, benefits, and possible side effects of medications explained to Gabriel and he verbalizes understanding and agreement for treatment.    Controlled Medication Discussion:     Not applicable    Psychotherapy Provided:     Individual psychotherapy provided: No    Treatment Plan:    Completed and signed during the session: Yes - with Gabriel    Visit Time    Visit Start Time:  3:00 PM  Visit End Time:  3:20 PM  Total Visit Duration:  20 " minutes    Elzbieta Carreno 10/24/24

## 2024-10-24 NOTE — ASSESSMENT & PLAN NOTE
Improving but not at goal - continue olanzapine 1.25 mg qhs; working with neuro to increase rivastigmine and starting home health program

## 2024-10-24 NOTE — BH TREATMENT PLAN
TREATMENT PLAN (Medication Management Only)        [facility]    Name and Date of Birth:  Gabriel Gonzalez 79 y.o. 1945  Date of Treatment Plan: October 24, 2024  Diagnosis/Diagnoses:    1. 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 Visual hallucinations    3. Delusions (HCC)      Strengths/Personal Resources for Self-Care: supportive family, taking medications as prescribed, motivation for treatment, willingness to work on problems.  Area/Areas of need (in own words): hallucinations  1. Long Term Goal: decrease hallucinations.  Target Date:6 months - 4/24/2025  Person/Persons responsible for completion of goal: Gabriel  2.  Short Term Objective (s) - How will we reach this goal?:   A. Provider new recommended medication/dosage changes and/or continue medication(s): continue current medications as prescribed.  B. N/A.  C. N/A.  Target Date:6 months - 4/24/2025  Person/Persons Responsible for Completion of Goal: Gabriel  Progress Towards Goals: continuing treatment  Treatment Modality: medication management every 4 weeks  Review due 180 days from date of this plan: 6 months - 4/24/2025  Expected length of service: ongoing treatment  My Physician/PA/NP and I have developed this plan together and I agree to work on the goals and objectives. I understand the treatment goals that were developed for my treatment.

## 2024-10-28 ENCOUNTER — OFFICE VISIT (OUTPATIENT)
Dept: UROLOGY | Facility: AMBULATORY SURGERY CENTER | Age: 79
End: 2024-10-28

## 2024-10-28 VITALS
SYSTOLIC BLOOD PRESSURE: 122 MMHG | DIASTOLIC BLOOD PRESSURE: 78 MMHG | OXYGEN SATURATION: 98 % | HEART RATE: 63 BPM | HEIGHT: 72 IN | WEIGHT: 196 LBS | BODY MASS INDEX: 26.55 KG/M2

## 2024-10-28 DIAGNOSIS — N40.1 BENIGN PROSTATIC HYPERPLASIA WITH URINARY FREQUENCY: Primary | ICD-10-CM

## 2024-10-28 DIAGNOSIS — R35.0 BENIGN PROSTATIC HYPERPLASIA WITH URINARY FREQUENCY: Primary | ICD-10-CM

## 2024-10-28 LAB
POST-VOID RESIDUAL VOLUME, ML POC: 247 ML
SL AMB  POCT GLUCOSE, UA: NORMAL
SL AMB LEUKOCYTE ESTERASE,UA: NORMAL
SL AMB POCT BILIRUBIN,UA: NORMAL
SL AMB POCT BLOOD,UA: NORMAL
SL AMB POCT CLARITY,UA: CLEAR
SL AMB POCT COLOR,UA: NORMAL
SL AMB POCT KETONES,UA: NORMAL
SL AMB POCT NITRITE,UA: NORMAL
SL AMB POCT PH,UA: 5
SL AMB POCT SPECIFIC GRAVITY,UA: 1.03
SL AMB POCT URINE PROTEIN: NORMAL
SL AMB POCT UROBILINOGEN: 0.2

## 2024-10-28 NOTE — PROGRESS NOTES
Assessment and plan:     Benign prostatic hyperplasia with urinary frequency  Cystoscopy with Dr. Ji on 5/11/2023 demonstrated significant bilateral lobar hypertrophy without a median lobe but significant prostatic extrusion into the bladder, TRUS 100 g  HOLEP/prostatectomy discussed at last visit but patient and wife remain hesitant due to underlying Parkinson's disease    mL mL in office today   Had consultation with interventional radiology regarding prostate artery embolization, during the consultation the patient stated that he would think about the procedure more and contact the office if he became interested in the future  He is still hesitant to proceed with any procedure for bladder outlet obstruction, per the wife neurology is not recommending proceeding with any general anesthesia procedures, they did tell him that it would be okay for him to undergo twilight sedation for prostate artery embolization, they are going to go home and rediscuss and decide if he wants to proceed with prostate artery embolization, if he decides to proceed with procedure would recommend following up 3 months post PAE, if he does not wish to proceed would recommend following up in 6 months    I did have an extensive discussion today with Taiwo and his wife regarding his bothersome urinary symptoms.  I explained to him that his PVR continues to be elevated.  I did explain to him that with an elevated PVR does put him at an increased risk for both hydronephrosis and UTIs.  I am recommending that he proceed with the prostate artery embolization for bladder outlet obstruction.  He is very hesitant to proceed with this.  I did explain to him that if he wishes to not proceed  there might be a role for a long-term catheter in the future to help with drainage of his bladder.  I did also explain to him that if he got the PAE he might still be retaining urine due to neurogenic reasons related to his Parkinson's.  Both him and his  "wife are understanding of this.  All questions and concerns were answered at today's office visit.      History of Present Illness     Gabriel Gonzalez is a 79 y.o. male who presents today to the office for follow-up of BPH with LUTS.  He was last seen in April 2024.  He is known to Dr. Ji.  He did have a cystoscopy with Dr. Ji in May 2023 which did demonstrate a bilateral lobar hypertrophy without median lobe but significant prostatic extrusion into the bladder, prostate size of 100 g.  He would not be a candidate for HoLEP/prostatectomy due to neurology not recommending any surgery due to his underlying Parkinson's.  It was recommended that he undergo a prostate artery embolization.  He had a consultation with interventional radiology regarding this and he wishes to think about this longer.  He has continued with urinary urgency and frequency which is affecting his activities of daily living.  Per his wife she states that he only leaves the house for doctors office visits and to go to his office.  He does feel as though he empties out his bladder effectively.  He denies any weakened urinary stream or hesitancy.  He denies any hematuria, dysuria, suprapubic or flank pain.    Laboratory     Lab Results   Component Value Date    BUN 18 08/02/2024    CREATININE 1.02 08/02/2024       No components found for: \"GFR\"    Lab Results   Component Value Date    CALCIUM 9.5 08/02/2024    K 3.9 08/02/2024    CO2 30 08/02/2024     08/02/2024       Lab Results   Component Value Date    WBC 5.91 08/02/2024    HGB 14.0 08/02/2024    HCT 43.3 08/02/2024    MCV 97 08/02/2024     08/02/2024       Lab Results   Component Value Date    PSA 13.45 (H) 03/22/2024    PSA 11.9 (H) 11/16/2022    PSA 7.6 (H) 07/27/2021       Recent Results (from the past 1 hour(s))   POCT urine dip    Collection Time: 10/28/24  3:44 PM   Result Value Ref Range    LEUKOCYTE ESTERASE,UA -     NITRITE,UA -     SL AMB POCT UROBILINOGEN 0.2     " POCT URINE PROTEIN -      PH,UA 5.0     BLOOD,UA -     SPECIFIC GRAVITY,UA 1.030     KETONES,UA -     BILIRUBIN,UA -     GLUCOSE, UA -      COLOR,UA dark yellow     CLARITY,UA clear    POCT Measure PVR    Collection Time: 10/28/24  3:44 PM   Result Value Ref Range    POST-VOID RESIDUAL VOLUME, ML  mL       Review of Systems     Review of Systems   Constitutional:  Negative for chills and fever.   Respiratory: Negative.  Negative for cough and shortness of breath.    Cardiovascular: Negative.  Negative for chest pain.   Gastrointestinal: Negative.  Negative for abdominal distention, abdominal pain, nausea and vomiting.   Genitourinary:  Negative for decreased urine volume, difficulty urinating, dysuria, flank pain, frequency, hematuria, penile discharge, penile pain, penile swelling, scrotal swelling, testicular pain and urgency.   Skin: Negative.  Negative for rash.   Neurological: Negative.    Hematological:  Negative for adenopathy. Does not bruise/bleed easily.                 Allergies     No Known Allergies    Physical Exam     Physical Exam  Vitals reviewed.   Constitutional:       Appearance: Normal appearance.   HENT:      Head: Normocephalic and atraumatic.   Eyes:      Pupils: Pupils are equal, round, and reactive to light.   Cardiovascular:      Rate and Rhythm: Normal rate.   Pulmonary:      Effort: Pulmonary effort is normal.   Abdominal:      General: Abdomen is flat.      Palpations: Abdomen is soft.   Musculoskeletal:      Cervical back: Normal range of motion.   Skin:     General: Skin is warm and dry.   Neurological:      General: No focal deficit present.      Mental Status: He is alert and oriented to person, place, and time.   Psychiatric:         Mood and Affect: Mood normal.         Behavior: Behavior normal.         Thought Content: Thought content normal.         Judgment: Judgment normal.         Vital Signs     Vitals:    10/28/24 1531   BP: 122/78   BP Location: Left arm   Patient  Position: Sitting   Cuff Size: Standard   Pulse: 63   SpO2: 98%   Weight: 88.9 kg (196 lb)   Height: 6' (1.829 m)       Current Medications       Current Outpatient Medications:     atorvastatin (LIPITOR) 20 mg tablet, TAKE ONE TABLET BY MOUTH EVERY DAY, Disp: 90 tablet, Rfl: 1    carbidopa-levodopa (SINEMET)  mg per tablet, TAKE 11/2 TABLET BY MOUTH AT  7AM, TAKE 1 1/2 TABLETS AT 10:30AM, TAKE 1 1/2 TABLET AT 2:30PM, TAKE 1 TABLET AT 6:30PM, AND TAKE 1 TABLET AT 10:30PM., Disp: 540 tablet, Rfl: 1    ketoconazole (NIZORAL) 2 % cream, APPLY TWICE DAILY TO RASH FOR 7 DAYS AS NEEDED FOR FLARES, Disp: , Rfl:     ketoconazole (NIZORAL) 2 % shampoo, APPLY TO SCALP AND FACE 2 TO 3 TIMES A WEEK, LATHER AND LEAVE ON FOR 5 MINUTES, THEN RINSE, Disp: , Rfl:     midodrine (PROAMATINE) 2.5 mg tablet, Take 1 tab twice daily as needed (Patient taking differently: 2 (two) times a day Take 1 tab twice daily), Disp: 60 tablet, Rfl: 3    OLANZapine (ZyPREXA) 2.5 mg tablet, Take 0.5 tablets (1.25 mg total) by mouth daily at bedtime, Disp: 15 tablet, Rfl: 1    rivastigmine (EXELON) 3 mg capsule, Take 3 mg by mouth 2 (two) times a day, Disp: , Rfl:     Active Problems     Patient Active Problem List   Diagnosis    REM behavioral disorder    Parkinson's disease (HCC)    Orthostatic hypotension    Benign prostatic hyperplasia with urinary frequency    Hip pain, chronic, right    Mixed hyperlipidemia    Unsteady gait    Right leg weakness    Elevated PSA    Stage 3a chronic kidney disease (HCC)    Visual hallucinations    Delusions (HCC)    Dementia due to Parkinson's disease, with psychotic disturbance (HCC)    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)       Past Medical History     Past Medical History:   Diagnosis Date    Anxiety 10/07/2020    Psychophysiological insomnia 10/07/2020       Surgical History     Past Surgical History:   Procedure Laterality Date    CYST REMOVAL      Right wrist    PROSTATE SURGERY      Biopsy     TONSILLECTOMY         Family History     Family History   Problem Relation Age of Onset    Heart disease Mother     Heart disease Father        Social History     Social History     Social History     Tobacco Use   Smoking Status Never   Smokeless Tobacco Never       Past Surgical History:   Procedure Laterality Date    CYST REMOVAL      Right wrist    PROSTATE SURGERY      Biopsy    TONSILLECTOMY           The following portions of the patient's history were reviewed and updated as appropriate: allergies, current medications, past family history, past medical history, past social history, past surgical history and problem list    Please note :  Voice dictation software has been used to create this document.  There may be inadvertent transcription errors.    OFELIA Reeves

## 2024-10-28 NOTE — ASSESSMENT & PLAN NOTE
Cystoscopy with Dr. Ji on 5/11/2023 demonstrated significant bilateral lobar hypertrophy without a median lobe but significant prostatic extrusion into the bladder, TRUS 100 g  HOLEP/prostatectomy discussed at last visit but patient and wife remain hesitant due to underlying Parkinson's disease    mL mL in office today   Had consultation with interventional radiology regarding prostate artery embolization, during the consultation the patient stated that he would think about the procedure more and contact the office if he became interested in the future  He is still hesitant to proceed with any procedure for bladder outlet obstruction, per the wife neurology is not recommending proceeding with any general anesthesia procedures, they did tell him that it would be okay for him to undergo twilight sedation for prostate artery embolization, they are going to go home and rediscuss and decide if he wants to proceed with prostate artery embolization, if he decides to proceed with procedure would recommend following up 3 months post PAE, if he does not wish to proceed would recommend following up in 6 months    I did have an extensive discussion today with Taiwo and his wife regarding his bothersome urinary symptoms.  I explained to him that his PVR continues to be elevated.  I did explain to him that with an elevated PVR does put him at an increased risk for both hydronephrosis and UTIs.  I am recommending that he proceed with the prostate artery embolization for bladder outlet obstruction.  He is very hesitant to proceed with this.  I did explain to him that if he wishes to not proceed  there might be a role for a long-term catheter in the future to help with drainage of his bladder.  I did also explain to him that if he got the PAE he might still be retaining urine due to neurogenic reasons related to his Parkinson's.  Both him and his wife are understanding of this.  All questions and concerns were answered at  today's office visit.

## 2024-11-01 DIAGNOSIS — F51.04 PSYCHOPHYSIOLOGICAL INSOMNIA: ICD-10-CM

## 2024-11-01 RX ORDER — ATORVASTATIN CALCIUM 20 MG/1
TABLET, FILM COATED ORAL
Qty: 90 TABLET | Refills: 1 | Status: SHIPPED | OUTPATIENT
Start: 2024-11-01

## 2024-11-13 ENCOUNTER — TELEPHONE (OUTPATIENT)
Dept: PSYCHIATRY | Facility: CLINIC | Age: 79
End: 2024-11-13

## 2024-11-13 ENCOUNTER — TELEPHONE (OUTPATIENT)
Age: 79
End: 2024-11-13

## 2024-11-13 NOTE — TELEPHONE ENCOUNTER
"This note was not shared with the patient due to reasonable likelihood of causing patient harm     Spoke to wife, she reports in the last couple of weeks pt has had more hallucinations and delusions. She noticed that this was this time last year that things first started and got bad. She is wondering if it has something to do with daylight savings time. She is wondering if it is a severe form of \"sundowning\". She states he is more paranoid about her being \"part of it\" now. He feels work is \"keeping him alive\". He is not doing any of the physical things he is supposed to do. Meds unchanged but neuro did say they could increase Ex[employer]. She is upset and stressed by this as well.   "

## 2024-11-13 NOTE — TELEPHONE ENCOUNTER
Patients wife called in requesting a call back from provide.     Patients wife would like provider to know that patient has had severe increase in hallucination and delusion in the last weeks that is causing him a lot of anxiety.     Patients wife also stated she sent a msg to neuro and got a response yesterday regarding increasing medication and that's something she would like to discuss with provider before upcoming appt on 11/21 @12:30pm

## 2024-11-19 DIAGNOSIS — G20.A1 PARKINSON'S DISEASE (HCC): ICD-10-CM

## 2024-11-19 RX ORDER — MIDODRINE HYDROCHLORIDE 2.5 MG/1
TABLET ORAL
Qty: 90 TABLET | Refills: 3 | Status: SHIPPED | OUTPATIENT
Start: 2024-11-19

## 2024-12-05 ENCOUNTER — EVALUATION (OUTPATIENT)
Dept: PHYSICAL THERAPY | Facility: CLINIC | Age: 79
End: 2024-12-05
Payer: MEDICARE

## 2024-12-05 DIAGNOSIS — R29.3 POSTURAL INSTABILITY: ICD-10-CM

## 2024-12-05 DIAGNOSIS — R29.6 FALLS: ICD-10-CM

## 2024-12-05 DIAGNOSIS — G20.A1 PARKINSON'S DISEASE WITHOUT DYSKINESIA OR FLUCTUATING MANIFESTATIONS (HCC): ICD-10-CM

## 2024-12-05 PROCEDURE — 97112 NEUROMUSCULAR REEDUCATION: CPT | Performed by: PHYSICAL THERAPIST

## 2024-12-05 PROCEDURE — 97162 PT EVAL MOD COMPLEX 30 MIN: CPT | Performed by: PHYSICAL THERAPIST

## 2024-12-05 PROCEDURE — 97530 THERAPEUTIC ACTIVITIES: CPT | Performed by: PHYSICAL THERAPIST

## 2024-12-05 NOTE — PROGRESS NOTES
PT Evaluation     Today's date: 2024  Patient name: Gabriel Gonzalez  : 1945  MRN: [id number]  Referring provider: Lawanda Joshi CRNP  Dx:   Encounter Diagnosis     ICD-10-CM    1. Parkinson's disease without dyskinesia or fluctuating manifestations (HCC)  G20.A1 Ambulatory Referral to Physical Therapy      2. Falls  R29.6 Ambulatory Referral to Physical Therapy      3. Postural instability  R29.3 Ambulatory Referral to Physical Therapy          Start Time: 1530  Stop Time: 1630  Total time in clinic (min): 60 minutes    Assessment  Impairments: abnormal gait, abnormal or restricted ROM, activity intolerance, impaired balance, impaired physical strength, lacks appropriate home exercise program, pain with function, weight-bearing intolerance, poor posture , poor body mechanics, participation limitations, activity limitations and endurance    Assessment details: Patient is a 79 y.o. male who presents to outpatient PT with reports of balance and postural impairments since diagnosis of Parkinson's many years ago. He reports neck drop has worsened more recently over the past 2-3 months. Overall postural limitations and balance impairments affect his walking mechanics and stability with daily walking, standing, and other ADLs. Weakness noted in LE musculature, especially glute musculature with inability to perform hip extension or supine bridges. Limitations in cervical Extension and Rotation mobility, which affects overall scanning environment for safety and maintaining neutral neck position for dressing, eating, etc. Some coordination deficits noted as well with Heel-shin and finger-nose testing. He also exhibits some resting tremors in hand as related to Parkinson's. His gait and overall posture coincide with Parkinsonian-like characteristics. However, exhibits significant limping on Right LE due to muscle imbalance and glute weakness that poses as a falls risk as it contributes to more instability in  his stride. Unable to assess 5x sit-stand test as patient had difficulty comprehending sequence and technique with testing. Overall, his gait and balance impairments indicates an increased falls risk. He has had about 2 falls in the past year, mainly with anterior weight-shifting perturbation/activities. He lives with his wife who assists him with ADLs as needed. Patient will benefit from skilled PT services to work on balance training, strength training, postural training, and overall safety with walking and transfers to improve independence with ADLs and reduce falls risk. Patient would benefit from skilled PT services to address these impairments and to maximize function.  Thank you for the referral.    Barriers to therapy: Chronicity of symptoms  Barriers to intervention: medical complexity  Understanding of Dx/Px/POC: good     Prognosis: fair    Goals  Impairment Goals 4-6 weeks   In order to maximize function patient will be able to...   - Demonstrate symmetrical cervical AROM without pain  - Increase LE/UE strength to 4/5 throughout  - Demonstrate improved hip/cervical flexibility as demonstrated by increased ROM through therapeutic exercise  - Improve TUG time to <13 sec to indicate reduce falls risk    Functional Goals 6-8 weeks  In order to return to prior level of function patient will be able to...   - Participate in ADL's/IADL's/sport specific activities with no greater than 2/10 pain.    - Increase Functional Status Measure (FOTO) to: anticipated at discharge  - Demonstrate independence and compliant with HEP  - Patient will be able to demonstrate good gait mechanics without compensations.   - Demonstrate sit to stand with good mechanics and eccentric control without pain/difficulty/compensation    Plan  Patient would benefit from: skilled PT  Planned modality interventions: cryotherapy and electrical stimulation/Russian stimulation  Other planned modality interventions: moist heat    Planned therapy  interventions: joint mobilization, manual therapy, neuromuscular re-education, patient education, strengthening, stretching, therapeutic activities, therapeutic exercise, home exercise program, functional ROM exercises, Bales taping, postural training, balance/weight bearing training, body mechanics training, flexibility, IASTM, kinesiology taping, massage, nerve gliding, transfer training and gait training    Frequency: 1-2x week  Duration in weeks: 4  Treatment plan discussed with: patient, PTA, referring physician and family        Subjective Evaluation    History of Present Illness  Mechanism of injury: Gabriel Gonzalez is a 79 y.o. year-old male who presents to outpatient PT accompanied by his wife Dariana. Patient reports diagnosed with Parkinson's many years ago and has attended PT at different location for neuro rehab/balance training. Patient's wife reports his neck drop and cervical stiffness started about 2-3 months ago. Per his wife, it has progressively worsened over the past few months. He also has history of frequent falls, noting 2 falls in the past year but no injuries from falls. Patient reports he is independent with most ADLs, however wife assists with dressing and driving. Patient is currently not driving per physician recommendation and family precaution. Patient is able to perform all other Adls and iADLs on his own. Grab bars present in bathroom. Patient does not use assistive device for ambulation. They have a cane at home from wife's previous injury.  Quality of life: good    Patient Goals  Patient goals for therapy: decreased pain, increased motion, improved balance, increased strength, independence with ADLs/IADLs and return to sport/leisure activities    Pain  Current pain ratin  At best pain ratin  At worst pain ratin  Location: neck  Quality: tight  Relieving factors: heat  Aggravating factors: sitting, standing, stair climbing, walking and lifting  Progression:  worsening    Social Support  Steps to enter house: yes  Stairs in house: yes   Lives with: spouse    Employment status: working (, own firm)  Treatments  Previous treatment: physical therapy  Current treatment: physical therapy        Objective     Palpation   Left   Muscle spasm in the levator scapulae, pectoralis major, pectoralis minor, scalenes, sternocleidomastoid, suboccipitals and upper trapezius.   Tenderness of the levator scapulae and upper trapezius.     Right   Muscle spasm in the levator scapulae, pectoralis major, pectoralis minor, scalenes, sternocleidomastoid, suboccipitals and upper trapezius.   Tenderness of the levator scapulae and upper trapezius.     Active Range of Motion   Cervical/Thoracic Spine       Cervical    Flexion: 50 degrees   Extension: 20 degrees      Left lateral flexion: 20 degrees      Right lateral flexion: 20 degrees      Left rotation: 60 (cuing to avoid cervical flexion with rotation) degrees  Right rotation: 60 (cuing to avoid cervical flexion with rotation) degrees       Strength/Myotome Testing   Cervical Spine   Neck extension: 3-  Neck flexion: 3+    Left   Neck lateral flexion (C3): 3+    Right   Neck lateral flexion (C3): 3+    Left Shoulder     Planes of Motion   Flexion: 3+   Abduction: 3+   External rotation at 0°: 3+   Internal rotation at 0°: 4-     Right Shoulder     Planes of Motion   Flexion: 3+   Abduction: 3+   External rotation at 0°: 3+   Internal rotation at 0°: 4-     Left Elbow   Flexion: 3+  Extension: 3+    Right Elbow   Flexion: 3+  Extension: 3+    Left Hip   Planes of Motion   Flexion: 3+  Extension: 2  Abduction: 4-  External rotation: 4-    Right Hip   Planes of Motion   Flexion: 3+  Extension: 2  Abduction: 4-  External rotation: 4-    Left Knee   Flexion: 4-  Extension: 4-    Right Knee   Flexion: 4-  Extension: 4-    Left Ankle/Foot   Dorsiflexion: 4-  Plantar flexion: 4-    Right Ankle/Foot   Dorsiflexion: 4-  Plantar flexion: 4-  Neuro  Exam:     Sensation   Light touch LE: left WNL and right WNL    Coordination   Heel to shin: left dysmetric and right dysmetric  Finger to nose: left dysmetria (4/5 trials correct) and right dysmetria (2/5 trials correct)  Rapid alternating movements: UE WNL    Transfers   Sit to stand: minimum assist (needs 2 UE support, close supervision)     Functional outcomes   5x sit to stand: unable (seconds)  TUG: 15 (seconds)  Functional outcome comment: 5x sit-stand unable to test due to patient unable to comprehend test  Functional outcome gait comment: Patient walks without assistive device, exhibits significant limp on Right LE with decreased Right LE stance phase and early Right toe-off in terminal stance, tends to exhibit knee flexion early end of mid-stance and terminal stance; decreased Left knee flexion in swing with decreased forward advancement of LE noted bilaterally; decreased arm and trunk rotation noted with overall guarded/stiff UE posture; forward head posture noted through with difficulty maintaining neutral cervical spine position while walking or standing              POC Expires Auth Status Start Date Expiration Date PT Visit Limit    1/5/2025 No Auth Req   BOMN   Date 12/5/2024       Used 1       Remaining           Diagnosis: Parkinson's disease; frequent falls; neck drop   Precautions: chronic Right hip pain, orthostatic hypotension, anxiety, 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        Leg Press        Obstacle negotiation        OH shelf taps                Pt Ed HEP, POC       Re-Evaluation             Modalities             Heat/ice (PRN)

## 2024-12-06 ENCOUNTER — TELEPHONE (OUTPATIENT)
Dept: PSYCHIATRY | Facility: CLINIC | Age: 79
End: 2024-12-06

## 2024-12-06 DIAGNOSIS — G20.A1 PSYCHOSIS DUE TO PARKINSON'S DISEASE (HCC): ICD-10-CM

## 2024-12-06 DIAGNOSIS — R44.1 VISUAL HALLUCINATIONS: ICD-10-CM

## 2024-12-06 DIAGNOSIS — F22 DELUSIONS (HCC): ICD-10-CM

## 2024-12-06 DIAGNOSIS — F06.8 PSYCHOSIS DUE TO PARKINSON'S DISEASE (HCC): ICD-10-CM

## 2024-12-06 RX ORDER — OLANZAPINE 2.5 MG/1
2.5 TABLET, FILM COATED ORAL
Qty: 30 TABLET | Refills: 1 | Status: SHIPPED | OUTPATIENT
Start: 2024-12-06 | End: 2025-02-04

## 2024-12-06 NOTE — TELEPHONE ENCOUNTER
Patient's wife called the RX Refill Line. Message is being forwarded to the office.     Pt's wife said that he takes the olanzapine differently then the directions on the bottle, she said that he takes a whole tablet daily, not a half and so he runs out of medication early    She's hoping a new script can be sent saying it takes one daily   She'd like the script to be sent to [facility] file     Please contact Dariana at  if any questions

## 2024-12-06 NOTE — TELEPHONE ENCOUNTER
Returned Dariana's call to ask why Olanzapine dosing was increased to a whole tablet and how long ago it was increased.  Dariana said it was around October but she does not remember why.  States that dosing was up and down but he is doing well on a whole tablet and she is afraid to decrease him back down to a half again.  The last time she filled the script she realized the mistake but didn't want to decrease it back down again.  Taiwo does not have enough until next appointment and she is requesting that another script be sent with a dose increase to 2.5 MG daily at bedtime.    Will refer to Elzbieta Carreno for review.

## 2024-12-10 ENCOUNTER — OFFICE VISIT (OUTPATIENT)
Dept: PHYSICAL THERAPY | Facility: CLINIC | Age: 79
End: 2024-12-10
Payer: MEDICARE

## 2024-12-10 DIAGNOSIS — G20.A1 PARKINSON'S DISEASE WITHOUT DYSKINESIA OR FLUCTUATING MANIFESTATIONS (HCC): Primary | ICD-10-CM

## 2024-12-10 DIAGNOSIS — R29.6 FALLS: ICD-10-CM

## 2024-12-10 PROCEDURE — 97110 THERAPEUTIC EXERCISES: CPT

## 2024-12-10 PROCEDURE — 97112 NEUROMUSCULAR REEDUCATION: CPT

## 2024-12-10 NOTE — PROGRESS NOTES
"Daily Note     Today's date: 12/10/2024  Patient name: Gabriel Gonzalez  : 1945  MRN: [id number]  Referring provider: Lawanda Joshi CRNP  Dx:   Encounter Diagnosis     ICD-10-CM    1. Parkinson's disease without dyskinesia or fluctuating manifestations (HCC)  G20.A1       2. Falls  R29.6                      Subjective: Pt states that he has nothing new to report.      Objective: See treatment diary below      Assessment: Tolerated treatment well.  Focsued on CS ROM along with scapular and LE strengthening.  Pt most challenged with chin tucks and scap squeezes.  Multiple cues given to improve posture and CS extension during seated ex's.   Fair carryover is noted.  Cues are also needed for slow and controlled movements.  No c/o increased pain or difficulty throughout.  Pt had most difficulty with glut activation, so HEP updated with printout given so he could practice at home.  Pt progressing slowly towards his goals and will benefit from continued therapy..      Plan: Continue per plan of care.         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      Chin Tucks  10x seat: 10x supine      Cervical Rot AROM  10x ea      CS ext  10x      Mid Rows  Blue 20x      Shoulder Ext  Blue 20x    "   Wobble Board        SLS                         Ther Activity              Nu Step             Gait Training in clinic, w/ SPC?        Leg Press        Obstacle negotiation        OH shelf taps                Pt Ed HEP, POC HEP      Re-Evaluation             Modalities             Heat/ice (PRN)

## 2024-12-17 ENCOUNTER — OFFICE VISIT (OUTPATIENT)
Dept: PHYSICAL THERAPY | Facility: CLINIC | Age: 79
End: 2024-12-17
Payer: MEDICARE

## 2024-12-17 DIAGNOSIS — G20.A1 PARKINSON'S DISEASE WITHOUT DYSKINESIA OR FLUCTUATING MANIFESTATIONS (HCC): Primary | ICD-10-CM

## 2024-12-17 DIAGNOSIS — R29.3 POSTURAL INSTABILITY: ICD-10-CM

## 2024-12-17 DIAGNOSIS — R29.6 FALLS: ICD-10-CM

## 2024-12-17 PROCEDURE — 97110 THERAPEUTIC EXERCISES: CPT | Performed by: PHYSICAL THERAPIST

## 2024-12-17 PROCEDURE — 97112 NEUROMUSCULAR REEDUCATION: CPT | Performed by: PHYSICAL THERAPIST

## 2024-12-17 PROCEDURE — 97140 MANUAL THERAPY 1/> REGIONS: CPT | Performed by: PHYSICAL THERAPIST

## 2024-12-17 NOTE — PROGRESS NOTES
"Daily Note     Today's date: 2024  Patient name: Gabriel Gonzalez  : 1945  MRN: [id number]  Referring provider: Lawanda Joshi CRNP  Dx:   Encounter Diagnosis     ICD-10-CM    1. Parkinson's disease without dyskinesia or fluctuating manifestations (HCC)  G20.A1       2. Falls  R29.6       3. Postural instability  R29.3           Start Time: 1445  Stop Time: 1530  Total time in clinic (min): 45 minutes    Subjective: Patient reports feeling better after last session      Objective: See treatment diary below      Assessment: Tolerated treatment well. Patient required frequent cuing and reminders to improve cervical mobility/posture and maintaining neck in neutral position throughout session. Added overhead ball lift and reach to improve UE posture and motor control of core as well. Cuing for cervical extension while perform, with improved mobility noted upon focus on ball. Patient exhibited good technique with therapeutic exercises and would benefit from continued PT      Plan: Continue per plan of care.  Progress treatment as tolerated.          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 10x CUES!     Chin Tucks  10x seat: 10x supine Seated attempted, cues!     Cervical Rot AROM  10x ea 10x ea     CS ext  10x 10x     Mid Rows  Blue 20x Seated GTB 20x cues for c/s ext     Shoulder Ext  Blue 20x      Wobble Board        SLS                         Ther Activity              Nu Step      5min Lv3       Gait Training in clinic, w/ SPC?        Leg Press        Obstacle negotiation        OH shelf taps                Pt Ed HEP, POC HEP      Re-Evaluation             Modalities             Heat/ice (PRN)

## 2024-12-19 ENCOUNTER — OFFICE VISIT (OUTPATIENT)
Dept: PHYSICAL THERAPY | Facility: CLINIC | Age: 79
End: 2024-12-19
Payer: MEDICARE

## 2024-12-19 DIAGNOSIS — G20.A1 PARKINSON'S DISEASE WITHOUT DYSKINESIA OR FLUCTUATING MANIFESTATIONS (HCC): Primary | ICD-10-CM

## 2024-12-19 DIAGNOSIS — R29.6 FALLS: ICD-10-CM

## 2024-12-19 DIAGNOSIS — R29.3 POSTURAL INSTABILITY: ICD-10-CM

## 2024-12-19 PROCEDURE — 97112 NEUROMUSCULAR REEDUCATION: CPT | Performed by: PHYSICAL THERAPIST

## 2024-12-19 PROCEDURE — 97140 MANUAL THERAPY 1/> REGIONS: CPT | Performed by: PHYSICAL THERAPIST

## 2024-12-19 PROCEDURE — 97110 THERAPEUTIC EXERCISES: CPT | Performed by: PHYSICAL THERAPIST

## 2024-12-19 NOTE — PROGRESS NOTES
"Daily Note     Today's date: 2024  Patient name: Gabriel Gonzalez  : 1945  MRN: [id number]  Referring provider: Lawanda Joshi CRNP  Dx:   Encounter Diagnosis     ICD-10-CM    1. Parkinson's disease without dyskinesia or fluctuating manifestations (HCC)  G20.A1       2. Falls  R29.6       3. Postural instability  R29.3           Start Time: 1700  Stop Time: 1745  Total time in clinic (min): 45 minutes    Subjective: Patient reports feeling better after last session, especially in his neck.       Objective: See treatment diary below      Assessment: Tolerated treatment well. Focused on standing exercises with postural cuing throughout. Worked on OH reaching tasks to encourage keeping cervical spine in neutral position and being able to look up better. Patient exhibited good technique with therapeutic exercises and would benefit from continued PT      Plan: Continue per plan of care.  Progress treatment as tolerated.          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   Next Physician's Appt:   [email or web address] HEP:   Manuals 12/5 12/10 12/17 12/19    STM   DK DK    C/S PROM  TH gentle DK gentle DK gentle    LE stretching                        Neuro Re-Ed        T/S seated extensions  1/2 FR 10x  1/2 FR 10x    Pulleys  2 mins ea      Standing OH reaching     Object tap on mirror for posture 2x10 alt    Heel raises    @ mirror 2x10    Side-stepping    @ mirror x2 laps    LAQs  10x 2 ea 2x10 ea 2x10 ea    OH ball lift for posture   Reclined/H/L purple ball x15     Glute Sets  2\"x15 3\" 2x10 3\" 2x10    Hip ADD ball squeeze  2\"x20 2-3\" 2x10     BKFO  Green 15x ea GTB 15x ea     Ball press out core  Seated " EOT  GMB 20x H/L purple ball 2x10     Ther Ex        Scap squeezes  10x CUES! 10x CUES! 15x cues    Chin Tucks  10x seat: 10x supine Seated attempted, cues!     Cervical Rot AROM  10x ea 10x ea     CS ext  10x 10x 2x5 cues    Mid Rows  Blue 20x Seated GTB 20x cues for c/s ext Standing GTB 2x10 cues    Shoulder Ext  Blue 20x  Standing GTB 2x10 cues    Wobble Board        SLS                         Ther Activity              Nu Step      5min Lv3  5min Lv3     Gait Training in clinic, w/ SPC?        Leg Press        Obstacle negotiation        OH shelf taps                Pt Ed HEP, POC HEP      Re-Evaluation             Modalities             Heat/ice (PRN)

## 2024-12-24 ENCOUNTER — APPOINTMENT (OUTPATIENT)
Dept: PHYSICAL THERAPY | Facility: CLINIC | Age: 79
End: 2024-12-24
Payer: MEDICARE

## 2024-12-26 ENCOUNTER — OFFICE VISIT (OUTPATIENT)
Dept: PHYSICAL THERAPY | Facility: CLINIC | Age: 79
End: 2024-12-26
Payer: MEDICARE

## 2024-12-26 ENCOUNTER — TELEPHONE (OUTPATIENT)
Age: 79
End: 2024-12-26

## 2024-12-26 DIAGNOSIS — R29.3 POSTURAL INSTABILITY: ICD-10-CM

## 2024-12-26 DIAGNOSIS — G20.A1 PARKINSON'S DISEASE WITHOUT DYSKINESIA OR FLUCTUATING MANIFESTATIONS (HCC): Primary | ICD-10-CM

## 2024-12-26 DIAGNOSIS — R29.6 FALLS: ICD-10-CM

## 2024-12-26 PROCEDURE — 97112 NEUROMUSCULAR REEDUCATION: CPT | Performed by: PHYSICAL THERAPIST

## 2024-12-26 PROCEDURE — 97110 THERAPEUTIC EXERCISES: CPT | Performed by: PHYSICAL THERAPIST

## 2024-12-26 PROCEDURE — 97140 MANUAL THERAPY 1/> REGIONS: CPT | Performed by: PHYSICAL THERAPIST

## 2024-12-26 NOTE — PROGRESS NOTES
Daily Note     Today's date: 2024  Patient name: Gabriel Gonzalez  : 1945  MRN: [id number]  Referring provider: Lawanda Joshi CRNP  Dx:   Encounter Diagnosis     ICD-10-CM    1. Parkinson's disease without dyskinesia or fluctuating manifestations (HCC)  G20.A1       2. Falls  R29.6       3. Postural instability  R29.3           Start Time: 0915  Stop Time: 1000  Total time in clinic (min): 45 minutes    Subjective: patient reports he is feeling okay today.      Objective: See treatment diary below      Assessment: Tolerated treatment well. Required cuing for proper upright posture and maintaining neck alignment, but able to maintain for longer periods of time today. Improving mobility into cervical extension to neutral position in inclined position today. He was able to perform OH reaching with better form and posture, but noted some fatigue after multiple reps.  Patient exhibited good technique with therapeutic exercises and would benefit from continued PT      Plan: Continue per plan of care.  Progress treatment as tolerated.          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   Next Physician's Appt:   [employer] [id number] HEP:   Manuals 12/5 12/10 12/17 12/19 12/26   STM   DK DK DK   C/S PROM  TH gentle DK gentle DK gentle DK gentle   LE stretching                        Neuro Re-Ed        T/S seated extensions  1/2 FR 10x  1/2 FR 10x 1/2 FR 10x2   Pulleys  2 mins ea      Standing OH reaching     Object tap on mirror for posture 2x10 alt Object tap on mirror for posture floor x10 alt, foam alt x10   Heel raises    @ mirror 2x10    Side-stepping    @ mirror x2 laps @ mirror x2 laps   LAQs  10x 2  "ea 2x10 ea 2x10 ea 2x10 ea   OH ball lift for posture   Reclined/H/L purple ball x15  Reclined/H/L purple ball x15   Glute Sets  2\"x15 3\" 2x10 3\" 2x10    Hip ADD ball squeeze  2\"x20 2-3\" 2x10  2-3\" 2x10   BKFO  Green 15x ea GTB 15x ea     Ball press out core  Seated EOT  GMB 20x H/L purple ball 2x10  Seated EOT  GMB 20x   Ther Ex        Scap squeezes  10x CUES! 10x CUES! 15x cues    Chin Tucks  10x seat: 10x supine Seated attempted, cues!     Cervical Rot AROM  10x ea 10x ea     CS ext  10x 10x 2x5 cues 15x reclined, cues   Mid Rows  Blue 20x Seated GTB 20x cues for c/s ext Standing GTB 2x10 cues Standing GTB 2x10 cues   Shoulder Ext  Blue 20x  Standing GTB 2x10 cues Standing GTB 2x10 cues   Wobble Board        SLS                         Ther Activity              Nu Step      5min Lv3  5min Lv3  5min Lv3   Gait Training in clinic, w/ SPC?        Leg Press        Obstacle negotiation        OH shelf taps                Pt Ed HEP, POC HEP      Re-Evaluation             Modalities             Heat/ice (PRN)                                      "

## 2024-12-26 NOTE — TELEPHONE ENCOUNTER
Patient is calling regarding cancelling an appointment.    Date/Time: 12/26/2024 1:30pm    Reason: bathroom emergency cannot get there on time    Patient was rescheduled: YES [x] NO []  If yes, when was Patient reschedule for: 1/2/2025 1:30pm    Patient requesting call back to reschedule: YES [] NO [x]

## 2024-12-31 ENCOUNTER — OFFICE VISIT (OUTPATIENT)
Dept: PHYSICAL THERAPY | Facility: CLINIC | Age: 79
End: 2024-12-31
Payer: MEDICARE

## 2024-12-31 DIAGNOSIS — G20.A1 PARKINSON'S DISEASE WITHOUT DYSKINESIA OR FLUCTUATING MANIFESTATIONS (HCC): Primary | ICD-10-CM

## 2024-12-31 DIAGNOSIS — R29.3 POSTURAL INSTABILITY: ICD-10-CM

## 2024-12-31 DIAGNOSIS — G20.A1 PARKINSON'S DISEASE (HCC): ICD-10-CM

## 2024-12-31 DIAGNOSIS — R29.6 FALLS: ICD-10-CM

## 2024-12-31 PROCEDURE — 97110 THERAPEUTIC EXERCISES: CPT | Performed by: PHYSICAL THERAPIST

## 2024-12-31 PROCEDURE — 97140 MANUAL THERAPY 1/> REGIONS: CPT | Performed by: PHYSICAL THERAPIST

## 2024-12-31 PROCEDURE — 97112 NEUROMUSCULAR REEDUCATION: CPT | Performed by: PHYSICAL THERAPIST

## 2024-12-31 NOTE — PROGRESS NOTES
Daily Note     Today's date: 2024  Patient name: Gabriel Gonzalez  : 1945  MRN: [id number]  Referring provider: Lawanda Joshi CRNP  Dx:   Encounter Diagnosis     ICD-10-CM    1. Parkinson's disease without dyskinesia or fluctuating manifestations (HCC)  G20.A1       2. Falls  R29.6       3. Postural instability  R29.3           Start Time: 0830  Stop Time: 0915  Total time in clinic (min): 45 minutes    Subjective: Patient reports he feels better overall, but has a tough time getting up early in the morning.      Objective: See treatment diary below      Assessment: Tolerated treatment well. He is steadily making progress with his postural awareness, however continues to require reminders to avoid forward head posture. Patient tolerated STM to cervical muscles with reports of reduced pain and tightness after.  Patient exhibited good technique with therapeutic exercises and would benefit from continued PT      Plan: Continue per plan of care.  Progress treatment as tolerated.          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   Next Physician's Appt:   [employer] HEP:   Manuals 12/31 12/10 12/17 12/19 12/26   STM DK  DK DK DK   C/S PROM  TH gentle DK gentle DK gentle DK gentle   LE stretching                        Neuro Re-Ed        T/S seated extensions  1/2 FR 10x  1/2 FR 10x 1/2 FR 10x2   Pulleys 2 min ea 2 mins ea      Standing OH reaching  Object tap on mirror for posture foam alt x10   Object tap on mirror for posture 2x10 alt Object tap on mirror for posture floor x10 alt, foam alt x10   Heel raises    @ mirror 2x10    Side-stepping @ mirror x3 laps   @ mirror x2 laps @ mirror x2  "laps   LAQs  10x 2 ea 2x10 ea 2x10 ea 2x10 ea   OH ball lift for posture   Reclined/H/L purple ball x15  Reclined/H/L purple ball x15   Glute Sets  2\"x15 3\" 2x10 3\" 2x10    Hip ADD ball squeeze  2\"x20 2-3\" 2x10  2-3\" 2x10   BKFO  Green 15x ea GTB 15x ea     Ball press out core  Seated EOT  GMB 20x H/L purple ball 2x10  Seated EOT  GMB 20x   Ther Ex        Scap squeezes 15x cues 10x CUES! 10x CUES! 15x cues    Chin Tucks  10x seat: 10x supine Seated attempted, cues!     Cervical Rot AROM Seated 10x ea cues 10x ea 10x ea     CS ext Seated 15x ea 10x 10x 2x5 cues 15x reclined, cues   Mid Rows Standing GTB 2x10 cues Blue 20x Seated GTB 20x cues for c/s ext Standing GTB 2x10 cues Standing GTB 2x10 cues   Shoulder Ext Standing GTB 2x10 cues Blue 20x  Standing GTB 2x10 cues Standing GTB 2x10 cues   Wobble Board        SLS                         Ther Activity             Nu Step 5min Lv3    5min Lv3  5min Lv3  5min Lv3   Gait Training in clinic, w/ SPC?        Leg Press        Obstacle negotiation        OH shelf taps                Pt Ed  HEP      Re-Evaluation            Modalities            Heat/ice (PRN)                                       "

## 2025-01-01 RX ORDER — CARBIDOPA AND LEVODOPA 25; 100 MG/1; MG/1
TABLET ORAL
Qty: 540 TABLET | Refills: 1 | Status: SHIPPED | OUTPATIENT
Start: 2025-01-01

## 2025-01-02 ENCOUNTER — OFFICE VISIT (OUTPATIENT)
Dept: PHYSICAL THERAPY | Facility: CLINIC | Age: 80
End: 2025-01-02
Payer: MEDICARE

## 2025-01-02 DIAGNOSIS — G20.A1 PARKINSON'S DISEASE WITHOUT DYSKINESIA OR FLUCTUATING MANIFESTATIONS (HCC): Primary | ICD-10-CM

## 2025-01-02 PROCEDURE — 97140 MANUAL THERAPY 1/> REGIONS: CPT

## 2025-01-02 PROCEDURE — 97112 NEUROMUSCULAR REEDUCATION: CPT

## 2025-01-02 PROCEDURE — 97110 THERAPEUTIC EXERCISES: CPT

## 2025-01-02 NOTE — PROGRESS NOTES
Daily Note     Today's date: 2025  Patient name: Gabriel Gonzalez  : 1945  MRN: [id number]  Referring provider: Lawanda Joshi CRNP  Dx:   Encounter Diagnosis     ICD-10-CM    1. Parkinson's disease without dyskinesia or fluctuating manifestations (HCC)  G20.A1                      Subjective: Pt states that he is doing well, no new complaints.        Objective: See treatment diary below      Assessment: Tolerated treatment well.  Continued with outlined program to improve CS ROM/strength, posture and overall strength.  Pt requires cues throughout to lift his chin/look up during ex's.  With cues he is able to improve his CS extension, and reports no pain or discomfort.  Pt reports relief of overall tightness after manuals, especially CS PROM.  Tenderness noted in R UT during STM with decreased pain after.  Doorway pec stretch added to improve flexibility and help to improve posture.  Pt will benefit from continued therapy.  Will progress as able.      Plan: Continue per plan of care.         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   Next Physician's Appt:   [employer] HEP:   Manuals    STM DK TH DK DK DK   C/S PROM  TH gentle DK gentle DK gentle DK gentle   LE stretching                        Neuro Re-Ed        T/S seated extensions    1/2 FR 10x 1/2 FR 10x2   Pulleys 2 min ea 2 min ea      Standing OH reaching  Object tap on mirror for posture foam alt x10   Object tap on mirror for posture 2x10 alt Object tap on mirror for posture floor x10 alt, foam alt x10   Heel raises    @ mirror 2x10    Side-stepping @ mirror x3 laps   @ mirror x2 laps @ mirror x2 laps  "  LAQs   2x10 ea 2x10 ea 2x10 ea   OH ball lift for posture  Reclined H/L purple ball 15x cues for CS ext Reclined/H/L purple ball x15  Reclined/H/L purple ball x15   Glute Sets   3\" 2x10 3\" 2x10    Hip ADD ball squeeze   2-3\" 2x10  2-3\" 2x10   BKFO   GTB 15x ea     Ball press out core   H/L purple ball 2x10  Seated EOT  GMB 20x   Ther Ex        Scap squeezes 15x cues 15x  10x CUES! 15x cues    Chin Tucks  Supine 5x attempts Seated attempted, cues!     Cervical Rot AROM Seated 10x ea cues Seated 10x ea cues 10x ea     CS ext Seated 15x ea Seated 15x   Supine 10x 10x 2x5 cues 15x reclined, cues   Mid Rows Standing GTB 2x10 cues Standing GTB  2x10 cues Seated GTB 20x cues for c/s ext Standing GTB 2x10 cues Standing GTB 2x10 cues   Shoulder Ext Standing GTB 2x10 cues Standing GTB 2x10 cues  Standing GTB 2x10 cues Standing GTB 2x10 cues   Wobble Board        SLS        Doorway pec stretch  5\"x10               Ther Activity             Nu Step 5min Lv3  5 mins Lv3  5min Lv3  5min Lv3  5min Lv3   Gait Training in clinic, w/ SPC?        Leg Press        Obstacle negotiation        OH shelf taps                Pt Ed        Re-Evaluation            Modalities            Heat/ice (PRN)                                         "

## 2025-01-07 ENCOUNTER — OFFICE VISIT (OUTPATIENT)
Dept: PHYSICAL THERAPY | Facility: CLINIC | Age: 80
End: 2025-01-07
Payer: MEDICARE

## 2025-01-07 DIAGNOSIS — R29.3 POSTURAL INSTABILITY: ICD-10-CM

## 2025-01-07 DIAGNOSIS — G20.A1 PARKINSON'S DISEASE WITHOUT DYSKINESIA OR FLUCTUATING MANIFESTATIONS (HCC): Primary | ICD-10-CM

## 2025-01-07 DIAGNOSIS — R29.6 FALLS: ICD-10-CM

## 2025-01-07 PROCEDURE — 97110 THERAPEUTIC EXERCISES: CPT

## 2025-01-07 PROCEDURE — 97140 MANUAL THERAPY 1/> REGIONS: CPT

## 2025-01-07 PROCEDURE — 97112 NEUROMUSCULAR REEDUCATION: CPT

## 2025-01-07 NOTE — PROGRESS NOTES
Daily Note     Today's date: 2025  Patient name: Gabriel Gonzalez  : 1945  MRN: [id number]  Referring provider: Lawanda Joshi CRNP  Dx:   Encounter Diagnosis     ICD-10-CM    1. Parkinson's disease without dyskinesia or fluctuating manifestations (HCC)  G20.A1       2. Falls  R29.6       3. Postural instability  R29.3                      Subjective: Pt states that he is doing well, no complaints today.        Objective: See treatment diary below      Assessment: Tolerated treatment well.  Progressed pt with balance and standing strengthening as able.  Pt challenged with SLS and requires B/L UE support to maintain his balance.  Foam balance with light UE support, CGS for safety.  Pt challenged with side stepping to the L due to weakness on the R.  Attempted GTB for BKFO however he was unable due to weakness, he was able to use lighter resistance (orange) with fatigue noted.  Gentle STM to B/L UT with tenderness noted which was relieved with manuals.  Good tolerance to gentle PROM with pt noting that he feels relaxed after.  Pt will benefit from continued therapy to improve overall strength.  Will progress as able.      Plan: Continue per plan of care.         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   Next Physician's Appt:   [employer] HEP:   Manuals    STM DK TH TH DK DK   C/S PROM  TH gentle TH gentle DK gentle DK gentle   LE stretching                        Neuro Re-Ed        T/S seated extensions    1/2 FR 10x 1/2 FR 10x2   Pulleys 2 min ea 2 min ea      Standing OH reaching  Object tap on mirror for posture foam alt x10   Object tap on mirror for posture 2x10  "alt Object tap on mirror for posture floor x10 alt, foam alt x10   Heel raises    @ mirror 2x10    Side-stepping @ mirror x3 laps  @ mirror x3 laps @ mirror x2 laps @ mirror x2 laps   LAQs    2x10 ea 2x10 ea   OH ball lift for posture  Reclined H/L purple ball 15x cues for CS ext Reclined H/L purple ball 20x  Reclined/H/L purple ball x15   Glute Sets   Bridge 5x2 3\" 2x10    Hip ADD ball squeeze     2-3\" 2x10   Foam balance   NBOS 30\"x     SLS   20\"x2 ea     BKFO   OTB 10x2 ea     Ball press out core     Seated EOT  GMB 20x   Ther Ex        Scap squeezes 15x cues 15x  20x cues 15x cues    Chin Tucks  Supine 5x attempts      Cervical Rot AROM Seated 10x ea cues Seated 10x ea cues H/L reclined  10x ea cues     CS ext Seated 15x ea Seated 15x   Supine 10x Seated 12x supine 10x 2x5 cues 15x reclined, cues   Mid Rows Standing GTB 2x10 cues Standing GTB  2x10 cues Standing blue 2x10 cues Standing GTB 2x10 cues Standing GTB 2x10 cues   Shoulder Ext Standing GTB 2x10 cues Standing GTB 2x10 cues Standing blue 2x10 cues Standing GTB 2x10 cues Standing GTB 2x10 cues   Wobble Board        SLS        Doorway pec stretch  5\"x10               Ther Activity            Nu Step 5min Lv3  5 mins Lv3 5 mins Lv 5  5min Lv3  5min Lv3   Gait Training in clinic, w/ SPC?        Leg Press        Obstacle negotiation        OH shelf taps                Pt Ed        Re-Evaluation            Modalities            Heat/ice (PRN)                                           "

## 2025-01-09 ENCOUNTER — OFFICE VISIT (OUTPATIENT)
Dept: PHYSICAL THERAPY | Facility: CLINIC | Age: 80
End: 2025-01-09
Payer: MEDICARE

## 2025-01-09 DIAGNOSIS — R29.3 POSTURAL INSTABILITY: ICD-10-CM

## 2025-01-09 DIAGNOSIS — G20.A1 PARKINSON'S DISEASE WITHOUT DYSKINESIA OR FLUCTUATING MANIFESTATIONS (HCC): Primary | ICD-10-CM

## 2025-01-09 DIAGNOSIS — R29.6 FALLS: ICD-10-CM

## 2025-01-09 PROCEDURE — 97112 NEUROMUSCULAR REEDUCATION: CPT | Performed by: PHYSICAL THERAPIST

## 2025-01-09 PROCEDURE — 97110 THERAPEUTIC EXERCISES: CPT | Performed by: PHYSICAL THERAPIST

## 2025-01-09 PROCEDURE — 97140 MANUAL THERAPY 1/> REGIONS: CPT | Performed by: PHYSICAL THERAPIST

## 2025-01-09 NOTE — PROGRESS NOTES
Daily Note     Today's date: 2025  Patient name: Gabriel Gonzalez  : 1945  MRN: [id number]  Referring provider: Lawanda Joshi CRNP  Dx:   Encounter Diagnosis     ICD-10-CM    1. Parkinson's disease without dyskinesia or fluctuating manifestations (HCC)  G20.A1       2. Falls  R29.6       3. Postural instability  R29.3           Start Time: 1435  Stop Time: 1520  Total time in clinic (min): 45 minutes    Subjective: Patient and patient's wife reports that he is doing better overall. Patient's wife reports that he does better in PT, but she still has to remind him a lot at home to keep chin up. She reports patient is reporting less pain at home and feels less tight/stiffness with the exercises.      Objective: See treatment diary below      Assessment: Tolerated treatment well. Initiated session with postural exercises continuing to encourage keeping chin in neutral position and avoiding forward/dropping head. Patient had better awareness at forward gaze but continues to have difficulty with maintaining. Added seated reaching tasks to work on UE flexibility and gaze stabilization to improve postural mechanics and UE mobility. Patient exhibited good technique with therapeutic exercises and would benefit from continued PT      Plan: Continue per plan of care.  Progress treatment as tolerated.          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   Next Physician's Appt:   [employer] HEP:   Manuals     STM DK TH TH     C/S PROM  TH gentle TH gentle     LE stretching                        Neuro Re-Ed        T/S seated extensions        Pulleys 2 min ea 2 min ea  2 min ea    Standing OH  "reaching  Object tap on mirror for posture foam alt x10       Side-stepping @ mirror x3 laps  @ mirror x3 laps @ mirror x3 laps    LAQs        OH ball lift for posture  Reclined H/L purple ball 15x cues for CS ext Reclined H/L purple ball 20x Seated purple ball raise 2x10    Seated ball reach and give    For posture and gaze stab with reaching x10ea purple ball    Glute Sets   Bridge 5x2     Hip ADD ball squeeze        Foam balance   NBOS 30\"x     SLS   20\"x2 ea     BKFO   OTB 10x2 ea GTB x15 ea    Ball press out core    Seated purple ball x10    Ther Ex        Scap squeezes 15x cues 15x  20x cues     Chin Tucks  Supine 5x attempts      Cervical Rot AROM Seated 10x ea cues Seated 10x ea cues H/L reclined  10x ea cues     CS ext Seated 15x ea Seated 15x   Supine 10x Seated 12x supine 10x Reclined cues x15    Mid Rows Standing GTB 2x10 cues Standing GTB  2x10 cues Standing blue 2x10 cues Standing blue 2x10 cues    Shoulder Ext Standing GTB 2x10 cues Standing GTB 2x10 cues Standing blue 2x10 cues Standing blue 2x10 cues    Wobble Board        SLS        Doorway pec stretch  5\"x10  5\" x10             Ther Activity          Nu Step 5min Lv3  5 mins Lv3 5 mins Lv 5 5 mins Lv 5    Gait Training in clinic, w/ SPC?        Leg Press        Obstacle negotiation        OH shelf taps                Pt Ed        Re-Evaluation          Modalities          Heat/ice (PRN)                                           "

## 2025-01-14 ENCOUNTER — TELEPHONE (OUTPATIENT)
Age: 80
End: 2025-01-14

## 2025-01-14 ENCOUNTER — EVALUATION (OUTPATIENT)
Dept: PHYSICAL THERAPY | Facility: CLINIC | Age: 80
End: 2025-01-14
Payer: MEDICARE

## 2025-01-14 DIAGNOSIS — R29.3 POSTURAL INSTABILITY: ICD-10-CM

## 2025-01-14 DIAGNOSIS — R29.6 FALLS: ICD-10-CM

## 2025-01-14 DIAGNOSIS — G20.A1 PARKINSON'S DISEASE WITHOUT DYSKINESIA OR FLUCTUATING MANIFESTATIONS (HCC): Primary | ICD-10-CM

## 2025-01-14 PROCEDURE — 97530 THERAPEUTIC ACTIVITIES: CPT | Performed by: PHYSICAL THERAPIST

## 2025-01-14 PROCEDURE — 97140 MANUAL THERAPY 1/> REGIONS: CPT | Performed by: PHYSICAL THERAPIST

## 2025-01-14 PROCEDURE — 97112 NEUROMUSCULAR REEDUCATION: CPT | Performed by: PHYSICAL THERAPIST

## 2025-01-14 NOTE — TELEPHONE ENCOUNTER
Patient's wife called in regard to appt. She thought it was today. Writer confirmed virtual appt is scheduled for tomorrow 1/15/25 at 12:30pm. She understood.

## 2025-01-14 NOTE — PROGRESS NOTES
LAST VISIT: 2/27/24 with CHERIE Evans  NEXT VISIT: 9/5/24 with Dr Jaquez    Per last dictation patient is on this medication. Please sign for refill if ok. Thank you.     PT Re-Evaluation     Today's date: 2025  Patient name: Gabriel Gonzalez  : 1945  MRN: [id number]  Referring provider: Lawanda Joshi CRNP  Dx:   Encounter Diagnosis     ICD-10-CM    1. Parkinson's disease without dyskinesia or fluctuating manifestations (HCC)  G20.A1       2. Falls  R29.6       3. Postural instability  R29.3             Start Time: 1400  Stop Time: 1445  Total time in clinic (min): 45 minutes    Assessment  Impairments: abnormal gait, abnormal or restricted ROM, activity intolerance, impaired balance, impaired physical strength, lacks appropriate home exercise program, pain with function, weight-bearing intolerance, poor posture , poor body mechanics, participation limitations, activity limitations and endurance    Assessment details: Patient is a 79 y.o. male who presents to outpatient PT with reports of balance and postural impairments since diagnosis of Parkinson's many years ago. He reports neck drop has worsened more recently over the past 3-4 months. Patient has attended PT for the past 1 month with improving overall function and reports of balance since start of PT. He reports he feels better flexibility in his neck and has improved awareness of keeping a neutral head position. Slight improvements in cervical ROM and overall ability to perform AROM compared to signifcant stiffness on IE. He continues to have forward head posture in static/resting position and requires verbal cuing by PT and wife for correction and awareness. He responds well to verbal cuing, however carryover is fair. Continued limitations in cervical Extension and Rotation mobility, which affects overall scanning environment for safety and maintaining neutral neck position for dressing, eating, etc. Some coordination deficits noted as well with Heel-shin and finger-nose testing. He also exhibits some resting tremors in hand as related to Parkinson's. His gait and overall posture coincide with Parkinsonian-like  characteristics. However, exhibits moderate limping on Right LE due to muscle imbalance and glute weakness that poses as a falls risk as it contributes to more instability in his stride. Unable to assess 5x sit-stand test as patient had difficulty comprehending sequence and technique with testing. No significant changes in TUG time since IE. Overall, his gait and balance impairments indicates an increased falls risk. He has had about 2 falls in the past year, mainly with anterior weight-shifting perturbation/activities. He lives with his wife who assists him with ADLs as needed. Patient will benefit from continued skilled PT services to work on balance training, strength training, postural training, and overall safety with walking and transfers to improve independence with ADLs and reduce falls risk. Patient would benefit from skilled PT services to address these impairments and to maximize function.  Thank you for the referral.    Barriers to therapy: Chronicity of symptoms  Barriers to intervention: medical complexity  Understanding of Dx/Px/POC: good     Prognosis: fair    Goals  Impairment Goals 4-6 weeks   In order to maximize function patient will be able to...   - Demonstrate symmetrical cervical AROM without pain  - Increase LE/UE strength to 4/5 throughout  - Demonstrate improved hip/cervical flexibility as demonstrated by increased ROM through therapeutic exercise  - Improve TUG time to <13 sec to indicate reduce falls risk    Functional Goals 6-8 weeks  In order to return to prior level of function patient will be able to...   - Participate in ADL's/IADL's/sport specific activities with no greater than 2/10 pain.    - Increase Functional Status Measure (FOTO) to: anticipated at discharge  - Demonstrate independence and compliant with HEP  - Patient will be able to demonstrate good gait mechanics without compensations.   - Demonstrate sit to stand with good mechanics and eccentric control without  pain/difficulty/compensation    Plan  Patient would benefit from: skilled PT  Planned modality interventions: cryotherapy and electrical stimulation/Russian stimulation  Other planned modality interventions: moist heat    Planned therapy interventions: joint mobilization, manual therapy, neuromuscular re-education, patient education, strengthening, stretching, therapeutic activities, therapeutic exercise, home exercise program, functional ROM exercises, Bales taping, postural training, balance/weight bearing training, body mechanics training, flexibility, IASTM, kinesiology taping, massage, nerve gliding, transfer training and gait training    Frequency: 1-2x week  Duration in weeks: 4  Treatment plan discussed with: patient, PTA, referring physician and family        Subjective Evaluation    History of Present Illness  Mechanism of injury: Gabriel Gonzalez is a 79 y.o. year-old male who presents to outpatient PT accompanied by his wife Dariana. Patient reports diagnosed with Parkinson's many years ago and has attended PT at different location for neuro rehab/balance training. Patient's wife reports his neck drop and cervical stiffness started about 2-3 months ago. Per his wife, it has progressively worsened over the past few months. He also has history of frequent falls, noting 2 falls in the past year but no injuries from falls. Patient reports he is independent with most ADLs, however wife assists with dressing and driving. Patient is currently not driving per physician recommendation and family precaution. Patient is able to perform all other Adls and iADLs on his own. Grab bars present in bathroom. Patient does not use assistive device for ambulation. They have a cane at home from wife's previous injury.  Quality of life: good    Patient Goals  Patient goals for therapy: decreased pain, increased motion, improved balance, increased strength, independence with ADLs/IADLs and return to sport/leisure  activities    Pain  Current pain ratin  At best pain ratin  At worst pain ratin  Location: neck  Quality: tight  Relieving factors: heat  Aggravating factors: sitting, standing, stair climbing, walking and lifting  Progression: worsening    Social Support  Steps to enter house: yes  Stairs in house: yes   Lives with: spouse    Employment status: working (, own firm)  Treatments  Previous treatment: physical therapy  Current treatment: physical therapy        Objective     Palpation   Left   Muscle spasm in the levator scapulae, pectoralis major, pectoralis minor, scalenes, sternocleidomastoid, suboccipitals and upper trapezius.   Tenderness of the levator scapulae and upper trapezius.     Right   Muscle spasm in the levator scapulae, pectoralis major, pectoralis minor, scalenes, sternocleidomastoid, suboccipitals and upper trapezius.   Tenderness of the levator scapulae and upper trapezius.     Active Range of Motion   Cervical/Thoracic Spine       Cervical    Flexion: 50 degrees   Extension: 8 (35 deg from resting position) degrees      Left lateral flexion: 20 degrees      Right lateral flexion: 20 degrees      Left rotation: 60 (cuing to avoid cervical flexion with rotation) degrees  Right rotation: 60 (cuing to avoid cervical flexion with rotation) degrees       Strength/Myotome Testing   Cervical Spine   Neck extension: 3  Neck flexion: 3+    Left   Neck lateral flexion (C3): 3+    Right   Neck lateral flexion (C3): 3+    Left Shoulder     Planes of Motion   Flexion: 4-   Abduction: 4-   External rotation at 0°: 4-   Internal rotation at 0°: 4-     Right Shoulder     Planes of Motion   Flexion: 4-   Abduction: 4-   External rotation at 0°: 4-   Internal rotation at 0°: 4-     Left Elbow   Flexion: 4-  Extension: 4-    Right Elbow   Flexion: 4-  Extension: 4-    Left Hip   Planes of Motion   Flexion: 3+  Extension: 2  Abduction: 4-  External rotation: 4-    Right Hip   Planes of Motion    Flexion: 3+  Extension: 2  Abduction: 4-  External rotation: 4-    Left Knee   Flexion: 4-  Extension: 4-    Right Knee   Flexion: 4-  Extension: 4-    Left Ankle/Foot   Dorsiflexion: 4-  Plantar flexion: 4-    Right Ankle/Foot   Dorsiflexion: 4-  Plantar flexion: 4-  Neuro Exam:     Sensation   Light touch LE: left WNL and right WNL    Coordination   Heel to shin: left dysmetric and right dysmetric  Finger to nose: left dysmetria (4/5 trials correct) and right dysmetria (2/5 trials correct)  Rapid alternating movements: UE WNL    Transfers   Sit to stand: minimum assist (needs 2 UE support, close supervision)     Functional outcomes   5x sit to stand: unable (seconds)  TUG: 15 (seconds)  Functional outcome comment: 5x sit-stand unable to test due to patient unable to comprehend test  Functional outcome gait comment: Patient walks without assistive device, exhibits significant limp on Right LE with decreased Right LE stance phase and early Right toe-off in terminal stance, tends to exhibit knee flexion early end of mid-stance and terminal stance; decreased Left knee flexion in swing with decreased forward advancement of LE noted bilaterally; decreased arm and trunk rotation noted with overall guarded/stiff UE posture; forward head posture noted through with difficulty maintaining neutral cervical spine position while walking or standing              POC Expires Auth Status Start Date Expiration Date PT Visit Limit    2/14/2025 No Auth Req   BOMN   Date 12/31/2024 1/2/2025 1/7/2025 1/9/2025 1/14/2025   Used 6 7 8 9 10   Remaining           Diagnosis: Parkinson's disease; frequent falls; neck drop   Precautions: chronic Right hip pain, orthostatic hypotension, anxiety, 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   Next Physician's Appt:   [employer] HEP:   Manuals 12/31 1/2 1/7 1/9 1/14   STM DK TH TH  DK   C/S PROM  TH gentle TH gentle  DK gentle   LE stretching                        Neuro Re-Ed        T/S seated extensions   "      Pulleys 2 min ea 2 min ea  2 min ea    Standing OH reaching  Object tap on mirror for posture foam alt x10       Side-stepping @ mirror x3 laps  @ mirror x3 laps @ mirror x3 laps    LAQs        OH ball lift for posture  Reclined H/L purple ball 15x cues for CS ext Reclined H/L purple ball 20x Seated purple ball raise 2x10 Seated purple ball raise 2x10   Seated ball reach and give    For posture and gaze stab with reaching x10ea purple ball    Glute Sets   Bridge 5x2     Hip ADD ball squeeze        Foam balance   NBOS 30\"x     SLS   20\"x2 ea     BKFO   OTB 10x2 ea GTB x15 ea    Ball press out core    Seated purple ball x10 Seated purple ball 2x10   Ther Ex        Scap squeezes 15x cues 15x  20x cues     Chin Tucks  Supine 5x attempts      Cervical Rot AROM Seated 10x ea cues Seated 10x ea cues H/L reclined  10x ea cues  Seated 10x ea cues   CS ext Seated 15x ea Seated 15x   Supine 10x Seated 12x supine 10x Reclined cues x15 Seated x10   Mid Rows Standing GTB 2x10 cues Standing GTB  2x10 cues Standing blue 2x10 cues Standing blue 2x10 cues    Shoulder Ext Standing GTB 2x10 cues Standing GTB 2x10 cues Standing blue 2x10 cues Standing blue 2x10 cues    Wobble Board        SLS        Doorway pec stretch  5\"x10  5\" x10             Ther Activity          Nu Step 5min Lv3  5 mins Lv3 5 mins Lv 5 5 mins Lv 5 5 mins Lv 5   Gait Training in clinic, w/ SPC?        Leg Press        Obstacle negotiation        OH shelf taps                Pt Ed     POC   Re-Evaluation       DK   Modalities          Heat/ice (PRN)                             "

## 2025-01-15 ENCOUNTER — TELEMEDICINE (OUTPATIENT)
Dept: PSYCHIATRY | Facility: CLINIC | Age: 80
End: 2025-01-15
Payer: MEDICARE

## 2025-01-15 DIAGNOSIS — F22 DELUSIONS (HCC): ICD-10-CM

## 2025-01-15 DIAGNOSIS — R44.1 VISUAL HALLUCINATIONS: ICD-10-CM

## 2025-01-15 DIAGNOSIS — F06.8 PSYCHOSIS DUE TO PARKINSON'S DISEASE (HCC): Primary | ICD-10-CM

## 2025-01-15 DIAGNOSIS — G20.A1 PSYCHOSIS DUE TO PARKINSON'S DISEASE (HCC): Primary | ICD-10-CM

## 2025-01-15 PROCEDURE — 99214 OFFICE O/P EST MOD 30 MIN: CPT | Performed by: PHYSICIAN ASSISTANT

## 2025-01-15 NOTE — PSYCH
This note was not shared with the patient due to reasonable likelihood of causing patient harm    PROGRESS NOTE        [facility]      Name and Date of Birth:  Gabriel Gonzalez 79 y.o. 1945 MRN: [id number]    Insurance: Payor: MEDICARE / Plan: MEDICARE A AND B / Product Type: Medicare A & B Fee for Service /     Virtual Regular Visit    Visit Date: 01/15/25     Verification of patient location:    Patient is located at Home in the following state in which I hold an active license [place]    Reason for visit is   Chief Complaint   Patient presents with    Virtual Regular Visit    Follow-up    Medication Management     Encounter provider Elzbieta Carreno    Provider located at [address]  [facility] [address] [phone]    The patient was identified by name and date of birth. Gabriel Gonzalez was informed that this is a telemedicine visit and that the visit is being conducted throughthe Epic Embedded platform. He agrees to proceed.  My office door was closed. No one else was in the room. He acknowledged consent and understanding of privacy and security of the video platform. The patient has agreed to participate and understands they can discontinue the visit at any time.    Patient is aware this is a billable service.     VIRTUAL VISIT DISCLAIMER    Gabriel Gonzalez verbally agrees to participate in Virtual Care Services. Pt is aware that Virtual Care Services could be limited without vital signs or the ability to perform a full hands-on physical exam. Gabriel Gonzalez understands he or the provider may request at any time to terminate the video visit and request the patient to seek care or treatment in person.   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 male with a history of dementia who presents today for follow-up and medication management. His wife, Dariana, also presents with him and helps with the history. Since his last visit both he and Dariana have noticed improvement in the anxiety and severity of the VH and delusions. They are still present but he is no longer particularly distressed by them. He also is sleeping better. Dariana feels the fatigue is also improved, though not resolved entirely. He is having more physical issues (new head drop and others) that are being addressed. He is still working and hesitant to consider assisted.     He denies any suicidal ideation, intent or plan at present; denies any homicidal ideation, intent or plan at present.    He denies any auditory hallucinations, denies any visual hallucinations, denies any delusions.    He denies any side effects from current psychiatric medications.    HPI ROS Appetite Changes and Sleep:     He reports adequate number of sleep hours, adequate appetite, decreased energy    Current Rating Scores:     None completed today.     Review Of Systems:    Mood euthymic   Behavior appropriate, cooperative, and calm   Thought Content delusional thoughts   General normal    Personality no change in personality   Other Psych Symptoms decreased memory and decreased concentration   Constitutional as noted in HPI   ENT as noted in HPI   Cardiovascular as noted in HPI   Respiratory as noted in HPI   Gastrointestinal as noted in HPI   Genitourinary as noted in HPI   Musculoskeletal as noted in HPI   Integumentary as noted in HPI   Neurological as noted in HPI   Endocrine negative   Other Symptoms none, all other systems are negative     Family Psychiatric History:     Family History   Problem Relation Age of Onset    Heart disease Mother     Heart disease Father      Social/Substance Abuse History:    Social History     Socioeconomic History    Marital status: /Civil Union     Spouse name: Not on  file    Number of children: 3    Years of education: Not on file    Highest education level: Not on file   Occupational History    Occupation:    still active    Occupation: retired    Tobacco Use    Smoking status: Never    Smokeless tobacco: Never   Vaping Use    Vaping status: Never Used   Substance and Sexual Activity    Alcohol use: Not Currently     Comment: social    Drug use: No    Sexual activity: Not Currently   Other Topics Concern    Not on file   Social History Narrative    Most recent tobacco use screenin-    Do you currently or have you served in the  [employer]: Yes    If Yes, What branch of service: [employer]    [employer]    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, into active duty, 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    Advance directive: Yes    Alcohol intake: Moderate    Caffeine intake: Moderate    Illicit drugs: none    Occupation: retired    Exercise level: Occasional    Single or multi-level home/work: single level home    Live alone or with others: with others    Chewing tobacco: none    Marital status:     Number of children: 3    Diet: Regular    Sexual orientation: Heterosexual    Smoke alarm in home: Yes    General stress level: Medium    Sunscreen used routinely: No    Education: Post Graduate    Guns present in home: No    Presence of domestic violence: No     Social Drivers of Health     Financial Resource Strain: Low Risk  (2023)    Overall Financial Resource Strain (CARDIA)     Difficulty of Paying Living Expenses: Not hard at all   Food Insecurity: No Food Insecurity (2024)    Nursing - Inadequate Food Risk Classification     Worried About Running Out of Food in the Last Year: Never true     Ran Out of Food in the Last Year: Never true     Ran Out of Food in the Last Year: Not on file   Transportation Needs: No Transportation Needs (2024)    PRAPARE - Transportation     Lack of Transportation (Medical): No     Lack of Transportation  (Non-Medical): No   Physical Activity: Not on file   Stress: Not on file   Social Connections: Not on file   Intimate Partner Violence: Not on file   Housing Stability: Low Risk  (6/25/2024)    Housing Stability Vital Sign     Unable to Pay for Housing in the Last Year: No     Number of Times Moved in the Last Year: 1     Homeless in the Last Year: No     The following portions of the patient's history were reviewed and updated as appropriate: past family history, past medical history, past social history, past surgical history and problem list.    OBJECTIVE:     Mental Status Evaluation:  Appearance:  dressed appropriately, adequate grooming, looks older than stated age   Behavior:  pleasant, cooperative, calm, interacts appropriately with this writer   Speech:  normal rate, normal volume, normal pitch   Mood:  improved, euthymic   Affect:  blunted   Thought Process:  goal directed, linear   Associations: intact associations   Thought Content:  no overt delusions, no paranoia noted on exam   Perceptual Disturbances: denies auditory or visual hallucinations when asked, does not appear responding to internal stimuli   Risk Potential: Suicidal ideation - None  Homicidal ideation - None  Potential for aggression - No   Sensorium:  oriented to person, place, and time/date   Memory:  short term memory moderately impaired, long term memory moderately impaired   Consciousness:  alert and awake   Attention/Concentration: attention span and concentration appear shorter than expected for age   Insight:  impaired   Judgment: partial   Gait/Station: Unable to assess today due to virtual visit   Motor Activity: unable to assess today due to virtual visit     Laboratory Results: I have personally reviewed all pertinent laboratory/tests results    Suicide/Homicide Risk Assessment:    Risk of Harm to Self:  The following ratings are based on assessment at the time of the interview  Based on today's assessment, Gabriel castro the  following risk of harm to self: none    Risk of Harm to Others:  The following ratings are based on assessment at the time of the interview  Based on today's assessment, Gabriel presents the following risk of harm to others: none    The following interventions are recommended: no intervention changes needed    Assessment/Plan:      He is still experiencing VH and delusions but less severe and they are not as distressing as previously. His mood and anxiety are generally improved, as is his sleep. No changes to medications advised at this time. He is not appropriate for therapy as this time. We have discussed his safety plan and he agrees that if he experience unsafe thoughts that he will reach out to his supports including this office, the suicide hotline, and emergency services if necessary. Gabriel is aware of non-emergent and emergent mental health resources. They are able to contract for their own safety at this time.    Will follow up in 2 months. Patient is aware to call the office if questions or concerns arise sooner.      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)    Delusions (HCC)    Visual hallucinations        Treatment Recommendations/Precautions:    Continue current medication:    - olanzapine 2.5 mg qhs    *rivastigmine from neuro    Does not want any medication changes  Aware of 24 hour and weekend coverage for urgent situations accessed by calling [facility] main practice number  Medication management every 2 months  I am scheduling this patient out for greater than 3 months: No    Medications Risks/Benefits      Risks, Benefits And Possible Side Effects Of Medications:    Risks, benefits, and possible side effects of medications explained to Gabriel and he verbalizes understanding and agreement for treatment.    Controlled Medication Discussion:     Not applicable    Psychotherapy Provided:     Individual psychotherapy provided:  No    Treatment Plan:    Completed and signed during the session: Not applicable - Treatment Plan not due at this session     Visit Time    Visit Start Time:  12:30 PM  Visit End Time:  12:50 PM  Total Visit Duration:  20 minutes    Elzbieta Carreno 01/15/25

## 2025-01-16 ENCOUNTER — APPOINTMENT (OUTPATIENT)
Dept: PHYSICAL THERAPY | Facility: CLINIC | Age: 80
End: 2025-01-16
Payer: MEDICARE

## 2025-01-21 ENCOUNTER — OFFICE VISIT (OUTPATIENT)
Dept: PHYSICAL THERAPY | Facility: CLINIC | Age: 80
End: 2025-01-21
Payer: MEDICARE

## 2025-01-21 DIAGNOSIS — G20.A1 PARKINSON'S DISEASE WITHOUT DYSKINESIA OR FLUCTUATING MANIFESTATIONS (HCC): Primary | ICD-10-CM

## 2025-01-21 DIAGNOSIS — R29.6 FALLS: ICD-10-CM

## 2025-01-21 DIAGNOSIS — R29.3 POSTURAL INSTABILITY: ICD-10-CM

## 2025-01-21 PROCEDURE — 97530 THERAPEUTIC ACTIVITIES: CPT

## 2025-01-21 PROCEDURE — 97112 NEUROMUSCULAR REEDUCATION: CPT

## 2025-01-21 PROCEDURE — 97110 THERAPEUTIC EXERCISES: CPT

## 2025-01-21 NOTE — PROGRESS NOTES
Daily Note     Today's date: 2025  Patient name: Gabriel Gonzalez  : 1945  MRN: [id number]  Referring provider: Lawanda Joshi CRNP  Dx:   Encounter Diagnosis     ICD-10-CM    1. Parkinson's disease without dyskinesia or fluctuating manifestations (HCC)  G20.A1       2. Falls  R29.6       3. Postural instability  R29.3                      Subjective: Pt states that he is doing ok, has been a little dizzy at times when walking.        Objective: See treatment diary below      Assessment: Tolerated treatment well.  Gait belt used for safety throughout standing strengthening and ambulation through clinic due to pt feeling dizzy at times.  Pt had no LOB or issues with balance.  Cues were given throughout for sequencing and to move through movements slowly with more control.  Fair carryover is noted.  Pt notes improve neck ROM and decreased stiffness after manuals and self CS extensions.  Discussed use of cane at home for safety.  Pt remains resistant however spouse is in agreement for safety.  Will address nv.        Plan: Continue per plan of care.         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 Reclined H/L purple ball 15x cues for CS ext Reclined H/L purple ball 20x  "Seated purple ball raise 2x10 Seated purple ball raise 2x10   Seated ball reach and give    For posture and gaze stab with reaching x10ea purple ball    Glute Sets Bridge 5x2  Bridge 5x2     Hip ADD ball squeeze        Tandem ambulation 2x mirror       Foam balance NBOS 2x30\" 1 w/ head turns  NBOS 30\"x     SLS   20\"x2 ea     BKFO GTB 15x ea  OTB 10x2 ea GTB x15 ea    Hip flexion  H/L 12x ea       Ball press out core    Seated purple ball x10 Seated purple ball 2x10   Ther Ex        Scap squeezes  15x  20x cues     Chin Tucks  Supine 5x attempts      Cervical Rot AROM  Seated 10x ea cues H/L reclined  10x ea cues  Seated 10x ea cues   CS ext Reclined, H/L  15x   Seated 15x   Supine 10x Seated 12x supine 10x Reclined cues x15 Seated x10   Mid Rows Seated EOT  GTB 20x   Standing GTB  2x10 cues Standing blue 2x10 cues Standing blue 2x10 cues    Shoulder Ext  Standing GTB 2x10 cues Standing blue 2x10 cues Standing blue 2x10 cues    Wobble Board        SLS        Doorway pec stretch  5\"x10  5\" x10             Ther Activity          Nu Step 5min Lv4  5 mins Lv3 5 mins Lv 5 5 mins Lv 5 5 mins Lv 5   Gait Training in clinic, w/ SPC? NV?       Leg Press                Obstacle negotiation        OH shelf taps                Pt Ed Cane use at home    POC   Re-Evaluation       DK   Modalities          Heat/ice (PRN)                             "

## 2025-01-23 ENCOUNTER — OFFICE VISIT (OUTPATIENT)
Dept: PHYSICAL THERAPY | Facility: CLINIC | Age: 80
End: 2025-01-23
Payer: MEDICARE

## 2025-01-23 DIAGNOSIS — R29.6 FALLS: ICD-10-CM

## 2025-01-23 DIAGNOSIS — G20.A1 PARKINSON'S DISEASE WITHOUT DYSKINESIA OR FLUCTUATING MANIFESTATIONS (HCC): Primary | ICD-10-CM

## 2025-01-23 PROCEDURE — 97530 THERAPEUTIC ACTIVITIES: CPT

## 2025-01-23 PROCEDURE — 97110 THERAPEUTIC EXERCISES: CPT

## 2025-01-23 PROCEDURE — 97112 NEUROMUSCULAR REEDUCATION: CPT

## 2025-01-23 NOTE — PROGRESS NOTES
Daily Note     Today's date: 2025  Patient name: Gabriel Gonzalez  : 1945  MRN: [id number]  Referring provider: Lawanda Joshi CRNP  Dx:   Encounter Diagnosis     ICD-10-CM    1. Parkinson's disease without dyskinesia or fluctuating manifestations (HCC)  G20.A1       2. Falls  R29.6                      Subjective: Pt has no new complaints,       Objective: See treatment diary below      Assessment: Tolerated treatment well.   Pt motivated throughout session to perform ex's as able.  Trial of ambulation with SPC during session first in L hand, then R.  Appeared to do better in R hand, however he was challenged with sequencing and required extra cues.  Pt challenged with supine to sit to stand transfer this visit and required therapist assist to with moving his LE's.  Pt will benefit from conitnued therapy.  Will progress as able.        Plan: Continue per plan of care.         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  Reclined H/L purple ball 20x Seated purple ball raise 2x10 Seated purple ball raise 2x10   Seated ball reach and give    For posture and gaze stab with reaching x10ea purple ball    Glute Sets Bridge 5x2 Bridge 5 Bridge 5x2     Hip ADD ball squeeze     "    Tandem ambulation 2x mirror 2x mirror      Foam balance NBOS 2x30\" 1 w/ head turns NBOS 2x30\"  NBOS 30\"x     SLS   20\"x2 ea     BKFO GTB 15x ea GTB 20x ea OTB 10x2 ea GTB x15 ea    Hip flexion  H/L 12x ea Stand 2x10      Ball press out core    Seated purple ball x10 Seated purple ball 2x10   Ther Ex        Scap squeezes   20x cues     Chin Tucks        Cervical Rot AROM   H/L reclined  10x ea cues  Seated 10x ea cues   CS ext Reclined, H/L  15x   Reclined H/L  15x Seated 12x supine 10x Reclined cues x15 Seated x10   Mid Rows Seated EOT  GTB 20x   Seated EOT  GTB 20x Standing blue 2x10 cues Standing blue 2x10 cues    Shoulder Ext   Standing blue 2x10 cues Standing blue 2x10 cues    Wobble Board        SLS        Doorway pec stretch    5\" x10             Ther Activity         Nu Step 5min Lv4 6 min Lv 5 5 mins Lv 5 5 mins Lv 5 5 mins Lv 5   Gait Training in clinic, w/ SPC? NV? W TH trial B/L hands      Leg Press                Obstacle negotiation        OH shelf taps                Pt Ed Cane use at home    POC   Re-Evaluation       DK   Modalities          Heat/ice (PRN)                               "

## 2025-01-28 ENCOUNTER — APPOINTMENT (OUTPATIENT)
Dept: LAB | Facility: CLINIC | Age: 80
End: 2025-01-28
Payer: MEDICARE

## 2025-01-28 ENCOUNTER — TELEPHONE (OUTPATIENT)
Age: 80
End: 2025-01-28

## 2025-01-28 ENCOUNTER — APPOINTMENT (OUTPATIENT)
Dept: PHYSICAL THERAPY | Facility: CLINIC | Age: 80
End: 2025-01-28
Payer: MEDICARE

## 2025-01-28 DIAGNOSIS — R53.1 WEAKNESS: ICD-10-CM

## 2025-01-28 DIAGNOSIS — N30.00 ACUTE CYSTITIS WITHOUT HEMATURIA: Primary | ICD-10-CM

## 2025-01-28 DIAGNOSIS — R53.1 WEAKNESS: Primary | ICD-10-CM

## 2025-01-28 LAB
AMORPH URATE CRY URNS QL MICRO: ABNORMAL
BACTERIA UR QL AUTO: ABNORMAL /HPF
BILIRUB UR QL STRIP: NEGATIVE
CLARITY UR: CLEAR
COLOR UR: YELLOW
GLUCOSE UR STRIP-MCNC: NEGATIVE MG/DL
HGB UR QL STRIP.AUTO: NEGATIVE
KETONES UR STRIP-MCNC: ABNORMAL MG/DL
LEUKOCYTE ESTERASE UR QL STRIP: NEGATIVE
MUCOUS THREADS UR QL AUTO: ABNORMAL
NITRITE UR QL STRIP: NEGATIVE
NON-SQ EPI CELLS URNS QL MICRO: ABNORMAL /HPF
PH UR STRIP.AUTO: 6.5 [PH]
PROT UR STRIP-MCNC: ABNORMAL MG/DL
RBC #/AREA URNS AUTO: ABNORMAL /HPF
SP GR UR STRIP.AUTO: 1.02 (ref 1–1.03)
UROBILINOGEN UR STRIP-ACNC: <2 MG/DL
WBC #/AREA URNS AUTO: ABNORMAL /HPF

## 2025-01-28 PROCEDURE — 81001 URINALYSIS AUTO W/SCOPE: CPT

## 2025-01-28 PROCEDURE — 87086 URINE CULTURE/COLONY COUNT: CPT

## 2025-01-28 RX ORDER — CEPHALEXIN 500 MG/1
500 CAPSULE ORAL 2 TIMES DAILY
Qty: 14 CAPSULE | Refills: 0 | Status: SHIPPED | OUTPATIENT
Start: 2025-01-28 | End: 2025-02-04

## 2025-01-28 NOTE — TELEPHONE ENCOUNTER
Dr weber spoke with spouse   He is doing ok   Went to work this am even   But did come back home   Not feeling well for a few days   Hx of uti's   Ill place the order for the UA ucx   And he can start empirical abx for now

## 2025-01-28 NOTE — TELEPHONE ENCOUNTER
Received call form patient's spouse Dariana to report patient not feeling well for the past 3 days with increased confusion, more than his usual.  Cancelled PT today because patient did not want to go and patient has been enjoying PT. Reports patient is lethargic and weak. Denies fever, sore throat, or other symptoms. Dariana is suspecting possible UTI. Requesting order for urine specimen so she can go to lab for sterile container to follow up. Please follow up with Dariana for provider response.

## 2025-01-29 ENCOUNTER — RESULTS FOLLOW-UP (OUTPATIENT)
Dept: FAMILY MEDICINE CLINIC | Facility: CLINIC | Age: 80
End: 2025-01-29

## 2025-01-29 LAB — BACTERIA UR CULT: NORMAL

## 2025-01-30 ENCOUNTER — APPOINTMENT (OUTPATIENT)
Dept: PHYSICAL THERAPY | Facility: CLINIC | Age: 80
End: 2025-01-30
Payer: MEDICARE

## 2025-02-04 ENCOUNTER — OFFICE VISIT (OUTPATIENT)
Dept: PHYSICAL THERAPY | Facility: CLINIC | Age: 80
End: 2025-02-04
Payer: MEDICARE

## 2025-02-04 DIAGNOSIS — G20.A1 PARKINSON'S DISEASE WITHOUT DYSKINESIA OR FLUCTUATING MANIFESTATIONS (HCC): Primary | ICD-10-CM

## 2025-02-04 DIAGNOSIS — R29.3 POSTURAL INSTABILITY: ICD-10-CM

## 2025-02-04 DIAGNOSIS — R29.6 FALLS: ICD-10-CM

## 2025-02-04 PROCEDURE — 97530 THERAPEUTIC ACTIVITIES: CPT | Performed by: PHYSICAL THERAPIST

## 2025-02-04 PROCEDURE — 97112 NEUROMUSCULAR REEDUCATION: CPT | Performed by: PHYSICAL THERAPIST

## 2025-02-04 PROCEDURE — 97110 THERAPEUTIC EXERCISES: CPT | Performed by: PHYSICAL THERAPIST

## 2025-02-04 NOTE — PROGRESS NOTES
Daily Note     Today's date: 2025  Patient name: Gabriel Gonzalez  : 1945  MRN: [id number]  Referring provider: Lawanda Joshi CRNP  Dx:   Encounter Diagnosis     ICD-10-CM    1. Parkinson's disease without dyskinesia or fluctuating manifestations (HCC)  G20.A1       2. Falls  R29.6       3. Postural instability  R29.3                        Subjective: Pt reports a head cold last week. He denies any other complaints.       Objective: See treatment diary below      Assessment: Overall, the patient exhibited good tolerance to exercises with fatigue noted with cervical extension. Trialed recumbent bike today with good tolerance as nu-step was in use at the start of treatment. The patient did need assistance to get both feet into the pedals. Repeated cueing needed to limit knee extension during standing hip flexion L>R. Patient also required frequent cueing for use of the SPC for both sequencing and cane placement. Patient able to tolerate one pillow vs two for 2-3 minutes during STM in reclined position before head reverted to forward position again.       Plan: Continue per plan of care.         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   Seated purple ball raise 2x10  "Seated purple ball raise 2x10   Seated ball reach and give    For posture and gaze stab with reaching x10ea purple ball    Glute Sets Bridge 5x2 Bridge 5 Bridge 5x2     Hip ADD ball squeeze        Tandem ambulation 2x mirror 2x mirror 2x mirror     Foam balance NBOS 2x30\" 1 w/ head turns NBOS 2x30\"  NBOS 3x30\"     SLS        BKFO GTB 15x ea GTB 20x ea GTB 20x ea GTB x15 ea    Hip flexion  H/L 12x ea Stand 2x10 Stand 2x10     Ball press out core    Seated purple ball x10 Seated purple ball 2x10   Ther Ex        Scap squeezes        Chin Tucks        Cervical Rot AROM     Seated 10x ea cues   CS ext Reclined, H/L  15x   Reclined H/L  15x Reclined H/L 15x Reclined cues x15 Seated x10   Mid Rows Seated EOT  GTB 20x   Seated EOT  GTB 20x Seated EOT GTB 25x Standing blue 2x10 cues    Shoulder Ext    Standing blue 2x10 cues    Wobble Board        SLS        Doorway pec stretch    5\" x10             Ther Activity         Nu Step 5min Lv4 6 min Lv 5 RB 5 min Lv 0 5 mins Lv 5 5 mins Lv 5   Gait Training in clinic, w/ SPC? NV? W TH trial B/L hands RUE w/ NB     Leg Press                Obstacle negotiation        OH shelf taps                Pt Ed Cane use at home    POC   Re-Evaluation      DK   Modalities          Heat/ice (PRN)                               "

## 2025-02-06 ENCOUNTER — OFFICE VISIT (OUTPATIENT)
Dept: PHYSICAL THERAPY | Facility: CLINIC | Age: 80
End: 2025-02-06
Payer: MEDICARE

## 2025-02-06 DIAGNOSIS — R29.6 FALLS: ICD-10-CM

## 2025-02-06 DIAGNOSIS — G20.A1 PARKINSON'S DISEASE WITHOUT DYSKINESIA OR FLUCTUATING MANIFESTATIONS (HCC): Primary | ICD-10-CM

## 2025-02-06 DIAGNOSIS — R29.3 POSTURAL INSTABILITY: ICD-10-CM

## 2025-02-06 PROCEDURE — 97110 THERAPEUTIC EXERCISES: CPT | Performed by: PHYSICAL THERAPIST

## 2025-02-06 PROCEDURE — 97112 NEUROMUSCULAR REEDUCATION: CPT | Performed by: PHYSICAL THERAPIST

## 2025-02-06 NOTE — PROGRESS NOTES
"Daily Note     Today's date: 2025  Patient name: Gabriel Gonzalez  : 1945  MRN: [id number]  Referring provider: Lawanda Joshi CRNP  Dx:   Encounter Diagnosis     ICD-10-CM    1. Parkinson's disease without dyskinesia or fluctuating manifestations (HCC)  G20.A1       2. Falls  R29.6       3. Postural instability  R29.3                        Subjective: Patient without c/o prior to treatment session.       Objective: See treatment diary below      Assessment: Patient demonstrated significant challenge with rockerboard and sidestepping with TB activities; demonstrated moderate fatigue at completion of treatment session.       Plan: Continue per plan of care.         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    Seated purple ball raise 2x10   Seated ball reach and give        Glute Sets Bridge 5x2 Bridge 5 Bridge 5x2 Bridge 5x2    Hip ADD ball squeeze        Tandem ambulation 2x mirror 2x mirror 2x mirror 2x mirror    Foam balance NBOS 2x30\" 1 w/ head turns NBOS 2x30\"  NBOS 3x30\" NBOS 3x30\"    SLS        BKFO GTB 15x ea GTB 20x ea GTB 20x ea GTB 20x ea    Hip flexion  H/L 12x ea Stand 2x10 Stand 2x10 Stand 2x10    Ball press out core     Seated purple ball 2x10   Ther Ex   "      Scap squeezes        Chin Tucks        Cervical Rot AROM     Seated 10x ea cues   CS ext Reclined, H/L  15x   Reclined H/L  15x Reclined H/L 15x Reclined H/L 20x Seated x10   Mid Rows Seated EOT  GTB 20x   Seated EOT  GTB 20x Seated EOT GTB 25x Seated EOT GTB 25x    Shoulder Ext        Wobble Board     Bal. 1' ea.    SLS        Doorway pec stretch                 Ther Activity         Nu Step 5min Lv4 6 min Lv 5 RB 5 min Lv 0  5 min 5 mins Lv 5   Gait Training in clinic, w/ SPC? NV? W TH trial B/L hands RUE w/ NB     Leg Press                Obstacle negotiation        OH shelf taps                Pt Ed Cane use at home    POC   Re-Evaluation      DK   Modalities          Heat/ice (PRN)

## 2025-02-11 ENCOUNTER — OFFICE VISIT (OUTPATIENT)
Dept: PHYSICAL THERAPY | Facility: CLINIC | Age: 80
End: 2025-02-11
Payer: MEDICARE

## 2025-02-11 DIAGNOSIS — R29.3 POSTURAL INSTABILITY: ICD-10-CM

## 2025-02-11 DIAGNOSIS — R29.6 FALLS: ICD-10-CM

## 2025-02-11 DIAGNOSIS — G20.A1 PARKINSON'S DISEASE WITHOUT DYSKINESIA OR FLUCTUATING MANIFESTATIONS (HCC): Primary | ICD-10-CM

## 2025-02-11 PROCEDURE — 97112 NEUROMUSCULAR REEDUCATION: CPT | Performed by: PHYSICAL THERAPIST

## 2025-02-11 PROCEDURE — 97110 THERAPEUTIC EXERCISES: CPT | Performed by: PHYSICAL THERAPIST

## 2025-02-11 PROCEDURE — 97140 MANUAL THERAPY 1/> REGIONS: CPT | Performed by: PHYSICAL THERAPIST

## 2025-02-11 NOTE — PROGRESS NOTES
Daily Note     Today's date: 2025  Patient name: Gabriel Gonzalez  : 1945  MRN: [id number]  Referring provider: Lawanda Joshi CRNP  Dx:   Encounter Diagnosis     ICD-10-CM    1. Parkinson's disease without dyskinesia or fluctuating manifestations (HCC)  G20.A1       2. Falls  R29.6       3. Postural instability  R29.3           Start Time: 1400  Stop Time: 1445  Total time in clinic (min): 45 minutes    Subjective: Patient arrives with his wife stating he had a lot of dizziness today more than usual and feels more wobbly.      Objective: See treatment diary below      Assessment: Tolerated treatment fair. Initiated session assessing patient's BP in sitting to be 104/60 mmHg. Trialed cold pack to patient's next to address dizziness. Patient reported no dizziness after a few minutes in sitting with improved BP noted as noted below. Patient was guarded close supervision/CGA to chair for remainder of exercises. Focused on seated cervical and postural exercises today due to dizziness upon standing up. Worked on improving cervical extension and maintaining neutral position of neck. Worked on exercises for reaching with gaze stabilization to improve this. Patient would benefit from continued PT      Plan: Continue per plan of care.  Progress treatment as tolerated.          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    Seated GMB x10   Seated ball reach and give     Seated GMB various reaching x10 ea   Glute Sets Bridge 5x2 Bridge 5 Bridge 5x2 Bridge 5x2    Hip ADD ball squeeze        Tandem ambulation 2x mirror 2x mirror 2x mirror 2x mirror    Foam balance NBOS 2x30\" 1 w/ head turns NBOS 2x30\"  NBOS 3x30\" NBOS 3x30\"    SLS        BKFO GTB 15x ea GTB 20x ea GTB 20x ea GTB 20x ea    Hip flexion  H/L 12x ea Stand 2x10 Stand 2x10 Stand 2x10    Ball press out core     Seated GMB chest press x10   Ther Ex        Scap squeezes     Seated 2x10 cues   Chin Tucks        Cervical Rot AROM     Seated cues for neck neutral x10 ea   CS ext Reclined, H/L  15x   Reclined H/L  15x Reclined H/L 15x Reclined H/L 20x Seated  H/L 20x   Mid Rows Seated EOT  GTB 20x   Seated EOT  GTB 20x Seated EOT GTB 25x Seated EOT GTB 25x    Shoulder Ext        Wobble Board     Bal. 1' ea.    SLS        Doorway pec stretch                 Ther Activity         Nu Step 5min Lv4 6 min Lv 5 RB 5 min Lv 0  5 min    Gait Training in clinic, w/ SPC? NV? W TH trial B/L hands RUE w/ NB     Leg Press        Obstacle negotiation        OH shelf taps                Pt Ed Cane use at home    POC   Re-Evaluation         Modalities          Heat/ice (PRN)     Ice x5min @ start to neck                            "

## 2025-02-12 NOTE — PROGRESS NOTES
PT Re-Evaluation     Today's date: 2025  Patient name: Gabriel Gonzalez  : 1945  MRN: [id number]  Referring provider: Lawanda Joshi CRNP  Dx:   Encounter Diagnosis     ICD-10-CM    1. Parkinson's disease without dyskinesia or fluctuating manifestations (HCC)  G20.A1       2. Falls  R29.6       3. Postural instability  R29.3               Start Time: 1400  Stop Time: 1445  Total time in clinic (min): 45 minutes    Assessment  Impairments: abnormal gait, abnormal or restricted ROM, activity intolerance, impaired balance, impaired physical strength, lacks appropriate home exercise program, pain with function, weight-bearing intolerance, poor posture , poor body mechanics, participation limitations, activity limitations and endurance    Assessment details: Patient is a 79 y.o. male who presents to outpatient PT with reports of balance and postural impairments since diagnosis of Parkinson's many years ago. He reports neck drop has worsened more recently over the past 4-5 months. Patient has attended PT for the past 2 months with improving overall function and reports of balance since start of PT. He reports he feels better flexibility in his neck and has improved awareness of keeping a neutral head position. Slight improvements in cervical ROM and overall ability to perform AROM compared to signifcant stiffness on IE. He continues to have forward head posture in static/resting position and requires verbal cuing by PT and wife for correction and awareness. He responds well to verbal cuing, however carryover is fair. Continued limitations in cervical Extension and Rotation mobility, which affects overall scanning environment for safety and maintaining neutral neck position for dressing, eating, etc. His gait and overall posture coincide with Parkinsonian-like characteristics. However, exhibits moderate limping on Right LE due to muscle imbalance and glute weakness that poses as a falls risk as it contributes  to more instability in his stride. Unable to assess 5x sit-stand test as patient had difficulty comprehending sequence and technique with testing. Improved TUG time since IE, with improved efficiency with walking and short distance ambulation. Overall, his gait and balance impairments indicates an increased falls risk. He has had about 2 falls in the past year, mainly with anterior weight-shifting perturbation/activities. He lives with his wife who assists him with ADLs as needed. Patient will benefit from continued skilled PT services to work on balance training, strength training, postural training, and overall safety with walking and transfers to improve independence with ADLs and reduce falls risk. Patient would benefit from skilled PT services to address these impairments and to maximize function.  Thank you for the referral.    Barriers to therapy: Chronicity of symptoms  Barriers to intervention: medical complexity  Understanding of Dx/Px/POC: good     Prognosis: fair    Goals  Impairment Goals 4-6 weeks   In order to maximize function patient will be able to...   - Demonstrate symmetrical cervical AROM without pain. Improved  - Increase LE/UE strength to 4/5 throughout. Improved  - Demonstrate improved hip/cervical flexibility as demonstrated by increased ROM through therapeutic exercise. Improved  - Improve TUG time to <13 sec to indicate reduce falls risk. Improved     Functional Goals 6-8 weeks  In order to return to prior level of function patient will be able to...   - Participate in ADL's/IADL's/sport specific activities with no greater than 2/10 pain.  Improved  - Increase Functional Status Measure (FOTO) to: anticipated at discharge. Met  - Demonstrate independence and compliant with HEP. Met  - Patient will be able to demonstrate good gait mechanics without compensations. Improved  - Demonstrate sit to stand with good mechanics and eccentric control without pain/difficulty/compensation.  Improved    Plan  Patient would benefit from: skilled PT  Planned modality interventions: cryotherapy and electrical stimulation/Russian stimulation  Other planned modality interventions: moist heat    Planned therapy interventions: joint mobilization, manual therapy, neuromuscular re-education, patient education, strengthening, stretching, therapeutic activities, therapeutic exercise, home exercise program, functional ROM exercises, Bales taping, postural training, balance/weight bearing training, body mechanics training, flexibility, IASTM, kinesiology taping, massage, nerve gliding, transfer training and gait training    Frequency: 1-2x week  Duration in weeks: 4  Treatment plan discussed with: patient, PTA, referring physician and family        Subjective Evaluation    History of Present Illness  Mechanism of injury: Gabriel Gonzalez is a 79 y.o. year-old male who presents to outpatient PT accompanied by his wife Dariana. Patient reports diagnosed with Parkinson's many years ago and has attended PT at different location for neuro rehab/balance training. Patient's wife reports his neck drop and cervical stiffness started about 2-3 months ago. Per his wife, it has progressively worsened over the past few months. He also has history of frequent falls, noting 2 falls in the past year but no injuries from falls. Patient reports he is independent with most ADLs, however wife assists with dressing and driving. Patient is currently not driving per physician recommendation and family precaution. Patient is able to perform all other Adls and iADLs on his own. Grab bars present in bathroom. Patient does not use assistive device for ambulation. They have a cane at home from wife's previous injury.  Quality of life: good    Patient Goals  Patient goals for therapy: decreased pain, increased motion, improved balance, increased strength, independence with ADLs/IADLs and return to sport/leisure activities    Pain  Current  pain ratin  At best pain ratin  At worst pain ratin  Location: neck  Quality: tight  Relieving factors: heat  Aggravating factors: sitting, standing, stair climbing, walking and lifting  Progression: worsening    Social Support  Steps to enter house: yes  Stairs in house: yes   Lives with: spouse    Employment status: working (, own firm)  Treatments  Previous treatment: physical therapy  Current treatment: physical therapy        Objective     Palpation   Left   Muscle spasm in the levator scapulae, pectoralis major, pectoralis minor, scalenes, sternocleidomastoid, suboccipitals and upper trapezius.   Tenderness of the levator scapulae and upper trapezius.     Right   Muscle spasm in the levator scapulae, pectoralis major, pectoralis minor, scalenes, sternocleidomastoid, suboccipitals and upper trapezius.   Tenderness of the levator scapulae and upper trapezius.     Active Range of Motion   Cervical/Thoracic Spine       Cervical    Flexion: 50 degrees   Extension: 10 (35 deg from resting position) degrees      Left lateral flexion: 20 degrees      Right lateral flexion: 20 degrees      Left rotation: 60 (cuing to avoid cervical flexion with rotation) degrees  Right rotation: 70 (cuing to avoid cervical flexion with rotation) degrees       Strength/Myotome Testing   Cervical Spine   Neck extension: 3  Neck flexion: 3+    Left   Neck lateral flexion (C3): 3+    Right   Neck lateral flexion (C3): 3+    Left Shoulder     Planes of Motion   Flexion: 4-   Abduction: 4-   External rotation at 0°: 4-   Internal rotation at 0°: 4-     Right Shoulder     Planes of Motion   Flexion: 4-   Abduction: 4-   External rotation at 0°: 4-   Internal rotation at 0°: 4-     Left Elbow   Flexion: 4-  Extension: 4-    Right Elbow   Flexion: 4-  Extension: 4-    Left Hip   Planes of Motion   Flexion: 3+  Extension: 2  Abduction: 4-  External rotation: 4-    Right Hip   Planes of Motion   Flexion: 3+  Extension:  2  Abduction: 4-  External rotation: 4-    Left Knee   Flexion: 4-  Extension: 4-    Right Knee   Flexion: 4-  Extension: 4-    Left Ankle/Foot   Dorsiflexion: 4-  Plantar flexion: 4-    Right Ankle/Foot   Dorsiflexion: 4-  Plantar flexion: 4-  Neuro Exam:     Sensation   Light touch LE: left WNL and right WNL    Coordination   Heel to shin: left dysmetric and right dysmetric  Finger to nose: left dysmetria (4/5 trials correct) and right dysmetria (2/5 trials correct)  Rapid alternating movements: UE WNL    Transfers   Sit to stand: minimum assist (needs 2 UE support, close supervision)     Functional outcomes   5x sit to stand: unable (seconds)  TU (seconds)  Functional outcome comment: 5x sit-stand unable to test due to patient unable to comprehend test  Functional outcome gait comment: Patient walks without assistive device, exhibits significant limp on Right LE with decreased Right LE stance phase and early Right toe-off in terminal stance, tends to exhibit knee flexion early end of mid-stance and terminal stance; decreased Left knee flexion in swing with decreased forward advancement of LE noted bilaterally; decreased arm and trunk rotation noted with overall guarded/stiff UE posture; forward head posture noted through with difficulty maintaining neutral cervical spine position while walking or standing              POC Expires Auth Status Start Date Expiration Date PT Visit Limit    3/13/2025 No Auth Req   BOMN   Date 2025   Used 16 12 13 14 15   Remaining           Diagnosis: Parkinson's disease; frequent falls; neck drop   Precautions: chronic Right hip pain, orthostatic hypotension, anxiety, 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   Next Physician's Appt:   [employer] HEP:   Manuals    STM DK TH NB KK DK   C/S PROM DK    DK   LE stretching        Vitals     BP L UE, 104/60mmHg, after 5min 108/70mmHg           Neuro Re-Ed        T/S seated  "extensions        Pulleys        Standing OH reaching         Side-stepping  @ mirror x3 laps @ mirror x3 laps GTB @ mirror x3 laps    HR  15x x20 x20    OH ball lift for posture     Seated GMB x10   Seated ball reach and give     Seated GMB various reaching x10 ea   Glute Sets  Bridge 5 Bridge 5x2 Bridge 5x2    Hip ADD ball squeeze        Tandem ambulation  2x mirror 2x mirror 2x mirror    Foam balance  NBOS 2x30\"  NBOS 3x30\" NBOS 3x30\"    SLS        BKFO  GTB 20x ea GTB 20x ea GTB 20x ea    Hip flexion   Stand 2x10 Stand 2x10 Stand 2x10    Ball press out core     Seated GMB chest press x10   Ther Ex        Scap squeezes     Seated 2x10 cues   Chin Tucks        Cervical Rot AROM Seated cues for neck neutral x10 ea    Seated cues for neck neutral x10 ea   CS ext Seated 20x Reclined H/L  15x Reclined H/L 15x Reclined H/L 20x Seated  20x   Mid Rows  Seated EOT  GTB 20x Seated EOT GTB 25x Seated EOT GTB 25x    Shoulder Ext        Wobble Board     Bal. 1' ea.    SLS        Doorway pec stretch                 Ther Activity         Nu Step 5 min Lv 4 6 min Lv 5 RB 5 min Lv 0  5 min    Gait Training in clinic, w/ SPC?  W TH trial B/L hands RUE w/ NB     Leg Press        Obstacle negotiation        OH shelf taps                Pt Ed POC    POC   Re-Evaluation DK        Modalities          Heat/ice (PRN)     Ice x5min @ start to neck                         "

## 2025-02-13 ENCOUNTER — EVALUATION (OUTPATIENT)
Dept: PHYSICAL THERAPY | Facility: CLINIC | Age: 80
End: 2025-02-13
Payer: MEDICARE

## 2025-02-13 DIAGNOSIS — R29.6 FALLS: ICD-10-CM

## 2025-02-13 DIAGNOSIS — G20.A1 PARKINSON'S DISEASE WITHOUT DYSKINESIA OR FLUCTUATING MANIFESTATIONS (HCC): Primary | ICD-10-CM

## 2025-02-13 DIAGNOSIS — R29.3 POSTURAL INSTABILITY: ICD-10-CM

## 2025-02-13 PROCEDURE — 97140 MANUAL THERAPY 1/> REGIONS: CPT | Performed by: PHYSICAL THERAPIST

## 2025-02-13 PROCEDURE — 97110 THERAPEUTIC EXERCISES: CPT | Performed by: PHYSICAL THERAPIST

## 2025-02-13 PROCEDURE — 97530 THERAPEUTIC ACTIVITIES: CPT | Performed by: PHYSICAL THERAPIST

## 2025-02-14 ENCOUNTER — TELEPHONE (OUTPATIENT)
Age: 80
End: 2025-02-14

## 2025-02-14 DIAGNOSIS — G20.A1 PARKINSON'S DISEASE (HCC): Primary | ICD-10-CM

## 2025-02-14 NOTE — TELEPHONE ENCOUNTER
Pt's wife Dariana called. She is requesting a referral for OT to be sent to [facility]. She is a certified therapist in [employer] and will come to pt's home to provide OT services to work on ADLs. Pt has an evaluation set up for next Monday. Dariana is requesting that the referral be faxed to #[phone].

## 2025-02-17 ENCOUNTER — TELEPHONE (OUTPATIENT)
Age: 80
End: 2025-02-17

## 2025-02-17 ENCOUNTER — NURSE TRIAGE (OUTPATIENT)
Age: 80
End: 2025-02-17

## 2025-02-17 NOTE — TELEPHONE ENCOUNTER
Regarding: low heart rate  ----- Message from Kirsten CORNELL sent at 2/17/2025  9:41 AM EST -----  Patient's wife called back, reporting same symptoms. Informed her message has been sent to triage team and she will receive a call from our nurses once available. She expressed understanding.    ----- Message from Sukhdev LOZANO sent at 2/17/2025  9:24 AM EST -----  Patient's wife calling to speak to clinical team based on the patient's low pulse rate. They state they are in the range of 51-69bpm for the last 48hours and have not been able to raise the rate higher than 69bpm. Please call patient back to discuss.

## 2025-02-17 NOTE — TELEPHONE ENCOUNTER
"Reason for Conversation: Spouse called stating a nurse was at the home on Saturday and raised some concern pt HR was in the 50's with palpitations. Pt has been experiencing increased dizziness, but attributes this to the parkinson's. They have been monitoring the HR over the weekend it averaged anywhere from 52-69. Pt denies any palpitations or fluttering, but unable to feel any symptoms due to the parkinson's.   Pt scheduled to see Derna tomorrow morning.    VS/Weight: (Note: Please include date/time vitals/weight were measured)  52-69    Pain: No    Risk Factors: Arrhythmia    Recent relevant testing and date of testing: Echo 9/2024    Medication: midodrine, lipitor    Upcoming Office Visit: Yes, tomorrow    Last Office Visit: 10/22/24     Answer Assessment - Initial Assessment Questions  1. DESCRIPTION: \"Please describe your heart rate or heartbeat that you are having\" (e.g., fast/slow, regular/irregular, skipped or extra beats, \"palpitations\")      palpitations  2. ONSET: \"When did it start?\" (e.g., minutes, hours, days)       Over the weekend  3. DURATION: \"How long does it last\" (e.g., seconds, minutes, hours)      ongoing  4. PATTERN \"Does it come and go, or has it been constant since it started?\"  \"Does it get worse with exertion?\"   \"Are you feeling it now?\"      unsure  5. TAP: \"Using your hand, can you tap out what you are feeling on a chair or table in front of you, so that I can hear?\" Note: Not all patients can do this.        unsure  6. HEART RATE: \"Can you tell me your heart rate?\" \"How many beats in 15 seconds?\"  Note: Not all patients can do this.        52-69  7. RECURRENT SYMPTOM: \"Have you ever had this before?\" If Yes, ask: \"When was the last time?\" and \"What happened that time?\"       unsure  8. CAUSE: \"What do you think is causing the palpitations?\"      unsure  9. CARDIAC HISTORY: \"Do you have any history of heart disease?\" (e.g., heart attack, angina, bypass surgery, angioplasty, " "arrhythmia)       unsure  10. OTHER SYMPTOMS: \"Do you have any other symptoms?\" (e.g., dizziness, chest pain, sweating, difficulty breathing)        dizziness    Protocols used: Heart Rate and Heartbeat Questions-Adult-[place]    " father/mother

## 2025-02-18 ENCOUNTER — OFFICE VISIT (OUTPATIENT)
Dept: CARDIOLOGY CLINIC | Facility: CLINIC | Age: 80
End: 2025-02-18
Payer: MEDICARE

## 2025-02-18 ENCOUNTER — APPOINTMENT (OUTPATIENT)
Dept: PHYSICAL THERAPY | Facility: CLINIC | Age: 80
End: 2025-02-18
Payer: MEDICARE

## 2025-02-18 VITALS
HEIGHT: 72 IN | OXYGEN SATURATION: 97 % | TEMPERATURE: 97 F | SYSTOLIC BLOOD PRESSURE: 116 MMHG | HEART RATE: 51 BPM | WEIGHT: 182 LBS | BODY MASS INDEX: 24.65 KG/M2 | DIASTOLIC BLOOD PRESSURE: 76 MMHG

## 2025-02-18 DIAGNOSIS — R00.1 BRADYCARDIA: ICD-10-CM

## 2025-02-18 DIAGNOSIS — G20.A1 DEMENTIA DUE TO PARKINSON'S DISEASE, WITH PSYCHOTIC DISTURBANCE, UNSPECIFIED DEMENTIA SEVERITY (HCC): ICD-10-CM

## 2025-02-18 DIAGNOSIS — F02.82 DEMENTIA DUE TO PARKINSON'S DISEASE, WITH PSYCHOTIC DISTURBANCE, UNSPECIFIED DEMENTIA SEVERITY (HCC): ICD-10-CM

## 2025-02-18 DIAGNOSIS — E78.2 MIXED HYPERLIPIDEMIA: ICD-10-CM

## 2025-02-18 DIAGNOSIS — I95.1 ORTHOSTATIC HYPOTENSION: ICD-10-CM

## 2025-02-18 PROCEDURE — 93000 ELECTROCARDIOGRAM COMPLETE: CPT

## 2025-02-18 PROCEDURE — 99214 OFFICE O/P EST MOD 30 MIN: CPT

## 2025-02-18 NOTE — PROGRESS NOTES
Gabriel Gonzalez  1945  [id number]  [facility] [place]  [facility] [address] [phone]    No diagnosis found.    Summary/Discussion:  Interval History: Gabriel Gonzalez is a 79 y.o. year old male with history of    who presents to the office today for routine follow up/ hospital follow up/for initial evaluation.     *** will RTO in *** with  *** or sooner if necessary. *** will call with any concerns.         Medical Problems       Problem List       REM behavioral disorder    Parkinson's disease (HCC)    Orthostatic hypotension    Benign prostatic hyperplasia with urinary frequency    Hip pain, chronic, right    Mixed hyperlipidemia    Unsteady gait    Right leg weakness    Elevated PSA    Stage 3a chronic kidney disease (HCC)    Lab Results   Component Value Date    EGFR 70 2024    EGFR 59 2024    EGFR 68 2023    CREATININE 1.02 2024    CREATININE 1.18 2024    CREATININE 1.05 2023         Visual hallucinations    Delusions (HCC)    Dementia due to Parkinson's disease, with psychotic disturbance (HCC)    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)        Past Medical History:   Diagnosis Date   • Anxiety 10/07/2020   • Psychophysiological insomnia 10/07/2020     Social History     Socioeconomic History   • Marital status: /Civil Union     Spouse name: Not on file   • Number of children: 3   • Years of education: Not on file   • Highest education level: Not on file   Occupational History   • Occupation:    still active   • Occupation: retired    Tobacco Use   • Smoking status: Never   • Smokeless tobacco: Never   Vaping Use   • Vaping status: Never Used   Substance and Sexual Activity   • Alcohol use: Not Currently     Comment: social   • Drug use: No   • Sexual activity: Not Currently   Other Topics Concern   • Not on file   Social History Narrative    Most recent tobacco use screenin-    Do  you currently or have you served in the  [employer]: Yes    If Yes, What branch of service: [employer]    [employer]    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, into active duty, 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    Advance directive: Yes    Alcohol intake: Moderate    Caffeine intake: Moderate    Illicit drugs: none    Occupation: retired    Exercise level: Occasional    Single or multi-level home/work: single level home    Live alone or with others: with others    Chewing tobacco: none    Marital status:     Number of children: 3    Diet: Regular    Sexual orientation: Heterosexual    Smoke alarm in home: Yes    General stress level: Medium    Sunscreen used routinely: No    Education: Post Graduate    Guns present in home: No    Presence of domestic violence: No     Social Drivers of Health     Financial Resource Strain: Low Risk  (6/23/2023)    Overall Financial Resource Strain (CARDIA)    • Difficulty of Paying Living Expenses: Not hard at all   Food Insecurity: No Food Insecurity (6/25/2024)    Nursing - Inadequate Food Risk Classification    • Worried About Running Out of Food in the Last Year: Never true    • Ran Out of Food in the Last Year: Never true    • Ran Out of Food in the Last Year: Not on file   Transportation Needs: No Transportation Needs (6/25/2024)    PRAPARE - Transportation    • Lack of Transportation (Medical): No    • Lack of Transportation (Non-Medical): No   Physical Activity: Not on file   Stress: Not on file   Social Connections: Not on file   Intimate Partner Violence: Not on file   Housing Stability: Low Risk  (6/25/2024)    Housing Stability Vital Sign    • Unable to Pay for Housing in the Last Year: No    • Number of Times Moved in the Last Year: 1    • Homeless in the Last Year: No      Family History   Problem Relation Age of Onset   • Heart disease Mother    • Heart disease Father      Past Surgical History:   Procedure Laterality Date   • CYST REMOVAL      Right wrist  "  • PROSTATE SURGERY      Biopsy   • TONSILLECTOMY         Current Outpatient Medications:   •  atorvastatin (LIPITOR) 20 mg tablet, TAKE ONE TABLET BY MOUTH EVERY DAY, Disp: 90 tablet, Rfl: 1  •  carbidopa-levodopa (SINEMET)  mg per tablet, TAKE 1.5 TABLETS BY MOUTH AT 7AM, 1.5 TABLETS AT 10:30AM, 1.5 TABLETS AT 2:30PM, 1 TABLET AT 6:30PM, AND 1 TABLET AT 10:30PM, Disp: 540 tablet, Rfl: 1  •  ketoconazole (NIZORAL) 2 % shampoo, APPLY TO SCALP AND FACE 2 TO 3 TIMES A WEEK, LATHER AND LEAVE ON FOR 5 MINUTES, THEN RINSE, Disp: , Rfl:   •  midodrine (PROAMATINE) 2.5 mg tablet, Take 1 tab TID as needed, Disp: 90 tablet, Rfl: 3  •  OLANZapine (ZyPREXA) 2.5 mg tablet, Take 1 tablet (2.5 mg total) by mouth daily at bedtime, Disp: 30 tablet, Rfl: 1  •  rivastigmine (EXELON) 3 mg capsule, Take 3 mg by mouth 2 (two) times a day, Disp: , Rfl:   No Known Allergies    Labs:     Chemistry        Component Value Date/Time    K 3.9 08/02/2024 0905     08/02/2024 0905    CO2 30 08/02/2024 0905    BUN 18 08/02/2024 0905    CREATININE 1.02 08/02/2024 0905        Component Value Date/Time    CALCIUM 9.5 08/02/2024 0905    ALKPHOS 66 08/02/2024 0905    AST 14 08/02/2024 0905    ALT 7 08/02/2024 0905            No results found for: \"CHOL\"  Lab Results   Component Value Date    HDL 54 08/02/2024    HDL 46 03/22/2024    HDL 39 (L) 11/16/2022     Lab Results   Component Value Date    LDLCALC 67 08/02/2024    LDLCALC 88 03/22/2024    LDLCALC 155 (H) 11/16/2022     Lab Results   Component Value Date    TRIG 58 08/02/2024    TRIG 58 03/22/2024    TRIG 133 11/16/2022     No results found for: \"CHOLHDL\"    Imaging: No results found.    ECG:  ***    ROS    Vitals:    02/18/25 1102   BP: 116/76   Pulse: (!) 51   Temp: (!) 97 °F (36.1 °C)   SpO2: 97%     Vitals:    02/18/25 1102   Weight: 82.6 kg (182 lb)     Height: 6' (182.9 cm)   Body mass index is 24.68 kg/m².    Physical Exam   "

## 2025-02-18 NOTE — PROGRESS NOTES
"Gabriel Gonzalez  1945  [id number]  [facility]  [facility] [id number] [phone]    1. Bradycardia  POCT ECG    Zio Monitor      2. Orthostatic hypotension        3. Mixed hyperlipidemia        4. Dementia due to Parkinson's disease, with psychotic disturbance, unspecified dementia severity (HCC)            Summary/Discussion:  Bradycardia:  - hx of based on chart review. Suspect secondary to parkinson's disease   - per wife who is present during our visit today, patient has been having more \"off days\" and she has been noticing that he is more fatigue then normal. He does have a home CRNP who visits him regularly and also noticed that the patient was having bradycardia and heard \"skipped beats\" on exam  - EKG today demonstrating sinus bradycardia, HR 51 bpm  - echo (9/2024): LV wall thickness mildly increased, concentric remodeling, LVEF 55%, no WMA, normal RV, mild annular calcification and trace MR, trace TR  - CT chest (9/2024): evidence of coronary artery calcifications-- without symptoms of angina   - recommend 2 week zio to assess for average heart rate and for any cardiac rhythm abnormality   - historically not on any tim blocking agents  - is on zyprexa daily at bedtime which can affects blood pressures/heart rates   - encourage adequate hydration, use of compression stockings and regular exercise as tolerated     Orthostatic hypotension:  - on midodrine     Dyslipidemia:  - Lipid Profile:    Latest Reference Range & Units 08/02/24 09:05   Cholesterol See Comment mg/dL 133   Triglycerides See Comment mg/dL 58   HDL >=40 mg/dL 54   LDL Calculated 0 - 100 mg/dL 67   - continue atorvastatin 20 mg daily   - encouraged low cholesterol diet and annual lipid follow up    Parkinson's disease, dementia, psychosis      Interval History: Gabriel Gonzalez is a 79 y.o. year old male with history mentioned in problem list who presents to the office today " "for an urgent follow up.     Per patient's wife who is present during our visit today, patient has been having more \"off days\" and she has been noticing that he is more fatigue then normal. He does have a home CRNP who visits him regularly and also noticed that the patient was having bradycardia and heard \"skipped beats\" on exam. Patient himself does not express any significant cardiac complaints. He denies any chest pain/pressure/discomfort or shortness of breath. He denies lower extremity edema, orthopnea, and PND. He denies near syncope and syncope. He denies palpitations.  He does have chronic dizziness due to his parkinson's disease.       He will RTO in 3 months with Dr. Oakley or sooner if necessary. He will call with any concerns.       Medical Problems       Problem List       REM behavioral disorder    Parkinson's disease (HCC)    Orthostatic hypotension    Benign prostatic hyperplasia with urinary frequency    Hip pain, chronic, right    Mixed hyperlipidemia    Unsteady gait    Right leg weakness    Elevated PSA    Stage 3a chronic kidney disease (HCC)    Lab Results   Component Value Date    EGFR 70 08/02/2024    EGFR 59 03/08/2024    EGFR 68 11/09/2023    CREATININE 1.02 08/02/2024    CREATININE 1.18 03/08/2024    CREATININE 1.05 11/09/2023         Visual hallucinations    Delusions (HCC)    Dementia due to Parkinson's disease, with psychotic disturbance (HCC)    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)        Past Medical History:   Diagnosis Date    Anxiety 10/07/2020    Psychophysiological insomnia 10/07/2020     Social History     Socioeconomic History    Marital status: /Civil Union     Spouse name: Not on file    Number of children: 3    Years of education: Not on file    Highest education level: Not on file   Occupational History    Occupation:    still active    Occupation: retired    Tobacco Use    Smoking status: Never    Smokeless tobacco: Never   Vaping Use    Vaping status: " Never Used   Substance and Sexual Activity    Alcohol use: Not Currently     Comment: social    Drug use: No    Sexual activity: Not Currently   Other Topics Concern    Not on file   Social History Narrative    Most recent tobacco use screenin-    Do you currently or have you served in the [place] Armed Forces: Yes    If Yes, What branch of service: [employer]    [employer]    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, into active duty, 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    Advance directive: Yes    Alcohol intake: Moderate    Caffeine intake: Moderate    Illicit drugs: none    Occupation: retired    Exercise level: Occasional    Single or multi-level home/work: single level home    Live alone or with others: with others    Chewing tobacco: none    Marital status:     Number of children: 3    Diet: Regular    Sexual orientation: Heterosexual    Smoke alarm in home: Yes    General stress level: Medium    Sunscreen used routinely: No    Education: Post Graduate    Guns present in home: No    Presence of domestic violence: No     Social Drivers of Health     Financial Resource Strain: Low Risk  (2023)    Overall Financial Resource Strain (CARDIA)     Difficulty of Paying Living Expenses: Not hard at all   Food Insecurity: No Food Insecurity (2024)    Nursing - Inadequate Food Risk Classification     Worried About Running Out of Food in the Last Year: Never true     Ran Out of Food in the Last Year: Never true     Ran Out of Food in the Last Year: Not on file   Transportation Needs: No Transportation Needs (2024)    PRAPARE - Transportation     Lack of Transportation (Medical): No     Lack of Transportation (Non-Medical): No   Physical Activity: Not on file   Stress: Not on file   Social Connections: Not on file   Intimate Partner Violence: Not on file   Housing Stability: Low Risk  (2024)    Housing Stability Vital Sign     Unable to Pay for Housing in the Last Year: No     Number of  "Times Moved in the Last Year: 1     Homeless in the Last Year: No      Family History   Problem Relation Age of Onset    Heart disease Mother     Heart disease Father      Past Surgical History:   Procedure Laterality Date    CYST REMOVAL      Right wrist    PROSTATE SURGERY      Biopsy    TONSILLECTOMY         Current Outpatient Medications:     atorvastatin (LIPITOR) 20 mg tablet, TAKE ONE TABLET BY MOUTH EVERY DAY, Disp: 90 tablet, Rfl: 1    carbidopa-levodopa (SINEMET)  mg per tablet, TAKE 1.5 TABLETS BY MOUTH AT 7AM, 1.5 TABLETS AT 10:30AM, 1.5 TABLETS AT 2:30PM, 1 TABLET AT 6:30PM, AND 1 TABLET AT 10:30PM, Disp: 540 tablet, Rfl: 1    ketoconazole (NIZORAL) 2 % shampoo, APPLY TO SCALP AND FACE 2 TO 3 TIMES A WEEK, LATHER AND LEAVE ON FOR 5 MINUTES, THEN RINSE, Disp: , Rfl:     midodrine (PROAMATINE) 2.5 mg tablet, Take 1 tab TID as needed, Disp: 90 tablet, Rfl: 3    OLANZapine (ZyPREXA) 2.5 mg tablet, Take 1 tablet (2.5 mg total) by mouth daily at bedtime, Disp: 30 tablet, Rfl: 1    rivastigmine (EXELON) 3 mg capsule, Take 3 mg by mouth 2 (two) times a day, Disp: , Rfl:   No Known Allergies    Labs:     Chemistry        Component Value Date/Time    K 3.9 08/02/2024 0905     08/02/2024 0905    CO2 30 08/02/2024 0905    BUN 18 08/02/2024 0905    CREATININE 1.02 08/02/2024 0905        Component Value Date/Time    CALCIUM 9.5 08/02/2024 0905    ALKPHOS 66 08/02/2024 0905    AST 14 08/02/2024 0905    ALT 7 08/02/2024 0905            No results found for: \"CHOL\"  Lab Results   Component Value Date    HDL 54 08/02/2024    HDL 46 03/22/2024    HDL 39 (L) 11/16/2022     Lab Results   Component Value Date    LDLCALC 67 08/02/2024    LDLCALC 88 03/22/2024    LDLCALC 155 (H) 11/16/2022     Lab Results   Component Value Date    TRIG 58 08/02/2024    TRIG 58 03/22/2024    TRIG 133 11/16/2022     No results found for: \"CHOLHDL\"    Imaging: No results found.    ECG:  sinus bradycardia    Review of Systems "   Neurological:  Positive for dizziness.   All other systems reviewed and are negative.      Vitals:    02/18/25 1102   BP: 116/76   Pulse: (!) 51   Temp: (!) 97 °F (36.1 °C)   SpO2: 97%     Vitals:    02/18/25 1102   Weight: 82.6 kg (182 lb)     Height: 6' (182.9 cm)   Body mass index is 24.68 kg/m².    Physical Exam  Vitals and nursing note reviewed.   Constitutional:       General: He is not in acute distress.     Appearance: Normal appearance.   HENT:      Head: Normocephalic.      Nose: Nose normal.      Mouth/Throat:      Mouth: Mucous membranes are moist.      Pharynx: Oropharynx is clear.   Cardiovascular:      Rate and Rhythm: Regular rhythm. Bradycardia present.      Pulses: Normal pulses.      Heart sounds: Normal heart sounds. No murmur heard.  Pulmonary:      Breath sounds: Decreased breath sounds present.   Musculoskeletal:      Cervical back: Normal range of motion.      Right lower leg: No edema.      Left lower leg: No edema.   Skin:     General: Skin is warm and dry.   Neurological:      Mental Status: He is alert.   Psychiatric:         Mood and Affect: Mood normal.         Behavior: Behavior normal.

## 2025-02-20 ENCOUNTER — OFFICE VISIT (OUTPATIENT)
Dept: PHYSICAL THERAPY | Facility: CLINIC | Age: 80
End: 2025-02-20
Payer: MEDICARE

## 2025-02-20 DIAGNOSIS — R29.6 FALLS: ICD-10-CM

## 2025-02-20 DIAGNOSIS — R29.3 POSTURAL INSTABILITY: ICD-10-CM

## 2025-02-20 DIAGNOSIS — G20.A1 PARKINSON'S DISEASE WITHOUT DYSKINESIA OR FLUCTUATING MANIFESTATIONS (HCC): Primary | ICD-10-CM

## 2025-02-20 PROCEDURE — 97110 THERAPEUTIC EXERCISES: CPT

## 2025-02-20 PROCEDURE — 97140 MANUAL THERAPY 1/> REGIONS: CPT

## 2025-02-20 PROCEDURE — 97112 NEUROMUSCULAR REEDUCATION: CPT

## 2025-02-20 NOTE — PROGRESS NOTES
"Daily Note     Today's date: 2025  Patient name: Gabriel Gonzalez  : 1945  MRN: [id number]  Referring provider: Lawanda Joshi CRNP  Dx:   Encounter Diagnosis     ICD-10-CM    1. Parkinson's disease without dyskinesia or fluctuating manifestations (HCC)  G20.A1       2. Falls  R29.6       3. Postural instability  R29.3                      Subjective:  Patient wife states that he has been having episodes of dizziness and low heart rate today.       Objective: See treatment diary below      Assessment: Tolerated treatment fair. O2 levels were tested throughout session today all WNL. Continued with POC, verbal cues provided throughout session to maintain cervical extension. Patient demonstrated fatigue post treatment, exhibited good technique with therapeutic exercises, and would benefit from continued PT      Plan: Continue per plan of care.         POC Expires Auth Status Start Date Expiration Date PT Visit Limit    3/13/2025 No Auth Req   BOMN   Date 2025   Used 16 17 13 14 15   Remaining           Diagnosis: Parkinson's disease; frequent falls; neck drop   Precautions: chronic Right hip pain, orthostatic hypotension, anxiety, 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   Next Physician's Appt:   LIQUITY HEP:   Manuals    STM WENDY WATSON DK   C/S PROM DK NAMRATA NGUYEN   LE stretching        Vitals     BP L UE, 104/60mmHg, after 5min 108/70mmHg           Neuro Re-Ed        T/S seated extensions        Pulleys        Standing OH reaching         Side-stepping    GTB @ mirror x3 laps    HR    x20    OH ball lift for posture  Seated GM x 10    Seated GMB x10   Seated ball reach and give     Seated GMB various reaching x10 ea   Glute Sets  Bridge 5x 2   Bridge 5x2    Hip ADD ball squeeze        Tandem ambulation    2x mirror    Foam balance  NBOS 3x 30\"   NBOS 3x30\"    SLS        BKFO    GTB 20x ea    Hip flexion     Stand 2x10    Ball press out core  Seated GMB " chest press x 10    Seated GMB chest press x10   Ther Ex        Scap squeezes  Seated 2x10 cues   Seated 2x10 cues   Chin Tucks        Cervical Rot AROM Seated cues for neck neutral x10 ea Seated cues for neck nuetral x 10 ea    Seated cues for neck neutral x10 ea   CS ext Seated 20x Seated 20x   Reclined H/L 20x Seated  20x   Mid Rows    Seated EOT GTB 25x    Shoulder Ext        Wobble Board     Bal. 1' ea.    SLS        Doorway pec stretch                 Ther Activity         Nu Step 5 min Lv 4 5 min lvl 4    5 min    Gait Training in clinic, w/ SPC?        Leg Press        Obstacle negotiation        OH shelf taps                Pt Ed POC    POC   Re-Evaluation DK       Modalities         Heat/ice (PRN)     Ice x5min @ start to neck

## 2025-02-25 ENCOUNTER — OFFICE VISIT (OUTPATIENT)
Dept: NEUROLOGY | Facility: CLINIC | Age: 80
End: 2025-02-25
Payer: MEDICARE

## 2025-02-25 VITALS
OXYGEN SATURATION: 98 % | DIASTOLIC BLOOD PRESSURE: 58 MMHG | TEMPERATURE: 98.2 F | HEART RATE: 65 BPM | WEIGHT: 181 LBS | SYSTOLIC BLOOD PRESSURE: 112 MMHG | BODY MASS INDEX: 24.55 KG/M2

## 2025-02-25 DIAGNOSIS — I95.1 ORTHOSTATIC HYPOTENSION: ICD-10-CM

## 2025-02-25 DIAGNOSIS — G20.B1 PARKINSON'S DISEASE WITH DYSKINESIA WITHOUT FLUCTUATING MANIFESTATIONS (HCC): Primary | ICD-10-CM

## 2025-02-25 PROCEDURE — 99214 OFFICE O/P EST MOD 30 MIN: CPT | Performed by: NURSE PRACTITIONER

## 2025-02-25 NOTE — PROGRESS NOTES
Name: Gabriel Gonzalez      : 1945      MRN: [id number]  Encounter Provider: OFELIA Chu  Encounter Date: 2025   Encounter department: NEUROLOGY [facility]  :  Assessment & Plan  Parkinson's disease with dyskinesia without fluctuating manifestations (HCC)  Parkinson's disease complicated by dementia and hallucinations.  Motor symptoms are relatively stable.  There is no significant freezing, continues with mild breakthrough tremor.  He does continue with hallucinations and delusions however these are less distressing than in the past.  Given his stability he will remain on his current dose of Sinemet.          He is currently on olanzapine and rivastigmine for his behavioral complaints.  He does follow with psych.  He has noted some bradycardia at home and is currently undergoing extended cardiac monitoring.  We did discuss if he has a significant bradykinesia on this testing would consider stopping rivastigmine as his bradycardia may be contributing to some of his dizziness/fatigue.    He is also noting some increased stooped posture, feels neck drop at times.  He does have good strength in flexion/extension of his neck.  He is interested in using cervical collar as needed, prescription provided.  Orders:    Cervical Collar    Orthostatic hypotension  Patient remains on midodrine, his wife reports blood pressures are improved/stable at home.  He does continue to report dizziness with position change and standing.  Unclear if this is more of a sense of imbalance/postural stability as he has difficulty describing his dizziness.  He is also undergoing workup for possible bradycardia which may be contributing to some of his dizziness.  He was advised to continue to change positions slowly, will await results from his cardiology workup.             History of Present Illness   HPI   Gabriel Gonzalez is a 79 y.o. male  who presents for follow up for parkinsonism with concerns  regarding recent development of hallucinations. To review, symptom onset in 2016 with slowness of gait with subsequent development of LH tremor and orthostatic hypotension. Initially seen by Dr Quinn and started on Sinemet, which was exacerbating his orthostatic intolerance. Sinemet discontinued and focus initially on treating orthostatic symptoms which improved on midodrine.   More recently has developed hallucinations-infectious work up was negative. Was started on seroquel with no significant improvement, xanax was weaned by PCP. Nuplazid caused increase in hallucinations was stopped after  3 days. Rivastigimine later added. Sinemet has been reduced. He follows with psych as well.      Last office visit 9/2024 in which he was to wean off rivastigmine given questionable benefit.         Interval History:   He presents today with his wife who assists with history.   Worsening behaviors were noted with stopping rivastigmine therefore was restarted.     He continues to feel dizziness when he stands, BP has been more stable, He did have a nurse there at the time who detect bradycardia, possible skipped beats. He will note the dizziness mainly with standing and shaving. He has a hard time explaining  what type of dizziness he is having, he does state at times it does feel off balance.    did see cardiology currently has heart monitor due to fatigue/bradycardia.     He is sleeping well overnight. Continues to have delusions/hallucinations, typically are daily. Not as distressing as previously and is more accepting of the reassurance.     He has been having some shuffling/freezing at times-He is currently in home PT/OT that is working on this. Has had several falls since last visit, no injuries, tends to try to move quickly.     He has been having some neck stiffness. He feels is painful to hold his head up.   No double vision or difficulty swallowing.      Current PD regimen:  Carbidopa/levodopa 25/100 1.5 tabs at 7  am, 10:30am ,1 tab at 2:30pm and 630 pm and bed time , 1 tab overnight as needed   Rivastigmine  3 mg BID  Olanzapine 2.5 mg daily.   Midodrine 2.5 mg 1 TID prn      Prior medications:  Quetiapine 25mg qam, noon, 4pm and night   Olanzapine  nuplazid      Review of Systems     Constitutional:  Negative for appetite change, fatigue and fever.   HENT: Negative.  Negative for hearing loss, tinnitus, trouble swallowing and voice change.    Eyes: Negative.  Negative for photophobia, pain and visual disturbance.   Respiratory: Negative.  Negative for shortness of breath.    Cardiovascular: Negative.  Negative for palpitations.   Gastrointestinal: Negative.  Negative for nausea and vomiting.   Endocrine: Negative.  Negative for cold intolerance.   Genitourinary: Negative.  Negative for dysuria, frequency and urgency.   Musculoskeletal:  Positive for gait problem (balance issues- a month ago), neck pain and neck stiffness. Negative for back pain and myalgias.        Neck drop   Skin: Negative.  Negative for rash.   Allergic/Immunologic: Negative.    Neurological:  Positive for dizziness (daily) and tremors (b/l hands). Negative for seizures, syncope, facial asymmetry, speech difficulty, weakness, light-headedness, numbness and headaches.   Hematological: Negative.  Does not bruise/bleed easily.   Psychiatric/Behavioral: Negative.  Negative for confusion, hallucinations and sleep disturbance.    All other systems reviewed and are negative.I have personally reviewed the MA's review of systems and made changes as necessary.         Objective   There were no vitals taken for this visit.    Physical Exam  Constitutional:       General: He is awake.   Eyes:      General: Lids are normal.      Extraocular Movements: Extraocular movements intact.      Pupils: Pupils are equal, round, and reactive to light.   Neurological:      Mental Status: He is alert.      Motor: Motor strength is normal.      Neurological Exam  Mental  Status  Awake and alert. Oriented only to person, place and situation. Speech: hypophonia. Language is fluent with no aphasia. Attention and concentration are normal.    Cranial Nerves  CN III, IV, VI: Extraocular movements intact bilaterally. Normal lids and orbits bilaterally. Pupils equal round and reactive to light bilaterally.  CN V:  Right: Facial sensation is normal.  Left: Facial sensation is normal on the left.  CN VII: Full and symmetric facial movement.  CN VIII: Hearing is normal.  CN XI: Shoulder shrug strength is normal.  CN XII: Tongue midline without atrophy or fasciculations.    Motor   Increased muscle tone. Strength is 5/5 throughout all four extremities.  Neck flexion/ext 5/5.    Sensory  Light touch is normal in upper and lower extremities.     Coordination  Right: Finger-to-nose normal. Rapid alternating movement abnormality:Left: Finger-to-nose normal. Rapid alternating movement abnormality:  See MDS UPDRS III.    Gait  Casual gait: Able to rise from chair without using arms.  Reduced arm swing, mild striatal posturing of the hands, , tremor in right hand antalgic gait at times. En block turn with imbalance, stooped posture.    MDS UPDRS III                              9/16/24 2/25/25   Time since last dose:      Speech  2 2   Facial Expression  1 1   Rigidity - Neck       Rigidity - Upper Extremity (R)  2 2   Rigidity - Upper Extremity (L)   2 2   Rigidity - Lower Extremity (R)  1 1   Rigidity - Lower Extremity (L)   1 1   Finger Taps (R)   1 1   Finger Taps (L)   2 2   Hand Movement (R)  1 2   Hand Movement (L)   1 2   Pronation/Supination (R)  1 1   Pronation/Supination (L)   2 2   Toe Tapping (R) 1 2   Toe Tapping (L) 2 2   Leg Agility (R)  1 1   Leg Agility (L)   1 1   Arising from Chair   1 1   Gait   1 2   Freezing of Gait 0 0   Postural Stability        Posture 1 2   Global spontaneity of movement 1 1   Postural Tremor (Ri 0 0   Postural Tremor (L) 0 0   Kinetic Tremor (R)  1 1    Kinetic Tremor (L)  1 1   Rest tremor amplitude RUE 0 2   Rest tremor amplitude LUE 1 1   Rest tremor amplitude RLE 0 0   Reset tremor amplitude LLE 0 0   Lip/Jaw Tremor  0 1   Consistency of tremor 1 1   Motor Exam Total:

## 2025-02-25 NOTE — PROGRESS NOTES
Review of Systems   Constitutional:  Negative for appetite change, fatigue and fever.   HENT: Negative.  Negative for hearing loss, tinnitus, trouble swallowing and voice change.    Eyes: Negative.  Negative for photophobia, pain and visual disturbance.   Respiratory: Negative.  Negative for shortness of breath.    Cardiovascular: Negative.  Negative for palpitations.   Gastrointestinal: Negative.  Negative for nausea and vomiting.   Endocrine: Negative.  Negative for cold intolerance.   Genitourinary: Negative.  Negative for dysuria, frequency and urgency.   Musculoskeletal:  Positive for gait problem (balance issues- a month ago), neck pain and neck stiffness. Negative for back pain and myalgias.        Neck drop   Skin: Negative.  Negative for rash.   Allergic/Immunologic: Negative.    Neurological:  Positive for dizziness (daily) and tremors (b/l hands). Negative for seizures, syncope, facial asymmetry, speech difficulty, weakness, light-headedness, numbness and headaches.   Hematological: Negative.  Does not bruise/bleed easily.   Psychiatric/Behavioral: Negative.  Negative for confusion, hallucinations and sleep disturbance.    All other systems reviewed and are negative.

## 2025-02-26 DIAGNOSIS — G20.A1 PSYCHOSIS DUE TO PARKINSON'S DISEASE (HCC): ICD-10-CM

## 2025-02-26 DIAGNOSIS — R44.1 VISUAL HALLUCINATIONS: ICD-10-CM

## 2025-02-26 DIAGNOSIS — F22 DELUSIONS (HCC): ICD-10-CM

## 2025-02-26 DIAGNOSIS — F06.8 PSYCHOSIS DUE TO PARKINSON'S DISEASE (HCC): ICD-10-CM

## 2025-02-26 RX ORDER — OLANZAPINE 2.5 MG/1
2.5 TABLET, FILM COATED ORAL
Qty: 30 TABLET | Refills: 1 | Status: SHIPPED | OUTPATIENT
Start: 2025-02-26 | End: 2025-04-27

## 2025-02-26 NOTE — TELEPHONE ENCOUNTER
Reason for call:   [x] Refill   [] Prior Auth  [] Other:     Office:   [] PCP/Provider -   [x] Specialty/Provider - Psychiatry    Medication: OLANZapine (ZyPREXA) 2.5 mg tablet (    Dose/Frequency: Take 1 tablet (2.5 mg total) by mouth daily     Quantity: 30 tablet    Pharmacy: [facility] [address] [address] -      Does the patient have enough for 3 days?   [x] Yes   [] No - Send as HP to POD

## 2025-03-04 ENCOUNTER — OFFICE VISIT (OUTPATIENT)
Dept: PHYSICAL THERAPY | Facility: CLINIC | Age: 80
End: 2025-03-04
Payer: MEDICARE

## 2025-03-04 DIAGNOSIS — R29.3 POSTURAL INSTABILITY: ICD-10-CM

## 2025-03-04 DIAGNOSIS — R29.6 FALLS: ICD-10-CM

## 2025-03-04 DIAGNOSIS — G20.A1 PARKINSON'S DISEASE WITHOUT DYSKINESIA OR FLUCTUATING MANIFESTATIONS (HCC): Primary | ICD-10-CM

## 2025-03-04 PROCEDURE — 97112 NEUROMUSCULAR REEDUCATION: CPT | Performed by: PHYSICAL THERAPIST

## 2025-03-04 PROCEDURE — 97140 MANUAL THERAPY 1/> REGIONS: CPT | Performed by: PHYSICAL THERAPIST

## 2025-03-04 PROCEDURE — 97530 THERAPEUTIC ACTIVITIES: CPT | Performed by: PHYSICAL THERAPIST

## 2025-03-04 NOTE — PROGRESS NOTES
Daily Note     Today's date: 3/4/2025  Patient name: Gabriel Gonzalez  : 1945  MRN: [id number]  Referring provider: Lawanda Joshi CRNP  Dx:   Encounter Diagnosis     ICD-10-CM    1. Parkinson's disease without dyskinesia or fluctuating manifestations (HCC)  G20.A1       2. Falls  R29.6       3. Postural instability  R29.3           Start Time: 1445  Stop Time: 1530  Total time in clinic (min): 45 minutes    Subjective: Patient's wife reports patient's neurologist recommended a cervical collar to wear at times, especially when neck fatigue noted to help improving his posture      Objective: See treatment diary below      Assessment: Tolerated treatment well. Focused on postural and cervical extension based activities today. Added seated cervical laser for tracing on spider web map to encourage cervical extension/maintaining cervical neutral position. Patient performed well, however had difficulty with maintaining posture and noted muscle fatigue after few reps. Patient exhibited good technique with therapeutic exercises and would benefit from continued PT      Plan: Continue per plan of care.  Progress treatment as tolerated.          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   Next Physician's Appt:   [employer] HEP:   Manuals 2/13 2/20 3/4 2/6 2/11   STM DK RA WENDY KK DK   C/S PROM DK RA DK  DK   LE stretching        Vitals     BP L UE, 104/60mmHg, after 5min 108/70mmHg           Neuro Re-Ed        T/S seated extensions        Pulleys        Standing OH reaching    Object tap on mirror alt x10 ea     Side-stepping   @ mirror x3 laps cues GTB @ mirror x3 laps    HR    x20    Cervical Laser    "Orange strip up/down, side-side x10 min total     OH ball lift for posture  Seated GM x 10  Seated GMB 2x10  Seated GMB x10   Seated ball reach and give     Seated GMB various reaching x10 ea   Glute Sets  Bridge 5x 2   Bridge 5x2    Hip ADD ball squeeze        Tandem ambulation    2x mirror    Foam balance  NBOS 3x 30\"   NBOS 3x30\"    BKFO    GTB 20x ea    Hip flexion     Stand 2x10    Ball press out core  Seated GMB chest press x 10    Seated GMB chest press x10   Ther Ex        Scap squeezes  Seated 2x10 cues   Seated 2x10 cues   Chin Tucks        Cervical Rot AROM Seated cues for neck neutral x10 ea Seated cues for neck nuetral x 10 ea    Seated cues for neck neutral x10 ea   CS ext Seated 20x Seated 20x   Reclined H/L 20x Seated  20x   Shoulder flex AAROM    With cane reclined 2x10     Mid Rows    Seated EOT GTB 25x    Shoulder Ext        Wobble Board     Bal. 1' ea.    SLS        Doorway pec stretch                 Ther Activity         Nu Step 5 min Lv 4 5 min lvl 4  Recumb x5min  5 min    Gait Training in clinic, w/ SPC?        Leg Press        Obstacle negotiation        OH shelf taps                Pt Ed POC  HEP, posture  POC   Re-Evaluation DK       Modalities         Heat/ice (PRN)     Ice x5min @ start to neck                           "

## 2025-03-06 ENCOUNTER — OFFICE VISIT (OUTPATIENT)
Dept: PHYSICAL THERAPY | Facility: CLINIC | Age: 80
End: 2025-03-06
Payer: MEDICARE

## 2025-03-06 DIAGNOSIS — R29.3 POSTURAL INSTABILITY: ICD-10-CM

## 2025-03-06 DIAGNOSIS — R29.6 FALLS: ICD-10-CM

## 2025-03-06 DIAGNOSIS — G20.A1 PARKINSON'S DISEASE WITHOUT DYSKINESIA OR FLUCTUATING MANIFESTATIONS (HCC): Primary | ICD-10-CM

## 2025-03-06 PROCEDURE — 97112 NEUROMUSCULAR REEDUCATION: CPT

## 2025-03-06 PROCEDURE — 97110 THERAPEUTIC EXERCISES: CPT

## 2025-03-06 NOTE — PROGRESS NOTES
Daily Note     Today's date: 3/6/2025  Patient name: Gabriel Gonzalez  : 1945  MRN: [id number]  Referring provider: Lawanda Joshi CRNP  Dx:   Encounter Diagnosis     ICD-10-CM    1. Parkinson's disease without dyskinesia or fluctuating manifestations (HCC)  G20.A1       2. Falls  R29.6       3. Postural instability  R29.3                      Subjective: Pt states that he is doing ok, no new complaints.        Objective: See treatment diary below      Assessment: Tolerated treatment well.  Continued with outlined program as indicated.  Pt challenged with laser and requires constant cues and assistance at times to maintain targeted area.  Tenderness noted in L UT > R UT during STM.   Consistent cues throughout session to improve CS extension and overall posture.  Pt will benefit from continued therapy.  Will progress as able.      Plan: Continue per plan of care.         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   Next Physician's Appt:   [employer] HEP:   Manuals 2/13 2/20 3/4 3/6 2/11   STM DK RA DK TH DK   C/S PROM DK RA DK  DK   LE stretching        Vitals     BP L UE, 104/60mmHg, after 5min 108/70mmHg           Neuro Re-Ed        T/S seated extensions        Pulleys        Standing OH reaching    Object tap on mirror alt x10 ea Object tap on mirror alt x10 ea    Side-stepping   @ mirror x3 laps cues @ mirror x 3 laps cues    HR        Cervical Laser   Orange strip up/down, side-side x10 min total Orange stip up/down, side/side x10 min    OH ball lift for posture  Seated GM x 10  Seated GMB 2x10  Seated GMB x10   Seated ball reach and give     Seated GMB various reaching x10 ea   Glute Sets  " Bridge 5x 2       Hip ADD ball squeeze        Tandem ambulation        Foam balance  NBOS 3x 30\"   NBOS 3x30\"    BKFO        Hip flexion         No monies    15x AROM    Ball press out core  Seated GMB chest press x 10   Seated GMB chest press x20 Seated GMB chest press x10   Ther Ex        Scap squeezes  Seated 2x10 cues  Seated 2x10 cues Seated 2x10 cues   Chin Tucks        Cervical Rot AROM Seated cues for neck neutral x10 ea Seated cues for neck nuetral x 10 ea   In H/L with pillow as feedback  10x ea Seated cues for neck neutral x10 ea   CS ext Seated 20x Seated 20x   In H/L into pillow 10x Seated  20x   Shoulder flex AAROM    With cane reclined 2x10 W/cane reclined  2x10    Mid Rows        Shoulder Ext        Wobble Board        SLS        Doorway pec stretch                 Ther Activity         Nu Step 5 min Lv 4 5 min lvl 4  Recumb x5min Upright 5 mins    Gait Training in clinic, w/ SPC?        Leg Press        Obstacle negotiation        OH shelf taps                Pt Ed POC  HEP, posture  POC   Re-Evaluation DK       Modalities         Heat/ice (PRN)     Ice x5min @ start to neck                             "

## 2025-03-11 ENCOUNTER — OFFICE VISIT (OUTPATIENT)
Dept: PHYSICAL THERAPY | Facility: CLINIC | Age: 80
End: 2025-03-11
Payer: MEDICARE

## 2025-03-11 DIAGNOSIS — R29.3 POSTURAL INSTABILITY: ICD-10-CM

## 2025-03-11 DIAGNOSIS — G20.A1 PARKINSON'S DISEASE WITHOUT DYSKINESIA OR FLUCTUATING MANIFESTATIONS (HCC): Primary | ICD-10-CM

## 2025-03-11 DIAGNOSIS — R29.6 FALLS: ICD-10-CM

## 2025-03-11 PROCEDURE — 97112 NEUROMUSCULAR REEDUCATION: CPT

## 2025-03-11 PROCEDURE — 97110 THERAPEUTIC EXERCISES: CPT

## 2025-03-11 PROCEDURE — 97140 MANUAL THERAPY 1/> REGIONS: CPT

## 2025-03-11 NOTE — PROGRESS NOTES
Daily Note     Today's date: 3/11/2025  Patient name: Gabriel Gonzalez  : 1945  MRN: [id number]  Referring provider: Lawanda Joshi CRNP  Dx:   Encounter Diagnosis     ICD-10-CM    1. Parkinson's disease without dyskinesia or fluctuating manifestations (HCC)  G20.A1       2. Falls  R29.6       3. Postural instability  R29.3           Start Time: 1445  Stop Time: 1530  Total time in clinic (min): 45 minutes    Subjective: Presents to therapy noting his neck feels better when he is moving it in therapy,but states a few hours after he gets home it tightens back up.       Objective: See treatment diary below      Assessment: Patient tolerated today's session well, continuing to focus on postural strengthening and cervical range of motion. Frequent verbal cueing needed throughout session to emphasize maintaining erect posture with fair carryover. Challenged maintaining scapular retraction secondary to fatigue, but able to complete full reps with appropriate rest breaks. Right upper trap tightness felt performing manual STM, with patient subjectively reporting improvement in symptoms post treatment. Patient would benefit from continued PT      Plan: Continue per plan of care.         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   Next Physician's Appt:   [employer] HEP:   Manuals 2/13 2/20 3/4 3/6 2/11   STM DK RA WENDY TH JS   C/S PROM DK NAMRATA NGUYEN     LE stretching        Vitals                Neuro Re-Ed        T/S seated extensions        Pulleys        Standing OH reaching    Object tap on mirror alt x10 ea Object tap on mirror alt x10 ea    Side-stepping   @ mirror x3 laps cues @ mirror x 3 laps cues @  "mirror x 3 laps cues   HR        Cervical Laser   Orange strip up/down, side-side x10 min total Orange stip up/down, side/side x10 min    OH ball lift for posture  Seated GM x 10  Seated GMB 2x10  Seated GMB x10   Seated ball reach and give     Seated GMB various reaching x10 ea   Glute Sets  Bridge 5x 2       Hip ADD ball squeeze        Tandem ambulation        Foam balance  NBOS 3x 30\"   NBOS 3x30\"    BKFO        Hip flexion         No monies    15x AROM    Ball press out core  Seated GMB chest press x 10   Seated GMB chest press x20 Seated GMB chest press x10   Ther Ex        Scap squeezes Seated 2x10 cues Seated 2x10 cues  Seated 2x10 cues Seated 2x10 cues   Chin Tucks        Cervical Rot AROM Seated cues for neck neutral x10 ea Seated cues for neck nuetral x 10 ea   In H/L with pillow as feedback  10x ea Seated cues for neck neutral x10 ea   CS ext Seated 2x10 Seated 20x   In H/L into pillow 10x Seated  20x   Shoulder flex AAROM    With cane reclined 2x10 W/cane reclined  2x10    Mid Rows        Shoulder Ext        Wobble Board        SLS        Doorway pec stretch                 Ther Activity         Nu Step 5 min Lv 4 5 min lvl 4  Recumb x5min Upright 5 mins Upright 5 mins   Gait Training in clinic, w/ SPC?        Leg Press        Obstacle negotiation        OH shelf taps                Pt Ed POC  HEP, posture  POC   Re-Evaluation DK       Modalities         Heat/ice (PRN)                                    "

## 2025-03-13 ENCOUNTER — APPOINTMENT (OUTPATIENT)
Dept: PHYSICAL THERAPY | Facility: CLINIC | Age: 80
End: 2025-03-13
Payer: MEDICARE

## 2025-03-13 ENCOUNTER — OFFICE VISIT (OUTPATIENT)
Dept: PSYCHIATRY | Facility: CLINIC | Age: 80
End: 2025-03-13
Payer: MEDICARE

## 2025-03-13 ENCOUNTER — PATIENT MESSAGE (OUTPATIENT)
Dept: NEUROLOGY | Facility: CLINIC | Age: 80
End: 2025-03-13

## 2025-03-13 DIAGNOSIS — R44.1 VISUAL HALLUCINATIONS: ICD-10-CM

## 2025-03-13 DIAGNOSIS — F06.8 PSYCHOSIS DUE TO PARKINSON'S DISEASE (HCC): ICD-10-CM

## 2025-03-13 DIAGNOSIS — F22 DELUSIONS (HCC): Primary | ICD-10-CM

## 2025-03-13 DIAGNOSIS — G20.A1 PSYCHOSIS DUE TO PARKINSON'S DISEASE (HCC): ICD-10-CM

## 2025-03-13 PROCEDURE — G2211 COMPLEX E/M VISIT ADD ON: HCPCS | Performed by: PHYSICIAN ASSISTANT

## 2025-03-13 PROCEDURE — 99214 OFFICE O/P EST MOD 30 MIN: CPT | Performed by: PHYSICIAN ASSISTANT

## 2025-03-13 NOTE — PSYCH
MEDICATION MANAGEMENT NOTE    Name: Gabriel Gonzalez      : 1945      MRN: [id number]  Encounter Provider: Elzbieta Carreno  Encounter Date: 3/13/2025   Encounter department: [facility]    Insurance: Payor: MEDICARE / Plan: MEDICARE A AND B / Product Type: Medicare A & B Fee for Service /      Reason for Visit:   Chief Complaint   Patient presents with    Follow-up    Medication Management   :  Assessment & Plan  Delusions (HCC)  Stable - continue olanzapine 2.5 mg qhs; f/u in 2 months         Visual hallucinations  Stable - continue olanzapine 2.5 mg qhs; f/u in 2 months         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 He does not find them distressing. The sleep is still good. Advised continuing olanzapine unchanged for the time being.     Treatment Recommendations:    Continue current medication:     - olanzapine 2.5 mg qhs     *rivastigmine managed by neuro    Educated about diagnosis and treatment modalities. Verbalizes understanding and agreement with the treatment plan.  Discussed self monitoring of symptoms, and symptom monitoring tools.  Discussed medications and if treatment adjustment was needed or desired.  Medication management every 2 months  Aware of 24 hour and weekend coverage for urgent situations accessed by calling [facility] main practice number  Follows with family physician for glucose and lipid monitoring due to current therapy with antipsychotic medication  Does not want to see a therapist  I am scheduling this patient out for greater than 3 months: No    Medications Risks/Benefits:      Risks, Benefits And Possible Side Effects Of Medications:    Risks, benefits, and possible  "side effects of medications explained to Taiwo and he (or legal representative) verbalizes understanding and agreement for treatment.    Controlled Medication Discussion:     Not applicable      History of Present Illness     Taiwo is seen today for a follow up for Follow-up and Medication Management and cognitive disorder. His wife, Dariana, also presents and helps with the history. He reports that he is \"the same\". He feels his mood is good and he has no anxiety. He does still report that people are \"visiting\" his house and outside of work but he still does not always identify these as hallucinations per his wife. Today he did mention that they \"aren't real\" but they says they are. He does not feel he has delusions, though his wife continues to report that he states that there is more than one of her and that he can be experimented on while he is sleeping. He thankfully still sleeps well and is not distressed by these sx at this time. He did have an episode of bradycardia per wife and is being worked up for this.     Past medication trials:  Nuplazid (had worse hallucinations but only 3 day trial), Zoloft (no benefit), Seroquel (no benefit), Xanax     He denies any suicidal ideation, intent or plan at present; denies any homicidal ideation, intent or plan at present.    He denies any auditory hallucinations, continues to experience visual hallucinations of \"people\", denies any delusions.    He denies any side effects from current psychiatric medications.    HPI ROS Appetite Changes and Sleep:     He reports normal sleep, normal appetite, normal energy level    Review Of Systems: A review of systems is obtained and is negative except for the pertinent positives listed in HPI/Subjective above.      Current Rating Scores:     None completed today.    Areas of Improvement: reviewed in HPI/Subjective Section and reviewed in Assessment and Plan Section      Past Medical History:   Diagnosis Date    Anxiety 10/07/2020    " Psychophysiological insomnia 10/07/2020        Past Surgical History:   Procedure Laterality Date    CYST REMOVAL      Right wrist    PROSTATE SURGERY      Biopsy    TONSILLECTOMY       Allergies: No Known Allergies    Current Outpatient Medications   Medication Sig Dispense Refill    atorvastatin (LIPITOR) 20 mg tablet TAKE ONE TABLET BY MOUTH EVERY DAY 90 tablet 1    carbidopa-levodopa (SINEMET)  mg per tablet TAKE 1.5 TABLETS BY MOUTH AT 7AM, 1.5 TABLETS AT 10:30AM, 1.5 TABLETS AT 2:30PM, 1 TABLET AT 6:30PM, AND 1 TABLET AT 10:30PM 540 tablet 1    ketoconazole (NIZORAL) 2 % shampoo APPLY TO SCALP AND FACE 2 TO 3 TIMES A WEEK, LATHER AND LEAVE ON FOR 5 MINUTES, THEN RINSE      midodrine (PROAMATINE) 2.5 mg tablet Take 1 tab TID as needed 90 tablet 3    OLANZapine (ZyPREXA) 2.5 mg tablet Take 1 tablet (2.5 mg total) by mouth daily at bedtime 30 tablet 1    rivastigmine (EXELON) 3 mg capsule Take 3 mg by mouth 2 (two) times a day       No current facility-administered medications for this visit.       Substance Abuse History:    Social History     Substance and Sexual Activity   Alcohol Use Not Currently    Comment: social     Social History     Substance and Sexual Activity   Drug Use No       Social History:    Social History     Socioeconomic History    Marital status: /Civil Union     Spouse name: Not on file    Number of children: 3    Years of education: Not on file    Highest education level: Not on file   Occupational History    Occupation:    still active    Occupation: retired    Tobacco Use    Smoking status: Never    Smokeless tobacco: Never   Vaping Use    Vaping status: Never Used   Substance and Sexual Activity    Alcohol use: Not Currently     Comment: social    Drug use: No    Sexual activity: Not Currently   Other Topics Concern    Not on file   Social History Narrative    Most recent tobacco use screenin-    Do you currently or have you served in the [employer]  [employer]: Yes    If Yes, What branch of service: [employer]    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, into active duty, 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    Advance directive: Yes    Alcohol intake: Moderate    Caffeine intake: Moderate    Illicit drugs: none    Occupation: retired    Exercise level: Occasional    Single or multi-level home/work: single level home    Live alone or with others: with others    Chewing tobacco: none    Marital status:     Number of children: 3    Diet: Regular    Sexual orientation: Heterosexual    Smoke alarm in home: Yes    General stress level: Medium    Sunscreen used routinely: No    Education: Post Graduate    Guns present in home: No    Presence of domestic violence: No     Social Drivers of Health     Financial Resource Strain: Low Risk  (6/23/2023)    Overall Financial Resource Strain (CARDIA)     Difficulty of Paying Living Expenses: Not hard at all   Food Insecurity: No Food Insecurity (6/25/2024)    Nursing - Inadequate Food Risk Classification     Worried About Running Out of Food in the Last Year: Never true     Ran Out of Food in the Last Year: Never true     Ran Out of Food in the Last Year: Not on file   Transportation Needs: No Transportation Needs (6/25/2024)    PRAPARE - Transportation     Lack of Transportation (Medical): No     Lack of Transportation (Non-Medical): No   Physical Activity: Not on file   Stress: Not on file   Social Connections: Not on file   Intimate Partner Violence: Not on file   Housing Stability: Low Risk  (6/25/2024)    Housing Stability Vital Sign     Unable to Pay for Housing in the Last Year: No     Number of Times Moved in the Last Year: 1     Homeless in the Last Year: No       Family Psychiatric History:     Family History   Problem Relation Age of Onset    Heart disease Mother     Heart disease Father        Medical History Reviewed by provider this encounter:  Tobacco  Allergies  Meds  Problems  Med Hx  Surg  Hx  Fam Hx          Objective   There were no vitals taken for this visit.     Mental Status Evaluation:    Appearance age appropriate, casually dressed, looks stated age   Behavior cooperative, calm   Speech normal rate, normal volume, normal pitch, spontaneous   Mood euthymic   Affect constricted   Thought Processes linear   Thought Content no overt delusions   Perceptual Disturbances: denies auditory hallucinations when asked, visual hallucinations, chronic   Abnormal Thoughts  Risk Potential Suicidal ideation - None  Homicidal ideation - None  Potential for aggression - No   Orientation oriented to person, place, time/date, and situation   Memory recent and remote memory grossly intact   Consciousness alert and awake   Attention Span Concentration Span attention span and concentration are age appropriate   Intellect appears to be of average intelligence   Insight intact   Judgement intact   Muscle Strength and  Gait slow gait, unstable gait, imbalance   Motor activity no abnormal movements   Language no difficulty naming common objects, no difficulty repeating a phrase, no difficulty writing a sentence   Fund of Knowledge adequate knowledge of current events  adequate fund of knowledge regarding past history  adequate fund of knowledge regarding vocabulary    Pain none   Pain Scale 0       Laboratory Results: I have personally reviewed all pertinent laboratory/tests results    Suicide/Homicide Risk Assessment:    Risk of Harm to Self:  The following ratings are based on assessment at the time of the interview  Based on today's assessment, Taiwo presents the following risk of harm to self: none    Risk of Harm to Others:  The following ratings are based on assessment at the time of the interview  Based on today's assessment, Taiwo presents the following risk of harm to others: none    The following interventions are recommended: Continue medication management. No other intervention changes indicated at this  time.    Psychotherapy Provided:     Individual psychotherapy provided: No    Treatment Plan:    Completed and signed during the session: Not applicable - Treatment Plan not due at this session    Goals: Progress towards Treatment Plan goals - Yes, progressing, as evidenced by subjective findings in HPI/Subjective Section and in Assessment and Plan Section    Depression Follow-up Plan Completed: Not applicable    Note Share:    This note was not shared with the patient due to reasonable likelihood of causing patient harm    Visit Time  Visit Start Time: 2:00 PM  Visit Stop Time: 2:15 PM  Total Visit Duration:  15 minutes    Elzbieta Carreno 03/13/25

## 2025-03-17 ENCOUNTER — OFFICE VISIT (OUTPATIENT)
Dept: FAMILY MEDICINE CLINIC | Facility: CLINIC | Age: 80
End: 2025-03-17
Payer: MEDICARE

## 2025-03-17 VITALS
OXYGEN SATURATION: 97 % | SYSTOLIC BLOOD PRESSURE: 130 MMHG | HEIGHT: 72 IN | WEIGHT: 185 LBS | HEART RATE: 58 BPM | BODY MASS INDEX: 25.06 KG/M2 | DIASTOLIC BLOOD PRESSURE: 76 MMHG

## 2025-03-17 DIAGNOSIS — G20.B1 PARKINSON'S DISEASE WITH DYSKINESIA WITHOUT FLUCTUATING MANIFESTATIONS (HCC): Primary | ICD-10-CM

## 2025-03-17 DIAGNOSIS — G20.A1 MODERATE DEMENTIA DUE TO PARKINSON'S DISEASE, WITH PSYCHOTIC DISTURBANCE (HCC): ICD-10-CM

## 2025-03-17 DIAGNOSIS — N40.1 BENIGN PROSTATIC HYPERPLASIA WITH URINARY FREQUENCY: ICD-10-CM

## 2025-03-17 DIAGNOSIS — R26.81 UNSTEADY GAIT: ICD-10-CM

## 2025-03-17 DIAGNOSIS — I95.1 ORTHOSTATIC HYPOTENSION: ICD-10-CM

## 2025-03-17 DIAGNOSIS — F02.B2 MODERATE DEMENTIA DUE TO PARKINSON'S DISEASE, WITH PSYCHOTIC DISTURBANCE (HCC): ICD-10-CM

## 2025-03-17 DIAGNOSIS — R35.0 BENIGN PROSTATIC HYPERPLASIA WITH URINARY FREQUENCY: ICD-10-CM

## 2025-03-17 DIAGNOSIS — R00.1 BRADYCARDIA: ICD-10-CM

## 2025-03-17 DIAGNOSIS — E78.2 MIXED HYPERLIPIDEMIA: ICD-10-CM

## 2025-03-17 PROBLEM — N18.31 STAGE 3A CHRONIC KIDNEY DISEASE (HCC): Status: RESOLVED | Noted: 2023-06-23 | Resolved: 2025-03-17

## 2025-03-17 PROCEDURE — G2211 COMPLEX E/M VISIT ADD ON: HCPCS | Performed by: FAMILY MEDICINE

## 2025-03-17 PROCEDURE — 99214 OFFICE O/P EST MOD 30 MIN: CPT | Performed by: FAMILY MEDICINE

## 2025-03-17 NOTE — PROGRESS NOTES
Name: Gabriel Gonzalez      : 1945      MRN: [id number]  Encounter Provider: Julian Rudolph MD  Encounter Date: 3/17/2025   Encounter department: [facility]    Assessment & Plan  Parkinson's disease with dyskinesia without fluctuating manifestations (HCC)         Moderate dementia due to Parkinson's disease, with psychotic disturbance (HCC)         Benign prostatic hyperplasia with urinary frequency         Unsteady gait         Orthostatic hypotension         Mixed hyperlipidemia         Bradycardia           Assessment & Plan  1. Bradycardia.  The patient's bradycardia is likely a side effect of midodrine, which he takes twice a day (7 AM and 10:30 AM). The cardiologist has ordered a heart monitor, and results are pending. His pulse today is 58, which is within normal limits. He will continue with the current medication regimen until further evaluation by the cardiologist.    2. Weight loss.  The patient has been eating less and losing weight. He is advised to incorporate protein shakes or bars into his diet to supplement his meals and prevent malnutrition.    3. Urinary frequency.  The patient has been experiencing urinary frequency. The urologist suggested prostate artery embolization, but the patient has not yet decided to proceed. The risks of leaving the condition untreated, including the potential need for a suprapubic catheter in the future, were discussed. He is advised to consider scheduling the procedure with the urologist.    4. Dizziness.  The patient experiences dizziness, particularly in the mornings and sometimes after walking long distances. He has been using an electric shaver to reduce dizziness during shaving, which has helped. He will continue with the current medication regimen, including midodrine, and monitor for any changes.    5. Parkinson's disease.  The patient continues to work with physical therapy twice a week and occupational therapy once a week to manage  symptoms. He is also on a low dose of Zyprexa at bedtime, which has improved his sleep. He will continue with the current therapy and medication regimen.    Follow-up  The patient will follow up in 4 months.       History of Present Illness     History of Present Illness  The patient presents for evaluation of bradycardia, weight loss, urinary frequency, dizziness, and Parkinson's disease.    He has been experiencing bradycardia, which is being monitored by a cardiologist. Approximately 3 weeks ago, he was seen by the cardiologist and was placed on a 2-week heart monitor. The results are pending. A visiting nurse noted his pulse was in the 40s and recommended an ER visit, but they opted to wait and see the cardiologist on Monday. Since then, his pulse has been stable. His pulse today is 58. The nurse practitioner in neurology mentioned that once the heart monitor results are available, they might consider discontinuing rivastigmine.    He continues to work with physical therapy twice a week for neck and balance issues. An occupational therapist visits his home once a week to assist with activities of daily living such as eating, shaving, and transitioning in and out of a chair. Additionally, a representative from an agency, [employer], visits once a week for 4 hours to repeat exercises and stretching routines. 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 He reports a decrease in appetite and subsequent weight loss. He is currently taking oral B12 and D3 supplements. He works 2 to 3 hours a day, 5 days a week, but often feels exhausted afterward. 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 He does not have many hobbies and enjoys going to work. There are days when he does not feel like going in and will call someone to pick him up.    He continues to experience urinary frequency. A urologist suggested prostate artery embolization, which  he has not yet pursued. He is a candidate for this procedure as it does not require general anesthesia. He has consulted with a radiologist about this option. The procedure takes 8 to 12 weeks before any noticeable reduction in prostate size is observed, which can help alleviate pressure on the urethra. He is hesitant about the procedure due to his age and other health concerns. He is also reluctant to undergo catheterization.    He reports no improvement in dizziness, which occurs primarily in the morning and occasionally after walking long distances. He does not experience dizziness during physical therapy sessions. He used to feel dizzy at the end of an exercise period, but that has improved. He experienced significant dizziness while shaving, and the occupational therapist recommended using an electric shaver, which has helped. He is currently using his new razor for the third week. He has always complained of fatigue, which, along with low pulse and dizziness, has been concerning. They attempted to raise his blood pressure slightly, hoping it would help, but it did not. They tried carbidopa-levodopa and a few different medications, but they did not help. He is currently taking Zyprexa at bedtime, which provides a mild sedative effect and helps him sleep better. He sleeps well most nights, although the time change disrupted his sleep schedule for a while.    SOCIAL HISTORY  - Works 2 to 3 hours a day, 5 days a week    MEDICATIONS  - Current: Rivastigmine  - Current: Midodrine  - Current: Zyprexa  - Current: B12  - Current: D3     Review of Systems   Constitutional:  Positive for appetite change (low appetite). Negative for fatigue and fever.   HENT:  Positive for voice change (only when tired). Negative for hearing loss, tinnitus and trouble swallowing.    Eyes: Negative.  Negative for photophobia, pain and visual disturbance.   Respiratory: Negative.  Negative for shortness of breath.    Cardiovascular:  Negative.  Negative for palpitations.   Gastrointestinal: Negative.  Negative for nausea and vomiting.   Endocrine: Negative.  Negative for cold intolerance.   Genitourinary: Negative.  Negative for dysuria, frequency and urgency.   Musculoskeletal:  Positive for gait problem (due to leg weakness) and myalgias. Negative for back pain and neck pain.        Balance Issues     Skin: Negative.  Negative for rash.   Allergic/Immunologic: Negative.    Neurological:  Positive for dizziness (a couple times a day, has to sit to help go away), tremors (Mild in hands, Left worse than Right, has stayed the same) and weakness (Right Leg which causes issues with gait). Negative for seizures, syncope, facial asymmetry, speech difficulty, light-headedness, numbness and headaches.   Hematological: Negative.  Does not bruise/bleed easily.   Psychiatric/Behavioral:  Positive for confusion and hallucinations. Negative for sleep disturbance.         Delusions  Paranoid   All other systems reviewed and are negative.    Objective   /76   Pulse 58   Ht 6' (1.829 m)   Wt 83.9 kg (185 lb)   SpO2 97%   BMI 25.09 kg/m²     Physical Exam  - Lungs were auscultated  - Heart sounds normal    Vital Signs  - Pulse is 58  Physical Exam  Vitals and nursing note reviewed.   Constitutional:       Appearance: He is well-developed.   HENT:      Head: Normocephalic and atraumatic.   Eyes:      Conjunctiva/sclera: Conjunctivae normal.      Pupils: Pupils are equal, round, and reactive to light.   Cardiovascular:      Rate and Rhythm: Normal rate and regular rhythm.      Heart sounds: Normal heart sounds.   Pulmonary:      Effort: Pulmonary effort is normal.      Breath sounds: Normal breath sounds. No wheezing or rales.   Abdominal:      General: Bowel sounds are normal. There is no distension.      Palpations: Abdomen is soft.      Tenderness: There is no abdominal tenderness.   Musculoskeletal:         General: No tenderness. Normal range of  motion.      Cervical back: Normal range of motion and neck supple.   Skin:     General: Skin is warm and dry.      Findings: No rash.   Neurological:      Mental Status: He is alert and oriented to person, place, and time.      Cranial Nerves: No cranial nerve deficit.      Sensory: No sensory deficit.      Coordination: Coordination normal.      Gait: Gait abnormal.   Psychiatric:         Behavior: Behavior normal.         Thought Content: Thought content normal.         Judgment: Judgment normal.

## 2025-03-18 ENCOUNTER — OFFICE VISIT (OUTPATIENT)
Dept: PHYSICAL THERAPY | Facility: CLINIC | Age: 80
End: 2025-03-18
Payer: MEDICARE

## 2025-03-18 DIAGNOSIS — G20.A1 PARKINSON'S DISEASE WITHOUT DYSKINESIA OR FLUCTUATING MANIFESTATIONS (HCC): Primary | ICD-10-CM

## 2025-03-18 DIAGNOSIS — R29.3 POSTURAL INSTABILITY: ICD-10-CM

## 2025-03-18 DIAGNOSIS — R29.6 FALLS: ICD-10-CM

## 2025-03-18 PROCEDURE — 97530 THERAPEUTIC ACTIVITIES: CPT | Performed by: PHYSICAL THERAPIST

## 2025-03-18 PROCEDURE — 97110 THERAPEUTIC EXERCISES: CPT | Performed by: PHYSICAL THERAPIST

## 2025-03-18 PROCEDURE — 97112 NEUROMUSCULAR REEDUCATION: CPT | Performed by: PHYSICAL THERAPIST

## 2025-03-18 NOTE — PROGRESS NOTES
Daily Note     Today's date: 3/18/2025  Patient name: Gabriel Gonzalez  : 1945  MRN: [id number]  Referring provider: Lawanda Joshi CRNP  Dx:   Encounter Diagnosis     ICD-10-CM    1. Parkinson's disease without dyskinesia or fluctuating manifestations (HCC)  G20.A1       2. Falls  R29.6       3. Postural instability  R29.3           Start Time: 1400  Stop Time: 1445  Total time in clinic (min): 45 minutes    Subjective: Patient reports no changes on arrival today.      Objective: See treatment diary below      Assessment: Tolerated treatment well. Worked on postural control and DNF stability with laser. He continues to be challenged with maintaining head neutral position with cervical rotation movement. Added pulleys to encourage upright postural mechanics and high amplitude UE movement. Worked on cervical extension in standing with cues against wall. Improving head posture overall but continues to require frequent cuing to avoid flexed/hunched posture. Patient exhibited good technique with therapeutic exercises and would benefit from continued PT      Plan: Continue per plan of care.  Progress note during next visit.         POC Expires Auth Status Start Date Expiration Date PT Visit Limit    3/13/2025 No Auth Req   BOMN   Date 2025 2025 3/4/2025 3/6/2025 3/18/2025   Used 16 17 18 19 20   Remaining           Diagnosis: Parkinson's disease; frequent falls; neck drop   Precautions: chronic Right hip pain, orthostatic hypotension, anxiety, 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   Next Physician's Appt:   [employer] HEP:   Manuals 2/13 2/20 3/4 3/6 3/18   STM WENDY NGUYEN TH    C/S PROM WENDY NGUYEN     LE stretching        Vitals                Neuro Re-Ed        Standing OH reaching    Object tap on mirror alt x10 ea Object tap on mirror alt x10 ea    Side-stepping   @ mirror x3 laps cues @ mirror x 3 laps cues @ mirror x 3 laps cues   HR        Cervical Laser   Orange strip up/down, side-side x10 min total  "Orange stip up/down, side/side x10 min Orange stip up/down, side/side x10 min   OH ball lift for posture  Seated GM x 10  Seated GMB 2x10  Seated GMB 2x10   Seated ball reach and give        Glute Sets  Bridge 5x 2       Hip ADD ball squeeze        Tandem ambulation        Foam balance  NBOS 3x 30\"   NBOS 3x30\"    BKFO        Hip flexion         No monies    15x AROM 15x AROM standing cues   Ball press out core  Seated GMB chest press x 10   Seated GMB chest press x20 Seated GMB chest press x20   Ther Ex        Scap squeezes Seated 2x10 cues Seated 2x10 cues  Seated 2x10 cues    Cervical Rot AROM Seated cues for neck neutral x10 ea Seated cues for neck nuetral x 10 ea   In H/L with pillow as feedback  10x ea    CS ext Seated 2x10 Seated 20x   In H/L into pillow 10x Standing against all 15x cues   Shoulder flex AAROM    With cane reclined 2x10 W/cane reclined  2x10    Pulleys     Cues for head up flex, ABD x2 min ea   Mid Rows        Shoulder Ext        Wobble Board        SLS        Doorway pec stretch                 Ther Activity         Nu Step 5 min Lv 4 5 min lvl 4  Recumb x5min Upright 5 mins NuStep x5min Lv4   Gait Training in clinic, w/ SPC?        Leg Press        Obstacle negotiation        OH shelf taps                Pt Ed POC  HEP, posture     Re-Evaluation DK       Modalities         Heat/ice (PRN)                                      "

## 2025-03-20 ENCOUNTER — EVALUATION (OUTPATIENT)
Dept: PHYSICAL THERAPY | Facility: CLINIC | Age: 80
End: 2025-03-20
Payer: MEDICARE

## 2025-03-20 DIAGNOSIS — R29.6 FALLS: ICD-10-CM

## 2025-03-20 DIAGNOSIS — G20.A1 PARKINSON'S DISEASE WITHOUT DYSKINESIA OR FLUCTUATING MANIFESTATIONS (HCC): Primary | ICD-10-CM

## 2025-03-20 DIAGNOSIS — R29.3 POSTURAL INSTABILITY: ICD-10-CM

## 2025-03-20 PROCEDURE — 97140 MANUAL THERAPY 1/> REGIONS: CPT | Performed by: PHYSICAL THERAPIST

## 2025-03-20 PROCEDURE — 97530 THERAPEUTIC ACTIVITIES: CPT | Performed by: PHYSICAL THERAPIST

## 2025-03-20 PROCEDURE — 97110 THERAPEUTIC EXERCISES: CPT | Performed by: PHYSICAL THERAPIST

## 2025-03-20 NOTE — PROGRESS NOTES
PT Re-Evaluation     Today's date: 3/20/2025  Patient name: Gabriel Gonzalez  : 1945  MRN: [id number]  Referring provider: Lawanda Joshi CRNP  Dx:   Encounter Diagnosis     ICD-10-CM    1. Parkinson's disease without dyskinesia or fluctuating manifestations (HCC)  G20.A1       2. Falls  R29.6       3. Postural instability  R29.3                 Start Time: 1445  Stop Time: 1530  Total time in clinic (min): 45 minutes    Assessment  Impairments: abnormal gait, abnormal or restricted ROM, activity intolerance, impaired balance, impaired physical strength, lacks appropriate home exercise program, pain with function, weight-bearing intolerance, poor posture , poor body mechanics, participation limitations, activity limitations and endurance    Assessment details: Patient is a 79 y.o. male who presents to outpatient PT with reports of balance and postural impairments since diagnosis of Parkinson's many years ago. He reports neck drop has worsened more recently over the past 5-6 months. Patient has attended PT for the past 3 months with improving overall function and reports of balance since start of PT. He reports he feels better flexibility in his neck and has improved awareness of keeping a neutral head position. Improvements in cervical ROM and overall ability to perform AROM compared to signifcant stiffness on IE. He has better awareness of keeping his head in neutral position, however DNF weakness and fatigue affects his ability to maintain position for longer periods of time. He continues to have forward head posture in static/resting position and requires verbal cuing by PT and wife for correction and awareness. He responds well to verbal cuing, however carryover is fair. Continued limitations in cervical Extension and Rotation mobility, which affects overall scanning environment for safety and maintaining neutral neck position for dressing, eating, etc. His gait and overall posture coincide with  Parkinsonian-like characteristics. However, exhibits moderate limping on Right LE due to muscle imbalance and glute weakness that poses as a falls risk as it contributes to more instability in his stride. Unable to assess 5x sit-stand test as patient had difficulty comprehending sequence and technique with testing. Improved TUG time since IE, with improved efficiency with walking and short distance ambulation. Overall, his forward head posture, inability to maintain proper head position/posture, and gait and balance impairments continue to indicate an increased falls risk. He has had about 2 falls in the past year, mainly with anterior weight-shifting perturbation/activities. He lives with his wife who assists him with ADLs as needed. Patient will benefit from continued skilled PT services to work on balance training, strength training, postural training, and overall safety with walking and transfers to improve independence with ADLs and reduce falls risk. Patient would benefit from skilled PT services to address these impairments and to maximize function.  Thank you for the referral.    Barriers to therapy: Chronicity of symptoms  Barriers to intervention: medical complexity  Understanding of Dx/Px/POC: good     Prognosis: fair    Goals  Impairment Goals 4-6 weeks   In order to maximize function patient will be able to...   - Demonstrate symmetrical cervical AROM without pain. Improved  - Increase LE/UE strength to 4/5 throughout. Improved  - Demonstrate improved hip/cervical flexibility as demonstrated by increased ROM through therapeutic exercise. Improved  - Improve TUG time to <13 sec to indicate reduce falls risk. Met     Functional Goals 6-8 weeks  In order to return to prior level of function patient will be able to...   - Participate in ADL's/IADL's/sport specific activities with no greater than 2/10 pain.  Improved  - Increase Functional Status Measure (FOTO) to: anticipated at discharge. Met  -  Demonstrate independence and compliant with HEP. Met  - Patient will be able to demonstrate good gait mechanics without compensations. Improved  - Demonstrate sit to stand with good mechanics and eccentric control without pain/difficulty/compensation. Improved    Plan  Patient would benefit from: skilled PT  Planned modality interventions: cryotherapy and electrical stimulation/Russian stimulation  Other planned modality interventions: moist heat    Planned therapy interventions: joint mobilization, manual therapy, neuromuscular re-education, patient education, strengthening, stretching, therapeutic activities, therapeutic exercise, home exercise program, functional ROM exercises, Bales taping, postural training, balance/weight bearing training, body mechanics training, flexibility, IASTM, kinesiology taping, massage, nerve gliding, transfer training and gait training    Frequency: 1-2x week  Duration in weeks: 4  Treatment plan discussed with: patient, PTA, referring physician and family        Subjective Evaluation    History of Present Illness  Mechanism of injury: Gabriel Gonzalez is a 79 y.o. year-old male who presents to outpatient PT accompanied by his wife Dariana. Patient reports diagnosed with Parkinson's many years ago and has attended PT at different location for neuro rehab/balance training. Patient's wife reports his neck drop and cervical stiffness started about 2-3 months ago. Per his wife, it has progressively worsened over the past few months. He also has history of frequent falls, noting 2 falls in the past year but no injuries from falls. Patient reports he is independent with most ADLs, however wife assists with dressing and driving. Patient is currently not driving per physician recommendation and family precaution. Patient is able to perform all other Adls and iADLs on his own. Grab bars present in bathroom. Patient does not use assistive device for ambulation. They have a cane at home from  wife's previous injury.  Quality of life: good    Patient Goals  Patient goals for therapy: decreased pain, increased motion, improved balance, increased strength, independence with ADLs/IADLs and return to sport/leisure activities    Pain  Current pain ratin  At best pain ratin  At worst pain ratin  Location: neck  Quality: tight  Relieving factors: heat  Aggravating factors: sitting, standing, stair climbing, walking and lifting  Progression: worsening    Social Support  Steps to enter house: yes  Stairs in house: yes   Lives with: spouse    Employment status: working (, own firm)  Treatments  Previous treatment: physical therapy  Current treatment: physical therapy        Objective     Palpation   Left   Muscle spasm in the levator scapulae, pectoralis major, pectoralis minor, scalenes, sternocleidomastoid, suboccipitals and upper trapezius.   Tenderness of the levator scapulae and upper trapezius.     Right   Muscle spasm in the levator scapulae, pectoralis major, pectoralis minor, scalenes, sternocleidomastoid, suboccipitals and upper trapezius.   Tenderness of the levator scapulae and upper trapezius.     Active Range of Motion   Cervical/Thoracic Spine       Cervical    Flexion: 50 degrees   Extension: 14 (25 deg from resting position) degrees      Left lateral flexion: 20 degrees      Right lateral flexion: 20 degrees      Left rotation: 60 (cuing to avoid cervical flexion with rotation) degrees  Right rotation: 70 (cuing to avoid cervical flexion with rotation) degrees       Strength/Myotome Testing   Cervical Spine   Neck extension: 3  Neck flexion: 3+    Left   Neck lateral flexion (C3): 3+    Right   Neck lateral flexion (C3): 3+    Left Shoulder     Planes of Motion   Flexion: 4-   Abduction: 4-   External rotation at 0°: 4-   Internal rotation at 0°: 4-     Right Shoulder     Planes of Motion   Flexion: 4-   Abduction: 4-   External rotation at 0°: 4-   Internal rotation at 0°: 4-      Left Elbow   Flexion: 4-  Extension: 4-    Right Elbow   Flexion: 4-  Extension: 4-    Left Hip   Planes of Motion   Flexion: 3+  Extension: 2  Abduction: 4-  External rotation: 4-    Right Hip   Planes of Motion   Flexion: 3+  Extension: 2  Abduction: 4-  External rotation: 4-    Left Knee   Flexion: 4-  Extension: 4-    Right Knee   Flexion: 4-  Extension: 4-    Left Ankle/Foot   Dorsiflexion: 4-  Plantar flexion: 4-    Right Ankle/Foot   Dorsiflexion: 4-  Plantar flexion: 4-  Neuro Exam:     Sensation   Light touch LE: left WNL and right WNL    Coordination   Heel to shin: left dysmetric and right dysmetric  Finger to nose: left dysmetria (4/5 trials correct) and right dysmetria (2/5 trials correct)  Rapid alternating movements: UE WNL    Transfers   Sit to stand: minimum assist (needs 2 UE support, close supervision)     Functional outcomes   5x sit to stand: unable (seconds)  TUG: 10 (seconds)  Functional outcome comment: 5x sit-stand unable to test due to patient unable to comprehend test  Functional outcome gait comment: Patient walks without assistive device, exhibits significant limp on Right LE with decreased Right LE stance phase and early Right toe-off in terminal stance, tends to exhibit knee flexion early end of mid-stance and terminal stance; decreased Left knee flexion in swing with decreased forward advancement of LE noted bilaterally; decreased arm and trunk rotation noted with overall guarded/stiff UE posture; forward head posture noted through with difficulty maintaining neutral cervical spine position while walking or standing              POC Expires Auth Status Start Date Expiration Date PT Visit Limit    4/20/2025 No Auth Req   BOMN   Date 3/20/2025 2/20/2025 3/4/2025 3/6/2025 3/18/2025   Used 21 17 18 19 20   Remaining           Diagnosis: Parkinson's disease; frequent falls; neck drop   Precautions: chronic Right hip pain, orthostatic hypotension, anxiety, mild dementia/psychosis due to  "PD   Next Physician's Appt:   [employer] HEP:   Manuals 3/20 2/20 3/4 3/6 3/18   STM DK RA DK TH    C/S PROM DK RA DK     LE stretching        Vitals                Neuro Re-Ed        Standing OH reaching    Object tap on mirror alt x10 ea Object tap on mirror alt x10 ea    Side-stepping   @ mirror x3 laps cues @ mirror x 3 laps cues @ mirror x 3 laps cues   HR        Cervical Laser   Orange strip up/down, side-side x10 min total Orange stip up/down, side/side x10 min Orange stip up/down, side/side x10 min   OH ball lift for posture  Seated GM x 10  Seated GMB 2x10  Seated GMB 2x10   Seated ball reach and give        Glute Sets  Bridge 5x 2       Hip ADD ball squeeze        Tandem ambulation        Foam balance  NBOS 3x 30\"   NBOS 3x30\"    BKFO        Hip flexion         No monies    15x AROM 15x AROM standing cues   Ball press out core  Seated GMB chest press x 10   Seated GMB chest press x20 Seated GMB chest press x20   Ther Ex        Scap squeezes  Seated 2x10 cues  Seated 2x10 cues    Cervical Rot AROM Seated cues for neck nuetral x 10 ea  Seated cues for neck nuetral x 10 ea   In H/L with pillow as feedback  10x ea    CS ext Seated 20x  Seated 20x   In H/L into pillow 10x Standing against all 15x cues   Shoulder flex AAROM    With cane reclined 2x10 W/cane reclined  2x10    Pulleys     Cues for head up flex, ABD x2 min ea   Mid Rows        Shoulder Ext        Wobble Board        SLS        Doorway pec stretch                 Ther Activity         Nu Step NuStep x5min Lv4 5 min lvl 4  Recumb x5min Upright 5 mins NuStep x5min Lv4   Gait Training in clinic, w/ SPC?        Leg Press        Obstacle negotiation        OH shelf taps                Pt Ed POC  HEP, posture     Re-Evaluation DK       Modalities         Heat/ice (PRN)                              " PAST MEDICAL HISTORY:  Afib x 15 yrs --on Coumadin  attempted cardioversion 13 yrs ago--failed    Asthma     CAD (coronary artery disease)     CAD (Coronary Artery Disease)     CHF (congestive heart failure)     Coronary Stent to RCA, LAD, and DIAG 9/25/06 at [facility]    Emphysema/COPD     GERD (Gastroesophageal Reflux Disease)     H/O: CVA pt unaware of CVA, yet showed up on CT of head as old CVA    Hypercholesterolemia     Lip Cancer resected 6 yrs ago (no chemo/rad)    Melanoma     PUD (peptic ulcer disease)     Skin cancer     Skin Cancer of Face removed 1 yr ago    Skin Cancer of Nose 1 yr ago- removed    Smoker

## 2025-03-25 ENCOUNTER — OFFICE VISIT (OUTPATIENT)
Dept: PHYSICAL THERAPY | Facility: CLINIC | Age: 80
End: 2025-03-25
Payer: MEDICARE

## 2025-03-25 ENCOUNTER — TELEPHONE (OUTPATIENT)
Age: 80
End: 2025-03-25

## 2025-03-25 DIAGNOSIS — G20.A1 PARKINSON'S DISEASE WITHOUT DYSKINESIA OR FLUCTUATING MANIFESTATIONS (HCC): ICD-10-CM

## 2025-03-25 DIAGNOSIS — R29.3 POSTURAL INSTABILITY: ICD-10-CM

## 2025-03-25 DIAGNOSIS — R29.6 FALLS: Primary | ICD-10-CM

## 2025-03-25 PROCEDURE — 97140 MANUAL THERAPY 1/> REGIONS: CPT

## 2025-03-25 PROCEDURE — 97110 THERAPEUTIC EXERCISES: CPT

## 2025-03-25 NOTE — TELEPHONE ENCOUNTER
Wife Dariana called to f/u on [employer] monitor results, she would like call back re: results. Ph# [phone], ok to leave message if she does not answer.

## 2025-03-25 NOTE — PROGRESS NOTES
Daily Note     Today's date: 3/25/2025  Patient name: Gabriel Gonzalez  : 1945  MRN: [id number]  Referring provider: Lawanda Joshi CRNP  Dx:   Encounter Diagnosis     ICD-10-CM    1. Falls  R29.6       2. Postural instability  R29.3       3. Parkinson's disease without dyskinesia or fluctuating manifestations (HCC)  G20.A1                      Subjective: Pt states that he is well and has no new complaints.      Objective: See treatment diary below      Assessment: Tolerated treatment well.  Laser utilized for CS motor control and improving CS extension with motion.  Improved ability to stay in a a narrow quentin although he continues to require cues to move through available ROM.  Standing balance activity on foam was trialed, however pt had dizziness and required a short rest break to clear the dizziness. BP checked at 108/72.  Seated ex's performed for LE and core strengthening and to improve posture.  Continued cues for CS extension.  Tenderness in B/L UT with decreased pain after.  Pt will benefit from continued therapy.  Will progress as able.      Plan: Continue per plan of care.         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   Next Physician's Appt:   [employer] HEP:   Manuals 3/20 3/25 3/4 3/6 3/18   STM DK TH DK TH    C/S PROM DK  DK     LE stretching        Vitals  108/72 after dizziness              Neuro Re-Ed        Standing OH reaching    Object tap on mirror alt x10 ea Object tap on mirror alt x10 ea    Side-stepping   @ mirror x3 laps cues @ mirror x 3 laps cues @ mirror x 3 laps cues   HR        Cervical Laser  Orange strip up/down, side/side x10min Orange strip up/down,  "side-side x10 min total Orange stip up/down, side/side x10 min Orange stip up/down, side/side x10 min   OH ball lift for posture   Seated GMB 2x10  Seated GMB 2x10   Seated ball reach and give        Glute Sets        Hip ADD ball squeeze        Tandem ambulation        Foam balance  NBOS w/ head up/down 1x20\" dizzy!  NBOS 3x30\"            BKFO  Blue 15x ea      Hip flexion   SLR 10x ea      No monies  15x AROM seated  15x AROM 15x AROM standing cues   Ball press out core  Seated GMB chest press 20x  Seated GMB chest press x20 Seated GMB chest press x20   Ther Ex        Scap squeezes    Seated 2x10 cues    Cervical Rot AROM Seated cues for neck nuetral x 10 ea    In H/L with pillow as feedback  10x ea    CS ext Seated 20x    In H/L into pillow 10x Standing against all 15x cues   Shoulder flex AAROM    With cane reclined 2x10 W/cane reclined  2x10    Pulleys  Cues for head up flex/ABD x2 min ea   Cues for head up flex, ABD x2 min ea   Mid Rows        Shoulder Ext        Wobble Board        SLS        Doorway pec stretch                 Ther Activity         Nu Step NuStep x5min Lv4 NuStep x 5 min Lv 4 Recumb x5min Upright 5 mins NuStep x5min Lv4   Gait Training in clinic, w/ SPC?        Leg Press        Obstacle negotiation        OH shelf taps                Pt Ed POC  HEP, posture     Re-Evaluation DK       Modalities         Heat/ice (PRN)                              "

## 2025-03-26 ENCOUNTER — RESULTS FOLLOW-UP (OUTPATIENT)
Dept: CARDIOLOGY CLINIC | Facility: CLINIC | Age: 80
End: 2025-03-26

## 2025-03-26 NOTE — TELEPHONE ENCOUNTER
----- Message from Fouzia Oakley MD sent at 3/26/2025  9:36 AM EDT -----  Patient had a min HR of 39 bpm, max HR of 184 bpm, and avg HR of 58 bpm. Predominant underlying rhythm was Sinus Rhythm. 1 run of Ventricular Tachycardia occurred lasting 4 beats with a max rate of 158 bpm (avg 110 bpm). 26 Supraventricular Tachycardia runs occurred, the run with the fastest interval lasting 20 beats with a max rate of 184 bpm, the longest lasting 17 beats with an avg rate of 113 bpm. Some episodes of Supraventricular Tachycardia may be possible Atrial Tachycardia with variable block. Isolated SVEs were rare (<1.0%), SVE Couplets were rare (<1.0%), and SVE Triplets were rare (<1.0%). Isolated VEs were rare (<1.0%), VE Couplets were rare (<1.0%), and no VE Triplets were present. Ventricular Bigeminy was present.    Symptoms of dizziness, lightheadedness, shortness of breath and feeling anxious correlated with normal sinus rhythm and no arrhythmia or abnormality

## 2025-03-27 ENCOUNTER — APPOINTMENT (OUTPATIENT)
Dept: PHYSICAL THERAPY | Facility: CLINIC | Age: 80
End: 2025-03-27
Payer: MEDICARE

## 2025-04-01 ENCOUNTER — OFFICE VISIT (OUTPATIENT)
Dept: PHYSICAL THERAPY | Facility: CLINIC | Age: 80
End: 2025-04-01
Payer: MEDICARE

## 2025-04-01 DIAGNOSIS — G20.A1 PARKINSON'S DISEASE WITHOUT DYSKINESIA OR FLUCTUATING MANIFESTATIONS (HCC): ICD-10-CM

## 2025-04-01 DIAGNOSIS — R29.6 FALLS: Primary | ICD-10-CM

## 2025-04-01 DIAGNOSIS — R29.3 POSTURAL INSTABILITY: ICD-10-CM

## 2025-04-01 PROCEDURE — 97112 NEUROMUSCULAR REEDUCATION: CPT | Performed by: PHYSICAL THERAPIST

## 2025-04-01 PROCEDURE — 97110 THERAPEUTIC EXERCISES: CPT | Performed by: PHYSICAL THERAPIST

## 2025-04-01 PROCEDURE — 97140 MANUAL THERAPY 1/> REGIONS: CPT | Performed by: PHYSICAL THERAPIST

## 2025-04-01 NOTE — PROGRESS NOTES
Daily Note     Today's date: 2025  Patient name: Gabriel Gonzalez  : 1945  MRN: [id number]  Referring provider: Lawanda Joshi CRNP  Dx:   Encounter Diagnosis     ICD-10-CM    1. Falls  R29.6       2. Postural instability  R29.3       3. Parkinson's disease without dyskinesia or fluctuating manifestations (HCC)  G20.A1           Start Time: 1445  Stop Time: 1530  Total time in clinic (min): 45 minutes    Subjective: Patient reports he feels wobbly today.       Objective: See treatment diary below      Assessment: Tolerated treatment well. CG(A) with navigating in PT clinic for patient's safety. Worked on postural mechanics and tracking with UE movement or object grab. Patient able to have good cervical extension with object tap on shelf. He continues to require cuing to maintain cervical extension/neutral position otherwise. Patient exhibited good technique with therapeutic exercises and would benefit from continued PT      Plan: Continue per plan of care.  Progress treatment as tolerated.          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   Next Physician's Appt:   [employer] HEP:   Manuals 3/20 3/25 4/1 3/6 3/18   STM DK TH DK TH    C/S PROM DK       LE stretching        Vitals  108/72 after dizziness              Neuro Re-Ed        Standing OH reaching    Seated + standing ball tap x10 ea Object tap on mirror alt x10 ea    Side-stepping    @ mirror x 3 laps cues @ mirror x 3 laps cues   HR        Cervical Laser  Orange strip up/down, side/side x10min Orange strip up/down, side/side x10min Orange stip up/down, side/side x10 min Orange stip up/down, side/side x10 min   OH ball lift for  "posture   Seated GMB 2x10  Seated GMB 2x10   Seated ball reach and give        Glute Sets        Hip ADD ball squeeze        Tandem ambulation        Foam balance  NBOS w/ head up/down 1x20\" dizzy!  NBOS 3x30\"    BKFO  Blue 15x ea      Hip flexion   SLR 10x ea      No monies  15x AROM seated 15x AROM seated 15x AROM 15x AROM standing cues   Ball press out core  Seated GMB chest press 20x Seated GMB chest press 20x Seated GMB chest press x20 Seated GMB chest press x20   Ther Ex        Scap squeezes    Seated 2x10 cues    Cervical Rot AROM Seated cues for neck nuetral x 10 ea   Seated cues for neck nuetral x 10 ea  In H/L with pillow as feedback  10x ea    CS ext Seated 20x   Seated 20x In H/L into pillow 10x Standing against all 15x cues   Shoulder flex AAROM     W/cane reclined  2x10    Pulleys  Cues for head up flex/ABD x2 min ea   Cues for head up flex, ABD x2 min ea   Mid Rows        Shoulder Ext        Wobble Board        SLS        Doorway pec stretch                 Ther Activity         Nu Step NuStep x5min Lv4 NuStep x 5 min Lv 4 NuStep x 5 min Lv 4 Upright 5 mins NuStep x5min Lv4   Gait Training in clinic, w/ SPC?        Leg Press        Obstacle negotiation        OH shelf taps   Soft ball @ ball rack x15 ea UE             Pt Ed POC       Re-Evaluation DK       Modalities        Heat/ice (PRN)                               "

## 2025-04-03 ENCOUNTER — APPOINTMENT (OUTPATIENT)
Dept: PHYSICAL THERAPY | Facility: CLINIC | Age: 80
End: 2025-04-03
Payer: MEDICARE

## 2025-04-08 ENCOUNTER — OFFICE VISIT (OUTPATIENT)
Dept: PHYSICAL THERAPY | Facility: CLINIC | Age: 80
End: 2025-04-08
Payer: MEDICARE

## 2025-04-08 DIAGNOSIS — R29.6 FALLS: Primary | ICD-10-CM

## 2025-04-08 DIAGNOSIS — R29.3 POSTURAL INSTABILITY: ICD-10-CM

## 2025-04-08 PROCEDURE — 97112 NEUROMUSCULAR REEDUCATION: CPT

## 2025-04-08 PROCEDURE — 97110 THERAPEUTIC EXERCISES: CPT

## 2025-04-08 PROCEDURE — 97140 MANUAL THERAPY 1/> REGIONS: CPT

## 2025-04-08 NOTE — PROGRESS NOTES
Daily Note     Today's date: 2025  Patient name: Gabriel Gonzalez  : 1945  MRN: [id number]  Referring provider: Lawanda Joshi CRNP  Dx:   Encounter Diagnosis     ICD-10-CM    1. Falls  R29.6       2. Postural instability  R29.3                      Subjective: Pt states that he is ok, no new complaints.        Objective: See treatment diary below      Assessment: Tolerated treatment well.  Cues throughout CS laser to improve CS extension and to move through as much motion as possible when moving from side to side.  Fair carryover is noted.  Cone taps with CGS throughout for safety.  Cues for technique and to tap onto both cones as pt would occasionally not see/forget the cone to the opposite side.  Shelf taps to improve CS extension with reaching task with pt having difficulty reaching target when fatigued.  Will continue to progress pt as able.      Plan: Continue per plan of care.         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   Next Physician's Appt:   [email or web address] HEP:   Manuals 3/20 3/25 4/1 4/8 3/18   STM DK TH DK TH    C/S PROM DK       LE stretching        Vitals  108/72 after dizziness              Neuro Re-Ed        Standing OH reaching    Seated + standing ball tap x10 ea     Side-stepping     @ mirror x 3 laps cues   HR        Cervical Laser  Orange strip up/down, side/side x10min Orange strip up/down, side/side x10min Orange strip up/down, side/side  X10 min Orange stip up/down, side/side x10 min   OH ball lift for posture   Seated GMB 2x10 Seated GMB 2x10 Seated GMB 2x10   Seated ball reach and give        Glute Sets        Hip ADD ball squeeze        Tandem ambulation       "  Foam balance  NBOS w/ head up/down 1x20\" dizzy!      BKFO  Blue 15x ea  Green 15x ea    Hip flexion   SLR 10x ea  SLR 10x ea    No monies  15x AROM seated 15x AROM seated 15x standing AROM CUES! 15x AROM standing cues   Floor to ceiling \"BIG\" exercise    5x    Ball press out core  Seated GMB chest press 20x Seated GMB chest press 20x  Seated GMB chest press x20   Ther Ex        Scap squeezes        Cervical Rot AROM Seated cues for neck nuetral x 10 ea   Seated cues for neck nuetral x 10 ea  Seated cues for neck neutral x10 ea    CS ext Seated 20x   Seated 20x Seated 10x EOT Standing against all 15x cues   Shoulder flex AAROM         Pulleys  Cues for head up flex/ABD x2 min ea   Cues for head up flex, ABD x2 min ea   Mid Rows        Shoulder Ext        Wobble Board        SLS        Doorway pec stretch                 Ther Activity         Nu Step NuStep x5min Lv4 NuStep x 5 min Lv 4 NuStep x 5 min Lv 4 NuStep x5 min Lv 5 NuStep x5min Lv4   Gait Training in clinic, w/ SPC?        Leg Press        Obstacle negotiation        OH shelf taps   Soft ball @ ball rack x15 ea UE Soft ball @ ball rack x 15 ea UE            Pt Ed POC       Re-Evaluation DK       Modalities        Heat/ice (PRN)                                 "

## 2025-04-10 ENCOUNTER — OFFICE VISIT (OUTPATIENT)
Dept: PHYSICAL THERAPY | Facility: CLINIC | Age: 80
End: 2025-04-10
Payer: MEDICARE

## 2025-04-10 DIAGNOSIS — R29.3 POSTURAL INSTABILITY: ICD-10-CM

## 2025-04-10 DIAGNOSIS — G20.A1 PARKINSON'S DISEASE WITHOUT DYSKINESIA OR FLUCTUATING MANIFESTATIONS (HCC): ICD-10-CM

## 2025-04-10 DIAGNOSIS — R29.6 FALLS: Primary | ICD-10-CM

## 2025-04-10 PROCEDURE — 97112 NEUROMUSCULAR REEDUCATION: CPT

## 2025-04-10 PROCEDURE — 97110 THERAPEUTIC EXERCISES: CPT

## 2025-04-10 PROCEDURE — 97140 MANUAL THERAPY 1/> REGIONS: CPT

## 2025-04-10 NOTE — PROGRESS NOTES
"Daily Note     Today's date: 4/10/2025  Patient name: Gabriel Gonzalez  : 1945  MRN: [id number]  Referring provider: Lawanda Joshi CRNP  Dx:   Encounter Diagnosis     ICD-10-CM    1. Falls  R29.6       2. Postural instability  R29.3       3. Parkinson's disease without dyskinesia or fluctuating manifestations (HCC)  G20.A1                      Subjective: Pt has no new complaints.      Objective: See treatment diary below      Assessment: Tolerated treatment well.  Continued with outlined program and progressed as able.  \"Big\"- like exercises added to improve pt's posture and coordination.  Pt requires multiple cues, especially for stand and reach and sit to stand.  Consistent cues throughout to improve CS extension and posture.  Pt also had difficulty with coordination of step ups onto foam.  CGS throughout for safety.  Pt will benefit from continued therapy.  Will progress as able.      Plan: Continue per plan of care.         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   Next Physician's Appt:   [employer] HEP:   Manuals 3/20 3/25 4/1 4/8 4/10   STM DK TH DK TH TH   C/S PROM DK       LE stretching        Vitals  108/72 after dizziness              Neuro Re-Ed        Standing OH reaching    Seated + standing ball tap x10 ea     Side-stepping        HR        Cervical Laser  Orange strip up/down, side/side x10min Orange strip up/down, side/side x10min Orange strip up/down, side/side  X10 min Orange strip up/down side/side x10 min   OH ball lift for posture   Seated GMB 2x10 Seated GMB 2x10    Seated ball reach and give        Glute Sets        Hip ADD ball squeeze        Tandem ambulation      " "  Foam balance  NBOS w/ head up/down 1x20\" dizzy!      BKFO  Blue 15x ea  Green 15x ea    Hip flexion   SLR 10x ea  SLR 10x ea    No monies  15x AROM seated 15x AROM seated 15x standing AROM CUES! Seated 15x AROM   Floor to ceiling \"BIG\" exercise    5x 8x   Sit to stand + stand and reach \"big\"     10x  CUES!   Ball press out core  Seated GMB chest press 20x Seated GMB chest press 20x  Seated RMB chest press 20x   Ther Ex        Scap squeezes        Cervical Rot AROM Seated cues for neck nuetral x 10 ea   Seated cues for neck nuetral x 10 ea  Seated cues for neck neutral x10 ea    CS ext Seated 20x   Seated 20x Seated 10x EOT    Shoulder flex AAROM         Pulleys  Cues for head up flex/ABD x2 min ea      Mid Rows        Shoulder Ext        Wobble Board        SLS        Doorway pec stretch        Wall angels      Modified 10x    Ther Activity         Nu Step NuStep x5min Lv4 NuStep x 5 min Lv 4 NuStep x 5 min Lv 4 NuStep x5 min Lv 5 NuStep x5min Lv5   Gait Training in clinic, w/ SPC?        Leg Press        Obstacle negotiation        OH shelf taps   Soft ball @ ball rack x15 ea UE Soft ball @ ball rack x 15 ea UE    Step ups     Onto foam 10x ea CUES   Pt Ed POC       Re-Evaluation DK       Modalities        Heat/ice (PRN)                                   "

## 2025-04-15 ENCOUNTER — OFFICE VISIT (OUTPATIENT)
Dept: PHYSICAL THERAPY | Facility: CLINIC | Age: 80
End: 2025-04-15
Attending: NURSE PRACTITIONER
Payer: MEDICARE

## 2025-04-15 DIAGNOSIS — G20.A1 PARKINSON'S DISEASE WITHOUT DYSKINESIA OR FLUCTUATING MANIFESTATIONS (HCC): ICD-10-CM

## 2025-04-15 DIAGNOSIS — R29.3 POSTURAL INSTABILITY: Primary | ICD-10-CM

## 2025-04-15 DIAGNOSIS — R29.6 FALLS: ICD-10-CM

## 2025-04-15 PROCEDURE — 97112 NEUROMUSCULAR REEDUCATION: CPT

## 2025-04-15 PROCEDURE — 97140 MANUAL THERAPY 1/> REGIONS: CPT

## 2025-04-15 PROCEDURE — 97110 THERAPEUTIC EXERCISES: CPT

## 2025-04-15 NOTE — PROGRESS NOTES
Daily Note     Today's date: 4/15/2025  Patient name: Gabriel Gonzalez  : 1945  MRN: [id number]  Referring provider: Lawanda Joshi CRNP  Dx:   Encounter Diagnosis     ICD-10-CM    1. Postural instability  R29.3       2. Parkinson's disease without dyskinesia or fluctuating manifestations (HCC)  G20.A1       3. Falls  R29.6                      Subjective: Pt states that he is feeling shaky today, last night he did too.  Wife reports that he had a more difficulty time getting up out of a chair last night and today.      Objective: See treatment diary below      Assessment: Tolerated treatment well.  Continued with outlined program to improve strength and balance.  Pt challenged throughout session due to increased fatigue and feeling shaky.  He had more difficulty with sit to stand and took longer to process and execute exercise.  Multiple cues needed for stepping over hurdles as he had difficulty processing getting close to the aimee and which LE to lift.  Pt was able to accomplish with CGS and coaching/cues throughout.  He also had more difficulty with CS extension this visit and required extra cues  with fair carryover.  Pt will benefit from continued therapy.  Will progress as able.      Plan: Continue per plan of care.         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   Next Physician's Appt:   [email or web address] HEP:   Manuals 4/15 3/25 4/1 4/8 4/10   RUST TH TH DK TH TH   C/S PROM        LE stretching        Vitals  108/72 after dizziness              Neuro Re-Ed        Standing OH reaching    Seated + standing ball tap x10 ea     Side-stepping        HR        Cervical Laser [place]  "stripe  Up/down +side/side 10min Orange strip up/down, side/side x10min Orange strip up/down, side/side x10min Orange strip up/down, side/side  X10 min Orange strip up/down side/side x10 min   OH ball lift for posture   Seated GMB 2x10 Seated GMB 2x10    Seated ball reach and give        Glute Sets        Hip ADD ball squeeze        Tandem ambulation        Foam balance NBOS 30\"x1 NBOS w/ head up/down 1x20\" dizzy!      BKFO  Blue 15x ea  Green 15x ea    Hip flexion   SLR 10x ea  SLR 10x ea    No monies Seated 15x AROM 15x AROM seated 15x AROM seated 15x standing AROM CUES! Seated 15x AROM   Floor to ceiling \"BIG\" exercise 5x   5x 8x   Sit to stand + stand and reach \"big\"     10x  CUES!   Ball press out core  Seated GMB chest press 20x Seated GMB chest press 20x  Seated RMB chest press 20x   Ther Ex        Scap squeezes        Cervical Rot AROM   Seated cues for neck nuetral x 10 ea  Seated cues for neck neutral x10 ea    CS ext H/L into pillow 10x  Seated 20x Seated 10x EOT    Shoulder flex AAROM         Pulleys  Cues for head up flex/ABD x2 min ea      Mid Rows        Shoulder Ext        Wobble Board        SLS        Doorway pec stretch        Wall angels      Modified 10x    Ther Activity         Nu Step NuStep x5min Lv4 NuStep x 5 min Lv 4 NuStep x 5 min Lv 4 NuStep x5 min Lv 5 NuStep x5min Lv5   Gait Training in clinic, w/ SPC?        Leg Press        Obstacle negotiation Over hurdles frwd+ lateral 2x ea       OH shelf taps   Soft ball @ ball rack x15 ea UE Soft ball @ ball rack x 15 ea UE    Step ups     Onto foam 10x ea CUES   Pt Ed        Re-Evaluation        Modalities        Heat/ice (PRN)                                     "

## 2025-04-17 ENCOUNTER — EVALUATION (OUTPATIENT)
Dept: PHYSICAL THERAPY | Facility: CLINIC | Age: 80
End: 2025-04-17
Payer: MEDICARE

## 2025-04-17 DIAGNOSIS — G20.A1 PARKINSON'S DISEASE WITHOUT DYSKINESIA OR FLUCTUATING MANIFESTATIONS (HCC): ICD-10-CM

## 2025-04-17 DIAGNOSIS — R29.3 POSTURAL INSTABILITY: Primary | ICD-10-CM

## 2025-04-17 DIAGNOSIS — R29.6 FALLS: ICD-10-CM

## 2025-04-17 PROCEDURE — 97530 THERAPEUTIC ACTIVITIES: CPT | Performed by: PHYSICAL THERAPIST

## 2025-04-17 PROCEDURE — 97112 NEUROMUSCULAR REEDUCATION: CPT | Performed by: PHYSICAL THERAPIST

## 2025-04-17 PROCEDURE — 97110 THERAPEUTIC EXERCISES: CPT | Performed by: PHYSICAL THERAPIST

## 2025-04-17 NOTE — PROGRESS NOTES
"PT Re-Evaluation     Today's date: 2025  Patient name: Gabriel Gonzalez  : 1945  MRN: [id number]  Referring provider: Lawanda Joshi CRNP  Dx:   Encounter Diagnosis     ICD-10-CM    1. Postural instability  R29.3       2. Parkinson's disease without dyskinesia or fluctuating manifestations (HCC)  G20.A1       3. Falls  R29.6                   Start Time: 1445  Stop Time: 1530  Total time in clinic (min): 45 minutes    Assessment  Impairments: abnormal gait, abnormal or restricted ROM, activity intolerance, impaired balance, impaired physical strength, lacks appropriate home exercise program, pain with function, weight-bearing intolerance, poor posture , poor body mechanics, participation limitations, activity limitations and endurance    Assessment details: Patient is a 79 y.o. male who presents to outpatient PT with reports of balance and postural impairments since diagnosis of Parkinson's many years ago. He reports neck drop has worsened more recently over the past 6-7 months. Patient has attended PT for the past 4-5 months with slight increase in stiffness in cervical spine noted compared to last re-evaluation. Noted difficulty with cervical extension and Sbing primarily. Patient noted to have possible processing and multiple step command difficulties, as he is able to perform these tasks with visual cuing or verbal alterations (ie: using cervical laser for tracking rotation and flexion/extension, and \"lean your head back to touch the pillow\". He continues to have TUG time WFLs with improved efficiency with walking and short distance ambulation. Patient's postural mechanics vary depending on fatigue level and overall attention to task. Discussed with patient's wife about overall POC with PT and monitoring for therapeutic plateau. Also discussed patient's recent varying in attention to task and difficulty with processing directions and instructions. Patient has follow-up with neurologist next month " as well. Patient will benefit from continued skilled PT services to work on balance training, strength training, postural training, and overall safety with walking and transfers to improve independence with ADLs and reduce falls risk. Patient would benefit from skilled PT services to address these impairments and to maximize function. He may be discharged from PT if appropriate if therapeutic plateau noted in the next few weeks. Thank you for the referral.    Barriers to therapy: Chronicity of symptoms  Barriers to intervention: medical complexity  Understanding of Dx/Px/POC: good     Prognosis: fair    Goals  Impairment Goals 4-6 weeks   In order to maximize function patient will be able to...   - Demonstrate symmetrical cervical AROM without pain. Improved  - Increase LE/UE strength to 4/5 throughout. Improved  - Demonstrate improved hip/cervical flexibility as demonstrated by increased ROM through therapeutic exercise. Improved  - Improve TUG time to <13 sec to indicate reduce falls risk. Met     Functional Goals 6-8 weeks  In order to return to prior level of function patient will be able to...   - Participate in ADL's/IADL's/sport specific activities with no greater than 2/10 pain.  Improved  - Increase Functional Status Measure (FOTO) to: anticipated at discharge. Met  - Demonstrate independence and compliant with HEP. Met  - Patient will be able to demonstrate good gait mechanics without compensations. Improved  - Demonstrate sit to stand with good mechanics and eccentric control without pain/difficulty/compensation. Improved    Plan  Patient would benefit from: skilled PT  Planned modality interventions: cryotherapy and electrical stimulation/Russian stimulation  Other planned modality interventions: moist heat    Planned therapy interventions: joint mobilization, manual therapy, neuromuscular re-education, patient education, strengthening, stretching, therapeutic activities, therapeutic exercise, home  exercise program, functional ROM exercises, Bales taping, postural training, balance/weight bearing training, body mechanics training, flexibility, IASTM, kinesiology taping, massage, nerve gliding, transfer training and gait training    Frequency: 1-2x week  Duration in weeks: 4  Treatment plan discussed with: patient, PTA, referring physician and family        Subjective Evaluation    History of Present Illness  Mechanism of injury: Gabriel Gonzalez is a 79 y.o. year-old male who presents to outpatient PT accompanied by his wife Dariana. Patient reports diagnosed with Parkinson's many years ago and has attended PT at different location for neuro rehab/balance training. Patient's wife reports his neck drop and cervical stiffness started about 2-3 months ago. Per his wife, it has progressively worsened over the past few months. He also has history of frequent falls, noting 2 falls in the past year but no injuries from falls. Patient reports he is independent with most ADLs, however wife assists with dressing and driving. Patient is currently not driving per physician recommendation and family precaution. Patient is able to perform all other Adls and iADLs on his own. Grab bars present in bathroom. Patient does not use assistive device for ambulation. They have a cane at home from wife's previous injury.  Quality of life: good    Patient Goals  Patient goals for therapy: decreased pain, increased motion, improved balance, increased strength, independence with ADLs/IADLs and return to sport/leisure activities    Pain  Current pain ratin  At best pain ratin  At worst pain ratin  Location: neck  Quality: tight  Relieving factors: heat  Aggravating factors: sitting, standing, stair climbing, walking and lifting  Progression: worsening    Social Support  Steps to enter house: yes  Stairs in house: yes   Lives with: spouse    Employment status: working (, own firm)  Treatments  Previous treatment:  physical therapy  Current treatment: physical therapy        Objective     Palpation   Left   Muscle spasm in the levator scapulae, pectoralis major, pectoralis minor, scalenes, sternocleidomastoid, suboccipitals and upper trapezius.   Tenderness of the levator scapulae and upper trapezius.     Right   Muscle spasm in the levator scapulae, pectoralis major, pectoralis minor, scalenes, sternocleidomastoid, suboccipitals and upper trapezius.   Tenderness of the levator scapulae and upper trapezius.     Active Range of Motion   Cervical/Thoracic Spine       Cervical    Flexion: 50 degrees   Extension: -15 (25 deg from resting position) degrees      Left lateral flexion: 16 degrees      Right lateral flexion: 10 degrees      Left rotation: 60 (cuing to avoid cervical flexion with rotation) degrees  Right rotation: 70 (cuing to avoid cervical flexion with rotation) degrees       Strength/Myotome Testing   Cervical Spine   Neck extension: 3  Neck flexion: 3+    Left   Neck lateral flexion (C3): 3+    Right   Neck lateral flexion (C3): 3+    Left Shoulder     Planes of Motion   Flexion: 4-   Abduction: 4-   External rotation at 0°: 4-   Internal rotation at 0°: 4-     Right Shoulder     Planes of Motion   Flexion: 4-   Abduction: 4-   External rotation at 0°: 4-   Internal rotation at 0°: 4-     Left Elbow   Flexion: 4-  Extension: 4-    Right Elbow   Flexion: 4-  Extension: 4-    Left Hip   Planes of Motion   Flexion: 3+  Extension: 2  Abduction: 4-  External rotation: 4-    Right Hip   Planes of Motion   Flexion: 3+  Extension: 2  Abduction: 4-  External rotation: 4-    Left Knee   Flexion: 4-  Extension: 4-    Right Knee   Flexion: 4-  Extension: 4-    Left Ankle/Foot   Dorsiflexion: 4-  Plantar flexion: 4-    Right Ankle/Foot   Dorsiflexion: 4-  Plantar flexion: 4-  Neuro Exam:     Sensation   Light touch LE: left WNL and right WNL    Coordination   Heel to shin: left dysmetric and right dysmetric  Finger to nose: left  dysmetria (4/5 trials correct) and right dysmetria (2/5 trials correct)  Rapid alternating movements: UE WNL    Transfers   Sit to stand: minimum assist (needs 2 UE support, close supervision)     Functional outcomes   5x sit to stand: unable (seconds)  TUG: 10 (seconds)  Functional outcome comment: 5x sit-stand unable to test due to patient unable to comprehend test  Functional outcome gait comment: Patient walks without assistive device, exhibits significant limp on Right LE with decreased Right LE stance phase and early Right toe-off in terminal stance, tends to exhibit knee flexion early end of mid-stance and terminal stance; decreased Left knee flexion in swing with decreased forward advancement of LE noted bilaterally; decreased arm and trunk rotation noted with overall guarded/stiff UE posture; forward head posture noted through with difficulty maintaining neutral cervical spine position while walking or standing              POC Expires Auth Status Start Date Expiration Date PT Visit Limit    5/17/2025 No Auth Req   BOMN   Date 4/15/2025 4/17/2025 4/1/2025 4/8/2025 4/10/2025   Used 26 27 23 24 25   Remaining           Diagnosis: Parkinson's disease; frequent falls; neck drop   Precautions: chronic Right hip pain, orthostatic hypotension, anxiety, 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   Next Physician's Appt:   Xi HEP:   Manuals 4/15 4/17 4/1 4/8 4/10   [facility] TH   TH TH   C/S PROM        LE stretching        Vitals                Neuro Re-Ed        Standing OH reaching    Seated + standing ball tap x10 ea     Side-stepping        HR        Cervical Laser Orange stripe  Up/down +side/side 10min  Orange strip up/down, side/side x10min Orange strip up/down, side/side  X10 min Orange strip up/down side/side x10 min   OH ball lift for posture  H/L RMB ball raise/chest press 2x10 Seated GMB 2x10 Seated GMB 2x10    Seated ball reach and give        Glute Sets        Hip ADD ball squeeze        Tandem ambulation    "     Foam balance NBOS 30\"x1       BKFO    Green 15x ea    Hip flexion     SLR 10x ea    No monies Seated 15x AROM  15x AROM seated 15x standing AROM CUES! Seated 15x AROM   Floor to ceiling \"BIG\" exercise 5x Seated 10\" x5 cues + mirroring  5x 8x   Sit to stand + stand and reach \"big\"     10x  CUES!   Ball press out core   Seated GMB chest press 20x  Seated RMB chest press 20x   Ther Ex        Scap squeezes        Cervical Rot AROM  H/L with cues for C/S ext x10 ext Seated cues for neck nuetral x 10 ea  Seated cues for neck neutral x10 ea    CS ext H/L into pillow 10x H/L into pillow 10x Seated 20x Seated 10x EOT    Shoulder flex AAROM         Pulleys        Mid Rows        Shoulder Ext        Wobble Board        SLS        Doorway pec stretch        Wall angels      Modified 10x    Ther Activity         Nu Step NuStep x5min Lv4 NuStep x5min Lv4 NuStep x 5 min Lv 4 NuStep x5 min Lv 5 NuStep x5min Lv5   Gait Training in clinic, w/ SPC?        Leg Press        Obstacle negotiation Over hurdles frwd+ lateral 2x ea       OH shelf taps   Soft ball @ ball rack x15 ea UE Soft ball @ ball rack x 15 ea UE    Step ups     Onto foam 10x ea CUES   Pt Ed  POC      Re-Evaluation  DK      Modalities        Heat/ice (PRN)                            "

## 2025-04-22 ENCOUNTER — OFFICE VISIT (OUTPATIENT)
Dept: PHYSICAL THERAPY | Facility: CLINIC | Age: 80
End: 2025-04-22
Attending: NURSE PRACTITIONER
Payer: MEDICARE

## 2025-04-22 DIAGNOSIS — G20.A1 PARKINSON'S DISEASE WITHOUT DYSKINESIA OR FLUCTUATING MANIFESTATIONS (HCC): ICD-10-CM

## 2025-04-22 DIAGNOSIS — R29.3 POSTURAL INSTABILITY: Primary | ICD-10-CM

## 2025-04-22 PROCEDURE — 97140 MANUAL THERAPY 1/> REGIONS: CPT

## 2025-04-22 PROCEDURE — 97112 NEUROMUSCULAR REEDUCATION: CPT

## 2025-04-22 NOTE — PROGRESS NOTES
Daily Note     Today's date: 2025  Patient name: Gabriel Gonzalez  : 1945  MRN: [id number]  Referring provider: Lawanda Joshi CRNP  Dx:   Encounter Diagnosis     ICD-10-CM    1. Postural instability  R29.3       2. Parkinson's disease without dyskinesia or fluctuating manifestations (HCC)  G20.A1                      Subjective: Pt states that he is having a good day today, not as wobbly as some days.      Objective: See treatment diary below      Assessment: Tolerated treatment well.  Pt challenged with motor coordination of standing hip flexion and requires multiple cues. Able to perform in seated with less cues.   Cues throughout for posture and to improve CS extension with fair carryover noted.  Unable to perform standing strengthening and balance due to increased dizziness with standing.  Strengthening for core and hip in seated witth good tolerance.  Cues for slow controlled movements with good carryover.  Pt will benefit from continued therapy.  Will progress as able.      Plan: Continue per plan of care.         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   Next Physician's Appt:   Xi HEP:   Manuals 4/15 4/17 4/22 4/8 4/10   [facility] TH  TH TH TH   C/S PROM        LE stretching        Vitals                Neuro Re-Ed        Standing OH reaching         Side-stepping        HR        Cervical Laser Orange stripe  Up/down +side/side 10min  Orange strip up/down, side/side x10min Orange strip up/down, side/side  X10 min Orange strip up/down side/side x10 min   OH ball lift for posture  H/L RMB ball raise/chest press 2x10 Seated RMB 20x Seated GMB 2x10    Seated ball reach and give   "      Glute Sets        Hip ADD ball squeeze        Tandem ambulation        Foam balance NBOS 30\"x1  Unable- caused dizziness     BKFO   Seated blue 20x ea Green 15x ea    Hip flexion     SLR 10x ea    No monies Seated 15x AROM   15x standing AROM CUES! Seated 15x AROM   Floor to ceiling \"BIG\" exercise 5x Seated 10\" x5 cues + mirroring  5x 8x   Sit to stand + stand and reach \"big\"     10x  CUES!   Ball press out core   Seated RMB chest press 20x  Seated RMB chest press 20x   Ther Ex        Scap squeezes        Cervical Rot AROM  H/L with cues for C/S ext x10 ext  Seated cues for neck neutral x10 ea    CS ext H/L into pillow 10x H/L into pillow 10x  Seated 10x EOT    Shoulder flex AAROM         Pulleys        Mid Rows        Shoulder Ext        Wobble Board        SLS        Doorway pec stretch        Wall angels      Modified 10x    Ther Activity         Nu Step NuStep x5min Lv4 NuStep x5min Lv4 NuStep x 5 min Lv 4 NuStep x5 min Lv 5 NuStep x5min Lv5   Gait Training in clinic, w/ SPC?        Leg Press        Obstacle negotiation Over hurdles frwd+ lateral 2x ea       OH shelf taps    Soft ball @ ball rack x 15 ea UE    Step ups     Onto foam 10x ea CUES   Pt Ed  POC      Re-Evaluation  DK      Modalities        Heat/ice (PRN)                            "

## 2025-04-23 DIAGNOSIS — F06.8 PSYCHOSIS DUE TO PARKINSON'S DISEASE (HCC): ICD-10-CM

## 2025-04-23 DIAGNOSIS — G20.A1 PSYCHOSIS DUE TO PARKINSON'S DISEASE (HCC): ICD-10-CM

## 2025-04-23 DIAGNOSIS — R44.1 VISUAL HALLUCINATIONS: ICD-10-CM

## 2025-04-23 DIAGNOSIS — F22 DELUSIONS (HCC): ICD-10-CM

## 2025-04-23 RX ORDER — OLANZAPINE 2.5 MG/1
2.5 TABLET, FILM COATED ORAL
Qty: 30 TABLET | Refills: 0 | Status: SHIPPED | OUTPATIENT
Start: 2025-04-23 | End: 2025-06-22

## 2025-04-23 NOTE — TELEPHONE ENCOUNTER
Reason for call:   [x] Refill   [] Prior Auth  [] Other:     Office:   [] PCP/Provider -   [x] Specialty[facility]Provider - [facility]     Medication: OLANZapine (ZyPREXA) 2.5 mg tablet     Dose/Frequency: Take 1 tablet (2.5 mg total) by mouth daily at bedtime,     Quantity: 30    Pharmacy: [address]      Local Pharmacy   Does the patient have enough for 3 days?   [x] Yes   [] No - Send as HP to POD    Mail Away Pharmacy   Does the patient have enough for 10 days?   [] Yes   [] No - Send as HP to POD

## 2025-04-24 DIAGNOSIS — F51.04 PSYCHOPHYSIOLOGICAL INSOMNIA: ICD-10-CM

## 2025-04-24 RX ORDER — ATORVASTATIN CALCIUM 20 MG/1
20 TABLET, FILM COATED ORAL DAILY
Qty: 90 TABLET | Refills: 1 | Status: SHIPPED | OUTPATIENT
Start: 2025-04-24

## 2025-04-29 ENCOUNTER — OFFICE VISIT (OUTPATIENT)
Dept: PHYSICAL THERAPY | Facility: CLINIC | Age: 80
End: 2025-04-29
Attending: NURSE PRACTITIONER
Payer: MEDICARE

## 2025-04-29 DIAGNOSIS — R29.3 POSTURAL INSTABILITY: Primary | ICD-10-CM

## 2025-04-29 PROCEDURE — 97530 THERAPEUTIC ACTIVITIES: CPT

## 2025-04-29 PROCEDURE — 97110 THERAPEUTIC EXERCISES: CPT

## 2025-04-29 PROCEDURE — 97112 NEUROMUSCULAR REEDUCATION: CPT

## 2025-04-29 NOTE — PROGRESS NOTES
"Daily Note     Today's date: 2025  Patient name: Gabriel Gonzalez  : 1945  MRN: [id number]  Referring provider: Lawanda Joshi CRNP  Dx:   Encounter Diagnosis     ICD-10-CM    1. Postural instability  R29.3                      Subjective: Pt reports no new complaints.        Objective: See treatment diary below      Assessment: Tolerated treatment well. Standing strengthening and balance as tolerated.  Cues for moving closer to obstacles to step over with improved motor planning noted.  Doorway pec stretch to improve posture and mobility with cues for form with fair carryover noted.  Cues throughout for improved cervical neck extension.    Pt notes that end of seated big movement with arms  in extension and wide he felt improved stretch and relief of tightness.  Pt would benefit from continued therapy.  Will progress as able.      Plan: Continue per plan of care.         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   Next Physician's Appt:   [employer] HEP:   Manuals 4/15 4/17 4/22 4/29 4/10   [facility] TH  TH TH TH   C/S PROM        LE stretching        Vitals                Neuro Re-Ed        Standing OH reaching         Side-stepping        HR        Cervical Laser Orange stripe  Up/down +side/side 10min  Orange strip up/down, side/side x10min  Orange strip up/down side/side x10 min   OH ball lift for posture  H/L RMB ball raise/chest press 2x10 Seated RMB 20x Seated RMB 20x    Seated ball reach and give        Glute Sets        Hip ADD ball squeeze        Tandem ambulation        Foam balance NBOS 30\"x1  Unable- caused dizziness     BKFO   Seated blue 20x ea H/L green 20x ea    Hip flexion   " "      No monies Seated 15x AROM   Modified stand  Pink 20x Seated 15x AROM   Floor to ceiling \"BIG\" exercise 5x Seated 10\" x5 cues + mirroring  Seated 10x cues  8x   Sit to stand + stand and reach \"big\"     10x  CUES!   Ball press out core   Seated RMB chest press 20x  Seated RMB chest press 20x   Ther Ex        Scap squeezes        Cervical Rot AROM  H/L with cues for C/S ext x10 ext      CS ext H/L into pillow 10x H/L into pillow 10x  H/L into pillow 10x    Shoulder flex AAROM         Pulleys        Mid Rows        Shoulder Ext        Wobble Board        SLS        Doorway pec stretch    5\"x10 cues!    Wall angels      Modified 10x    Ther Activity         Nu Step NuStep x5min Lv4 NuStep x5min Lv4 NuStep x 5 min Lv 4 NuStep x6 min Lv 5 NuStep x5min Lv5   Gait Training in clinic, w/ SPC?        Leg Press        Obstacle negotiation Over hurdles frwd+ lateral 2x ea   Over 1/2 FR   10x    OH shelf taps        Step ups    Onto 4\" step cues 10x ea Onto foam 10x ea CUES   Pt Ed  POC      Re-Evaluation  DK      Modalities        Heat/ice (PRN)                              "

## 2025-04-30 ENCOUNTER — TELEPHONE (OUTPATIENT)
Age: 80
End: 2025-04-30

## 2025-04-30 NOTE — TELEPHONE ENCOUNTER
Patient's wife called in. Patient last seen in October 2024, patient was at that point still deciding if he would proceed with prostate artery embolization. Patient has now decided to move forward and they were calling regarding next steps. Reviewed last OV note and advised they call IR d/t their last consult was June 2024 and inquire if he will need to be seen again prior to moving forward. Advised patient the plan on our end was to follow up with him 3 months post procedure. Advised her to call once the PAE is scheduled and we would set him up for F/U appt.

## 2025-05-01 ENCOUNTER — OFFICE VISIT (OUTPATIENT)
Dept: PHYSICAL THERAPY | Facility: CLINIC | Age: 80
End: 2025-05-01
Payer: MEDICARE

## 2025-05-01 DIAGNOSIS — R29.3 POSTURAL INSTABILITY: Primary | ICD-10-CM

## 2025-05-01 DIAGNOSIS — G20.A1 PARKINSON'S DISEASE WITHOUT DYSKINESIA OR FLUCTUATING MANIFESTATIONS (HCC): ICD-10-CM

## 2025-05-01 DIAGNOSIS — R29.6 FALLS: ICD-10-CM

## 2025-05-01 PROCEDURE — 97140 MANUAL THERAPY 1/> REGIONS: CPT | Performed by: PHYSICAL THERAPIST

## 2025-05-01 PROCEDURE — 97530 THERAPEUTIC ACTIVITIES: CPT | Performed by: PHYSICAL THERAPIST

## 2025-05-01 PROCEDURE — 97112 NEUROMUSCULAR REEDUCATION: CPT | Performed by: PHYSICAL THERAPIST

## 2025-05-01 NOTE — PROGRESS NOTES
"Daily Note     Today's date: 2025  Patient name: Gabriel Gonzalez  : 1945  MRN: [id number]  Referring provider: Lawanda Joshi CRNP  Dx:   Encounter Diagnosis     ICD-10-CM    1. Postural instability  R29.3       2. Parkinson's disease without dyskinesia or fluctuating manifestations (HCC)  G20.A1       3. Falls  R29.6           Start Time: 1445  Stop Time: 1530  Total time in clinic (min): 45 minutes    Subjective: Patient reports he feels more tired today. Per patient's wife, he was more tired and head drop yesterday \"and today seems like a better day.\" She reports OT has been working on LSVT BIG exercises in supine to promote more head extension and postural mechanics.      Objective: See treatment diary below      Assessment: Tolerated treatment fair. Patient had difficulty maintaining head neutral position with cervical laser and thus deferred today. However, he was able to exhibit some cervical extension when practicing OH reaching on mirror with target to reach. Patient reported some shoulder tightness thus performed postural exercises such as shoulder pulleys. He continues to require cuing to maintaining proper postural mechanics throughout session. He reported less pain and less stiffness end of session compared to on arrival. Patient demonstrated fatigue post treatment      Plan: Continue per plan of care.  Progress treatment as tolerated.          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   Next Physician's Appt:   [employer] HEP:   Manuals 4/15 4/17 4/22 4/29 5/1   [facility] TH  TH TH [facility]   C/S PROM     DK   LE stretching        Vitals                Neuro Re-Ed        Standing OH " "reaching      Standing OH mirror taps x10 ea   Side-stepping     @ mirror x3 laps   HR        Cervical Laser Orange stripe  Up/down +side/side 10min  Orange strip up/down, side/side x10min  Attempted, but unable   OH ball lift for posture  H/L RMB ball raise/chest press 2x10 Seated RMB 20x Seated RMB 20x    Seated ball reach and give        Glute Sets        Hip ADD ball squeeze        Tandem ambulation        Foam balance NBOS 30\"x1  Unable- caused dizziness     BKFO   Seated blue 20x ea H/L green 20x ea    Hip flexion         No monies Seated 15x AROM   Modified stand  Pink 20x    Floor to ceiling \"BIG\" exercise 5x Seated 10\" x5 cues + mirroring  Seated 10x cues  Seated 10\" x6 cues + mirroring   Sit to stand + stand and reach \"big\"        Ball press out core   Seated RMB chest press 20x     Ther Ex        Scap squeezes        Cervical Rot AROM  H/L with cues for C/S ext x10 ext      CS ext H/L into pillow 10x H/L into pillow 10x  H/L into pillow 10x    Shoulder flex AAROM         Pulleys     2 min ea   Mid Rows        Shoulder Ext        Wobble Board        SLS        Doorway pec stretch    5\"x10 cues!    Wall angels          Ther Activity         Nu Step NuStep x5min Lv4 NuStep x5min Lv4 NuStep x 5 min Lv 4 NuStep x6 min Lv 5 NuStep x6 min Lv 5   Gait Training in clinic, w/ SPC?        Leg Press        Obstacle negotiation Over hurdles frwd+ lateral 2x ea   Over 1/2 FR   10x    OH shelf taps        Step ups    Onto 4\" step cues 10x ea    Pt Ed  POC   Family ed on pt's processing to instructions, POC   Re-Evaluation  DK      Modalities        Heat/ice (PRN)                                "

## 2025-05-06 ENCOUNTER — OFFICE VISIT (OUTPATIENT)
Dept: PHYSICAL THERAPY | Facility: CLINIC | Age: 80
End: 2025-05-06
Attending: NURSE PRACTITIONER
Payer: MEDICARE

## 2025-05-06 DIAGNOSIS — R29.6 FALLS: ICD-10-CM

## 2025-05-06 DIAGNOSIS — R29.3 POSTURAL INSTABILITY: Primary | ICD-10-CM

## 2025-05-06 DIAGNOSIS — G20.A1 PARKINSON'S DISEASE WITHOUT DYSKINESIA OR FLUCTUATING MANIFESTATIONS (HCC): ICD-10-CM

## 2025-05-06 PROCEDURE — 97112 NEUROMUSCULAR REEDUCATION: CPT | Performed by: PHYSICAL THERAPIST

## 2025-05-06 PROCEDURE — 97140 MANUAL THERAPY 1/> REGIONS: CPT | Performed by: PHYSICAL THERAPIST

## 2025-05-06 PROCEDURE — 97110 THERAPEUTIC EXERCISES: CPT | Performed by: PHYSICAL THERAPIST

## 2025-05-06 NOTE — PROGRESS NOTES
Daily Note     Today's date: 2025  Patient name: Gabriel Gonzalez  : 1945  MRN: [id number]  Referring provider: Lawanda Joshi CRNP  Dx:   Encounter Diagnosis     ICD-10-CM    1. Postural instability  R29.3       2. Parkinson's disease without dyskinesia or fluctuating manifestations (HCC)  G20.A1       3. Falls  R29.6           Start Time: 1400  Stop Time: 1445  Total time in clinic (min): 45 minutes    Subjective: Patient reports feeling a little better today.      Objective: See treatment diary below      Assessment: Tolerated treatment well. Worked on cervical laser at start of session with some difficulty with understanding task, however improved with frequent verbal cuing and object focused training. Noted slight dizziness after side-stepping at railing support, but reduced with seated rest break and cuing for relaxation. Patient performed all other exercises with improved tolerance, improving neck mobility noted overall.  Patient exhibited good technique with therapeutic exercises and would benefit from continued PT      Plan: Continue per plan of care.  Progress treatment as tolerated.          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   Next Physician's Appt:   [employer] HEP:   Manuals    [facility] DK   C/S PROM     DK   LE stretching        Vitals                Neuro Re-Ed        Standing OH reaching      Standing OH mirror taps x10 ea   Side-stepping @ mirror x3 laps    @ mirror x3 laps   HR        Cervical Laser Orange strip up/down, side/side x10min  Orange strip up/down, side/side x10min  Attempted, but unable   OH ball lift for posture Seated GMB  "x20 H/L RMB ball raise/chest press 2x10 Seated RMB 20x Seated RMB 20x    Seated ball reach and give        Glute Sets        Hip ADD ball squeeze        Tandem ambulation        Foam balance   Unable- caused dizziness     BKFO   Seated blue 20x ea H/L green 20x ea    Hip flexion         No monies    Modified stand  Pink 20x    Floor to ceiling \"BIG\" exercise Seated 10\" x6 cues + mirroring Seated 10\" x5 cues + mirroring  Seated 10x cues  Seated 10\" x6 cues + mirroring   Sit to stand + stand and reach \"big\"        Ball press out core   Seated RMB chest press 20x     Ther Ex        Scap squeezes        Cervical Rot AROM Seated 10x ea cues H/L with cues for C/S ext x10 ext      CS ext Seated 10x cues H/L into pillow 10x  H/L into pillow 10x    Shoulder flex AAROM         Pulleys     2 min ea   Mid Rows        Shoulder Ext        Wobble Board        SLS        Doorway pec stretch    5\"x10 cues!    Wall angels          Ther Activity         Nu Step NuStep x6 min Lv 5 NuStep x5min Lv4 NuStep x 5 min Lv 4 NuStep x6 min Lv 5 NuStep x6 min Lv 5   Gait Training in clinic, w/ SPC?        Leg Press        Obstacle negotiation    Over 1/2 FR   10x    OH shelf taps        Step ups    Onto 4\" step cues 10x ea    Pt Ed  POC   Family ed on pt's processing to instructions, POC   Re-Evaluation  DK      Modalities        Heat/ice (PRN)                                  "

## 2025-05-08 ENCOUNTER — TELEMEDICINE (OUTPATIENT)
Dept: INTERVENTIONAL RADIOLOGY/VASCULAR | Facility: CLINIC | Age: 80
End: 2025-05-08
Payer: MEDICARE

## 2025-05-08 ENCOUNTER — PREP FOR PROCEDURE (OUTPATIENT)
Dept: INTERVENTIONAL RADIOLOGY/VASCULAR | Facility: CLINIC | Age: 80
End: 2025-05-08

## 2025-05-08 DIAGNOSIS — N40.1 BENIGN PROSTATIC HYPERPLASIA WITH URINARY FREQUENCY: Primary | ICD-10-CM

## 2025-05-08 DIAGNOSIS — R35.0 BENIGN PROSTATIC HYPERPLASIA WITH URINARY FREQUENCY: Primary | ICD-10-CM

## 2025-05-08 PROCEDURE — 99214 OFFICE O/P EST MOD 30 MIN: CPT | Performed by: STUDENT IN AN ORGANIZED HEALTH CARE EDUCATION/TRAINING PROGRAM

## 2025-05-08 NOTE — PROGRESS NOTES
Virtual Brief Visit  Name: Gabriel Gonzalez      : 1945      MRN: [id number]  Encounter Provider: Rickie Woods MD  Encounter Date: 2025   Encounter department: [facility] INTERVENTIONAL RADIOLOGY [place]  :  Assessment & Plan  Benign prostatic hyperplasia with urinary frequency       79-year-old male with a past medical history of benign prostatic hyperplasia resulting in lower urinary tract symptoms as well as Parkinson's disease and mild dementia who presents as a follow up to discuss prostate artery embolization.    Mr. Gonzalez has decided that he would like to move forward with the prostate artery embolization.  We will try to coordinate this in an expedited fashion.  He prefers the [facility] if possible.    Thank you for allowing Interventional Radiology to participate in the care of Gabriel Gonzalez. Please don't hesitate to call, text, email, or TigerText with any questions.     Rickie Woods MD  Interventional Radiologist  [facility]  Personal Cell: [phone]  [email or web address]    History of Present Illness     79-year-old male with a past medical history of benign prostatic hyperplasia resulting in lower urinary tract symptoms as well as Parkinson's disease and mild dementia who presents as a follow up to discuss prostate artery embolization.    I spoke with the patient last year regarding prostate artery embolization.  At that time, he wanted to think more about it.  Since that time, his urinary symptoms, most notably urinary frequency, urgency, weak stream, and nocturia have stayed more or less similar.  Once again, it has gotten to the point where it has significantly impacted his quality of life and his ability to travel.  He denies any history of hematuria.     He has been maintained on multiple medications with minimal benefit.     He does have a history of progressive Parkinson's disease.  Given concerns of anesthesia and his ability to tolerate a  more invasive procedure with respect to his Parkinson's disease, both the urology service and the patient are hoping to pursue the most minimally invasive procedure possible to achieve a benefit.    Administrative Statements   Encounter provider Rickie Woods MD    The Patient is located at Home and in the following state in which I hold an active license [place].    The patient was identified by name and date of birth. Gabriel Gonzalez was informed that this is a telemedicine visit and that the visit is being conducted through the Epic Embedded platform. He agrees to proceed..  My office door was closed. No one else was in the room.  He acknowledged consent and understanding of privacy and security of the video platform. The patient has agreed to participate and understands they can discontinue the visit at any time.    I have spent a total time of 30 minutes in caring for this patient on the day of the visit/encounter including Risks and benefits of tx options, Patient and family education, Impressions, Counseling / Coordination of care, Documenting in the medical record, Reviewing/placing orders in the medical record (including tests, medications, and/or procedures), and Obtaining or reviewing history  , not including the time spent for establishing the audio/video connection.

## 2025-05-13 ENCOUNTER — APPOINTMENT (OUTPATIENT)
Dept: PHYSICAL THERAPY | Facility: CLINIC | Age: 80
End: 2025-05-13
Attending: NURSE PRACTITIONER
Payer: MEDICARE

## 2025-05-15 ENCOUNTER — OFFICE VISIT (OUTPATIENT)
Dept: PSYCHIATRY | Facility: CLINIC | Age: 80
End: 2025-05-15
Payer: MEDICARE

## 2025-05-15 DIAGNOSIS — F06.8 PSYCHOSIS DUE TO PARKINSON'S DISEASE (HCC): ICD-10-CM

## 2025-05-15 DIAGNOSIS — F22 DELUSIONS (HCC): Primary | ICD-10-CM

## 2025-05-15 DIAGNOSIS — G20.A1 PSYCHOSIS DUE TO PARKINSON'S DISEASE (HCC): ICD-10-CM

## 2025-05-15 DIAGNOSIS — R44.1 VISUAL HALLUCINATIONS: ICD-10-CM

## 2025-05-15 PROCEDURE — 99214 OFFICE O/P EST MOD 30 MIN: CPT | Performed by: PHYSICIAN ASSISTANT

## 2025-05-15 PROCEDURE — G2211 COMPLEX E/M VISIT ADD ON: HCPCS | Performed by: PHYSICIAN ASSISTANT

## 2025-05-15 RX ORDER — OLANZAPINE 2.5 MG/1
2.5 TABLET, FILM COATED ORAL
Qty: 90 TABLET | Refills: 1 | Status: SHIPPED | OUTPATIENT
Start: 2025-05-15 | End: 2025-11-11

## 2025-05-15 NOTE — ASSESSMENT & PLAN NOTE
Stable - continue olanzapine 2.5 mg qhs; follows with neuro; f/u in 2-3 months    Orders:    OLANZapine (ZyPREXA) 2.5 mg tablet; Take 1 tablet (2.5 mg total) by mouth daily at bedtime

## 2025-05-15 NOTE — PSYCH
MEDICATION MANAGEMENT NOTE    Name: Gabriel Gonzalez      : 1945      MRN: [id number]  Encounter Provider: Elzbieta Carreno  Encounter Date: 5/15/2025   Encounter department: [facility]    Insurance: Payor: MEDICARE / Plan: MEDICARE A AND B / Product Type: Medicare A & B Fee for Service /      Reason for Visit:   Chief Complaint   Patient presents with    Follow-up    Medication Management   :  Assessment & Plan  Delusions (HCC)  Stable - continue olanzapine 2.5 mg qhs; f/u in 2-3 months    Orders:    OLANZapine (ZyPREXA) 2.5 mg tablet; Take 1 tablet (2.5 mg total) by mouth daily at bedtime    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 No changes to medications advised at this time. He is retiring at the end of this year which I encourage.    Treatment Recommendations:    Continue current medication:     - olanzapine 2.5 mg qhs     *rivastigmine managed by neuro    Educated about diagnosis and treatment modalities. Verbalizes understanding and agreement with the treatment plan.  Discussed self monitoring of symptoms, and symptom monitoring tools.  Discussed medications and if treatment adjustment was needed or desired.  Medication management every 2 months  Does not want any medication changes  Aware of 24 hour and weekend coverage for urgent situations accessed by calling [facility] main practice number  Follows with family physician for glucose and lipid monitoring due to current therapy with antipsychotic  "medication  Does not want to see a therapist  I am scheduling this patient out for greater than 3 months: No    Medications Risks/Benefits:      Risks, Benefits And Possible Side Effects Of Medications:    Risks, benefits, and possible side effects of medications explained to Taiwo and he (or legal representative) verbalizes understanding and agreement for treatment.    Controlled Medication Discussion:     Not applicable      History of Present Illness     Taiwo is seen today for a follow up for Follow-up and Medication Management and dementia. His wife, Dariana, also presents and helps with the history. He reports everything is \"good\" and still denies AVH and delusions. However, he admits that \"people\" still come around the house and rarely work but he states they are not bothering him as much as they used to. His wife reports that the hallucinations appear to be under control and much less bothersome. He is sleeping through the night. He did make the decision to retire at the end of this year so he and Dariana are doing a lot of work going through his old files and everything at the office to make this possible. Dariana states she feels he is a little more down since making this decision but Taiwo says \"it's too soon to tell\". He still has dizziness but the fatigue is somewhat improved.     Past medication trials:  Nuplazid (had worse hallucinations but only 3 day trial), Zoloft (no benefit), Seroquel (no benefit), Xanax     He denies any suicidal ideation, intent or plan at present; denies any homicidal ideation, intent or plan at present.    He denies any auditory hallucinations, denies any visual hallucinations, denies any delusions.    He denies any side effects from current psychiatric medications.    HPI ROS Appetite Changes and Sleep:     He reports normal sleep, normal appetite, decreased energy    Review Of Systems: A review of systems is obtained and is negative except for the pertinent positives listed in " HPI/Subjective above.      Current Rating Scores:     None completed today.    Areas of Improvement: reviewed in HPI/Subjective Section and reviewed in Assessment and Plan Section      Past Medical History:   Diagnosis Date    Anxiety 10/07/2020    Psychophysiological insomnia 10/07/2020     Past Surgical History:   Procedure Laterality Date    CYST REMOVAL      Right wrist    PROSTATE SURGERY      Biopsy    TONSILLECTOMY       Allergies: Allergies[1]    Current Outpatient Medications   Medication Instructions    atorvastatin (LIPITOR) 20 mg, Oral, Daily    carbidopa-levodopa (SINEMET)  mg per tablet TAKE 1.5 TABLETS BY MOUTH AT 7AM, 1.5 TABLETS AT 10:30AM, 1.5 TABLETS AT 2:30PM, 1 TABLET AT 6:30PM, AND 1 TABLET AT 10:30PM    ketoconazole (NIZORAL) 2 % shampoo     midodrine (PROAMATINE) 2.5 mg tablet Take 1 tab TID as needed    OLANZapine (ZYPREXA) 2.5 mg, Oral, Daily at bedtime    rivastigmine (EXELON) 3 mg, 2 times daily        Substance Abuse History:    Tobacco, Alcohol and Drug Use History     Tobacco Use    Smoking status: Never    Smokeless tobacco: Never   Vaping Use    Vaping status: Never Used   Substance Use Topics    Alcohol use: Not Currently     Comment: social    Drug use: No          Social History:    Social History     Socioeconomic History    Marital status: /Civil Union     Spouse name: Not on file    Number of children: 3    Years of education: Not on file    Highest education level: Not on file   Occupational History    Occupation:    still active    Occupation: retired    Other Topics Concern    Not on file   Social History Narrative    Most recent tobacco use screenin-    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    If Yes, What branch of service: [employer]    [employer]    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, into active duty, 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    Advance directive: Yes    Alcohol intake: Moderate    Caffeine intake:  "Moderate    Illicit drugs: none    Occupation: retired    Exercise level: Occasional    Single or multi-level home/work: single level home    Live alone or with others: with others    Chewing tobacco: none    Marital status:     Number of children: 3    Diet: Regular    Sexual orientation: Heterosexual    Smoke alarm in home: Yes    General stress level: Medium    Sunscreen used routinely: No    Education: Post Graduate    Guns present in home: No    Presence of domestic violence: No        Family Psychiatric History:     Family History   Problem Relation Age of Onset    Heart disease Mother     Heart disease Father        Medical History Reviewed by provider this encounter:  Tobacco  Allergies  Meds  Problems  Med Hx  Surg Hx  Fam Hx          Objective   There were no vitals taken for this visit.     Mental Status Evaluation:    Appearance age appropriate, casually dressed   Behavior cooperative, calm   Speech normal rate, normal volume, normal pitch, spontaneous   Mood \"Good\"   Affect flat   Thought Processes goal directed, linear   Thought Content no overt delusions   Perceptual Disturbances: denies auditory hallucinations when asked, denies visual hallucinations when asked, does not appear responding to internal stimuli   Abnormal Thoughts  Risk Potential Suicidal ideation - None at present  Homicidal ideation - None  Potential for aggression - No   Orientation oriented to person, place, time/date, and situation   Memory recent and remote memory grossly intact   Consciousness alert and awake   Attention Span Concentration Span attention span and concentration appear shorter than expected for age   Intellect appears to be of average intelligence   Insight partial   Judgement fair   Muscle Strength and  Gait slow gait, unstable gait, imbalance   Motor activity abnormal movement noted: mild hand tremor present   Language no difficulty naming common objects, no difficulty repeating a phrase, no " "difficulty writing a sentence   Fund of Knowledge adequate knowledge of current events  adequate fund of knowledge regarding past history  adequate fund of knowledge regarding vocabulary        Laboratory Results: I have personally reviewed all pertinent laboratory/tests results    Suicide/Homicide Risk Assessment:    Risk of Harm to Self:  The following ratings are based on assessment at the time of the interview  Based on today's assessment, Taiwo presents the following risk of harm to self: none    Risk of Harm to Others:  The following ratings are based on assessment at the time of the interview  Based on today's assessment, Taiwo presents the following risk of harm to others: none    The following interventions are recommended: Continue medication management. No other intervention changes indicated at this time.    Psychotherapy Provided:     Individual psychotherapy provided: No    Treatment Plan:    Completed and signed during the session: Not applicable - Treatment Plan not due at this session.    Goals: Progress towards Treatment Plan goals - Yes, progressing, as evidenced by subjective findings in HPI/Subjective Section and in Assessment and Plan Section    Depression Follow-up Plan Completed: Not applicable    Note Share:    This note was shared with patient.    Visit Time  Visit Start Time: 2:00 PM  Visit Stop Time: 2:25 PM  Total Visit Duration: 25 minutes    Portions of the record may have been created with voice recognition software. Occasional wrong word or \"sound a like\" substitutions may have occurred due to the inherent limitations of voice recognition software. Read the chart carefully and recognize, using context, where substitutions have occurred.    Elzbieta Carreno 05/15/25       [1] No Known Allergies    "

## 2025-05-15 NOTE — ASSESSMENT & PLAN NOTE
Stable - continue olanzapine 2.5 mg qhs; f/u in 2-3 months    Orders:    OLANZapine (ZyPREXA) 2.5 mg tablet; Take 1 tablet (2.5 mg total) by mouth daily at bedtime

## 2025-05-16 ENCOUNTER — TELEPHONE (OUTPATIENT)
Dept: RADIOLOGY | Facility: HOSPITAL | Age: 80
End: 2025-05-16

## 2025-05-16 NOTE — TELEPHONE ENCOUNTER
Received a call from abby (spouse) she is trying to schedule the PAE, offered 6/9 with Dr. Woods but she said pt is not available that date, so she wants to wait until we get the schedule for July.

## 2025-05-20 ENCOUNTER — OFFICE VISIT (OUTPATIENT)
Dept: PHYSICAL THERAPY | Facility: CLINIC | Age: 80
End: 2025-05-20
Attending: NURSE PRACTITIONER
Payer: MEDICARE

## 2025-05-20 DIAGNOSIS — R29.3 POSTURAL INSTABILITY: Primary | ICD-10-CM

## 2025-05-20 DIAGNOSIS — R29.6 FALLS: ICD-10-CM

## 2025-05-20 DIAGNOSIS — G20.A1 PARKINSON'S DISEASE WITHOUT DYSKINESIA OR FLUCTUATING MANIFESTATIONS (HCC): ICD-10-CM

## 2025-05-20 PROCEDURE — 97140 MANUAL THERAPY 1/> REGIONS: CPT

## 2025-05-20 PROCEDURE — 97110 THERAPEUTIC EXERCISES: CPT

## 2025-05-20 PROCEDURE — 97112 NEUROMUSCULAR REEDUCATION: CPT

## 2025-05-20 NOTE — PROGRESS NOTES
Daily Note     Today's date: 2025  Patient name: Gabriel Gonzalez  : 1945  MRN: [id number]  Referring provider: Lawanda Joshi CRNP  Dx:   Encounter Diagnosis     ICD-10-CM    1. Postural instability  R29.3       2. Parkinson's disease without dyskinesia or fluctuating manifestations (HCC)  G20.A1       3. Falls  R29.6                      Subjective: Pt states that he has not had a great week, feels a little off balance and wobbly legs.  Pt reports no falls.        Objective: See treatment diary below      Assessment: Tolerated treatment well.  Modified session between seated and standing strengthening with improved tolerance.  Improved ability to stay in designated area with laser this visit with less cues and reminders needed.  Pt continues to be most challenged with hip flexion on L and hip abd on L due to weakness. CGS throughout standing activities for safety.  Pt progressing slowly towards his goals and will benefit from continued therapy.      Plan: Continue per plan of care.         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   Next Physician's Appt:   [employer] HEP:   Manuals    STM DK TH TH TH DK   C/S PROM     DK   LE stretching        Vitals                Neuro Re-Ed        Standing OH reaching   Standing OH mirror taps 10x ea   Standing OH mirror taps x10 ea   Side-stepping @ mirror x3 laps @ mirror x2 laps   @ mirror x3 laps   HR        Cervical Laser Orange strip up/down, side/side x10min Orange strip up/down side/side  Orange strip up/down, side/side x10min  Attempted, but unable   OH ball lift for posture Seated GMB x20 Seated GMB 20x Seated RMB 20x  "Seated RMB 20x    Seated ball reach and give        Glute Sets        Hip ADD ball squeeze        Tandem ambulation        Foam balance   Unable- caused dizziness     BKFO  Seated blue 20x ea Seated blue 20x ea H/L green 20x ea    Hip flexion         No monies    Modified stand  Pink 20x    Floor to ceiling \"BIG\" exercise Seated 10\" x6 cues + mirroring Seated 10\"x6 cues+mirroring  Seated 10x cues  Seated 10\" x6 cues + mirroring   Sit to stand + stand and reach \"big\"        Ball press out core   Seated RMB chest press 20x     Ther Ex        Scap squeezes        Cervical Rot AROM Seated 10x ea cues Seated 10x ea cues!      CS ext Seated 10x cues Seated 10x cues  H/L into pillow 10x    Shoulder flex AAROM         Pulleys     2 min ea   Mid Rows        Shoulder Ext        Wobble Board        SLS        Doorway pec stretch    5\"x10 cues!    Wall angels          Ther Activity         Nu Step NuStep x6 min Lv 5 NuStep x5min Lv4 NuStep x 5 min Lv 4 NuStep x6 min Lv 5 NuStep x6 min Lv 5   Gait Training in clinic, w/ SPC?        Leg Press        Obstacle negotiation    Over 1/2 FR   10x    OH shelf taps        Step ups    Onto 4\" step cues 10x ea    Pt Ed     Family ed on pt's processing to instructions, POC   Re-Evaluation        Modalities        Heat/ice (PRN)                                    "

## 2025-05-27 ENCOUNTER — OFFICE VISIT (OUTPATIENT)
Dept: PHYSICAL THERAPY | Facility: CLINIC | Age: 80
End: 2025-05-27
Attending: NURSE PRACTITIONER
Payer: MEDICARE

## 2025-05-27 DIAGNOSIS — R29.6 FALLS: ICD-10-CM

## 2025-05-27 DIAGNOSIS — R29.3 POSTURAL INSTABILITY: Primary | ICD-10-CM

## 2025-05-27 DIAGNOSIS — G20.A1 PARKINSON'S DISEASE WITHOUT DYSKINESIA OR FLUCTUATING MANIFESTATIONS (HCC): ICD-10-CM

## 2025-05-27 PROCEDURE — 97110 THERAPEUTIC EXERCISES: CPT | Performed by: PHYSICAL THERAPIST

## 2025-05-27 PROCEDURE — 97112 NEUROMUSCULAR REEDUCATION: CPT | Performed by: PHYSICAL THERAPIST

## 2025-05-27 PROCEDURE — 97140 MANUAL THERAPY 1/> REGIONS: CPT | Performed by: PHYSICAL THERAPIST

## 2025-05-27 NOTE — PROGRESS NOTES
Daily Note     Today's date: 2025  Patient name: Gabriel Gonzalez  : 1945  MRN: [id number]  Referring provider: Lawanda Joshi CRNP  Dx:   Encounter Diagnosis     ICD-10-CM    1. Postural instability  R29.3       2. Parkinson's disease without dyskinesia or fluctuating manifestations (HCC)  G20.A1       3. Falls  R29.6           Start Time: 1445  Stop Time: 1530  Total time in clinic (min): 45 minutes    Subjective: Patient reports no new changes on arrival. Per patient's wife, she has noticed a lot of inconsistencies in his ease with transfers, etc at home.       Objective: See treatment diary below      Assessment: Tolerated treatment well. Focused on posture and neck position with all exercises today. Improved ability to perform laser up/down through full path on target, but had difficulty due to fatigue for side-to side. Added stepping over obstacle of agility ladder for visual cuing on larger amplitude steps, etc. Patient required max cuing to avoid shortened step lengths. Patient exhibited good technique with therapeutic exercises and would benefit from continued PT      Plan: Continue per plan of care.  Progress treatment as tolerated.          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   Next Physician's Appt:   [employer] HEP:   Manuals    STM DK TH DK TH DK   C/S PROM     DK   LE stretching        Vitals                Neuro Re-Ed        Standing OH reaching   Standing OH mirror taps 10x ea   Standing OH mirror taps x10 ea   Side-stepping @ mirror x3 laps @ mirror x2 laps @ mirror x3 laps  @ mirror x3 laps   Cervical Laser Orange strip up/down, side/side  "x10min Orange strip up/down side/side  Orange strip up/down side/side  Attempted, but unable   OH ball lift for posture Seated GMB x20 Seated GMB 20x Seated RMB 20x Seated RMB 20x    Seated ball reach and give        Hip ADD ball squeeze        Tandem ambulation        Foam balance        BKFO  Seated blue 20x ea  H/L green 20x ea    Hip flexion         No monies    Modified stand  Pink 20x    Floor to ceiling \"BIG\" exercise Seated 10\" x6 cues + mirroring Seated 10\"x6 cues+mirroring  Seated 10x cues  Seated 10\" x6 cues + mirroring   Sit to stand + stand and reach \"big\"        Ball press out core        Ther Ex        Cervical Rot AROM Seated 10x ea cues Seated 10x ea cues!      CS ext Seated 10x cues Seated 10x cues Seated 10x cues H/L into pillow 10x    Shoulder flex AAROM         Pulleys   2 min ea  2 min ea   Mid Rows        Shoulder Ext        Wobble Board        SLS        Doorway pec stretch    5\"x10 cues!    Wall angels          Ther Activity         Nu Step NuStep x6 min Lv 5 NuStep x5min Lv4 NuStep x5min Lv4 NuStep x6 min Lv 5 NuStep x6 min Lv 5   Gait Training in clinic, w/ SPC?        Leg Press        Obstacle negotiation   Agility ladder @ mirror FWD, LAT stepping x3 laps ea Over 1/2 FR   10x    OH shelf taps        Step ups    Onto 4\" step cues 10x ea    Pt Ed     Family ed on pt's processing to instructions, POC   Re-Evaluation        Modalities        Heat/ice (PRN)                                      "

## 2025-05-28 ENCOUNTER — APPOINTMENT (OUTPATIENT)
Dept: LAB | Facility: CLINIC | Age: 80
End: 2025-05-28
Payer: MEDICARE

## 2025-05-28 ENCOUNTER — TELEPHONE (OUTPATIENT)
Age: 80
End: 2025-05-28

## 2025-05-28 DIAGNOSIS — N30.00 ACUTE CYSTITIS WITHOUT HEMATURIA: Primary | ICD-10-CM

## 2025-05-28 DIAGNOSIS — N30.00 ACUTE CYSTITIS WITHOUT HEMATURIA: ICD-10-CM

## 2025-05-28 PROCEDURE — 87086 URINE CULTURE/COLONY COUNT: CPT

## 2025-05-28 RX ORDER — LEVOFLOXACIN 500 MG/1
500 TABLET, FILM COATED ORAL EVERY 24 HOURS
Qty: 7 TABLET | Refills: 0 | Status: SHIPPED | OUTPATIENT
Start: 2025-05-28 | End: 2025-06-04

## 2025-05-28 NOTE — TELEPHONE ENCOUNTER
I will place the order for the urine culture after they drop off that specimen they can go to the pharmacy and  some Levaquin to start taking hopefully this prostate artery embolization with IR on June 20 well be helpful with this symptom

## 2025-05-28 NOTE — TELEPHONE ENCOUNTER
Dariana-wife called states patient is having more incontinent issues and is more confused all day for four days now. Does have a kit at home to do a test but will need a order. States does have history of frequent UTI's

## 2025-05-29 LAB — BACTERIA UR CULT: NORMAL

## 2025-05-30 ENCOUNTER — RESULTS FOLLOW-UP (OUTPATIENT)
Dept: FAMILY MEDICINE CLINIC | Facility: CLINIC | Age: 80
End: 2025-05-30

## 2025-06-03 ENCOUNTER — OFFICE VISIT (OUTPATIENT)
Dept: PHYSICAL THERAPY | Facility: CLINIC | Age: 80
End: 2025-06-03
Attending: NURSE PRACTITIONER
Payer: MEDICARE

## 2025-06-03 DIAGNOSIS — R29.3 POSTURAL INSTABILITY: Primary | ICD-10-CM

## 2025-06-03 DIAGNOSIS — R29.6 FALLS: ICD-10-CM

## 2025-06-03 DIAGNOSIS — G20.A1 PARKINSON'S DISEASE WITHOUT DYSKINESIA OR FLUCTUATING MANIFESTATIONS (HCC): ICD-10-CM

## 2025-06-03 PROCEDURE — 97112 NEUROMUSCULAR REEDUCATION: CPT

## 2025-06-03 PROCEDURE — 97140 MANUAL THERAPY 1/> REGIONS: CPT

## 2025-06-03 NOTE — PROGRESS NOTES
Daily Note     Today's date: 6/3/2025  Patient name: Gabriel Gonzalez  : 1945  MRN: [id number]  Referring provider: Lawanda Joshi CRNP  Dx:   Encounter Diagnosis     ICD-10-CM    1. Postural instability  R29.3       2. Parkinson's disease without dyskinesia or fluctuating manifestations (HCC)  G20.A1       3. Falls  R29.6                      Subjective: Pt states that his neck is sore today and his feet feel wobbly and stuck.        Objective: See treatment diary below      Assessment: Tolerated treatment well.  Session modified due to pt report of feeling wobbly.  Focused on seated and H/L strengthening along with CS AROM and manuals.  Multiple cues are needed for form and to control movement during strengthening.  Fair carryover is noted.  STM to B/L UT with muscle spasms noted which were relieved with manuals.  Pt notes decreased pain after session.  Will continue to progress as able.        Plan: Continue per plan of care.         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   Next Physician's Appt:   Xi HEP:   Manuals 5/6 5/20 5/27 6/3 5/1   STM DK TH DK TH DK   C/S PROM     DK   LE stretching        Vitals                Neuro Re-Ed        Standing OH reaching   Standing OH mirror taps 10x ea   Standing OH mirror taps x10 ea   LAQ        Side-stepping @ mirror x3 laps @ mirror x2 laps @ mirror x3 laps 20x ea EOT @ mirror x3 laps   Cervical Laser Orange strip up/down, side/side x10min Orange strip up/down side/side  Orange strip up/down side/side  Attempted, but unable   OH ball lift for posture Seated GMB x20 Seated GMB 20x Seated RMB 20x Seated GMB 20x    Seated ball reach and give  "       Hip ADD ball squeeze    Bolster 20x3\"    Tandem ambulation        Foam balance        BKFO  Seated blue 20x ea  H/L blue 20x ea    Hip flexion         No monies        Floor to ceiling \"BIG\" exercise Seated 10\" x6 cues + mirroring Seated 10\"x6 cues+mirroring  Seated 10x  Seated 10\" x6 cues + mirroring   Sit to stand + stand and reach \"big\"        Ball press out core    H/L 20x GMB    Ther Ex        Cervical Rot AROM Seated 10x ea cues Seated 10x ea cues!  H/L 10x ea    CS ext Seated 10x cues Seated 10x cues Seated 10x cues Seated 10x    Shoulder flex AAROM         Pulleys   2 min ea  2 min ea   Mid Rows        Shoulder Ext        Wobble Board        SLS        Doorway pec stretch        Wall angels          Ther Activity         Nu Step NuStep x6 min Lv 5 NuStep x5min Lv4 NuStep x5min Lv4 NuStep x5 mins Lv 4 NuStep x6 min Lv 5   Gait Training in clinic, w/ SPC?        Leg Press        Obstacle negotiation   Agility ladder @ mirror FWD, LAT stepping x3 laps ea     OH shelf taps        Step ups        Pt Ed     Family ed on pt's processing to instructions, POC   Re-Evaluation        Modalities        Heat/ice (PRN)                                        "

## 2025-06-10 ENCOUNTER — OFFICE VISIT (OUTPATIENT)
Dept: NEUROLOGY | Facility: CLINIC | Age: 80
End: 2025-06-10
Payer: MEDICARE

## 2025-06-10 VITALS
SYSTOLIC BLOOD PRESSURE: 122 MMHG | WEIGHT: 185.5 LBS | DIASTOLIC BLOOD PRESSURE: 80 MMHG | HEART RATE: 68 BPM | BODY MASS INDEX: 25.12 KG/M2 | HEIGHT: 72 IN | TEMPERATURE: 97.8 F

## 2025-06-10 DIAGNOSIS — G20.B1 PARKINSON'S DISEASE WITH DYSKINESIA WITHOUT FLUCTUATING MANIFESTATIONS (HCC): Primary | ICD-10-CM

## 2025-06-10 PROCEDURE — 99214 OFFICE O/P EST MOD 30 MIN: CPT | Performed by: NURSE PRACTITIONER

## 2025-06-10 RX ORDER — LANOLIN ALCOHOL/MO/W.PET/CERES
1000 CREAM (GRAM) TOPICAL DAILY
COMMUNITY

## 2025-06-10 RX ORDER — CARBIDOPA AND LEVODOPA 25; 100 MG/1; MG/1
TABLET, EXTENDED RELEASE ORAL
Qty: 180 TABLET | Refills: 1 | Status: SHIPPED | OUTPATIENT
Start: 2025-06-10

## 2025-06-10 NOTE — PATIENT INSTRUCTIONS
Switch to sinemet CR 1.5 4x/day about every 4-5 hours   If any wearing off would return to previous sinemet dosing.  Could consider increases if freezing persists/worsens.

## 2025-06-10 NOTE — PROGRESS NOTES
Review of Systems   Constitutional: Negative.    HENT: Negative.     Eyes: Negative.    Respiratory: Negative.     Cardiovascular: Negative.    Gastrointestinal: Negative.    Endocrine: Negative.    Genitourinary: Negative.    Musculoskeletal: Negative.    Skin: Negative.    Allergic/Immunologic: Negative.    Neurological:  Positive for tremors.   Hematological: Negative.    Psychiatric/Behavioral: Negative.

## 2025-06-10 NOTE — PROGRESS NOTES
Name: Gabriel Gonzalez      : 1945      MRN: [id number]  Encounter Provider: OFELIA Chu  Encounter Date: 6/10/2025   Encounter department: [facility]  :  Assessment & Plan  Parkinson's disease with dyskinesia without fluctuating manifestations (HCC)  Parkinson's disease complicated by dementia and hallucinations.  He does note some mild increase in freezing and mild intermittent breakthrough tremor.  He continues to report intermittent dizziness, mainly with standing for a long period of time, he has since made adaptive measures to have a chairs available to avoid standing for an extended period of time.  His orthostatic hypotension remains well-controlled.    He remains on rivastigmine and olanzapine which is somewhat controlling his hallucinations and delusions.  These appear to be less distressing than in the past.  He will continue these for now.    Wife requests trial of extended release sinemet in hopes that dosing frequency can be reduced. I am hesitant to switch to rytary  or crexont given his psychosis behaviors along with history of OH, will therefore switch to sinemet CR.  He will take sinement CR 25/100 mg 1.5 tabs QID, if he notes any wearing off should return to previous dosing of sinemet IR.   If freezing persists/continues to worsen could consider increases, would need to watch for worsening hallucinations and dizziness.   Orders:    carbidopa-levodopa (SINEMET CR)  mg TBCR per ER tablet; Take 1.5 tabs QID          History of Present Illness   HPI   Gabriel Gonzalez is a 80 y.o. male  who presents for follow up for parkinsonism with concerns regarding recent development of hallucinations. To review, symptom onset in  with slowness of gait with subsequent development of LH tremor and orthostatic hypotension. Initially seen by Dr Quinn and started on Sinemet, which was exacerbating his orthostatic intolerance. Sinemet discontinued and focus  initially on treating orthostatic symptoms which improved on midodrine.   More recently has developed hallucinations-infectious work up was negative. Was started on seroquel with no significant improvement, xanax was weaned by PCP. Nuplazid caused increase in hallucinations was stopped after  3 days. Rivastigimine later added. Sinemet has been reduced. He follows with psych as well.      Last office visit 2/2025 in which no changes were made.      Interval History:   He presents today with his wife who assists with history.     Zio patch did not correlate any dizziness to any abnormal heart rhythm.   He continues to have intermittent dizziness, seems to be occurring less often, mainly occurs if he is standing for a long period of time.   They avoid this by having chairs available.     Posture is more stooped-working with PT/OT at home.   Has been having some more freezing when he first stands at times. Worse on certain floors.   Shuffles at times-PT has been helpful.   No falls since last visit.      He is sleeping well overnight. Continues to have delusions/hallucinations some illusions as well, typically are daily. Not as distressing as previously and is more accepting of the reassurance/less threatening.   No real change in these.   Continues to follow with psych.     Appetite is somewhat less.  Weight is stable over the last several months.  Has to be careful with swallowing with his neck stiffness, fatigue.     Continue with mild breakthrough tremor.     He is not currently driving.     Current PD regimen:  Carbidopa/levodopa 25/100 1.5 tabs at 7 am, 10:30am ,1 tab at 2:30pm and 630 pm and bed time , 1 tab overnight as needed   Rivastigmine  3 mg BID  Olanzapine 2.5 mg daily.   Midodrine 2.5 mg 1 TID prn      Prior medications:  Quetiapine 25mg qam, noon, 4pm and night   Olanzapine  nuplazid    Review of Systems   Review of Systems   Constitutional: Negative.    HENT: Negative.     Eyes: Negative.     Respiratory: Negative.     Cardiovascular: Negative.    Gastrointestinal: Negative.    Endocrine: Negative.    Genitourinary: Negative.    Musculoskeletal: Negative.    Skin: Negative.    Allergic/Immunologic: Negative.    Neurological:  Positive for tremors.   Hematological: Negative.    Psychiatric/Behavioral: Negative.           I have personally reviewed the MA's review of systems and made changes as necessary.         Objective   There were no vitals taken for this visit.    Physical Exam  Constitutional:       General: He is awake.     Eyes:      General: Lids are normal.      Extraocular Movements: Extraocular movements intact.      Pupils: Pupils are equal, round, and reactive to light.       Neurological:      Mental Status: He is alert.      Motor: Motor strength is normal.      Neurological Exam  Mental Status  Awake and alert. Oriented only to person, place and situation. Speech: hypophonia. Language is fluent with no aphasia. Attention and concentration are normal.    Cranial Nerves  CN III, IV, VI: Extraocular movements intact bilaterally. Normal lids and orbits bilaterally. Pupils equal round and reactive to light bilaterally.  CN V:  Right: Facial sensation is normal.  Left: Facial sensation is normal on the left.  CN VII: Full and symmetric facial movement.  CN VIII: Hearing is normal.  CN XI: Shoulder shrug strength is normal.  CN XII: Tongue midline without atrophy or fasciculations.    Motor   Increased muscle tone. Strength is 5/5 throughout all four extremities.  Neck flexion/ext 5/5.    Sensory  Light touch is normal in upper and lower extremities.     Coordination  Right: Finger-to-nose normal. Rapid alternating movement abnormality:Left: Finger-to-nose normal. Rapid alternating movement abnormality:  See MDS UPDRS III.    Gait  Casual gait: Able to rise from chair without using arms.  Reduced arm swing, mild striatal posturing of the hands, , tremor in right hand antalgic gait at times. En  block turn with imbalance, stooped posture.    MDS UPDRS III                              6/10/25 2/25/25   Time since last dose:      Speech  2 2   Facial Expression  1 1   Rigidity - Neck       Rigidity - Upper Extremity (R)  2 2   Rigidity - Upper Extremity (L)   2 2   Rigidity - Lower Extremity (R)  1 1   Rigidity - Lower Extremity (L)   1 1   Finger Taps (R)   1 1   Finger Taps (L)   2 2   Hand Movement (R)  2 2   Hand Movement (L)   2 2   Pronation/Supination (R)  1 1   Pronation/Supination (L)   2 2   Toe Tapping (R) 2 2   Toe Tapping (L) 2 2   Leg Agility (R)  1 1   Leg Agility (L)   1 1   Arising from Chair   1 1   Gait   2 2   Freezing of Gait 0 0   Postural Stability        Posture 3 2   Global spontaneity of movement 1 1   Postural Tremor (Ri 0 0   Postural Tremor (L) 0 0   Kinetic Tremor (R)  1 1   Kinetic Tremor (L)  1 1   Rest tremor amplitude RUE 2 2   Rest tremor amplitude LUE 1 1   Rest tremor amplitude RLE 0 0   Reset tremor amplitude LLE 0 0   Lip/Jaw Tremor  0 1   Consistency of tremor 1 1   Motor Exam Total:

## 2025-06-10 NOTE — ASSESSMENT & PLAN NOTE
Parkinson's disease complicated by dementia and hallucinations.  He does note some mild increase in freezing and mild intermittent breakthrough tremor.  He continues to report intermittent dizziness, mainly with standing for a long period of time, he has since made adaptive measures to have a chairs available to avoid standing for an extended period of time.  His orthostatic hypotension remains well-controlled.    He remains on rivastigmine and olanzapine which is somewhat controlling his hallucinations and delusions.  These appear to be less distressing than in the past.  He will continue these for now.    Wife requests trial of extended release sinemet in hopes that dosing frequency can be reduced. I am hesitant to switch to rytary  or crexont given his psychosis behaviors along with history of OH, will therefore switch to sinemet CR.  He will take sinement CR 25/100 mg 1.5 tabs QID, if he notes any wearing off should return to previous dosing of sinemet IR.   If freezing persists/continues to worsen could consider increases, would need to watch for worsening hallucinations and dizziness.   Orders:    carbidopa-levodopa (SINEMET CR)  mg TBCR per ER tablet; Take 1.5 tabs QID

## 2025-06-12 RX ORDER — CEFAZOLIN SODIUM 2 G/50ML
2000 SOLUTION INTRAVENOUS ONCE
Status: CANCELLED | OUTPATIENT
Start: 2025-06-12 | End: 2025-06-12

## 2025-06-12 RX ORDER — SODIUM CHLORIDE 9 MG/ML
75 INJECTION, SOLUTION INTRAVENOUS CONTINUOUS
Status: CANCELLED | OUTPATIENT
Start: 2025-06-12

## 2025-06-13 ENCOUNTER — TELEPHONE (OUTPATIENT)
Age: 80
End: 2025-06-13

## 2025-06-13 ENCOUNTER — TELEPHONE (OUTPATIENT)
Dept: RADIOLOGY | Facility: HOSPITAL | Age: 80
End: 2025-06-13

## 2025-06-13 NOTE — PRE-PROCEDURE INSTRUCTIONS
Pre-procedure Instructions for Interventional Radiology  [address] [address]  INTERVENTIONAL RADIOLOGY [phone]    You are scheduled for a/an Prostate Artery Embolization.    On Friday 6/20/25.    Your tentative arrival time is 9:15 AM.  Short stay will notify you the day before your procedure with the exact arrival time and the location to arrive.    To prepare for your procedure:  Please arrange for someone to drive you home after the procedure and stay with you until the next morning if you are instructed to do so.  This is typically for patients receiving some type of sedative or anesthetic for the procedure.  DO NOT EAT OR DRINK ANYTHING after midnight on the evening before your procedure including candy & gum.  ONLY SIPS OF WATER with your medications are allowed on the morning of your procedure.  TAKE ALL OF YOUR REGULAR MEDICATIONS THE MORNING OF YOUR PROCEDURE with sips of water!  We may call you to stop some of your blood sugar, blood pressure and blood thinning medications depending on the procedure.  Please take all of these medications unless we instruct you to stop them.  If you have an allergy to x-ray dye, please contact Interventional Radiology for an x-ray dye preparation which usually consists of an oral steroid and Benadryl.  If you wear a Glucose Monitor, you may be asked to remove it for your procedure if we are using x-ray.  These devices need to be removed when we are imaging with x-ray near the device since the radiation can cause the unit to malfunction.  If possible and not too inconvenient, you may want to schedule your exam closer to day 14 of your 14 day device so your device is not wasted.    The day of your procedure:  Bring a list of the medications you take at home.  Bring medications you take for breathing problems (such as inhalers), medications for chest pain, or both.  Bring a case for your glasses or contacts.  Bring your  insurance card and a form of photo ID.  Please leave all valuables such as credit cards and jewelry at home.  Report to the admitting office to the left of the registration desk in the main lobby at the [facility], Entrance B.  You will then be directed to the Short Stay Center.  While your procedure is being performed, your family may wait in the Radiology Waiting Room on the 1st floor in Radiology.  if they need to leave, they may provide a number to be called following the procedure.   Be prepared to stay overnight just in case. Sometimes procedures will indicate the need for further observation or treatment.   If you are scheduled for a follow-up visit with the Interventional Radiologist after your procedure, you will be called with a date and time.    Special Instructions (Medications to stop taking before your procedure etc.):  Above reviewed with his wife Dariana.

## 2025-06-13 NOTE — TELEPHONE ENCOUNTER
Patent was treated for UTI by PCP and finished the Levofloxacin on 6/3/25    He is concerned with his upcoming surgery on a Friday 6/20/25 and getting another UTI and requesting abx.  [facility] pharmacy on file    Scheduled 12 week post op per request

## 2025-06-13 NOTE — TELEPHONE ENCOUNTER
I reviewed his last urine culture and it actually was negative for bacteria and did not require any antibiotics.  Due to this I would not recommend extending or providing any additional antibiotics at this time.

## 2025-06-13 NOTE — TELEPHONE ENCOUNTER
Patients spouse called because he is having a radiology procedure done next Friday and anesthesia advised him to reach out to his pcp to get a script for 1 dose of xanax to relax him before the procedure. Please advise. Thank you.

## 2025-06-15 DIAGNOSIS — G20.A1 PARKINSON'S DISEASE (HCC): ICD-10-CM

## 2025-06-16 DIAGNOSIS — G20.B1 PARKINSON'S DISEASE WITH DYSKINESIA WITHOUT FLUCTUATING MANIFESTATIONS (HCC): Primary | ICD-10-CM

## 2025-06-16 RX ORDER — ALPRAZOLAM 1 MG/1
TABLET ORAL
Qty: 1 TABLET | Refills: 0 | Status: SHIPPED | OUTPATIENT
Start: 2025-06-16

## 2025-06-16 RX ORDER — MIDODRINE HYDROCHLORIDE 2.5 MG/1
2.5 TABLET ORAL 3 TIMES DAILY PRN
Qty: 90 TABLET | Refills: 3 | Status: SHIPPED | OUTPATIENT
Start: 2025-06-16

## 2025-06-16 NOTE — TELEPHONE ENCOUNTER
Called and spoke with Dariana and notified her that Taiwo didn't have an infection for that las ABX that was prescribed and the provider isn't recommending anything at this time

## 2025-06-17 ENCOUNTER — APPOINTMENT (OUTPATIENT)
Dept: PHYSICAL THERAPY | Facility: CLINIC | Age: 80
End: 2025-06-17
Attending: NURSE PRACTITIONER
Payer: MEDICARE

## 2025-06-17 DIAGNOSIS — F02.82: Primary | ICD-10-CM

## 2025-06-17 DIAGNOSIS — G20.A1: Primary | ICD-10-CM

## 2025-06-17 RX ORDER — RIVASTIGMINE TARTRATE 3 MG/1
3 CAPSULE ORAL 2 TIMES DAILY
Qty: 180 CAPSULE | Refills: 3 | Status: SHIPPED | OUTPATIENT
Start: 2025-06-17

## 2025-06-19 ENCOUNTER — PATIENT MESSAGE (OUTPATIENT)
Dept: NEUROLOGY | Facility: CLINIC | Age: 80
End: 2025-06-19

## 2025-06-19 DIAGNOSIS — G20.A1 PARKINSON'S DISEASE WITHOUT DYSKINESIA OR FLUCTUATING MANIFESTATIONS (HCC): Primary | ICD-10-CM

## 2025-06-20 ENCOUNTER — HOSPITAL ENCOUNTER (OUTPATIENT)
Dept: RADIOLOGY | Facility: HOSPITAL | Age: 80
Discharge: HOME | End: 2025-06-20
Attending: STUDENT IN AN ORGANIZED HEALTH CARE EDUCATION/TRAINING PROGRAM
Payer: MEDICARE

## 2025-06-20 VITALS
HEIGHT: 72 IN | RESPIRATION RATE: 17 BRPM | WEIGHT: 184 LBS | BODY MASS INDEX: 24.92 KG/M2 | SYSTOLIC BLOOD PRESSURE: 170 MMHG | OXYGEN SATURATION: 96 % | TEMPERATURE: 98.1 F | DIASTOLIC BLOOD PRESSURE: 86 MMHG | HEART RATE: 69 BPM

## 2025-06-20 DIAGNOSIS — N40.1 BENIGN PROSTATIC HYPERPLASIA WITH URINARY FREQUENCY: ICD-10-CM

## 2025-06-20 DIAGNOSIS — N40.1 BENIGN PROSTATIC HYPERPLASIA WITH URINARY FREQUENCY: Primary | ICD-10-CM

## 2025-06-20 DIAGNOSIS — R35.0 BENIGN PROSTATIC HYPERPLASIA WITH URINARY FREQUENCY: ICD-10-CM

## 2025-06-20 DIAGNOSIS — R35.0 BENIGN PROSTATIC HYPERPLASIA WITH URINARY FREQUENCY: Primary | ICD-10-CM

## 2025-06-20 LAB
ALBUMIN SERPL BCG-MCNC: 4.2 G/DL (ref 3.5–5)
ALP SERPL-CCNC: 82 U/L (ref 34–104)
ALT SERPL W P-5'-P-CCNC: <3 U/L (ref 7–52)
ANION GAP SERPL CALCULATED.3IONS-SCNC: 6 MMOL/L (ref 4–13)
AST SERPL W P-5'-P-CCNC: 12 U/L (ref 13–39)
BILIRUB SERPL-MCNC: 0.8 MG/DL (ref 0.2–1)
BUN SERPL-MCNC: 17 MG/DL (ref 5–25)
CALCIUM SERPL-MCNC: 9.3 MG/DL (ref 8.4–10.2)
CHLORIDE SERPL-SCNC: 106 MMOL/L (ref 96–108)
CO2 SERPL-SCNC: 28 MMOL/L (ref 21–32)
CREAT SERPL-MCNC: 0.96 MG/DL (ref 0.6–1.3)
ERYTHROCYTE [DISTWIDTH] IN BLOOD BY AUTOMATED COUNT: 13.1 % (ref 11.6–15.1)
GFR SERPL CREATININE-BSD FRML MDRD: 75 ML/MIN/1.73SQ M
GLUCOSE P FAST SERPL-MCNC: 99 MG/DL (ref 65–99)
GLUCOSE SERPL-MCNC: 99 MG/DL (ref 65–140)
HCT VFR BLD AUTO: 40.4 % (ref 36.5–49.3)
HGB BLD-MCNC: 13.4 G/DL (ref 12–17)
INR PPP: 0.97 (ref 0.85–1.19)
MCH RBC QN AUTO: 31.7 PG (ref 26.8–34.3)
MCHC RBC AUTO-ENTMCNC: 33.2 G/DL (ref 31.4–37.4)
MCV RBC AUTO: 96 FL (ref 82–98)
PLATELET # BLD AUTO: 156 THOUSANDS/UL (ref 149–390)
PMV BLD AUTO: 11.1 FL (ref 8.9–12.7)
POTASSIUM SERPL-SCNC: 4.1 MMOL/L (ref 3.5–5.3)
PROT SERPL-MCNC: 6.6 G/DL (ref 6.4–8.4)
PROTHROMBIN TIME: 13.2 SECONDS (ref 12.3–15)
RBC # BLD AUTO: 4.23 MILLION/UL (ref 3.88–5.62)
SODIUM SERPL-SCNC: 140 MMOL/L (ref 135–147)
WBC # BLD AUTO: 6.02 THOUSAND/UL (ref 4.31–10.16)

## 2025-06-20 PROCEDURE — C1887 CATHETER, GUIDING: HCPCS

## 2025-06-20 PROCEDURE — C1769 GUIDE WIRE: HCPCS

## 2025-06-20 PROCEDURE — 76937 US GUIDE VASCULAR ACCESS: CPT

## 2025-06-20 PROCEDURE — 99153 MOD SED SAME PHYS/QHP EA: CPT

## 2025-06-20 PROCEDURE — 76937 US GUIDE VASCULAR ACCESS: CPT | Performed by: STUDENT IN AN ORGANIZED HEALTH CARE EDUCATION/TRAINING PROGRAM

## 2025-06-20 PROCEDURE — 99152 MOD SED SAME PHYS/QHP 5/>YRS: CPT

## 2025-06-20 PROCEDURE — C1894 INTRO/SHEATH, NON-LASER: HCPCS

## 2025-06-20 PROCEDURE — 85610 PROTHROMBIN TIME: CPT | Performed by: STUDENT IN AN ORGANIZED HEALTH CARE EDUCATION/TRAINING PROGRAM

## 2025-06-20 PROCEDURE — 85027 COMPLETE CBC AUTOMATED: CPT | Performed by: STUDENT IN AN ORGANIZED HEALTH CARE EDUCATION/TRAINING PROGRAM

## 2025-06-20 PROCEDURE — 36247 INS CATH ABD/L-EXT ART 3RD: CPT | Performed by: STUDENT IN AN ORGANIZED HEALTH CARE EDUCATION/TRAINING PROGRAM

## 2025-06-20 PROCEDURE — 75736 ARTERY X-RAYS PELVIS: CPT

## 2025-06-20 PROCEDURE — 36247 INS CATH ABD/L-EXT ART 3RD: CPT

## 2025-06-20 PROCEDURE — 80053 COMPREHEN METABOLIC PANEL: CPT | Performed by: STUDENT IN AN ORGANIZED HEALTH CARE EDUCATION/TRAINING PROGRAM

## 2025-06-20 PROCEDURE — 37243 VASC EMBOLIZE/OCCLUDE ORGAN: CPT

## 2025-06-20 PROCEDURE — 99152 MOD SED SAME PHYS/QHP 5/>YRS: CPT | Performed by: STUDENT IN AN ORGANIZED HEALTH CARE EDUCATION/TRAINING PROGRAM

## 2025-06-20 PROCEDURE — 37243 VASC EMBOLIZE/OCCLUDE ORGAN: CPT | Performed by: STUDENT IN AN ORGANIZED HEALTH CARE EDUCATION/TRAINING PROGRAM

## 2025-06-20 RX ORDER — HYDRALAZINE HYDROCHLORIDE 20 MG/ML
INJECTION INTRAMUSCULAR; INTRAVENOUS AS NEEDED
Status: COMPLETED | OUTPATIENT
Start: 2025-06-20 | End: 2025-06-20

## 2025-06-20 RX ORDER — CIPROFLOXACIN 250 MG/1
250 TABLET, FILM COATED ORAL EVERY 12 HOURS SCHEDULED
Qty: 14 TABLET | Refills: 0 | Status: SHIPPED | OUTPATIENT
Start: 2025-06-20 | End: 2025-06-27

## 2025-06-20 RX ORDER — METHYLPREDNISOLONE 4 MG/1
TABLET ORAL
Qty: 1 EACH | Refills: 0 | Status: SHIPPED | OUTPATIENT
Start: 2025-06-20

## 2025-06-20 RX ORDER — PHENAZOPYRIDINE HYDROCHLORIDE 100 MG/1
100 TABLET, FILM COATED ORAL 3 TIMES DAILY PRN
Qty: 10 TABLET | Refills: 0 | Status: SHIPPED | OUTPATIENT
Start: 2025-06-20

## 2025-06-20 RX ORDER — VERAPAMIL HYDROCHLORIDE 2.5 MG/ML
INJECTION INTRAVENOUS AS NEEDED
Status: COMPLETED | OUTPATIENT
Start: 2025-06-20 | End: 2025-06-20

## 2025-06-20 RX ORDER — LIDOCAINE WITH 8.4% SOD BICARB 0.9%(10ML)
SYRINGE (ML) INJECTION AS NEEDED
Status: COMPLETED | OUTPATIENT
Start: 2025-06-20 | End: 2025-06-20

## 2025-06-20 RX ORDER — CEFAZOLIN SODIUM 2 G/50ML
SOLUTION INTRAVENOUS
Status: COMPLETED | OUTPATIENT
Start: 2025-06-20 | End: 2025-06-20

## 2025-06-20 RX ORDER — SODIUM CHLORIDE 9 MG/ML
75 INJECTION, SOLUTION INTRAVENOUS CONTINUOUS
Status: DISCONTINUED | OUTPATIENT
Start: 2025-06-20 | End: 2025-06-21 | Stop reason: HOSPADM

## 2025-06-20 RX ORDER — OXYBUTYNIN CHLORIDE 5 MG/1
5 TABLET ORAL 3 TIMES DAILY
Qty: 7 TABLET | Refills: 0 | Status: SHIPPED | OUTPATIENT
Start: 2025-06-20

## 2025-06-20 RX ORDER — CEFAZOLIN SODIUM 2 G/50ML
2000 SOLUTION INTRAVENOUS ONCE
Status: DISCONTINUED | OUTPATIENT
Start: 2025-06-20 | End: 2025-06-21 | Stop reason: HOSPADM

## 2025-06-20 RX ORDER — NITROGLYCERIN 20 MG/100ML
INJECTION INTRAVENOUS AS NEEDED
Status: COMPLETED | OUTPATIENT
Start: 2025-06-20 | End: 2025-06-20

## 2025-06-20 RX ORDER — IODIXANOL 320 MG/ML
300 INJECTION, SOLUTION INTRAVASCULAR
Status: COMPLETED | OUTPATIENT
Start: 2025-06-20 | End: 2025-06-20

## 2025-06-20 RX ORDER — MIDAZOLAM HYDROCHLORIDE 2 MG/2ML
INJECTION, SOLUTION INTRAMUSCULAR; INTRAVENOUS AS NEEDED
Status: COMPLETED | OUTPATIENT
Start: 2025-06-20 | End: 2025-06-20

## 2025-06-20 RX ORDER — FENTANYL CITRATE 50 UG/ML
INJECTION, SOLUTION INTRAMUSCULAR; INTRAVENOUS AS NEEDED
Status: COMPLETED | OUTPATIENT
Start: 2025-06-20 | End: 2025-06-20

## 2025-06-20 RX ADMIN — VERAPAMIL HYDROCHLORIDE 2.5 MG: 2.5 INJECTION INTRAVENOUS at 12:29

## 2025-06-20 RX ADMIN — FENTANYL CITRATE 25 MCG: 50 INJECTION INTRAMUSCULAR; INTRAVENOUS at 12:00

## 2025-06-20 RX ADMIN — CEFAZOLIN SODIUM 2000 MG: 2 SOLUTION INTRAVENOUS at 12:00

## 2025-06-20 RX ADMIN — Medication 100 MCG: at 13:18

## 2025-06-20 RX ADMIN — MIDAZOLAM 0.5 MG: 1 INJECTION INTRAMUSCULAR; INTRAVENOUS at 12:54

## 2025-06-20 RX ADMIN — Medication 100 MCG: at 12:29

## 2025-06-20 RX ADMIN — HYDRALAZINE HYDROCHLORIDE 10 MG: 20 INJECTION INTRAMUSCULAR; INTRAVENOUS at 12:22

## 2025-06-20 RX ADMIN — MIDAZOLAM 0.5 MG: 1 INJECTION INTRAMUSCULAR; INTRAVENOUS at 13:12

## 2025-06-20 RX ADMIN — VERAPAMIL HYDROCHLORIDE 2.5 MG: 2.5 INJECTION INTRAVENOUS at 13:18

## 2025-06-20 RX ADMIN — Medication 10 ML: at 12:07

## 2025-06-20 RX ADMIN — FENTANYL CITRATE 25 MCG: 50 INJECTION INTRAMUSCULAR; INTRAVENOUS at 13:05

## 2025-06-20 RX ADMIN — MIDAZOLAM 0.5 MG: 1 INJECTION INTRAMUSCULAR; INTRAVENOUS at 12:00

## 2025-06-20 RX ADMIN — MIDAZOLAM 0.5 MG: 1 INJECTION INTRAMUSCULAR; INTRAVENOUS at 13:04

## 2025-06-20 RX ADMIN — FENTANYL CITRATE 25 MCG: 50 INJECTION INTRAMUSCULAR; INTRAVENOUS at 12:37

## 2025-06-20 RX ADMIN — MIDAZOLAM 1 MG: 1 INJECTION INTRAMUSCULAR; INTRAVENOUS at 12:31

## 2025-06-20 RX ADMIN — SODIUM CHLORIDE 75 ML/HR: 0.9 INJECTION, SOLUTION INTRAVENOUS at 09:29

## 2025-06-20 RX ADMIN — IODIXANOL 90 ML: 320 INJECTION, SOLUTION INTRAVASCULAR at 14:17

## 2025-06-20 NOTE — BRIEF OP NOTE (RAD/CATH)
INTERVENTIONAL RADIOLOGY PROCEDURE NOTE    Date: 6/20/2025    Procedure:   Procedure Summary       Date: 06/20/25 Room / Location: [facility] Interventional Radiology    Anesthesia Start:  Anesthesia Stop:     Procedure: IR PROSTATE ARTERY EMBOLIZATION Diagnosis:       Benign prostatic hyperplasia with urinary frequency      (BPH w/LUTS)    Scheduled Providers: Tray Mckeon MD Responsible Provider:     Anesthesia Type: Not recorded ASA Status: Not recorded            Preoperative diagnosis:   1. Benign prostatic hyperplasia with urinary frequency         Postoperative diagnosis: Same.    Surgeon: Rickie Woods MD     Assistant: None. No qualified resident was available.    Blood loss: 5 ml    Specimens: none.     Findings:   Bilateral PAE.    Complications: None immediate.    Anesthesia: conscious sedation

## 2025-06-20 NOTE — SEDATION DOCUMENTATION
Interventional Radiology Procedure Nursing Note    Procedure: PAE  Performing Provider: Dr. Woods    Access site: Right groin  Closure: starclose and gauze  Bedrest start time: 1345.    Medications administered:  Midazolam IV: 3 mg  Fentanyl IV: 75 mcg    Events:  Patient safely transferred back to [facility] with assistance. Patient tolerated procedure well. Report called to [facility] RN and transported to designated department.

## 2025-06-20 NOTE — H&P
Interventional Radiology Preprocedure Note    History/Indication for procedure:   Gabriel Gonzalez is a 80 y.o. male with a PMH of BPH w/LUTS who presents for PAE.    Relevant past medical history:    Past Medical History[1]  Problem List[2]    /75 (BP Location: Right arm)   Pulse 75   Temp 97.6 °F (36.4 °C) (Oral)   Resp 16   Ht 6' (1.829 m)   Wt 83.5 kg (184 lb)   SpO2 96%   BMI 24.95 kg/m²     Medications:    Inpatient Medications:     Scheduled Medications:  Current Facility-Administered Medications   Medication Dose Route Frequency Provider Last Rate    ceFAZolin  2,000 mg Intravenous Once Rickie Woods MD      sodium chloride  75 mL/hr Intravenous Continuous Rickie Woods MD 75 mL/hr (06/20/25 0929)       Infusions:  sodium chloride, 75 mL/hr, Last Rate: 75 mL/hr (06/20/25 0929)        PRN:      Outpatient Medications:  Medications Ordered Prior to Encounter[3]    Allergies[4]    Anticoagulants: none    ASA classification: ASA 3 - Patient with moderate systemic disease with functional limitations    Airway Assessment: II (hard and soft palate, upper portion of tonsils anduvula visible)    Relevant family history: None    Relevant review of systems: None    Prior sedation/anesthesia: yes    Can the patient lie flat? Yes     NPO Status: yes    Labs:   CBC with diff:   Lab Results   Component Value Date    WBC 6.02 06/20/2025    HGB 13.4 06/20/2025    HCT 40.4 06/20/2025    MCV 96 06/20/2025     06/20/2025    RBC 4.23 06/20/2025    MCH 31.7 06/20/2025    MCHC 33.2 06/20/2025    RDW 13.1 06/20/2025    MPV 11.1 06/20/2025    NRBC 0 08/02/2024     BMP/CMP:  Lab Results   Component Value Date    K 4.1 06/20/2025     06/20/2025    CO2 28 06/20/2025    BUN 17 06/20/2025    CREATININE 0.96 06/20/2025    CALCIUM 9.3 06/20/2025    AST 12 (L) 06/20/2025    ALT <3 (L) 06/20/2025    ALKPHOS 82 06/20/2025    EGFR 75 06/20/2025   ,     Coags:   Lab Results   Component Value Date    INR 0.97  06/20/2025   ,   Results from last 7 days   Lab Units 06/20/25  0922   INR  0.97        Relevant imaging studies:   Reviewed.    Directed physical examination:  I agree with the physical exam performed on 6/10/25 and there are no additional changes.    Assessment/Plan:   PAE.    Sedation/Anesthesia plan:  Moderate sedation will be used as needed for procedure.    Consent with alternatives to the procedure, risks and benefits have been explained and discussed with the patient/patient's family: yes.         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)    Parkinson's disease with dyskinesia without fluctuating manifestations (HCC)   [3]   Current Outpatient Medications on File Prior to Encounter   Medication Sig Dispense Refill    ALPRAZolam (XANAX) 1 mg tablet Take 1 tablet 1 hr prior to procedure 1 tablet 0    atorvastatin (LIPITOR) 20 mg tablet TAKE ONE TABLET BY MOUTH EVERY DAY 90 tablet 1    carbidopa-levodopa (SINEMET CR)  mg TBCR per ER tablet Take 1.5 tabs  tablet 1    Cholecalciferol (VITAMIN D3) 1,000 units tablet Take 1,000 Units by mouth daily      ketoconazole (NIZORAL) 2 % shampoo Apply 1 Application topically Uses Sparingly      midodrine (PROAMATINE) 2.5 mg tablet TAKE ONE TABLET BY MOUTH THREE TIMES A DAY AS NEEDED 90 tablet 3    OLANZapine (ZyPREXA) 2.5 mg tablet Take 1 tablet (2.5 mg total) by mouth daily at bedtime 90  tablet 1    rivastigmine (EXELON) 3 mg capsule TAKE ONE CAPSULE BY MOUTH TWICE A  capsule 3    vitamin B-12 (VITAMIN B-12) 1,000 mcg tablet Take 1,000 mcg by mouth daily       No current facility-administered medications on file prior to encounter.   [4] No Known Allergies

## 2025-06-20 NOTE — DISCHARGE INSTRUCTIONS
Prostate Artery Embolization     How can you care for yourself at home?  This care sheet gives you a general idea about how long it will take for you to recover. Each person recovers at a different speed.    You had local anesthesia (a shot to numb the area where the catheter was inserted). You may feel some pain and discomfort as it wears off.    You may have gotten a sedative to help you relax and pain medication to ease pain. It is usually given in a vein (by IV). Common side effects from sedation include:    Feeling sleepy - your doctors and nurses will make sure you are not too sleepy to go home.  Nausea and vomiting - this usually does not last long. It can be treated with over-the-counter medication. Read and follow all instructions on label.  If you received sedation, for the next 24 hours please AVOID:    Alcoholic beverages;  Making important personal, business, and legal decisions;  Driving, operating heavy machinery, or doing anything that requires coordination and balance.       Recovering from a prostate artery embolization  Common side effects include:    Pain or burning during urination. This is from inflammation in the prostate caused by the blocked arteries. If severe, your doctor may prescribe a medication to help lessen the pain.  A small amount of blood in your urine in the days or weeks following the procedure. This will get better with time. If the amount of blood seems to be increasing, notify your doctor.  Flu-like symptoms. You may feel tired, have nausea, poor appetite, a low grade fever (less than 101.5?F), vague pelvic pain, night sweats and/or chills when you go home. Most of these symptoms should improve after 4 to 5 days.  In rare circumstances, inflammation in the prostate will make it hard to urinate. The doctor will need to place a urinary catheter to help with urination. In these rare circumstances, you will go home with the urinary catheter. Your doctor will arrange a  follow up visit in 1 week with your urologist to remove the catheter.  Diet    You can eat and drink fluids as tolerated. Start will clear liquids and work up to solid foods. Drink plenty of fluids unless otherwise directed by your doctor. Extra fluids will help flush the contrast dye from your body.  Activity    Do not lift anything heavier than 5 pounds for the first 3 days after the procedure.  Avoid strenuous activities and heavy lifting for 2 weeks.  Limit climbing stairs today and tomorrow if the puncture site was in your groin.  Rest when you feel tired, especially for the first 48 hours. Getting enough sleep will help you recover.  You can go back to normal activities as you feel up to it. Start slowly and listen to your body, do not overdo it. Most people are ready to return to work 5 to 7 days after the procedure if not sooner.  You can drive 2 days after the procedure if you are not taking any narcotics.  You can shower 24 hours after your procedure.  Caring for the puncture site    You may have a small amount of bruising at the puncture site (where the catheter entered your skin). This is normal and should go away over the next several days.  Remove the bandage after 24 hours.  Gently clean the puncture site and the skin around it with soap and water. Don’t use hydrogen peroxide or alcohol which may slow healing.  Gently pat the puncture site dry. Apply new gauze and tape or a Band-Aid.  Change the bandage and clean the puncture site every day until it has healed completely (usually 2-3 days).  Do not use any lotion or powder near the puncture site.  Do not take a bath or swim until it has healed completely.  There should be no or very minimal bleeding from the puncture site.  If bleeding is noticed, lie down (if you had a groin puncture) and apply direct pressure to the area for 20 minutes. Notify the Interventional Radiologist immediately.       Medication Instructions  See the medications below that  can be taken at home. Be safe with medicines. Read and follow all instructions on the label. If you are diabetic and taking glucophage (Metformin), do NOT take for 2 days after the procedure. If you have been prescribed any new medications see the attached “Medication List” section of the After Visit Summary.    Mild pain and/or low grade fever (pain score 1-4; fever less than 101.5? F)    If you have pain, don’t be afraid to say so. Pain medicine works better if you take it before the pain gets bad.  You can take over-the-counter (OTC) medication such as ibuprofen (Advil/Motrin), naproxen (Aleve) or acetaminophen (Tylenol) for mild pain and/or low grade fever.  Take ibuprofen and naproxen with food as they may upset your stomach.  If you have a fever over 101.5? F that does not go down with medication, please contact your Interventional Radiologist and Oncologist.       Follow up care  If you are sent home with a urinary catheter, your doctor will arrange a follow up visit with your urologist to remove the catheter in 1-2 weeks.  Make a follow-up appointment with Interventional Radiology 4 weeks after your procedure date.  Be sure to make and go to all appointments, and call your doctor if you are having problems. It is also a good idea to know your test results and keep an up-to-date list of the medications you take.       When should you call for help?  Call 911 anytime you think you may need emergency care. For example, call if:    You pass out or lose consciousness.  You have uncontrolled bleeding from the puncture site.  Call your doctor now or seek immediate medical care if:    You have severe pain not relieved by medication.  Blood has soaked through the bandage.  You have numbness, tingling or difficulty moving your hand (if you had a radial or wrist puncture).  You are unable to urinate (and do not have a urinary catheter in place).  You see a lot of blood in your urine.  You see any blood in your  stool.  You have signs of infection such as:  Increased pain, swelling, warmth, or redness around the puncture site.  Pus draining or red streaks leading from the puncture site.  A fever greater than 101.5? F that does not go down with over-the-counter medications lasting longer than 24 hours, chills, or body aches.  Watch closely for changes in your health, and be sure to contact your doctor if:    You do not get better as expected.             Moderate Sedation   WHAT YOU NEED TO KNOW:   Moderate sedation, or conscious sedation, is medicine used during procedures to help you feel relaxed and calm. You will be awake and able to follow directions without anxiety or pain. You will remember little to none of the procedure. You may feel tired, weak, or unsteady on your feet after you get sedation. You may also have trouble concentrating or short-term memory loss. These symptoms should go away in 24 hours or less.   DISCHARGE INSTRUCTIONS:   Call 911 or have someone else call for any of the following:   You have sudden trouble breathing.     You cannot be woken.  Seek care immediately if:   You have a severe headache or dizziness.     Your heart is beating faster than usual.  Contact your healthcare provider if:   You have a fever.     You have nausea or are vomiting for more than 8 hours after the procedure.      Your skin is itchy, swollen, or you have a rash.     You have questions or concerns about your condition or care.  Self-care:   Have someone stay with you for 24 hours. This person can drive you to errands and help you do things around the house. This person can also watch for problems.      Rest and do quiet activities for 24 hours. Do not exercise, ride a bike, or play sports. Stand up slowly to prevent dizziness and falls. Take short walks around the house with another person. Slowly return to your usual activities the next day.      Do not drive or use dangerous machines or tools for 24 hours. You may  injure yourself or others. Examples include a lawnmower, saw, or drill. Do not return to work for 24 hours if you use dangerous machines or tools for work.      Do not make important decisions for 24 hours. For example, do not sign important papers or invest money.      Drink liquids as directed. Liquids help flush the sedation medicine out of your body. Ask how much liquid to drink each day and which liquids are best for you.      Eat small, frequent meals to prevent nausea and vomiting. Start with clear liquids such as juice or broth. If you do not vomit after clear liquids, you can eat your usual foods.      Do not drink alcohol or take medicines that make you drowsy. This includes medicines that help you sleep and anxiety medicines. Ask your healthcare provider if it is safe for you to take pain medicine.  Follow up with your healthcare provider as directed: Write down your questions so you remember to ask them during your visits.   © 2017 [employer] Information is for End User's use only and may not be sold, redistributed or otherwise used for commercial purposes. All illustrations and images included in CareNotes® are the copyrighted property of [employer] or [employer].  The above information is an  only. It is not intended as medical advice for individual conditions or treatments. Talk to your doctor, nurse or pharmacist before following any medical regimen to see if it is safe and effective for you.

## 2025-06-23 LAB
DME PARACHUTE DELIVERY DATE REQUESTED: NORMAL
DME PARACHUTE ITEM DESCRIPTION: NORMAL
DME PARACHUTE ITEM DESCRIPTION: NORMAL
DME PARACHUTE ORDER STATUS: NORMAL
DME PARACHUTE SUPPLIER NAME: NORMAL
DME PARACHUTE SUPPLIER PHONE: NORMAL

## 2025-06-25 RX ORDER — CARBIDOPA AND LEVODOPA 25; 100 MG/1; MG/1
TABLET ORAL
Qty: 630 TABLET | Refills: 0 | Status: SHIPPED | OUTPATIENT
Start: 2025-06-25

## 2025-07-10 LAB
DME PARACHUTE DELIVERY DATE ACTUAL: NORMAL
DME PARACHUTE DELIVERY DATE REQUESTED: NORMAL
DME PARACHUTE ITEM DESCRIPTION: NORMAL
DME PARACHUTE ITEM DESCRIPTION: NORMAL
DME PARACHUTE ORDER STATUS: NORMAL
DME PARACHUTE SUPPLIER NAME: NORMAL
DME PARACHUTE SUPPLIER PHONE: NORMAL

## 2025-07-17 ENCOUNTER — OFFICE VISIT (OUTPATIENT)
Dept: FAMILY MEDICINE CLINIC | Facility: CLINIC | Age: 80
End: 2025-07-17
Payer: MEDICARE

## 2025-07-17 VITALS
HEIGHT: 72 IN | DIASTOLIC BLOOD PRESSURE: 72 MMHG | WEIGHT: 184 LBS | BODY MASS INDEX: 24.92 KG/M2 | OXYGEN SATURATION: 99 % | HEART RATE: 73 BPM | SYSTOLIC BLOOD PRESSURE: 122 MMHG

## 2025-07-17 DIAGNOSIS — G20.A1 PARKINSON'S DISEASE (HCC): ICD-10-CM

## 2025-07-17 DIAGNOSIS — I95.1 ORTHOSTATIC HYPOTENSION: Primary | ICD-10-CM

## 2025-07-17 DIAGNOSIS — R35.0 BENIGN PROSTATIC HYPERPLASIA WITH URINARY FREQUENCY: ICD-10-CM

## 2025-07-17 DIAGNOSIS — G20.B1 PARKINSON'S DISEASE WITH DYSKINESIA WITHOUT FLUCTUATING MANIFESTATIONS (HCC): ICD-10-CM

## 2025-07-17 DIAGNOSIS — N40.1 BENIGN PROSTATIC HYPERPLASIA WITH URINARY FREQUENCY: ICD-10-CM

## 2025-07-17 DIAGNOSIS — F06.8 PSYCHOSIS DUE TO PARKINSON'S DISEASE (HCC): ICD-10-CM

## 2025-07-17 DIAGNOSIS — G20.A1 PSYCHOSIS DUE TO PARKINSON'S DISEASE (HCC): ICD-10-CM

## 2025-07-17 DIAGNOSIS — R26.81 UNSTEADY GAIT: ICD-10-CM

## 2025-07-17 PROCEDURE — 99214 OFFICE O/P EST MOD 30 MIN: CPT | Performed by: FAMILY MEDICINE

## 2025-07-17 PROCEDURE — G2211 COMPLEX E/M VISIT ADD ON: HCPCS | Performed by: FAMILY MEDICINE

## 2025-07-17 RX ORDER — MIDODRINE HYDROCHLORIDE 2.5 MG/1
TABLET ORAL
Qty: 90 TABLET | Refills: 3 | Status: SHIPPED | OUTPATIENT
Start: 2025-07-17

## 2025-07-17 NOTE — PROGRESS NOTES
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 Orthostatic Hypotension.  - Symptoms suggest a possible increase in orthostatic hypotension, likely exacerbated by improved urinary function leading to volume depletion.  - The current regimen of midodrine may need adjustment. The morning dose of midodrine will be doubled, while the late morning dose will remain unchanged.  - Fluid intake will be increased to compensate for potential dehydration due to improved urinary function.  - The patient will monitor his heart rate and report any significant drops.    2. Falls.  - The patient has experienced multiple falls, likely related to orthostatic hypotension.  - Advised to continue using assistive devices such as a walker or rollator for stability and safety.  - Occupational therapy will continue to focus on strengthening exercises.  - The patient will also use cameras and potentially a baby monitor for additional safety monitoring at home.    3. Shortness of Breath.  - Reports episodes of shortness of breath, particularly during exertion, which may be related to deconditioning.  - Continued use of pulmonary exercises and monitoring of symptoms are recommended.  - If symptoms persist or worsen, further evaluation may be necessary.    4. Neck Pain.  - Reports neck pain, which may be related to posture or muscle strain.  -  Stretching exercises and proper ergonomics are recommended.  - If pain persists, further evaluation may be necessary.    Follow-up  - Follow up in 4 months.           History of Present Illness     History of Present Illness  The patient presents for orthostatic hypotension, falls, shortness of breath, and neck pain.    He underwent prostate artery embolization on 06/2025, which has been beneficial. The first 5 days post-procedure were challenging due to the effects of anesthesia, but his condition improved thereafter. His urinary urgency has decreased, now only needing to urinate once between 10 PM and 4 AM. He has been using Pedialyte in his water and consumes coffee, milk, and juice in the morning. He dislikes water and prefers cranberry juice. He also takes aspirin.    On 07/13/2025, he experienced a fall, necessitating a call to the squad. He has had 2 or 3 near-falls since then. Physical therapy was paused to allow for occupational therapy, focusing on strengthening his core and legs. Despite this, he had another fall, which seemed to be triggered by an increase in orthostatic hypotension, causing him to feel faint and collapse. He now struggles with short distances, feeling very weak after walking a few feet. Today, he used a wheelchair for the first time, having previously used a rollator. He is unhappy about this but understands it is necessary due to his weakness. His midodrine dose has remained unchanged for 7 years, taken at 6:30 or 7 AM and again at 10:30 or 11 AM. He does not take any more doses for the rest of the day. His wife is considering giving him a dose before he gets up in the morning. He has not been able to go to the office since the fall. He has cameras in the house and is considering getting a baby monitor. His occupational therapist will return tomorrow to restart his strength exercises. His nurse practitioner from [facility] has recommended palliative care. He has been using a walker  at home but tries to take a few steps without it when he feels steady. He has had some near-misses where he almost fell but was caught by his wife.    He sometimes feels like he cannot breathe when he is about to collapse. He has been through pulmonary therapy and cardiac therapy.    He also reports neck pain.    Social History:  Diet: He consumes coffee, milk, juice, and cranberry juice. He dislikes water.  Coffee/Tea/Caffeine-containing Drinks: He consumes coffee and a small Pepsi in the middle of the day.  Living Condition: He has cameras in the house and is considering getting a baby monitor.    PAST SURGICAL HISTORY:  Prostate artery embolization on 06/2025     Review of Systems   Constitutional:  Positive for appetite change (low appetite). Negative for fatigue and fever.   HENT:  Positive for voice change (only when tired). Negative for hearing loss, tinnitus and trouble swallowing.    Eyes: Negative.  Negative for photophobia, pain and visual disturbance.   Respiratory: Negative.  Negative for shortness of breath.    Cardiovascular: Negative.  Negative for palpitations.   Gastrointestinal: Negative.  Negative for nausea and vomiting.   Endocrine: Negative.  Negative for cold intolerance.   Genitourinary: Negative.  Negative for dysuria, frequency and urgency.   Musculoskeletal:  Positive for gait problem (due to leg weakness) and myalgias. Negative for back pain and neck pain.        Balance Issues     Skin: Negative.  Negative for rash.   Allergic/Immunologic: Negative.    Neurological:  Positive for dizziness (a couple times a day, has to sit to help go away), tremors (Mild in hands, Left worse than Right, has stayed the same) and weakness (Right Leg which causes issues with gait). Negative for seizures, syncope, facial asymmetry, speech difficulty, light-headedness, numbness and headaches.   Hematological: Negative.  Does not bruise/bleed easily.   Psychiatric/Behavioral:  Positive for confusion and  hallucinations. Negative for sleep disturbance.         Delusions  Paranoid   All other systems reviewed and are negative.    Objective   /72   Pulse 73   Ht 6' (1.829 m)   Wt 83.5 kg (184 lb)   SpO2 99%   BMI 24.95 kg/m²     Physical Exam  - Respiratory: Clear to auscultation, no wheezing, rales, or rhonchi  - Cardiovascular: Regular rate and rhythm, no murmurs, rubs, or gallops  - Extremities: Not a lot of swelling  Physical Exam  Vitals and nursing note reviewed.   Constitutional:       Appearance: He is well-developed.   HENT:      Head: Normocephalic and atraumatic.     Eyes:      Conjunctiva/sclera: Conjunctivae normal.      Pupils: Pupils are equal, round, and reactive to light.       Cardiovascular:      Rate and Rhythm: Normal rate and regular rhythm.      Heart sounds: Normal heart sounds.   Pulmonary:      Effort: Pulmonary effort is normal.      Breath sounds: Normal breath sounds. No wheezing or rales.   Abdominal:      General: Bowel sounds are normal. There is no distension.      Palpations: Abdomen is soft.      Tenderness: There is no abdominal tenderness.     Musculoskeletal:         General: No tenderness. Normal range of motion.      Cervical back: Normal range of motion and neck supple.     Skin:     General: Skin is warm and dry.      Findings: No rash.     Neurological:      Mental Status: He is alert and oriented to person, place, and time.      Cranial Nerves: No cranial nerve deficit.      Sensory: No sensory deficit.      Coordination: Coordination normal.      Gait: Gait abnormal.     Psychiatric:         Behavior: Behavior normal.         Thought Content: Thought content normal.         Judgment: Judgment normal.

## 2025-07-20 DIAGNOSIS — G20.A1 PARKINSON'S DISEASE WITHOUT DYSKINESIA OR FLUCTUATING MANIFESTATIONS (HCC): ICD-10-CM

## 2025-07-20 NOTE — ASSESSMENT & PLAN NOTE
Orders:    midodrine (PROAMATINE) 2.5 mg tablet; Take 2 tablets in the AM and 1 in the afternoon and if needed 1 in the late afternoon

## 2025-07-22 ENCOUNTER — TELEMEDICINE (OUTPATIENT)
Dept: INTERVENTIONAL RADIOLOGY/VASCULAR | Facility: CLINIC | Age: 80
End: 2025-07-22
Payer: MEDICARE

## 2025-07-22 DIAGNOSIS — N40.1 BENIGN PROSTATIC HYPERPLASIA WITH URINARY FREQUENCY: Primary | ICD-10-CM

## 2025-07-22 DIAGNOSIS — R35.0 BENIGN PROSTATIC HYPERPLASIA WITH URINARY FREQUENCY: Primary | ICD-10-CM

## 2025-07-22 PROCEDURE — 99213 OFFICE O/P EST LOW 20 MIN: CPT | Performed by: STUDENT IN AN ORGANIZED HEALTH CARE EDUCATION/TRAINING PROGRAM

## 2025-07-22 RX ORDER — CARBIDOPA AND LEVODOPA 25; 100 MG/1; MG/1
TABLET ORAL
Qty: 630 TABLET | Refills: 0 | Status: SHIPPED | OUTPATIENT
Start: 2025-07-22

## 2025-07-23 NOTE — PROGRESS NOTES
"Virtual Brief Visit  Name: Gabriel Gonzalez      : 1945      MRN: [id number]  Encounter Provider: Rickie Woods MD  Encounter Date: 2025   Encounter department: [facility] INTERVENTIONAL RADIOLOGY [facility]  :  Assessment & Plan  Benign prostatic hyperplasia with urinary frequency       80-year-old male with a past medical history of Parkinson disease as well as benign prostatic hyperplasia who presents as a 1 month follow-up status post prostate artery embolization.    Taiwo seems to be doing quite well after his PAE.  He is already experiencing dramatic improvement in his lower urinary tract symptoms.    I explained that he should continue to see improvement over the next 1 to 2 months.    I reminded him that he can reach out to me whenever he would like if he has any further questions.    Thank you for allowing Interventional Radiology to participate in the care of Gabriel Gonzalez. Please don't hesitate to call, text, email, or Epic Message with any questions.     Rickie Woods MD, DABR  Interventional Radiologist  [facility]  Personal Cell: [phone]  [email or web address]    History of Present Illness     80-year-old male with a past medical history of Parkinson disease as well as benign prostatic hyperplasia who presents as a 1 month follow-up status post prostate artery embolization.    Taiwo is doing well.  He reports \"significant improvement\" after the prostate artery embolization.  He reports that he is able to go longer stretches of time without having to urinate.  Specifically, his nocturia has improved from 5 times per night to twice per night.    He did report deep pelvic \"soreness\" for approximately 2 days.  He took all of his prescribed medications.  His wife did mention that he was weak for approximately 5 days and did have a mechanical fall, likely attributed to his concomitant Parkinson's disease.  This has since improved and resolved.    He had no " hematuria.  He had no issues with urinary retention.    He reports no issues with his right groin site.    Administrative Statements   Encounter provider Rickie Woods MD    The Patient is located at Home and in the following state in which I hold an active license [place].    The patient was identified by name and date of birth. Gabriel Gonzalez was informed that this is a telemedicine visit and that the visit is being conducted through Telephone.  My office door was closed. No one else was in the room.  He acknowledged consent and understanding of privacy and security of the video platform. The patient has agreed to participate and understands they can discontinue the visit at any time.    I have spent a total time of 20 minutes in caring for this patient on the day of the visit/encounter including Prognosis, Instructions for management, Patient and family education, Impressions, Documenting in the medical record, and Obtaining or reviewing history  , not including the time spent for establishing the audio/video connection.

## 2025-07-25 ENCOUNTER — TELEPHONE (OUTPATIENT)
Dept: NEUROLOGY | Facility: CLINIC | Age: 80
End: 2025-07-25

## 2025-07-25 NOTE — TELEPHONE ENCOUNTER
Called patient to confirm appointment with Dr. Patel on 08/04/2025 at the [facility]. Patient did confirm.

## 2025-07-31 ENCOUNTER — TELEPHONE (OUTPATIENT)
Age: 80
End: 2025-07-31

## 2025-08-04 ENCOUNTER — OFFICE VISIT (OUTPATIENT)
Dept: NEUROLOGY | Facility: CLINIC | Age: 80
End: 2025-08-04
Payer: MEDICARE

## 2025-08-04 VITALS
SYSTOLIC BLOOD PRESSURE: 126 MMHG | BODY MASS INDEX: 24.62 KG/M2 | OXYGEN SATURATION: 97 % | HEART RATE: 65 BPM | HEIGHT: 72 IN | DIASTOLIC BLOOD PRESSURE: 70 MMHG | WEIGHT: 181.8 LBS | TEMPERATURE: 97.7 F

## 2025-08-04 DIAGNOSIS — F02.B2 MODERATE DEMENTIA DUE TO PARKINSON'S DISEASE, WITH PSYCHOTIC DISTURBANCE (HCC): Primary | ICD-10-CM

## 2025-08-04 DIAGNOSIS — I95.1 ORTHOSTATIC HYPOTENSION: ICD-10-CM

## 2025-08-04 DIAGNOSIS — G47.52 REM BEHAVIORAL DISORDER: ICD-10-CM

## 2025-08-04 DIAGNOSIS — G20.A1 PARKINSON'S DISEASE (HCC): ICD-10-CM

## 2025-08-04 DIAGNOSIS — G20.B1 PARKINSON'S DISEASE WITH DYSKINESIA WITHOUT FLUCTUATING MANIFESTATIONS (HCC): ICD-10-CM

## 2025-08-04 DIAGNOSIS — G20.A1 MODERATE DEMENTIA DUE TO PARKINSON'S DISEASE, WITH PSYCHOTIC DISTURBANCE (HCC): Primary | ICD-10-CM

## 2025-08-04 PROCEDURE — G2211 COMPLEX E/M VISIT ADD ON: HCPCS | Performed by: PSYCHIATRY & NEUROLOGY

## 2025-08-04 PROCEDURE — 99215 OFFICE O/P EST HI 40 MIN: CPT | Performed by: PSYCHIATRY & NEUROLOGY

## 2025-08-04 RX ORDER — MIDODRINE HYDROCHLORIDE 2.5 MG/1
TABLET ORAL
Qty: 90 TABLET | Refills: 3 | Status: SHIPPED | OUTPATIENT
Start: 2025-08-04

## 2025-08-04 RX ORDER — DROXIDOPA 100 MG/1
1 CAPSULE ORAL 3 TIMES DAILY
Qty: 90 CAPSULE | Refills: 5 | Status: SHIPPED | OUTPATIENT
Start: 2025-08-04

## 2025-08-05 ENCOUNTER — TELEPHONE (OUTPATIENT)
Age: 80
End: 2025-08-05

## 2025-08-06 ENCOUNTER — TELEPHONE (OUTPATIENT)
Age: 80
End: 2025-08-06